# Patient Record
Sex: MALE | Race: WHITE | NOT HISPANIC OR LATINO
[De-identification: names, ages, dates, MRNs, and addresses within clinical notes are randomized per-mention and may not be internally consistent; named-entity substitution may affect disease eponyms.]

---

## 2017-05-03 ENCOUNTER — APPOINTMENT (OUTPATIENT)
Dept: VASCULAR SURGERY | Facility: CLINIC | Age: 67
End: 2017-05-03

## 2017-05-03 VITALS
SYSTOLIC BLOOD PRESSURE: 124 MMHG | HEART RATE: 73 BPM | BODY MASS INDEX: 13.72 KG/M2 | HEIGHT: 71 IN | DIASTOLIC BLOOD PRESSURE: 63 MMHG | WEIGHT: 98 LBS

## 2017-05-26 ENCOUNTER — MEDICATION RENEWAL (OUTPATIENT)
Age: 67
End: 2017-05-26

## 2017-11-01 ENCOUNTER — APPOINTMENT (OUTPATIENT)
Dept: VASCULAR SURGERY | Facility: CLINIC | Age: 67
End: 2017-11-01
Payer: MEDICARE

## 2017-11-01 VITALS
BODY MASS INDEX: 26.18 KG/M2 | SYSTOLIC BLOOD PRESSURE: 157 MMHG | WEIGHT: 187 LBS | DIASTOLIC BLOOD PRESSURE: 67 MMHG | TEMPERATURE: 98.3 F | HEART RATE: 68 BPM | HEIGHT: 71 IN

## 2017-11-01 PROCEDURE — 99213 OFFICE O/P EST LOW 20 MIN: CPT

## 2017-12-07 ENCOUNTER — MEDICATION RENEWAL (OUTPATIENT)
Age: 67
End: 2017-12-07

## 2018-05-02 ENCOUNTER — APPOINTMENT (OUTPATIENT)
Dept: VASCULAR SURGERY | Facility: CLINIC | Age: 68
End: 2018-05-02

## 2018-05-08 ENCOUNTER — APPOINTMENT (OUTPATIENT)
Dept: VASCULAR SURGERY | Facility: CLINIC | Age: 68
End: 2018-05-08
Payer: MEDICARE

## 2018-05-08 VITALS
WEIGHT: 180 LBS | DIASTOLIC BLOOD PRESSURE: 82 MMHG | TEMPERATURE: 98.8 F | SYSTOLIC BLOOD PRESSURE: 132 MMHG | BODY MASS INDEX: 25.2 KG/M2 | HEART RATE: 71 BPM | HEIGHT: 71 IN

## 2018-05-08 PROCEDURE — 99213 OFFICE O/P EST LOW 20 MIN: CPT

## 2018-10-12 ENCOUNTER — OUTPATIENT (OUTPATIENT)
Dept: OUTPATIENT SERVICES | Facility: HOSPITAL | Age: 68
LOS: 1 days | End: 2018-10-12
Payer: MEDICARE

## 2018-10-12 ENCOUNTER — TRANSCRIPTION ENCOUNTER (OUTPATIENT)
Age: 68
End: 2018-10-12

## 2018-10-12 ENCOUNTER — APPOINTMENT (OUTPATIENT)
Dept: CARDIOLOGY | Facility: CLINIC | Age: 68
End: 2018-10-12
Payer: MEDICARE

## 2018-10-12 VITALS
DIASTOLIC BLOOD PRESSURE: 76 MMHG | RESPIRATION RATE: 16 BRPM | HEART RATE: 56 BPM | WEIGHT: 184.97 LBS | HEIGHT: 71 IN | OXYGEN SATURATION: 98 % | TEMPERATURE: 98 F | SYSTOLIC BLOOD PRESSURE: 176 MMHG

## 2018-10-12 VITALS — DIASTOLIC BLOOD PRESSURE: 70 MMHG | SYSTOLIC BLOOD PRESSURE: 120 MMHG | HEART RATE: 75 BPM

## 2018-10-12 DIAGNOSIS — Z98.890 OTHER SPECIFIED POSTPROCEDURAL STATES: Chronic | ICD-10-CM

## 2018-10-12 DIAGNOSIS — I20.0 UNSTABLE ANGINA: ICD-10-CM

## 2018-10-12 LAB
ALBUMIN SERPL ELPH-MCNC: 4.7 G/DL — SIGNIFICANT CHANGE UP (ref 3.3–5)
ALP SERPL-CCNC: 81 U/L — SIGNIFICANT CHANGE UP (ref 40–120)
ALT FLD-CCNC: 28 U/L — SIGNIFICANT CHANGE UP (ref 10–45)
ANION GAP SERPL CALC-SCNC: 11 MMOL/L — SIGNIFICANT CHANGE UP (ref 5–17)
AST SERPL-CCNC: 23 U/L — SIGNIFICANT CHANGE UP (ref 10–40)
BILIRUB SERPL-MCNC: 0.4 MG/DL — SIGNIFICANT CHANGE UP (ref 0.2–1.2)
BUN SERPL-MCNC: 25 MG/DL — HIGH (ref 7–23)
CALCIUM SERPL-MCNC: 10.2 MG/DL — SIGNIFICANT CHANGE UP (ref 8.4–10.5)
CHLORIDE SERPL-SCNC: 105 MMOL/L — SIGNIFICANT CHANGE UP (ref 96–108)
CO2 SERPL-SCNC: 28 MMOL/L — SIGNIFICANT CHANGE UP (ref 22–31)
CREAT SERPL-MCNC: 1.1 MG/DL — SIGNIFICANT CHANGE UP (ref 0.5–1.3)
GLUCOSE BLDC GLUCOMTR-MCNC: 103 MG/DL — HIGH (ref 70–99)
GLUCOSE BLDC GLUCOMTR-MCNC: 231 MG/DL — HIGH (ref 70–99)
GLUCOSE SERPL-MCNC: 110 MG/DL — HIGH (ref 70–99)
HCT VFR BLD CALC: 41.6 % — SIGNIFICANT CHANGE UP (ref 39–50)
HGB BLD-MCNC: 14 G/DL — SIGNIFICANT CHANGE UP (ref 13–17)
MCHC RBC-ENTMCNC: 31.7 PG — SIGNIFICANT CHANGE UP (ref 27–34)
MCHC RBC-ENTMCNC: 33.7 GM/DL — SIGNIFICANT CHANGE UP (ref 32–36)
MCV RBC AUTO: 94.2 FL — SIGNIFICANT CHANGE UP (ref 80–100)
PLATELET # BLD AUTO: 149 K/UL — LOW (ref 150–400)
POTASSIUM SERPL-MCNC: 5 MMOL/L — SIGNIFICANT CHANGE UP (ref 3.5–5.3)
POTASSIUM SERPL-SCNC: 5 MMOL/L — SIGNIFICANT CHANGE UP (ref 3.5–5.3)
PROT SERPL-MCNC: 7.3 G/DL — SIGNIFICANT CHANGE UP (ref 6–8.3)
RBC # BLD: 4.42 M/UL — SIGNIFICANT CHANGE UP (ref 4.2–5.8)
RBC # FLD: 13 % — SIGNIFICANT CHANGE UP (ref 10.3–14.5)
SODIUM SERPL-SCNC: 144 MMOL/L — SIGNIFICANT CHANGE UP (ref 135–145)
WBC # BLD: 7.8 K/UL — SIGNIFICANT CHANGE UP (ref 3.8–10.5)
WBC # FLD AUTO: 7.8 K/UL — SIGNIFICANT CHANGE UP (ref 3.8–10.5)

## 2018-10-12 PROCEDURE — 99205 OFFICE O/P NEW HI 60 MIN: CPT | Mod: PD

## 2018-10-12 RX ORDER — DULOXETINE HYDROCHLORIDE 30 MG/1
20 CAPSULE, DELAYED RELEASE ORAL DAILY
Qty: 0 | Refills: 0 | Status: DISCONTINUED | OUTPATIENT
Start: 2018-10-12 | End: 2018-10-13

## 2018-10-12 RX ORDER — LOSARTAN POTASSIUM 100 MG/1
50 TABLET, FILM COATED ORAL DAILY
Qty: 0 | Refills: 0 | Status: DISCONTINUED | OUTPATIENT
Start: 2018-10-12 | End: 2018-10-13

## 2018-10-12 RX ORDER — INSULIN LISPRO 100/ML
VIAL (ML) SUBCUTANEOUS
Qty: 0 | Refills: 0 | Status: DISCONTINUED | OUTPATIENT
Start: 2018-10-12 | End: 2018-10-13

## 2018-10-12 RX ORDER — SODIUM CHLORIDE 9 MG/ML
1000 INJECTION, SOLUTION INTRAVENOUS
Qty: 0 | Refills: 0 | Status: DISCONTINUED | OUTPATIENT
Start: 2018-10-12 | End: 2018-10-13

## 2018-10-12 RX ORDER — DEXTROSE 50 % IN WATER 50 %
25 SYRINGE (ML) INTRAVENOUS ONCE
Qty: 0 | Refills: 0 | Status: DISCONTINUED | OUTPATIENT
Start: 2018-10-12 | End: 2018-10-13

## 2018-10-12 RX ORDER — DEXTROSE 50 % IN WATER 50 %
15 SYRINGE (ML) INTRAVENOUS ONCE
Qty: 0 | Refills: 0 | Status: DISCONTINUED | OUTPATIENT
Start: 2018-10-12 | End: 2018-10-13

## 2018-10-12 RX ORDER — ASPIRIN/CALCIUM CARB/MAGNESIUM 324 MG
81 TABLET ORAL DAILY
Qty: 0 | Refills: 0 | Status: DISCONTINUED | OUTPATIENT
Start: 2018-10-13 | End: 2018-10-13

## 2018-10-12 RX ORDER — CLOPIDOGREL BISULFATE 75 MG/1
75 TABLET, FILM COATED ORAL DAILY
Qty: 0 | Refills: 0 | Status: DISCONTINUED | OUTPATIENT
Start: 2018-10-13 | End: 2018-10-13

## 2018-10-12 RX ORDER — INSULIN LISPRO 100/ML
VIAL (ML) SUBCUTANEOUS AT BEDTIME
Qty: 0 | Refills: 0 | Status: DISCONTINUED | OUTPATIENT
Start: 2018-10-12 | End: 2018-10-13

## 2018-10-12 RX ORDER — ATORVASTATIN CALCIUM 80 MG/1
80 TABLET, FILM COATED ORAL AT BEDTIME
Qty: 0 | Refills: 0 | Status: DISCONTINUED | OUTPATIENT
Start: 2018-10-12 | End: 2018-10-13

## 2018-10-12 RX ORDER — CLOPIDOGREL BISULFATE 75 MG/1
1 TABLET, FILM COATED ORAL
Qty: 30 | Refills: 11
Start: 2018-10-12 | End: 2019-10-06

## 2018-10-12 RX ORDER — DEXTROSE 50 % IN WATER 50 %
12.5 SYRINGE (ML) INTRAVENOUS ONCE
Qty: 0 | Refills: 0 | Status: DISCONTINUED | OUTPATIENT
Start: 2018-10-12 | End: 2018-10-13

## 2018-10-12 RX ORDER — METOPROLOL TARTRATE 50 MG
25 TABLET ORAL
Qty: 0 | Refills: 0 | Status: DISCONTINUED | OUTPATIENT
Start: 2018-10-12 | End: 2018-10-13

## 2018-10-12 RX ORDER — ASPIRIN/CALCIUM CARB/MAGNESIUM 324 MG
1 TABLET ORAL
Qty: 30 | Refills: 11
Start: 2018-10-12 | End: 2019-10-06

## 2018-10-12 RX ORDER — GLUCAGON INJECTION, SOLUTION 0.5 MG/.1ML
1 INJECTION, SOLUTION SUBCUTANEOUS ONCE
Qty: 0 | Refills: 0 | Status: DISCONTINUED | OUTPATIENT
Start: 2018-10-12 | End: 2018-10-13

## 2018-10-12 RX ADMIN — ATORVASTATIN CALCIUM 80 MILLIGRAM(S): 80 TABLET, FILM COATED ORAL at 21:12

## 2018-10-12 RX ADMIN — Medication 2: at 18:22

## 2018-10-12 RX ADMIN — Medication 25 MILLIGRAM(S): at 18:22

## 2018-10-12 NOTE — DISCHARGE NOTE ADULT - ADDITIONAL INSTRUCTIONS
Follow-up with your Cardiologist in 1-2 weeks   Do not stop you Aspirin or Plavix unless instructed to do so by your cardiologist.

## 2018-10-12 NOTE — H&P CARDIOLOGY - HISTORY OF PRESENT ILLNESS
69 y/o  male PMH HTN, HLD, DM2 (controlled), bladder cancer, anxiety, PAD, c/o intermittent chest heaviness, not exacerbated with exertion, over the past month. Denies associated dyspnea, dizziness, diaphoresis, nausea, or palpitations. Seen and evaluated by PMD today who referred him to cardiologist, Dr. Barney Medina. Referred for cardiac catheterization today.

## 2018-10-12 NOTE — DISCHARGE NOTE ADULT - HOSPITAL COURSE
69 y/o  male PMH HTN, HLD, DM2 (controlled), bladder cancer, anxiety, PAD, c/o intermittent chest heaviness, not exacerbated with exertion, over the past month. Denies associated dyspnea, dizziness, diaphoresis, nausea, or palpitations. Seen and evaluated by PMD today who referred him to cardiologist, Dr. Barney Medina. Referred for cardiac catheterization today. Pt is now s/p cardiac cath GELA x 1 mRCA via right radial artery access. Pt tolerated the procedure well, cardiac cath site benign. Post-procedure discharge instructions discussed and questions addressed.

## 2018-10-12 NOTE — H&P CARDIOLOGY - PSH
arthroscopy  right carol 2008  bladder surgery  2-000  H/O cardiac catheterization    Knee Surgery  1998

## 2018-10-12 NOTE — CHART NOTE - NSCHARTNOTEFT_GEN_A_CORE
Pt started on Tikosyn 500mcg received dose at 1500  repeat ECG at 1700 with HR 70 AF show QTc 545 with calculated QTc 480ms  baseline ECG on admission HR 76 with QTc 440ms  Reviewed with Dr. Sexton    Plan:  decrease Tikosyn from 500mcg to 250mcg  check ECG prior to next dose at 3am  if QTc stable then give lower dose.  Recheck ECG 2 hours post dose  Review with Dr. Hernandez, EP covering tomorrow     Maribel Rausch NP-c  Cardiology

## 2018-10-12 NOTE — DISCHARGE NOTE ADULT - PATIENT PORTAL LINK FT
You can access the [facility] Patient Portal, offered by [facility], by registering with the following website: [email or web address]

## 2018-10-12 NOTE — DISCHARGE NOTE ADULT - PLAN OF CARE
No heavy lifting or pushing/pulling with procedure arm for 2 weeks. No driving for 2 days. You may shower 24 hours following the procedure but avoid baths/swimming for 1 week. Check your wrist site for bleeding and/or swelling daily following procedure and call your doctor immediately if it occurs or if you experience increased pain at the site. Follow up with your cardiologist in 1-2 weeks. You may call [facility] if you have any questions/concerns regarding your procedure [phone]. Your LDL cholesterol will be less than 70mg/dL Continue with your cholesterol medications. Eat a heart healthy diet that is low in saturated fats and salt, and includes whole grains, fruits, vegetables and lean protein; exercise regularly (consult with your physician or cardiologist first); maintain a heart healthy weight. Continue to follow with your primary physician or cardiologist for treatment goals, continue medication, have liver function testing every 3 months as anti lipid medications can cause liver irritation. If you smoke - quit (A resource to help you stop smoking is the [facility] – phone number [phone].). Your blood pressure will be controlled. Continue with your blood pressure medications; eat a heart healthy diet with low salt diet; exercise regularly (consult with your physician or cardiologist first); maintain a heart healthy weight; if you smoke - quit (A resource to help you stop smoking is the [facility] – phone number [phone].); include healthy ways to manage stress. Continue to follow with your primary care physician or cardiologist. Your hemoglobin A1C will be between 7-8 Continue to follow with your primary care MD or your endocrinologist.  Follow a heart healthy diabetic diet. If you check your fingerstick glucose at home, call your MD if it is greater than 250mg/dL on 2 occasions or less than 100mg/dL on 2 occasions. Know signs of low blood sugar, such as: dizziness, shakiness, sweating, confusion, hunger, nervousness-drink 4 ounces apple juice if occurs and call your doctor. Know early signs of high blood sugar, such as: frequent urination, increased thirst, blurry vision, fatigue, headache - call your doctor if this occurs. Follow with other practitioners to care for your diabetes, such as ophthalmologist and podiatrist. Pt remains chest pain free and understands post cath discharge instructions

## 2018-10-12 NOTE — DISCHARGE NOTE ADULT - CARE PLAN
Principal Discharge DX:	CAD (coronary artery disease)  Goal:	Pt remains chest pain free and understands post cath discharge instructions  Assessment and plan of treatment:	No heavy lifting or pushing/pulling with procedure arm for 2 weeks. No driving for 2 days. You may shower 24 hours following the procedure but avoid baths/swimming for 1 week. Check your wrist site for bleeding and/or swelling daily following procedure and call your doctor immediately if it occurs or if you experience increased pain at the site. Follow up with your cardiologist in 1-2 weeks. You may call [facility] if you have any questions/concerns regarding your procedure [phone].  Secondary Diagnosis:	Hyperlipidemia  Goal:	Your LDL cholesterol will be less than 70mg/dL  Assessment and plan of treatment:	Continue with your cholesterol medications. Eat a heart healthy diet that is low in saturated fats and salt, and includes whole grains, fruits, vegetables and lean protein; exercise regularly (consult with your physician or cardiologist first); maintain a heart healthy weight. Continue to follow with your primary physician or cardiologist for treatment goals, continue medication, have liver function testing every 3 months as anti lipid medications can cause liver irritation. If you smoke - quit (A resource to help you stop smoking is the [facility] Tobacco [facility] – phone number [phone].).  Secondary Diagnosis:	HTN (hypertension)  Goal:	Your blood pressure will be controlled.  Assessment and plan of treatment:	Continue with your blood pressure medications; eat a heart healthy diet with low salt diet; exercise regularly (consult with your physician or cardiologist first); maintain a heart healthy weight; if you smoke - quit (A resource to help you stop smoking is the [facility] for Tobacco Control – phone number [phone].); include healthy ways to manage stress. Continue to follow with your primary care physician or cardiologist.  Secondary Diagnosis:	Diabetes  Goal:	Your hemoglobin A1C will be between 7-8  Assessment and plan of treatment:	Continue to follow with your primary care MD or your endocrinologist.  Follow a heart healthy diabetic diet. If you check your fingerstick glucose at home, call your MD if it is greater than 250mg/dL on 2 occasions or less than 100mg/dL on 2 occasions. Know signs of low blood sugar, such as: dizziness, shakiness, sweating, confusion, hunger, nervousness-drink 4 ounces apple juice if occurs and call your doctor. Know early signs of high blood sugar, such as: frequent urination, increased thirst, blurry vision, fatigue, headache - call your doctor if this occurs. Follow with other practitioners to care for your diabetes, such as ophthalmologist and podiatrist.

## 2018-10-12 NOTE — CHART NOTE - NSCHARTNOTEFT_GEN_A_CORE
Patient underwent a PCI procedure and is being admitted as they are at increased risk for major adverse cardiac and vascular events if discharged due to the following high risk characteristics:  admitted for UA

## 2018-10-12 NOTE — H&P CARDIOLOGY - PMH
Anxiety    Cancer of Bladder  2000  Diabetes    HTN (Hypertension)    Hyperlipidemia    Malignant neoplasm of urinary bladder, unspecified site    DOLORES on CPAP    PAD (peripheral artery disease)    Peripheral neuropathy    Rotator Cuff Disorder

## 2018-10-12 NOTE — DISCHARGE NOTE ADULT - MEDICATION SUMMARY - MEDICATIONS TO TAKE
I will START or STAY ON the medications listed below when I get home from the hospital:    Aspirin Enteric Coated 81 mg oral delayed release tablet  -- 1 tab(s) by mouth once a day  -- Indication: For TO KEEP STENT OPEN     losartan 50 mg oral tablet  -- 1 tab(s) by mouth once a day  -- Indication: For High blood pressure     Lyrica 100 mg oral capsule  -- 1 cap(s) by mouth once a day (at bedtime)  -- Indication: For neuropathy     DULoxetine 20 mg oral delayed release capsule  -- 1 cap(s) by mouth once a day  -- Indication: For depression     metFORMIN 500 mg oral tablet  -- 3 tab(s) by mouth once a day PM. RESTART ON 10/15/2018   -- Indication: For diabetes mellitus     metFORMIN 500 mg oral tablet  -- 1 tab(s) by mouth once a day AM. RESTART ON 10/15/2018   -- Indication: For diabetes mellitus     atorvastatin 80 mg oral tablet  -- 1 tab(s) by mouth once a day  -- Indication: For High cholesterol     clopidogrel 75 mg oral tablet  -- 1 tab(s) by mouth once a day  -- Indication: For TO KEEP STENT OPEN     metoprolol tartrate 25 mg oral tablet  -- 1 tab(s) by mouth 2 times a day  -- Indication: For High blood pressure

## 2018-10-13 VITALS
RESPIRATION RATE: 17 BRPM | SYSTOLIC BLOOD PRESSURE: 160 MMHG | OXYGEN SATURATION: 97 % | TEMPERATURE: 97 F | DIASTOLIC BLOOD PRESSURE: 70 MMHG | HEART RATE: 50 BPM

## 2018-10-13 LAB
ANION GAP SERPL CALC-SCNC: 11 MMOL/L — SIGNIFICANT CHANGE UP (ref 5–17)
BUN SERPL-MCNC: 20 MG/DL — SIGNIFICANT CHANGE UP (ref 7–23)
CALCIUM SERPL-MCNC: 9.8 MG/DL — SIGNIFICANT CHANGE UP (ref 8.4–10.5)
CHLORIDE SERPL-SCNC: 107 MMOL/L — SIGNIFICANT CHANGE UP (ref 96–108)
CO2 SERPL-SCNC: 26 MMOL/L — SIGNIFICANT CHANGE UP (ref 22–31)
CREAT SERPL-MCNC: 1.05 MG/DL — SIGNIFICANT CHANGE UP (ref 0.5–1.3)
GLUCOSE SERPL-MCNC: 76 MG/DL — SIGNIFICANT CHANGE UP (ref 70–99)
HBA1C BLD-MCNC: 6.2 % — HIGH (ref 4–5.6)
HCT VFR BLD CALC: 40.1 % — SIGNIFICANT CHANGE UP (ref 39–50)
HGB BLD-MCNC: 13.4 G/DL — SIGNIFICANT CHANGE UP (ref 13–17)
MCHC RBC-ENTMCNC: 31.5 PG — SIGNIFICANT CHANGE UP (ref 27–34)
MCHC RBC-ENTMCNC: 33.5 GM/DL — SIGNIFICANT CHANGE UP (ref 32–36)
MCV RBC AUTO: 93.9 FL — SIGNIFICANT CHANGE UP (ref 80–100)
PLATELET # BLD AUTO: 136 K/UL — LOW (ref 150–400)
POTASSIUM SERPL-MCNC: 4.2 MMOL/L — SIGNIFICANT CHANGE UP (ref 3.5–5.3)
POTASSIUM SERPL-SCNC: 4.2 MMOL/L — SIGNIFICANT CHANGE UP (ref 3.5–5.3)
RBC # BLD: 4.27 M/UL — SIGNIFICANT CHANGE UP (ref 4.2–5.8)
RBC # FLD: 13.1 % — SIGNIFICANT CHANGE UP (ref 10.3–14.5)
SODIUM SERPL-SCNC: 144 MMOL/L — SIGNIFICANT CHANGE UP (ref 135–145)
WBC # BLD: 6.7 K/UL — SIGNIFICANT CHANGE UP (ref 3.8–10.5)
WBC # FLD AUTO: 6.7 K/UL — SIGNIFICANT CHANGE UP (ref 3.8–10.5)

## 2018-10-13 PROCEDURE — 99152 MOD SED SAME PHYS/QHP 5/>YRS: CPT

## 2018-10-13 PROCEDURE — C1725: CPT

## 2018-10-13 PROCEDURE — C9600: CPT | Mod: RC

## 2018-10-13 PROCEDURE — 80053 COMPREHEN METABOLIC PANEL: CPT

## 2018-10-13 PROCEDURE — 85027 COMPLETE CBC AUTOMATED: CPT

## 2018-10-13 PROCEDURE — C1887: CPT

## 2018-10-13 PROCEDURE — 99153 MOD SED SAME PHYS/QHP EA: CPT

## 2018-10-13 PROCEDURE — C1874: CPT

## 2018-10-13 PROCEDURE — 93454 CORONARY ARTERY ANGIO S&I: CPT | Mod: 59

## 2018-10-13 PROCEDURE — C1769: CPT

## 2018-10-13 PROCEDURE — 82962 GLUCOSE BLOOD TEST: CPT

## 2018-10-13 PROCEDURE — 80048 BASIC METABOLIC PNL TOTAL CA: CPT

## 2018-10-13 PROCEDURE — 83036 HEMOGLOBIN GLYCOSYLATED A1C: CPT

## 2018-10-13 PROCEDURE — 93005 ELECTROCARDIOGRAM TRACING: CPT

## 2018-10-13 RX ADMIN — CLOPIDOGREL BISULFATE 75 MILLIGRAM(S): 75 TABLET, FILM COATED ORAL at 05:19

## 2018-10-13 RX ADMIN — LOSARTAN POTASSIUM 50 MILLIGRAM(S): 100 TABLET, FILM COATED ORAL at 05:19

## 2018-10-13 RX ADMIN — Medication 81 MILLIGRAM(S): at 05:19

## 2018-10-13 RX ADMIN — DULOXETINE HYDROCHLORIDE 20 MILLIGRAM(S): 30 CAPSULE, DELAYED RELEASE ORAL at 05:19

## 2018-10-13 NOTE — PROGRESS NOTE ADULT - SUBJECTIVE AND OBJECTIVE BOX
68y old  Male who presents with CAD (12 Oct 2018 20:06) now s/p cardiac cath GELA x 1 mRCA via right radial artery access.       Ceclor (Other)          Medications:  aspirin enteric coated 81 milliGRAM(s) Oral daily  atorvastatin 80 milliGRAM(s) Oral at bedtime  clopidogrel Tablet 75 milliGRAM(s) Oral daily  dextrose 40% Gel 15 Gram(s) Oral once PRN  dextrose 5%. 1000 milliLiter(s) IV Continuous <Continuous>  dextrose 50% Injectable 12.5 Gram(s) IV Push once  dextrose 50% Injectable 25 Gram(s) IV Push once  dextrose 50% Injectable 25 Gram(s) IV Push once  DULoxetine 20 milliGRAM(s) Oral daily  glucagon  Injectable 1 milliGRAM(s) IntraMuscular once PRN  insulin lispro (HumaLOG) corrective regimen sliding scale   SubCutaneous three times a day before meals  insulin lispro (HumaLOG) corrective regimen sliding scale   SubCutaneous at bedtime  losartan 50 milliGRAM(s) Oral daily  metoprolol tartrate 25 milliGRAM(s) Oral two times a day  pregabalin 100 milliGRAM(s) Oral at bedtime      Vitals:  T(C): 36.7 (10-12-18 @ 20:15), Max: 36.9 (10-12-18 @ 12:52)  HR: 51 (10-12-18 @ 20:15) (51 - 66)  BP: 149/67 (10-12-18 @ 20:15) (107/85 - 176/76)  BP(mean): 109 (10-12-18 @ 12:52) (109 - 109)  RR: 13 (10-12-18 @ 20:15) (13 - 17)  SpO2: 95% (10-12-18 @ 20:15) (95% - 100%)  Wt(kg): --  Daily Height in cm: 180.34 (12 Oct 2018 12:52)    Daily Weight in k.9 (12 Oct 2018 12:52)  I&O's Summary        Physical Exam:  Appearance: Normal  HENT: Normal oral muscosa, NC/AT  Cardiovascular: S1S2, RRR, No M/R/G, no JVD, No Lower extremity edema  Procedural Access Site: Right radial artery access. No hematoma, Non-tender to palpation, 2+ pulse, No bruit, No Ecchymosis  Respiratory: Clear to auscultation bilaterally  Gastrointestinal: Soft, Non tender, Normal Bowel Sounds  Musculoskeletal: No clubbing, No joint deformity   Neurologic: Non-focal    Psychiatry: AAOx3, Mood & affect appropriate  Skin: No rashes, No ecchymoses, No cyanosis    10-13    144  |  107  |  20  ----------------------------<  76  4.2   |  26  |  1.05    Ca    9.8      13 Oct 2018 00:30    TPro  7.3  /  Alb  4.7  /  TBili  0.4  /  DBili  x   /  AST  23  /  ALT  28  /  AlkPhos  81  10-12      Interpretation of Telemetry:    ASSESSMENT/PLAN   68y old  Male who presents with CAD (12 Oct 2018 20:06) now s/p cardiac cath GELA x 1 mRCA via right radial artery access. Pt tolerated the procedure well, cardiac cath site benign. Overnight remained uneventful. Denies SOB, CP or dizziness. Post-procedure discharge instructions discussed ans questions addressed 68y old  Male who presents with CAD (12 Oct 2018 20:06) now s/p cardiac cath GELA x 1 mRCA via right radial artery access.             Medications:  aspirin enteric coated 81 milliGRAM(s) Oral daily  atorvastatin 80 milliGRAM(s) Oral at bedtime  clopidogrel Tablet 75 milliGRAM(s) Oral daily  dextrose 40% Gel 15 Gram(s) Oral once PRN  dextrose 5%. 1000 milliLiter(s) IV Continuous <Continuous>  dextrose 50% Injectable 12.5 Gram(s) IV Push once  dextrose 50% Injectable 25 Gram(s) IV Push once  dextrose 50% Injectable 25 Gram(s) IV Push once  DULoxetine 20 milliGRAM(s) Oral daily  glucagon  Injectable 1 milliGRAM(s) IntraMuscular once PRN  insulin lispro (HumaLOG) corrective regimen sliding scale   SubCutaneous three times a day before meals  insulin lispro (HumaLOG) corrective regimen sliding scale   SubCutaneous at bedtime  losartan 50 milliGRAM(s) Oral daily  metoprolol tartrate 25 milliGRAM(s) Oral two times a day  pregabalin 100 milliGRAM(s) Oral at bedtime      Vitals:  T(C): 36.7 (10-12-18 @ 20:15), Max: 36.9 (10-12-18 @ 12:52)  HR: 51 (10-12-18 @ 20:15) (51 - 66)  BP: 149/67 (10-12-18 @ 20:15) (107/85 - 176/76)  BP(mean): 109 (10-12-18 @ 12:52) (109 - 109)  RR: 13 (10-12-18 @ 20:15) (13 - 17)  SpO2: 95% (10-12-18 @ 20:15) (95% - 100%)  Wt(kg): --  Daily Height in cm: 180.34 (12 Oct 2018 12:52)    Daily Weight in k.9 (12 Oct 2018 12:52)  I&O's Summary        Physical Exam:  Appearance: Normal  HENT: Normal oral muscosa, NC/AT  Cardiovascular: S1S2, RRR, No M/R/G, no JVD, No Lower extremity edema  Procedural Access Site: Right radial artery access. No hematoma, Non-tender to palpation, 2+ pulse, No bruit, No Ecchymosis  Respiratory: Clear to auscultation bilaterally  Gastrointestinal: Soft, Non tender, Normal Bowel Sounds  Musculoskeletal: No clubbing, No joint deformity   Neurologic: Non-focal    Psychiatry: AAOx3, Mood & affect appropriate  Skin: No rashes, No ecchymoses, No cyanosis    10-13    144  |  107  |  20  ----------------------------<  76  4.2   |  26  |  1.05    Ca    9.8      13 Oct 2018 00:30    TPro  7.3  /  Alb  4.7  /  TBili  0.4  /  DBili  x   /  AST  23  /  ALT  28  /  AlkPhos  81  10-12      Interpretation of Telemetry: SB/SR 45-70bpm     ASSESSMENT/PLAN   68y old  Male who presents with CAD (12 Oct 2018 20:06) now s/p cardiac cath GELA x 1 mRCA via right radial artery access. Pt tolerated the procedure well, cardiac cath site benign. Overnight remained uneventful. Denies SOB, CP or dizziness. Post-procedure discharge instructions discussed ans questions addressed

## 2018-10-16 RX ORDER — METFORMIN HYDROCHLORIDE 850 MG/1
3 TABLET ORAL
Qty: 0 | Refills: 0 | COMMUNITY

## 2018-10-16 RX ORDER — ASPIRIN/CALCIUM CARB/MAGNESIUM 324 MG
1 TABLET ORAL
Qty: 0 | Refills: 0 | COMMUNITY

## 2018-10-16 RX ORDER — CILOSTAZOL 100 MG/1
1 TABLET ORAL
Qty: 0 | Refills: 0 | COMMUNITY

## 2018-10-16 RX ORDER — METFORMIN HYDROCHLORIDE 850 MG/1
1 TABLET ORAL
Qty: 0 | Refills: 0 | COMMUNITY

## 2018-10-19 PROBLEM — C67.9 MALIGNANT NEOPLASM OF BLADDER, UNSPECIFIED: Chronic | Status: ACTIVE | Noted: 2018-10-12

## 2018-10-19 PROBLEM — I73.9 PERIPHERAL VASCULAR DISEASE, UNSPECIFIED: Chronic | Status: ACTIVE | Noted: 2018-10-12

## 2018-10-19 PROBLEM — G62.9 POLYNEUROPATHY, UNSPECIFIED: Chronic | Status: ACTIVE | Noted: 2018-10-12

## 2018-10-23 ENCOUNTER — APPOINTMENT (OUTPATIENT)
Dept: CARDIOLOGY | Facility: CLINIC | Age: 68
End: 2018-10-23

## 2018-10-24 ENCOUNTER — APPOINTMENT (OUTPATIENT)
Dept: CARDIOLOGY | Facility: CLINIC | Age: 68
End: 2018-10-24

## 2018-10-29 ENCOUNTER — APPOINTMENT (OUTPATIENT)
Dept: CARDIOLOGY | Facility: CLINIC | Age: 68
End: 2018-10-29
Payer: MEDICARE

## 2018-10-29 VITALS — DIASTOLIC BLOOD PRESSURE: 64 MMHG | SYSTOLIC BLOOD PRESSURE: 132 MMHG

## 2018-10-29 PROCEDURE — 99214 OFFICE O/P EST MOD 30 MIN: CPT

## 2018-10-29 RX ORDER — CLOPIDOGREL BISULFATE 75 MG/1
75 TABLET, FILM COATED ORAL DAILY
Qty: 90 | Refills: 2 | Status: ACTIVE | COMMUNITY
Start: 2018-10-29 | End: 1900-01-01

## 2018-10-31 ENCOUNTER — APPOINTMENT (OUTPATIENT)
Dept: CARDIOLOGY | Facility: CLINIC | Age: 68
End: 2018-10-31

## 2018-11-07 ENCOUNTER — APPOINTMENT (OUTPATIENT)
Dept: VASCULAR SURGERY | Facility: CLINIC | Age: 68
End: 2018-11-07
Payer: MEDICARE

## 2018-11-07 VITALS
WEIGHT: 184 LBS | TEMPERATURE: 99 F | HEART RATE: 61 BPM | SYSTOLIC BLOOD PRESSURE: 138 MMHG | HEIGHT: 71 IN | BODY MASS INDEX: 25.76 KG/M2 | DIASTOLIC BLOOD PRESSURE: 68 MMHG

## 2018-11-07 PROCEDURE — 99213 OFFICE O/P EST LOW 20 MIN: CPT

## 2018-11-20 ENCOUNTER — APPOINTMENT (OUTPATIENT)
Dept: INTERNAL MEDICINE | Facility: CLINIC | Age: 68
End: 2018-11-20
Payer: MEDICARE

## 2018-11-20 VITALS — SYSTOLIC BLOOD PRESSURE: 156 MMHG | DIASTOLIC BLOOD PRESSURE: 80 MMHG | HEART RATE: 72 BPM

## 2018-11-20 DIAGNOSIS — M79.604 PAIN IN RIGHT LEG: ICD-10-CM

## 2018-11-20 DIAGNOSIS — L85.3 XEROSIS CUTIS: ICD-10-CM

## 2018-11-20 PROCEDURE — 99214 OFFICE O/P EST MOD 30 MIN: CPT

## 2018-11-20 RX ORDER — CILOSTAZOL 100 MG/1
100 TABLET ORAL TWICE DAILY
Qty: 60 | Refills: 6 | Status: DISCONTINUED | COMMUNITY
Start: 2017-05-03 | End: 2018-11-20

## 2018-11-20 NOTE — HISTORY OF PRESENT ILLNESS
[Coronary Artery Disease] : Coronary Artery Disease [Depression] : Depression [Diabetes Mellitus] : Diabetes Mellitus [Hyperlipidemia] : Hyperlipidemia [Hypertension] : Hypertension [No episodes] : No hypoglycemic episodes since the last visit. [Does not check BP] : The patient is not checking blood pressure [<130/90] : Target blood pressure is  <130/90 [High Intensity therapy] : Patient is currently on high intensity statin therapy [Well Controlled] : Overall status has been well controlled

## 2019-01-03 RX ORDER — PAROXETINE HYDROCHLORIDE 20 MG/1
20 TABLET, FILM COATED ORAL DAILY
Refills: 0 | Status: DISCONTINUED | COMMUNITY
Start: 2018-11-20 | End: 2019-01-03

## 2019-01-14 ENCOUNTER — APPOINTMENT (OUTPATIENT)
Dept: VASCULAR SURGERY | Facility: CLINIC | Age: 69
End: 2019-01-14
Payer: MEDICARE

## 2019-01-14 VITALS
HEART RATE: 51 BPM | WEIGHT: 178 LBS | SYSTOLIC BLOOD PRESSURE: 131 MMHG | HEIGHT: 71 IN | DIASTOLIC BLOOD PRESSURE: 69 MMHG | BODY MASS INDEX: 24.92 KG/M2

## 2019-01-14 PROCEDURE — 99213 OFFICE O/P EST LOW 20 MIN: CPT

## 2019-01-15 ENCOUNTER — RX RENEWAL (OUTPATIENT)
Age: 69
End: 2019-01-15

## 2019-01-28 ENCOUNTER — APPOINTMENT (OUTPATIENT)
Dept: CARDIOLOGY | Facility: CLINIC | Age: 69
End: 2019-01-28

## 2019-04-09 ENCOUNTER — APPOINTMENT (OUTPATIENT)
Dept: VASCULAR SURGERY | Facility: CLINIC | Age: 69
End: 2019-04-09
Payer: MEDICARE

## 2019-04-09 VITALS
BODY MASS INDEX: 25.9 KG/M2 | HEIGHT: 71 IN | HEART RATE: 56 BPM | DIASTOLIC BLOOD PRESSURE: 58 MMHG | OXYGEN SATURATION: 98 % | TEMPERATURE: 98.2 F | SYSTOLIC BLOOD PRESSURE: 131 MMHG | WEIGHT: 185 LBS

## 2019-04-09 PROCEDURE — 99213 OFFICE O/P EST LOW 20 MIN: CPT

## 2019-05-07 ENCOUNTER — APPOINTMENT (OUTPATIENT)
Dept: VASCULAR SURGERY | Facility: CLINIC | Age: 69
End: 2019-05-07
Payer: MEDICARE

## 2019-05-07 VITALS
DIASTOLIC BLOOD PRESSURE: 54 MMHG | OXYGEN SATURATION: 98 % | WEIGHT: 185 LBS | SYSTOLIC BLOOD PRESSURE: 138 MMHG | HEART RATE: 65 BPM | HEIGHT: 71 IN | TEMPERATURE: 98.2 F | BODY MASS INDEX: 25.9 KG/M2

## 2019-05-07 PROCEDURE — 99213 OFFICE O/P EST LOW 20 MIN: CPT

## 2019-06-11 ENCOUNTER — APPOINTMENT (OUTPATIENT)
Dept: VASCULAR SURGERY | Facility: CLINIC | Age: 69
End: 2019-06-11
Payer: MEDICARE

## 2019-06-11 VITALS
HEIGHT: 71 IN | DIASTOLIC BLOOD PRESSURE: 77 MMHG | BODY MASS INDEX: 25.9 KG/M2 | OXYGEN SATURATION: 97 % | TEMPERATURE: 98.1 F | SYSTOLIC BLOOD PRESSURE: 136 MMHG | WEIGHT: 185 LBS | HEART RATE: 64 BPM

## 2019-06-11 PROCEDURE — 99213 OFFICE O/P EST LOW 20 MIN: CPT

## 2019-07-17 ENCOUNTER — APPOINTMENT (OUTPATIENT)
Dept: CARDIOLOGY | Facility: CLINIC | Age: 69
End: 2019-07-17
Payer: MEDICARE

## 2019-07-17 VITALS
SYSTOLIC BLOOD PRESSURE: 126 MMHG | WEIGHT: 180 LBS | DIASTOLIC BLOOD PRESSURE: 62 MMHG | BODY MASS INDEX: 25.11 KG/M2 | HEART RATE: 60 BPM

## 2019-07-17 PROCEDURE — 99214 OFFICE O/P EST MOD 30 MIN: CPT

## 2019-07-17 NOTE — REVIEW OF SYSTEMS
[Leg Claudication] : intermittent leg claudication [Fever] : no fever [Chills] : no chills [Shortness Of Breath] : no shortness of breath [Dyspnea on exertion] : not dyspnea during exertion [Chest  Pressure] : no chest pressure [Chest Pain] : no chest pain [Cough] : no cough [Abdominal Pain] : no abdominal pain [Heartburn] : no heartburn

## 2019-07-17 NOTE — DISCUSSION/SUMMARY
[FreeTextEntry1] : 69M with CAD s/p PCI, PAD, DM, HTN, HLD. Pending MRI \par \par 1. CAD: Cont with asa, plavix, lipitor daily.  Importance of med compliance stressed with patient. Tolerating without bleeding. [facility]par \par 2. PAD: On DAPT and lipitor. Trental for pain.  Decreasing exercise tolerance. Following with vascualr surgery.  He might be a candidate for revascularization, would repeat LE arterial duplex. Continued exercise to build up tolerance. Pending MRI of lumbar spine, continue neuro follow up\par \par 3. HTN: Well controlled, cont current medications\par \par 4. HLD:  On statin, [facility]\par \par 5. DM: Care with PCP\par \par No cardiac contraindications to proceeding with planned MRI of the lumbar spine. ([phone])

## 2019-07-17 NOTE — PHYSICAL EXAM
[General Appearance - Well Developed] : well developed [Normal Appearance] : normal appearance [Well Groomed] : well groomed [General Appearance - Well Nourished] : well nourished [No Deformities] : no deformities [General Appearance - In No Acute Distress] : no acute distress [Normal Conjunctiva] : the conjunctiva exhibited no abnormalities [Eyelids - No Xanthelasma] : the eyelids demonstrated no xanthelasmas [Normal Oral Mucosa] : normal oral mucosa [No Oral Pallor] : no oral pallor [No Oral Cyanosis] : no oral cyanosis [Normal Jugular Venous A Waves Present] : normal jugular venous A waves present [Normal Jugular Venous V Waves Present] : normal jugular venous V waves present [No Jugular Venous Stuart A Waves] : no jugular venous stuart A waves [Respiration, Rhythm And Depth] : normal respiratory rhythm and effort [Exaggerated Use Of Accessory Muscles For Inspiration] : no accessory muscle use [Auscultation Breath Sounds / Voice Sounds] : lungs were clear to auscultation bilaterally [Heart Rate And Rhythm] : heart rate and rhythm were normal [Heart Sounds] : normal S1 and S2 [Murmurs] : no murmurs present [Edema] : no peripheral edema present [Abdomen Soft] : soft [Abdomen Tenderness] : non-tender [Abdomen Mass (___ Cm)] : no abdominal mass palpated [Abnormal Walk] : normal gait [Gait - Sufficient For Exercise Testing] : the gait was sufficient for exercise testing [Nail Clubbing] : no clubbing of the fingernails [Cyanosis, Localized] : no localized cyanosis [Petechial Hemorrhages (___cm)] : no petechial hemorrhages [Skin Color & Pigmentation] : normal skin color and pigmentation [] : no rash [No Venous Stasis] : no venous stasis [Skin Lesions] : no skin lesions [No Skin Ulcers] : no skin ulcer [No Xanthoma] : no  xanthoma was observed [Oriented To Time, Place, And Person] : oriented to person, place, and time [Affect] : the affect was normal [Mood] : the mood was normal [No Anxiety] : not feeling anxious [FreeTextEntry1] : +RRA pulse

## 2019-07-24 ENCOUNTER — APPOINTMENT (OUTPATIENT)
Dept: INTERNAL MEDICINE | Facility: CLINIC | Age: 69
End: 2019-07-24
Payer: MEDICARE

## 2019-07-24 LAB
BASOPHILS # BLD AUTO: 0.02 K/UL
BASOPHILS NFR BLD AUTO: 0.3 %
EOSINOPHIL # BLD AUTO: 0.13 K/UL
EOSINOPHIL NFR BLD AUTO: 2 %
HCT VFR BLD CALC: 40.8 %
HGB BLD-MCNC: 13 G/DL
IMM GRANULOCYTES NFR BLD AUTO: 0.5 %
LYMPHOCYTES # BLD AUTO: 1.39 K/UL
LYMPHOCYTES NFR BLD AUTO: 21.8 %
MAN DIFF?: NORMAL
MCHC RBC-ENTMCNC: 31.2 PG
MCHC RBC-ENTMCNC: 31.9 GM/DL
MCV RBC AUTO: 97.8 FL
MONOCYTES # BLD AUTO: 0.39 K/UL
MONOCYTES NFR BLD AUTO: 6.1 %
NEUTROPHILS # BLD AUTO: 4.42 K/UL
NEUTROPHILS NFR BLD AUTO: 69.3 %
PLATELET # BLD AUTO: 126 K/UL
RBC # BLD: 4.17 M/UL
RBC # FLD: 13.1 %
WBC # FLD AUTO: 6.38 K/UL

## 2019-07-24 PROCEDURE — 36415 COLL VENOUS BLD VENIPUNCTURE: CPT

## 2019-07-25 LAB
ALBUMIN SERPL ELPH-MCNC: 4.6 G/DL
ALP BLD-CCNC: 77 U/L
ALT SERPL-CCNC: 22 U/L
ANION GAP SERPL CALC-SCNC: 10 MMOL/L
AST SERPL-CCNC: 20 U/L
BILIRUB SERPL-MCNC: 0.3 MG/DL
BUN SERPL-MCNC: 24 MG/DL
CALCIUM SERPL-MCNC: 9.9 MG/DL
CHLORIDE SERPL-SCNC: 108 MMOL/L
CHOLEST SERPL-MCNC: 125 MG/DL
CHOLEST/HDLC SERPL: 3.3 RATIO
CK SERPL-CCNC: 92 U/L
CO2 SERPL-SCNC: 27 MMOL/L
CREAT SERPL-MCNC: 0.97 MG/DL
ERYTHROCYTE [SEDIMENTATION RATE] IN BLOOD BY WESTERGREN METHOD: 3 MM/HR
ESTIMATED AVERAGE GLUCOSE: 126 MG/DL
GGT SERPL-CCNC: 14 U/L
GLUCOSE SERPL-MCNC: 124 MG/DL
HBA1C MFR BLD HPLC: 6 %
HDLC SERPL-MCNC: 38 MG/DL
LDLC SERPL CALC-MCNC: 63 MG/DL
POTASSIUM SERPL-SCNC: 5.6 MMOL/L
PROT SERPL-MCNC: 6.5 G/DL
SODIUM SERPL-SCNC: 145 MMOL/L
T4 FREE SERPL-MCNC: 1 NG/DL
TRIGL SERPL-MCNC: 118 MG/DL
TSH SERPL-ACNC: 4.34 UIU/ML

## 2019-07-26 ENCOUNTER — APPOINTMENT (OUTPATIENT)
Dept: CARDIOLOGY | Facility: CLINIC | Age: 69
End: 2019-07-26

## 2019-07-26 LAB
ALBUMIN MFR SERPL ELPH: 64 %
ALBUMIN SERPL-MCNC: 4.2 G/DL
ALBUMIN/GLOB SERPL: 1.8 RATIO
ALPHA1 GLOB MFR SERPL ELPH: 3 %
ALPHA1 GLOB SERPL ELPH-MCNC: 0.2 G/DL
ALPHA2 GLOB MFR SERPL ELPH: 14.1 %
ALPHA2 GLOB SERPL ELPH-MCNC: 0.9 G/DL
B-GLOBULIN MFR SERPL ELPH: 11.5 %
B-GLOBULIN SERPL ELPH-MCNC: 0.7 G/DL
DEPRECATED KAPPA LC FREE/LAMBDA SER: 0.89 RATIO
GAMMA GLOB FLD ELPH-MCNC: 0.5 G/DL
GAMMA GLOB MFR SERPL ELPH: 7.4 %
IGA SER QL IEP: 152 MG/DL
IGG SER QL IEP: 469 MG/DL
IGM SER QL IEP: 44 MG/DL
INTERPRETATION SERPL IEP-IMP: NORMAL
KAPPA LC CSF-MCNC: 2.15 MG/DL
KAPPA LC SERPL-MCNC: 1.91 MG/DL
M PROTEIN SPEC IFE-MCNC: NORMAL
PROT SERPL-MCNC: 6.5 G/DL
PROT SERPL-MCNC: 6.5 G/DL

## 2019-08-01 LAB

## 2019-08-06 ENCOUNTER — APPOINTMENT (OUTPATIENT)
Dept: VASCULAR SURGERY | Facility: CLINIC | Age: 69
End: 2019-08-06
Payer: MEDICARE

## 2019-08-06 VITALS
SYSTOLIC BLOOD PRESSURE: 98 MMHG | WEIGHT: 180 LBS | TEMPERATURE: 98.2 F | BODY MASS INDEX: 25.2 KG/M2 | OXYGEN SATURATION: 97 % | HEART RATE: 76 BPM | HEIGHT: 71 IN | DIASTOLIC BLOOD PRESSURE: 54 MMHG

## 2019-08-06 PROCEDURE — 99214 OFFICE O/P EST MOD 30 MIN: CPT

## 2019-09-03 ENCOUNTER — APPOINTMENT (OUTPATIENT)
Dept: VASCULAR SURGERY | Facility: CLINIC | Age: 69
End: 2019-09-03
Payer: MEDICARE

## 2019-09-03 VITALS
SYSTOLIC BLOOD PRESSURE: 108 MMHG | TEMPERATURE: 98.2 F | DIASTOLIC BLOOD PRESSURE: 64 MMHG | HEIGHT: 71 IN | WEIGHT: 180 LBS | BODY MASS INDEX: 25.2 KG/M2 | OXYGEN SATURATION: 98 % | HEART RATE: 59 BPM

## 2019-09-03 PROCEDURE — 93923 UPR/LXTR ART STDY 3+ LVLS: CPT

## 2019-09-03 PROCEDURE — 99213 OFFICE O/P EST LOW 20 MIN: CPT

## 2019-09-13 ENCOUNTER — APPOINTMENT (OUTPATIENT)
Dept: INTERNAL MEDICINE | Facility: CLINIC | Age: 69
End: 2019-09-13
Payer: MEDICARE

## 2019-09-13 VITALS
WEIGHT: 180 LBS | OXYGEN SATURATION: 98 % | RESPIRATION RATE: 16 BRPM | BODY MASS INDEX: 25.2 KG/M2 | DIASTOLIC BLOOD PRESSURE: 65 MMHG | HEART RATE: 63 BPM | SYSTOLIC BLOOD PRESSURE: 111 MMHG | HEIGHT: 71 IN

## 2019-09-13 PROCEDURE — 99213 OFFICE O/P EST LOW 20 MIN: CPT

## 2019-10-04 ENCOUNTER — APPOINTMENT (OUTPATIENT)
Dept: CARDIOLOGY | Facility: CLINIC | Age: 69
End: 2019-10-04

## 2019-10-18 ENCOUNTER — APPOINTMENT (OUTPATIENT)
Dept: INTERNAL MEDICINE | Facility: CLINIC | Age: 69
End: 2019-10-18

## 2019-10-18 ENCOUNTER — RECORD ABSTRACTING (OUTPATIENT)
Age: 69
End: 2019-10-18

## 2019-10-18 DIAGNOSIS — R10.32 LEFT LOWER QUADRANT PAIN: ICD-10-CM

## 2019-10-18 DIAGNOSIS — G47.33 OBSTRUCTIVE SLEEP APNEA (ADULT) (PEDIATRIC): ICD-10-CM

## 2019-10-18 DIAGNOSIS — R14.3 FLATULENCE: ICD-10-CM

## 2019-10-18 DIAGNOSIS — Z87.09 PERSONAL HISTORY OF OTHER DISEASES OF THE RESPIRATORY SYSTEM: ICD-10-CM

## 2019-10-22 ENCOUNTER — APPOINTMENT (OUTPATIENT)
Dept: VASCULAR SURGERY | Facility: CLINIC | Age: 69
End: 2019-10-22
Payer: MEDICARE

## 2019-10-22 VITALS
HEART RATE: 74 BPM | DIASTOLIC BLOOD PRESSURE: 69 MMHG | HEIGHT: 71 IN | WEIGHT: 179 LBS | SYSTOLIC BLOOD PRESSURE: 123 MMHG | BODY MASS INDEX: 25.06 KG/M2 | TEMPERATURE: 98 F | OXYGEN SATURATION: 97 %

## 2019-10-22 PROCEDURE — 99213 OFFICE O/P EST LOW 20 MIN: CPT

## 2019-11-27 ENCOUNTER — APPOINTMENT (OUTPATIENT)
Dept: INTERNAL MEDICINE | Facility: CLINIC | Age: 69
End: 2019-11-27
Payer: MEDICARE

## 2019-11-27 VITALS
OXYGEN SATURATION: 95 % | SYSTOLIC BLOOD PRESSURE: 113 MMHG | HEART RATE: 62 BPM | RESPIRATION RATE: 16 BRPM | DIASTOLIC BLOOD PRESSURE: 70 MMHG | HEIGHT: 71 IN | WEIGHT: 185 LBS | BODY MASS INDEX: 25.9 KG/M2

## 2019-11-27 DIAGNOSIS — M79.671 PAIN IN RIGHT LEG: ICD-10-CM

## 2019-11-27 DIAGNOSIS — M79.672 PAIN IN RIGHT LEG: ICD-10-CM

## 2019-11-27 DIAGNOSIS — M79.605 PAIN IN RIGHT LEG: ICD-10-CM

## 2019-11-27 DIAGNOSIS — I20.0 UNSTABLE ANGINA: ICD-10-CM

## 2019-11-27 DIAGNOSIS — M79.604 PAIN IN RIGHT LEG: ICD-10-CM

## 2019-11-27 DIAGNOSIS — F32.9 MAJOR DEPRESSIVE DISORDER, SINGLE EPISODE, UNSPECIFIED: ICD-10-CM

## 2019-11-27 PROCEDURE — 99214 OFFICE O/P EST MOD 30 MIN: CPT

## 2019-11-27 NOTE — HISTORY OF PRESENT ILLNESS
[FreeTextEntry1] : Getting evaluation   neuro  [facility]\par ? gabapentin effect on cognition\par Being withdrawn\par \par No angina\par Adherent to meds\par W  helpful\par \par \par Walking disabilities  unchanged\par

## 2019-11-27 NOTE — ASSESSMENT
[FreeTextEntry1] : Mild cognitive dysfunction\par \par To continue evaluation by the [facility]\par \par No changes in meds for now \par Getting PT  to shoulders   had seen ortho\par     may ultimately be facing need for surgical intervention

## 2020-01-28 ENCOUNTER — APPOINTMENT (OUTPATIENT)
Dept: VASCULAR SURGERY | Facility: CLINIC | Age: 70
End: 2020-01-28
Payer: MEDICARE

## 2020-01-28 VITALS
DIASTOLIC BLOOD PRESSURE: 67 MMHG | BODY MASS INDEX: 24.57 KG/M2 | HEIGHT: 71 IN | SYSTOLIC BLOOD PRESSURE: 99 MMHG | WEIGHT: 175.5 LBS | TEMPERATURE: 97.9 F

## 2020-01-28 PROCEDURE — 99214 OFFICE O/P EST MOD 30 MIN: CPT

## 2020-02-11 ENCOUNTER — TRANSCRIPTION ENCOUNTER (OUTPATIENT)
Age: 70
End: 2020-02-11

## 2020-02-17 ENCOUNTER — TRANSCRIPTION ENCOUNTER (OUTPATIENT)
Age: 70
End: 2020-02-17

## 2020-02-24 ENCOUNTER — APPOINTMENT (OUTPATIENT)
Dept: INTERNAL MEDICINE | Facility: CLINIC | Age: 70
End: 2020-02-24
Payer: MEDICARE

## 2020-02-24 VITALS
HEART RATE: 93 BPM | HEIGHT: 71 IN | TEMPERATURE: 98 F | SYSTOLIC BLOOD PRESSURE: 102 MMHG | RESPIRATION RATE: 16 BRPM | BODY MASS INDEX: 24.92 KG/M2 | WEIGHT: 178 LBS | OXYGEN SATURATION: 97 % | DIASTOLIC BLOOD PRESSURE: 62 MMHG

## 2020-02-24 DIAGNOSIS — M10.9 GOUT, UNSPECIFIED: ICD-10-CM

## 2020-02-24 DIAGNOSIS — I73.9 PERIPHERAL VASCULAR DISEASE, UNSPECIFIED: ICD-10-CM

## 2020-02-24 PROCEDURE — 99213 OFFICE O/P EST LOW 20 MIN: CPT

## 2020-02-24 NOTE — PHYSICAL EXAM
[No Acute Distress] : no acute distress [Well Nourished] : well nourished [Well Developed] : well developed [Normal Sclera/Conjunctiva] : normal sclera/conjunctiva [Well-Appearing] : well-appearing [PERRL] : pupils equal round and reactive to light [EOMI] : extraocular movements intact [Normal Outer Ear/Nose] : the outer ears and nose were normal in appearance [Normal Oropharynx] : the oropharynx was normal [No JVD] : no jugular venous distention [No Lymphadenopathy] : no lymphadenopathy [Supple] : supple [Thyroid Normal, No Nodules] : the thyroid was normal and there were no nodules present [No Respiratory Distress] : no respiratory distress  [No Accessory Muscle Use] : no accessory muscle use [Clear to Auscultation] : lungs were clear to auscultation bilaterally [Normal Rate] : normal rate  [Regular Rhythm] : with a regular rhythm [Normal S1, S2] : normal S1 and S2 [No Murmur] : no murmur heard [No Abdominal Bruit] : a ~M bruit was not heard ~T in the abdomen [No Carotid Bruits] : no carotid bruits [No Varicosities] : no varicosities [Pedal Pulses Present] : the pedal pulses are present [No Edema] : there was no peripheral edema [No Palpable Aorta] : no palpable aorta [No Extremity Clubbing/Cyanosis] : no extremity clubbing/cyanosis [Soft] : abdomen soft [Non Tender] : non-tender [No Masses] : no abdominal mass palpated [Non-distended] : non-distended [No HSM] : no HSM [Normal Bowel Sounds] : normal bowel sounds [Normal Posterior Cervical Nodes] : no posterior cervical lymphadenopathy [Normal Anterior Cervical Nodes] : no anterior cervical lymphadenopathy [No CVA Tenderness] : no CVA  tenderness [No Joint Swelling] : no joint swelling [No Spinal Tenderness] : no spinal tenderness [Grossly Normal Strength/Tone] : grossly normal strength/tone [No Rash] : no rash [Normal Gait] : normal gait [Coordination Grossly Intact] : coordination grossly intact [No Focal Deficits] : no focal deficits [Deep Tendon Reflexes (DTR)] : deep tendon reflexes were 2+ and symmetric [Normal Affect] : the affect was normal [Normal Insight/Judgement] : insight and judgment were intact

## 2020-02-24 NOTE — HISTORY OF PRESENT ILLNESS
[FreeTextEntry1] : F/U\par \par Concomitant care   [facility]\par Eval there 2 m ago:  BT all acceptable\par \par Claudication   limiting\par Can go max 100 feet [de-identified] : Adherent to most med   -  Donepezil stopped  "made too sleepy"

## 2020-05-27 ENCOUNTER — APPOINTMENT (OUTPATIENT)
Dept: INTERNAL MEDICINE | Facility: CLINIC | Age: 70
End: 2020-05-27

## 2020-06-18 ENCOUNTER — NON-APPOINTMENT (OUTPATIENT)
Age: 70
End: 2020-06-18

## 2020-06-18 ENCOUNTER — APPOINTMENT (OUTPATIENT)
Dept: CARDIOLOGY | Facility: CLINIC | Age: 70
End: 2020-06-18
Payer: MEDICARE

## 2020-06-18 VITALS — SYSTOLIC BLOOD PRESSURE: 124 MMHG | DIASTOLIC BLOOD PRESSURE: 73 MMHG | HEART RATE: 91 BPM

## 2020-06-18 PROCEDURE — 93000 ELECTROCARDIOGRAM COMPLETE: CPT

## 2020-06-18 PROCEDURE — 99214 OFFICE O/P EST MOD 30 MIN: CPT

## 2020-06-18 RX ORDER — PENTOXIFYLLINE 400 MG/1
400 TABLET, EXTENDED RELEASE ORAL
Qty: 270 | Refills: 3 | Status: DISCONTINUED | COMMUNITY
Start: 2019-04-09 | End: 2020-06-18

## 2020-06-18 NOTE — HISTORY OF PRESENT ILLNESS
[FreeTextEntry1] : 69M with PAD and claudication, DM, HTN, HLD, CAD s/p PCI (RCA), former smoker who presents for follow up.\par \par Had been having bad claudication, went to see Dr. Bailey, had multiple stents placed to the legs bilaterally\par Notes continued claudication, no rest pain. As a result, he has been hesitant to walk due to the pain\par Early onset dementia, has been placed on Namenda. Decreased appetite, decreased desire to go out and walk.\par Denies chest pain, shortness of breath, dizziness, lightheadedness\par \par HISTORY:\par \par In October 2018, pt was seen in the office with worsening exertional chest pain, was sent for cardiac cath and was noted with 99% RCA occlusion s/p GELA.  \par \par ECG: SR, no ST-T wave changes\par Cath 2018 (RRA): 100% RCA, other arteries normal\par TTE 9/2017: Normal , EF 78%\par LE Duplex: Moderate fem-pop disease bilaterally\par NST (2009): Mild inferior ischemia, EF 58%\par \par Asa 81mg, Clopidogrel 75mg, Losartan 50mg, Metoprolol 12.5mg BID, atorvastatin

## 2020-06-18 NOTE — REVIEW OF SYSTEMS
[Fever] : no fever [Shortness Of Breath] : no shortness of breath [Chills] : no chills [Dyspnea on exertion] : not dyspnea during exertion [Chest Pain] : no chest pain [Chest  Pressure] : no chest pressure [Cough] : no cough [Abdominal Pain] : no abdominal pain [Leg Claudication] : intermittent leg claudication [Heartburn] : no heartburn

## 2020-06-18 NOTE — DISCUSSION/SUMMARY
[FreeTextEntry1] : 69M with CAD s/p PCI, PAD, DM, HTN, HLD\par \par 1. CAD: Cont with asa, plavix, lipitor daily.  Importance of med compliance stressed with patient. Tolerating without bleeding.\par \par 2. PAD: On DAPT and lipitor. There is a component of diabetic neuropathy as well. He is s/p stenting of both legs (report unavailable) with Dr. Bailey. Continued claudication. Dr. Leblanc had recommended exercise and deemed that further procedurals would be futile. He is seeing Dr. Bailey next week. Would continue to recommend gradual exercise to manage claudication \par \par 3. HTN: Well controlled, cont current medications\par \par 4. HLD:  On statin, [facility]\par \par 5. DM: Care with PCP\par \par Check ECG\par RV 6 months

## 2020-06-18 NOTE — PHYSICAL EXAM
[General Appearance - Well Developed] : well developed [General Appearance - Well Nourished] : well nourished [Well Groomed] : well groomed [Normal Appearance] : normal appearance [No Deformities] : no deformities [General Appearance - In No Acute Distress] : no acute distress [Eyelids - No Xanthelasma] : the eyelids demonstrated no xanthelasmas [Normal Oral Mucosa] : normal oral mucosa [Normal Conjunctiva] : the conjunctiva exhibited no abnormalities [No Oral Cyanosis] : no oral cyanosis [No Oral Pallor] : no oral pallor [Normal Jugular Venous A Waves Present] : normal jugular venous A waves present [Normal Jugular Venous V Waves Present] : normal jugular venous V waves present [No Jugular Venous Stuart A Waves] : no jugular venous stuart A waves [Exaggerated Use Of Accessory Muscles For Inspiration] : no accessory muscle use [Respiration, Rhythm And Depth] : normal respiratory rhythm and effort [Heart Rate And Rhythm] : heart rate and rhythm were normal [Auscultation Breath Sounds / Voice Sounds] : lungs were clear to auscultation bilaterally [Heart Sounds] : normal S1 and S2 [Murmurs] : no murmurs present [Edema] : no peripheral edema present [FreeTextEntry1] : Boot on L foot, pulses absent bilateral AT/DP  [Abdomen Soft] : soft [Abdomen Mass (___ Cm)] : no abdominal mass palpated [Abdomen Tenderness] : non-tender [Abnormal Walk] : normal gait [Gait - Sufficient For Exercise Testing] : the gait was sufficient for exercise testing [Nail Clubbing] : no clubbing of the fingernails [Petechial Hemorrhages (___cm)] : no petechial hemorrhages [Cyanosis, Localized] : no localized cyanosis [Skin Color & Pigmentation] : normal skin color and pigmentation [] : no rash [No Venous Stasis] : no venous stasis [Skin Lesions] : no skin lesions [No Skin Ulcers] : no skin ulcer [No Xanthoma] : no  xanthoma was observed [Mood] : the mood was normal [Affect] : the affect was normal [Oriented To Time, Place, And Person] : oriented to person, place, and time [No Anxiety] : not feeling anxious

## 2020-07-06 ENCOUNTER — APPOINTMENT (OUTPATIENT)
Dept: INTERNAL MEDICINE | Facility: CLINIC | Age: 70
End: 2020-07-06
Payer: MEDICARE

## 2020-07-06 VITALS
OXYGEN SATURATION: 96 % | TEMPERATURE: 98 F | DIASTOLIC BLOOD PRESSURE: 67 MMHG | HEART RATE: 92 BPM | HEIGHT: 71 IN | WEIGHT: 186 LBS | SYSTOLIC BLOOD PRESSURE: 125 MMHG | BODY MASS INDEX: 26.04 KG/M2

## 2020-07-06 DIAGNOSIS — R41.3 OTHER AMNESIA: ICD-10-CM

## 2020-07-06 PROCEDURE — 99214 OFFICE O/P EST MOD 30 MIN: CPT | Mod: 25

## 2020-07-06 PROCEDURE — 36415 COLL VENOUS BLD VENIPUNCTURE: CPT

## 2020-07-06 RX ORDER — CLOTRIMAZOLE AND BETAMETHASONE DIPROPIONATE 10; .5 MG/G; MG/G
1-0.05 CREAM TOPICAL TWICE DAILY
Qty: 1 | Refills: 3 | Status: DISCONTINUED | COMMUNITY
Start: 2019-01-15 | End: 2020-07-06

## 2020-07-06 RX ORDER — GABAPENTIN 300 MG/1
300 CAPSULE ORAL
Refills: 0 | Status: DISCONTINUED | COMMUNITY
Start: 2019-09-13 | End: 2020-07-06

## 2020-07-06 NOTE — HISTORY OF PRESENT ILLNESS
[FreeTextEntry1] : for f/u of medical problems  former pt of .  he taks all the same meds  and has no kirit effects diet re dm not that good  watches his salt  denies  headache dizziness as well as cp and sob  has foot ulcerations and is due for a vascular procedure  he sees stacey for  his memory wife says he wakes up at night at times

## 2020-07-07 LAB
ALBUMIN SERPL ELPH-MCNC: 4.7 G/DL
ALP BLD-CCNC: 87 U/L
ALT SERPL-CCNC: 22 U/L
ANION GAP SERPL CALC-SCNC: 14 MMOL/L
AST SERPL-CCNC: 19 U/L
BASOPHILS # BLD AUTO: 0.02 K/UL
BASOPHILS NFR BLD AUTO: 0.3 %
BILIRUB SERPL-MCNC: 0.6 MG/DL
BUN SERPL-MCNC: 25 MG/DL
CALCIUM SERPL-MCNC: 9.8 MG/DL
CHLORIDE SERPL-SCNC: 101 MMOL/L
CHOLEST SERPL-MCNC: 142 MG/DL
CHOLEST/HDLC SERPL: 3.6 RATIO
CO2 SERPL-SCNC: 25 MMOL/L
CREAT SERPL-MCNC: 1.25 MG/DL
EOSINOPHIL # BLD AUTO: 0.11 K/UL
EOSINOPHIL NFR BLD AUTO: 1.6 %
ESTIMATED AVERAGE GLUCOSE: 148 MG/DL
GLUCOSE SERPL-MCNC: 310 MG/DL
HBA1C MFR BLD HPLC: 6.8 %
HCT VFR BLD CALC: 41.4 %
HDLC SERPL-MCNC: 39 MG/DL
HGB BLD-MCNC: 12.7 G/DL
IMM GRANULOCYTES NFR BLD AUTO: 0.4 %
LDLC SERPL CALC-MCNC: 70 MG/DL
LYMPHOCYTES # BLD AUTO: 1.08 K/UL
LYMPHOCYTES NFR BLD AUTO: 15.5 %
MAN DIFF?: NORMAL
MCHC RBC-ENTMCNC: 30.7 GM/DL
MCHC RBC-ENTMCNC: 30.9 PG
MCV RBC AUTO: 100.7 FL
MONOCYTES # BLD AUTO: 0.32 K/UL
MONOCYTES NFR BLD AUTO: 4.6 %
NEUTROPHILS # BLD AUTO: 5.4 K/UL
NEUTROPHILS NFR BLD AUTO: 77.6 %
PLATELET # BLD AUTO: 166 K/UL
POTASSIUM SERPL-SCNC: 4.8 MMOL/L
PROT SERPL-MCNC: 6.5 G/DL
PSA SERPL-MCNC: 0.42 NG/ML
RBC # BLD: 4.11 M/UL
RBC # FLD: 13 %
SODIUM SERPL-SCNC: 140 MMOL/L
TRIGL SERPL-MCNC: 162 MG/DL
WBC # FLD AUTO: 6.96 K/UL

## 2020-08-04 ENCOUNTER — APPOINTMENT (OUTPATIENT)
Dept: VASCULAR SURGERY | Facility: CLINIC | Age: 70
End: 2020-08-04

## 2020-10-05 ENCOUNTER — APPOINTMENT (OUTPATIENT)
Dept: INTERNAL MEDICINE | Facility: CLINIC | Age: 70
End: 2020-10-05
Payer: MEDICARE

## 2020-10-05 VITALS
HEIGHT: 71 IN | OXYGEN SATURATION: 97 % | DIASTOLIC BLOOD PRESSURE: 73 MMHG | SYSTOLIC BLOOD PRESSURE: 127 MMHG | WEIGHT: 193 LBS | BODY MASS INDEX: 27.02 KG/M2 | HEART RATE: 75 BPM

## 2020-10-05 VITALS — DIASTOLIC BLOOD PRESSURE: 65 MMHG | SYSTOLIC BLOOD PRESSURE: 115 MMHG

## 2020-10-05 DIAGNOSIS — Z23 ENCOUNTER FOR IMMUNIZATION: ICD-10-CM

## 2020-10-05 PROCEDURE — G0008: CPT

## 2020-10-05 PROCEDURE — 36415 COLL VENOUS BLD VENIPUNCTURE: CPT

## 2020-10-05 PROCEDURE — 90662 IIV NO PRSV INCREASED AG IM: CPT

## 2020-10-05 PROCEDURE — 99214 OFFICE O/P EST MOD 30 MIN: CPT | Mod: 25

## 2020-10-05 RX ORDER — RIVASTIGMINE 13.3 MG/24H
13.3 PATCH, EXTENDED RELEASE TRANSDERMAL
Refills: 0 | Status: ACTIVE | COMMUNITY
Start: 2020-10-05

## 2020-10-05 NOTE — HISTORY OF PRESENT ILLNESS
[FreeTextEntry1] : for f/u  cad  dm  and dementia.  wife says jimmy has good and bad days  saw stacey and put on rivastigmaine.  he is exercising and has no cardiopulm sx    he does some exercise  he has been on  the added glipizide  he still  eats sweets

## 2020-10-06 LAB
ALBUMIN SERPL ELPH-MCNC: 4.4 G/DL
ALP BLD-CCNC: 95 U/L
ALT SERPL-CCNC: 26 U/L
ANION GAP SERPL CALC-SCNC: 9 MMOL/L
AST SERPL-CCNC: 24 U/L
BILIRUB SERPL-MCNC: 0.3 MG/DL
BUN SERPL-MCNC: 27 MG/DL
CALCIUM SERPL-MCNC: 9.9 MG/DL
CHLORIDE SERPL-SCNC: 105 MMOL/L
CHOLEST SERPL-MCNC: 142 MG/DL
CHOLEST/HDLC SERPL: 3.5 RATIO
CO2 SERPL-SCNC: 26 MMOL/L
CREAT SERPL-MCNC: 1.22 MG/DL
ESTIMATED AVERAGE GLUCOSE: 128 MG/DL
GLUCOSE SERPL-MCNC: 240 MG/DL
HBA1C MFR BLD HPLC: 6.1 %
HDLC SERPL-MCNC: 41 MG/DL
LDLC SERPL CALC-MCNC: 75 MG/DL
POTASSIUM SERPL-SCNC: 5.6 MMOL/L
PROT SERPL-MCNC: 6.4 G/DL
SODIUM SERPL-SCNC: 140 MMOL/L
TRIGL SERPL-MCNC: 128 MG/DL

## 2020-12-29 ENCOUNTER — APPOINTMENT (OUTPATIENT)
Dept: CARDIOLOGY | Facility: CLINIC | Age: 70
End: 2020-12-29
Payer: MEDICARE

## 2020-12-29 VITALS — DIASTOLIC BLOOD PRESSURE: 64 MMHG | SYSTOLIC BLOOD PRESSURE: 126 MMHG

## 2020-12-29 VITALS
DIASTOLIC BLOOD PRESSURE: 68 MMHG | WEIGHT: 193 LBS | BODY MASS INDEX: 27.02 KG/M2 | HEART RATE: 72 BPM | SYSTOLIC BLOOD PRESSURE: 141 MMHG | HEIGHT: 71 IN

## 2020-12-29 PROCEDURE — 99214 OFFICE O/P EST MOD 30 MIN: CPT

## 2020-12-29 NOTE — PHYSICAL EXAM
[General Appearance - Well Developed] : well developed [Normal Appearance] : normal appearance [Well Groomed] : well groomed [General Appearance - Well Nourished] : well nourished [No Deformities] : no deformities [General Appearance - In No Acute Distress] : no acute distress [Normal Conjunctiva] : the conjunctiva exhibited no abnormalities [Eyelids - No Xanthelasma] : the eyelids demonstrated no xanthelasmas [Normal Oral Mucosa] : normal oral mucosa [No Oral Pallor] : no oral pallor [No Oral Cyanosis] : no oral cyanosis [Normal Jugular Venous A Waves Present] : normal jugular venous A waves present [Normal Jugular Venous V Waves Present] : normal jugular venous V waves present [No Jugular Venous Stuart A Waves] : no jugular venous stuart A waves [Respiration, Rhythm And Depth] : normal respiratory rhythm and effort [Exaggerated Use Of Accessory Muscles For Inspiration] : no accessory muscle use [Auscultation Breath Sounds / Voice Sounds] : lungs were clear to auscultation bilaterally [Heart Rate And Rhythm] : heart rate and rhythm were normal [Heart Sounds] : normal S1 and S2 [Murmurs] : no murmurs present [Edema] : no peripheral edema present [Abdomen Soft] : soft [Abdomen Tenderness] : non-tender [Abdomen Mass (___ Cm)] : no abdominal mass palpated [Abnormal Walk] : normal gait [Gait - Sufficient For Exercise Testing] : the gait was sufficient for exercise testing [Nail Clubbing] : no clubbing of the fingernails [Cyanosis, Localized] : no localized cyanosis [Petechial Hemorrhages (___cm)] : no petechial hemorrhages [Skin Color & Pigmentation] : normal skin color and pigmentation [] : no rash [No Venous Stasis] : no venous stasis [Skin Lesions] : no skin lesions [No Skin Ulcers] : no skin ulcer [No Xanthoma] : no  xanthoma was observed [Oriented To Time, Place, And Person] : oriented to person, place, and time [Affect] : the affect was normal [Mood] : the mood was normal [No Anxiety] : not feeling anxious

## 2020-12-29 NOTE — REVIEW OF SYSTEMS
[Fever] : no fever [Chills] : no chills [Shortness Of Breath] : no shortness of breath [Dyspnea on exertion] : not dyspnea during exertion [Chest  Pressure] : no chest pressure [Chest Pain] : no chest pain [Leg Claudication] : intermittent leg claudication [Cough] : no cough [Abdominal Pain] : no abdominal pain [Heartburn] : no heartburn

## 2020-12-29 NOTE — DISCUSSION/SUMMARY
[FreeTextEntry1] : 70M with CAD s/p PCI, PAD, DM, HTN, HLD\par \par 1. CAD: Cont with asa, plavix, lipitor daily.  Importance of med compliance stressed with patient. Tolerating without bleeding.\par \par 2. PAD: On DAPT and lipitor. There is a component of diabetic neuropathy as well. He is s/p stenting of both legs (report unavailable) with Dr. Bailey with balloon angioplasty as well. Wife notes symptoms have improved. Would continue to recommend gradual exercise to manage claudication \par \par 3. HTN: Well controlled, cont current medications\par \par 4. HLD:  On statin, [facility]\par \par 5. DM: Care with PCP\par \par RV 6 months \par Echo in 2021

## 2021-01-04 ENCOUNTER — APPOINTMENT (OUTPATIENT)
Dept: INTERNAL MEDICINE | Facility: CLINIC | Age: 71
End: 2021-01-04
Payer: MEDICARE

## 2021-01-04 VITALS
DIASTOLIC BLOOD PRESSURE: 64 MMHG | HEART RATE: 66 BPM | WEIGHT: 187 LBS | SYSTOLIC BLOOD PRESSURE: 140 MMHG | HEIGHT: 71 IN | BODY MASS INDEX: 26.18 KG/M2 | OXYGEN SATURATION: 98 %

## 2021-01-04 DIAGNOSIS — L30.9 DERMATITIS, UNSPECIFIED: ICD-10-CM

## 2021-01-04 PROCEDURE — 99214 OFFICE O/P EST MOD 30 MIN: CPT

## 2021-01-04 RX ORDER — CLOBETASOL PROPIONATE 0.5 MG/G
0.05 OINTMENT TOPICAL DAILY
Qty: 1 | Refills: 0 | Status: ACTIVE | COMMUNITY
Start: 2021-01-04 | End: 1900-01-01

## 2021-04-05 ENCOUNTER — APPOINTMENT (OUTPATIENT)
Dept: INTERNAL MEDICINE | Facility: CLINIC | Age: 71
End: 2021-04-05
Payer: MEDICARE

## 2021-04-05 VITALS
DIASTOLIC BLOOD PRESSURE: 74 MMHG | HEIGHT: 71 IN | SYSTOLIC BLOOD PRESSURE: 132 MMHG | WEIGHT: 192 LBS | HEART RATE: 77 BPM | BODY MASS INDEX: 26.88 KG/M2 | OXYGEN SATURATION: 97 % | TEMPERATURE: 97.8 F

## 2021-04-05 VITALS — DIASTOLIC BLOOD PRESSURE: 65 MMHG | SYSTOLIC BLOOD PRESSURE: 120 MMHG

## 2021-04-05 DIAGNOSIS — Z00.00 ENCOUNTER FOR GENERAL ADULT MEDICAL EXAMINATION W/OUT ABNORMAL FINDINGS: ICD-10-CM

## 2021-04-05 DIAGNOSIS — Z12.5 ENCOUNTER FOR SCREENING FOR MALIGNANT NEOPLASM OF PROSTATE: ICD-10-CM

## 2021-04-05 LAB
BILIRUB UR QL STRIP: NEGATIVE
CLARITY UR: CLEAR
COLLECTION METHOD: NORMAL
GLUCOSE UR-MCNC: NORMAL
HCG UR QL: NORMAL EU/DL
HGB UR QL STRIP.AUTO: NEGATIVE
KETONES UR-MCNC: NEGATIVE
LEUKOCYTE ESTERASE UR QL STRIP: NEGATIVE
NITRITE UR QL STRIP: NEGATIVE
PH UR STRIP: 5.5
PROT UR STRIP-MCNC: NEGATIVE
SP GR UR STRIP: 1.03

## 2021-04-05 PROCEDURE — 36415 COLL VENOUS BLD VENIPUNCTURE: CPT

## 2021-04-05 PROCEDURE — G0439: CPT

## 2021-04-05 PROCEDURE — 81003 URINALYSIS AUTO W/O SCOPE: CPT | Mod: QW

## 2021-04-05 NOTE — HISTORY OF PRESENT ILLNESS
[FreeTextEntry1] : snacks at times  and so may impact his dm he is to have bypass for his claudication  has no retinopathy  meds set out for him totake  memory is poor

## 2021-04-06 DIAGNOSIS — D64.9 ANEMIA, UNSPECIFIED: ICD-10-CM

## 2021-04-07 ENCOUNTER — APPOINTMENT (OUTPATIENT)
Dept: INTERNAL MEDICINE | Facility: CLINIC | Age: 71
End: 2021-04-07

## 2021-04-08 LAB
ALBUMIN SERPL ELPH-MCNC: 4.7 G/DL
ALP BLD-CCNC: 124 U/L
ALT SERPL-CCNC: 24 U/L
ANION GAP SERPL CALC-SCNC: 9 MMOL/L
AST SERPL-CCNC: 18 U/L
BASOPHILS # BLD AUTO: 0.02 K/UL
BASOPHILS NFR BLD AUTO: 0.3 %
BILIRUB SERPL-MCNC: 0.3 MG/DL
BUN SERPL-MCNC: 36 MG/DL
CALCIUM SERPL-MCNC: 10.2 MG/DL
CHLORIDE SERPL-SCNC: 103 MMOL/L
CHOLEST SERPL-MCNC: 131 MG/DL
CO2 SERPL-SCNC: 26 MMOL/L
CREAT SERPL-MCNC: 1.17 MG/DL
EOSINOPHIL # BLD AUTO: 0.11 K/UL
EOSINOPHIL NFR BLD AUTO: 1.6 %
ESTIMATED AVERAGE GLUCOSE: 128 MG/DL
FERRITIN SERPL-MCNC: 54 NG/ML
FOLATE SERPL-MCNC: >20 NG/ML
GLUCOSE SERPL-MCNC: 347 MG/DL
HBA1C MFR BLD HPLC: 6.1 %
HCT VFR BLD CALC: 39.1 %
HDLC SERPL-MCNC: 37 MG/DL
HGB BLD-MCNC: 12.1 G/DL
IMM GRANULOCYTES NFR BLD AUTO: 0.9 %
INR PPP: 0.98 RATIO
IRON SATN MFR SERPL: 30 %
IRON SERPL-MCNC: 103 UG/DL
LDLC SERPL CALC-MCNC: 67 MG/DL
LYMPHOCYTES # BLD AUTO: 0.89 K/UL
LYMPHOCYTES NFR BLD AUTO: 12.7 %
MAN DIFF?: NORMAL
MCHC RBC-ENTMCNC: 30.9 GM/DL
MCHC RBC-ENTMCNC: 31.7 PG
MCV RBC AUTO: 102.4 FL
MONOCYTES # BLD AUTO: 0.37 K/UL
MONOCYTES NFR BLD AUTO: 5.3 %
NEUTROPHILS # BLD AUTO: 5.56 K/UL
NEUTROPHILS NFR BLD AUTO: 79.2 %
NONHDLC SERPL-MCNC: 94 MG/DL
PLATELET # BLD AUTO: 196 K/UL
POTASSIUM SERPL-SCNC: 5.3 MMOL/L
PROT SERPL-MCNC: 6.5 G/DL
PSA SERPL-MCNC: 0.33 NG/ML
PT BLD: 11.7 SEC
RBC # BLD: 3.82 M/UL
RBC # FLD: 13.2 %
SODIUM SERPL-SCNC: 138 MMOL/L
TIBC SERPL-MCNC: 340 UG/DL
TRIGL SERPL-MCNC: 139 MG/DL
TSH SERPL-ACNC: 2.93 UIU/ML
UIBC SERPL-MCNC: 237 UG/DL
VIT B12 SERPL-MCNC: 1141 PG/ML
WBC # FLD AUTO: 7.01 K/UL

## 2021-04-09 LAB
RBC # BLD: 3.8 M/UL
RETICS # AUTO: 1.6 %
RETICS AGGREG/RBC NFR: 60 K/UL

## 2021-04-13 LAB
ALBUMIN MFR SERPL ELPH: 61.3 %
ALBUMIN SERPL-MCNC: 4 G/DL
ALBUMIN/GLOB SERPL: 1.6 RATIO
ALPHA1 GLOB MFR SERPL ELPH: 3.8 %
ALPHA1 GLOB SERPL ELPH-MCNC: 0.2 G/DL
ALPHA2 GLOB MFR SERPL ELPH: 15 %
ALPHA2 GLOB SERPL ELPH-MCNC: 1 G/DL
B-GLOBULIN MFR SERPL ELPH: 13 %
B-GLOBULIN SERPL ELPH-MCNC: 0.8 G/DL
DEPRECATED KAPPA LC FREE/LAMBDA SER: 1.02 RATIO
GAMMA GLOB FLD ELPH-MCNC: 0.4 G/DL
GAMMA GLOB MFR SERPL ELPH: 6.9 %
IGA SER QL IEP: 159 MG/DL
IGG SER QL IEP: 492 MG/DL
IGM SER QL IEP: 44 MG/DL
INTERPRETATION SERPL IEP-IMP: NORMAL
KAPPA LC CSF-MCNC: 2.11 MG/DL
KAPPA LC SERPL-MCNC: 2.16 MG/DL
M PROTEIN SPEC IFE-MCNC: NORMAL
PROT SERPL-MCNC: 6.5 G/DL

## 2021-04-15 ENCOUNTER — APPOINTMENT (OUTPATIENT)
Dept: INTERNAL MEDICINE | Facility: CLINIC | Age: 71
End: 2021-04-15
Payer: MEDICARE

## 2021-04-15 VITALS
BODY MASS INDEX: 26.6 KG/M2 | SYSTOLIC BLOOD PRESSURE: 140 MMHG | DIASTOLIC BLOOD PRESSURE: 77 MMHG | WEIGHT: 190 LBS | OXYGEN SATURATION: 99 % | TEMPERATURE: 97.8 F | HEART RATE: 80 BPM | HEIGHT: 71 IN

## 2021-04-15 DIAGNOSIS — Z01.818 ENCOUNTER FOR OTHER PREPROCEDURAL EXAMINATION: ICD-10-CM

## 2021-04-15 LAB — T4 FREE SERPL DIALY-MCNC: 0.83 NG/DL

## 2021-04-15 PROCEDURE — 99214 OFFICE O/P EST MOD 30 MIN: CPT

## 2021-04-15 NOTE — PHYSICAL EXAM
[No Acute Distress] : no acute distress [Well Nourished] : well nourished [Well Developed] : well developed [Well-Appearing] : well-appearing [Normal Sclera/Conjunctiva] : normal sclera/conjunctiva [PERRL] : pupils equal round and reactive to light [EOMI] : extraocular movements intact [Normal Outer Ear/Nose] : the outer ears and nose were normal in appearance [Normal Oropharynx] : the oropharynx was normal [No JVD] : no jugular venous distention [No Lymphadenopathy] : no lymphadenopathy [Supple] : supple [Thyroid Normal, No Nodules] : the thyroid was normal and there were no nodules present [No Respiratory Distress] : no respiratory distress  [No Accessory Muscle Use] : no accessory muscle use [Clear to Auscultation] : lungs were clear to auscultation bilaterally [Normal Rate] : normal rate  [Regular Rhythm] : with a regular rhythm [Normal S1, S2] : normal S1 and S2 [No Murmur] : no murmur heard [No Carotid Bruits] : no carotid bruits [No Abdominal Bruit] : a ~M bruit was not heard ~T in the abdomen [No Varicosities] : no varicosities [Pedal Pulses Present] : the pedal pulses are present [No Edema] : there was no peripheral edema [No Palpable Aorta] : no palpable aorta [No Extremity Clubbing/Cyanosis] : no extremity clubbing/cyanosis [Soft] : abdomen soft [Non Tender] : non-tender [Non-distended] : non-distended [No HSM] : no HSM [No Masses] : no abdominal mass palpated [Normal Bowel Sounds] : normal bowel sounds [Normal Posterior Cervical Nodes] : no posterior cervical lymphadenopathy [Normal Anterior Cervical Nodes] : no anterior cervical lymphadenopathy [No CVA Tenderness] : no CVA  tenderness [No Spinal Tenderness] : no spinal tenderness [No Joint Swelling] : no joint swelling [Grossly Normal Strength/Tone] : grossly normal strength/tone [No Rash] : no rash [Coordination Grossly Intact] : coordination grossly intact [No Focal Deficits] : no focal deficits [Normal Gait] : normal gait [Normal Affect] : the affect was normal [Deep Tendon Reflexes (DTR)] : deep tendon reflexes were 2+ and symmetric [Normal Insight/Judgement] : insight and judgment were intact

## 2021-04-15 NOTE — HISTORY OF PRESENT ILLNESS
[Coronary Artery Disease] : coronary artery disease [No Pertinent Pulmonary History] : no history of asthma, COPD, sleep apnea, or smoking [No Adverse Anesthesia Reaction] : no adverse anesthesia reaction in self or family member [Chronic Anticoagulation] : chronic anticoagulation [Chronic Kidney Disease] : no chronic kidney disease [Diabetes] : diabetes [FreeTextEntry2] : may 6 [FreeTextEntry1] : bypass in left leg [FreeTextEntry3] : dr pushpa carlin [FreeTextEntry5] : plavix

## 2021-04-23 ENCOUNTER — OUTPATIENT (OUTPATIENT)
Dept: OUTPATIENT SERVICES | Facility: HOSPITAL | Age: 71
LOS: 1 days | End: 2021-04-23
Payer: MEDICARE

## 2021-04-23 VITALS
SYSTOLIC BLOOD PRESSURE: 109 MMHG | OXYGEN SATURATION: 96 % | DIASTOLIC BLOOD PRESSURE: 73 MMHG | TEMPERATURE: 98 F | WEIGHT: 192.02 LBS | HEART RATE: 69 BPM | RESPIRATION RATE: 18 BRPM | HEIGHT: 71 IN

## 2021-04-23 DIAGNOSIS — F03.90 UNSPECIFIED DEMENTIA WITHOUT BEHAVIORAL DISTURBANCE: ICD-10-CM

## 2021-04-23 DIAGNOSIS — Z98.890 OTHER SPECIFIED POSTPROCEDURAL STATES: Chronic | ICD-10-CM

## 2021-04-23 DIAGNOSIS — G47.33 OBSTRUCTIVE SLEEP APNEA (ADULT) (PEDIATRIC): ICD-10-CM

## 2021-04-23 DIAGNOSIS — Z98.49 CATARACT EXTRACTION STATUS, UNSPECIFIED EYE: Chronic | ICD-10-CM

## 2021-04-23 DIAGNOSIS — I70.221 ATHEROSCLEROSIS OF NATIVE ARTERIES OF EXTREMITIES WITH REST PAIN, RIGHT LEG: ICD-10-CM

## 2021-04-23 DIAGNOSIS — Z01.818 ENCOUNTER FOR OTHER PREPROCEDURAL EXAMINATION: ICD-10-CM

## 2021-04-23 DIAGNOSIS — Z29.9 ENCOUNTER FOR PROPHYLACTIC MEASURES, UNSPECIFIED: ICD-10-CM

## 2021-04-23 DIAGNOSIS — I25.10 ATHEROSCLEROTIC HEART DISEASE OF NATIVE CORONARY ARTERY WITHOUT ANGINA PECTORIS: ICD-10-CM

## 2021-04-23 DIAGNOSIS — I10 ESSENTIAL (PRIMARY) HYPERTENSION: ICD-10-CM

## 2021-04-23 DIAGNOSIS — S46.211S STRAIN OF MUSCLE, FASCIA AND TENDON OF OTHER PARTS OF BICEPS, RIGHT ARM, SEQUELA: ICD-10-CM

## 2021-04-23 DIAGNOSIS — E11.9 TYPE 2 DIABETES MELLITUS WITHOUT COMPLICATIONS: ICD-10-CM

## 2021-04-23 DIAGNOSIS — I70.209 UNSPECIFIED ATHEROSCLEROSIS OF NATIVE ARTERIES OF EXTREMITIES, UNSPECIFIED EXTREMITY: ICD-10-CM

## 2021-04-23 LAB
ANION GAP SERPL CALC-SCNC: 11 MMOL/L — SIGNIFICANT CHANGE UP (ref 5–17)
BLD GP AB SCN SERPL QL: NEGATIVE — SIGNIFICANT CHANGE UP
BUN SERPL-MCNC: 31 MG/DL — HIGH (ref 7–23)
CALCIUM SERPL-MCNC: 10 MG/DL — SIGNIFICANT CHANGE UP (ref 8.4–10.5)
CHLORIDE SERPL-SCNC: 105 MMOL/L — SIGNIFICANT CHANGE UP (ref 96–108)
CO2 SERPL-SCNC: 25 MMOL/L — SIGNIFICANT CHANGE UP (ref 22–31)
CREAT SERPL-MCNC: 1.11 MG/DL — SIGNIFICANT CHANGE UP (ref 0.5–1.3)
GLUCOSE SERPL-MCNC: 154 MG/DL — HIGH (ref 70–99)
HCT VFR BLD CALC: 42.8 % — SIGNIFICANT CHANGE UP (ref 39–50)
HGB BLD-MCNC: 13.3 G/DL — SIGNIFICANT CHANGE UP (ref 13–17)
MCHC RBC-ENTMCNC: 31.1 GM/DL — LOW (ref 32–36)
MCHC RBC-ENTMCNC: 31.1 PG — SIGNIFICANT CHANGE UP (ref 27–34)
MCV RBC AUTO: 100.2 FL — HIGH (ref 80–100)
NRBC # BLD: 0 /100 WBCS — SIGNIFICANT CHANGE UP (ref 0–0)
PLATELET # BLD AUTO: 197 K/UL — SIGNIFICANT CHANGE UP (ref 150–400)
POTASSIUM SERPL-MCNC: 4.4 MMOL/L — SIGNIFICANT CHANGE UP (ref 3.5–5.3)
POTASSIUM SERPL-SCNC: 4.4 MMOL/L — SIGNIFICANT CHANGE UP (ref 3.5–5.3)
RBC # BLD: 4.27 M/UL — SIGNIFICANT CHANGE UP (ref 4.2–5.8)
RBC # FLD: 13.2 % — SIGNIFICANT CHANGE UP (ref 10.3–14.5)
RH IG SCN BLD-IMP: POSITIVE — SIGNIFICANT CHANGE UP
SODIUM SERPL-SCNC: 141 MMOL/L — SIGNIFICANT CHANGE UP (ref 135–145)
WBC # BLD: 8.52 K/UL — SIGNIFICANT CHANGE UP (ref 3.8–10.5)
WBC # FLD AUTO: 8.52 K/UL — SIGNIFICANT CHANGE UP (ref 3.8–10.5)

## 2021-04-23 PROCEDURE — 93010 ELECTROCARDIOGRAM REPORT: CPT

## 2021-04-23 PROCEDURE — 80048 BASIC METABOLIC PNL TOTAL CA: CPT

## 2021-04-23 PROCEDURE — 86850 RBC ANTIBODY SCREEN: CPT

## 2021-04-23 PROCEDURE — G0463: CPT

## 2021-04-23 PROCEDURE — 85027 COMPLETE CBC AUTOMATED: CPT

## 2021-04-23 PROCEDURE — 93005 ELECTROCARDIOGRAM TRACING: CPT

## 2021-04-23 PROCEDURE — 86901 BLOOD TYPING SEROLOGIC RH(D): CPT

## 2021-04-23 PROCEDURE — 86900 BLOOD TYPING SEROLOGIC ABO: CPT

## 2021-04-23 RX ORDER — METFORMIN HYDROCHLORIDE 850 MG/1
3 TABLET ORAL
Qty: 0 | Refills: 0 | DISCHARGE

## 2021-04-23 RX ORDER — VANCOMYCIN HCL 1 G
1250 VIAL (EA) INTRAVENOUS ONCE
Refills: 0 | Status: DISCONTINUED | OUTPATIENT
Start: 2021-05-06 | End: 2021-05-07

## 2021-04-23 RX ORDER — METOPROLOL TARTRATE 50 MG
1 TABLET ORAL
Qty: 0 | Refills: 0 | DISCHARGE

## 2021-04-23 RX ORDER — METFORMIN HYDROCHLORIDE 850 MG/1
1 TABLET ORAL
Qty: 0 | Refills: 0 | DISCHARGE

## 2021-04-23 RX ORDER — DULOXETINE HYDROCHLORIDE 30 MG/1
1 CAPSULE, DELAYED RELEASE ORAL
Qty: 0 | Refills: 0 | DISCHARGE

## 2021-04-23 NOTE — H&P PST ADULT - CONSTITUTIONAL DETAILS
Ongoing SW/CM Assessment/Plan of Care Note     See SW/CM flowsheets for goals and other objective data.    Patient/Family discharge goal (s): Transportation   Goal #1: Adjustment/coping issues addressed    Progress note: [facility] () [facility] on will call to [facility].         well-groomed/no distress

## 2021-04-23 NOTE — H&P PST ADULT - HISTORY OF PRESENT ILLNESS
70 YOM presents to PST accompanied by wife (pt poor historian) for Left leg femoral endarterectomy artery bypass on 5/6/21. Pt reports tightness in the calves to bilateral legs, ambulates with cane, difficulty walking far distances. PMH: HTN, HLD, DM, DOLORES (on CPAP), CAD(stent x1 to mid RCA in 2018 on plavix- per surgeon office to discontinue 4/28 & ASA, and dementia.  Pt denies  fever, chills, cough, chest pain, SOB or N/V. Pt to have covid testing at [facility] 5/3/21.

## 2021-04-23 NOTE — H&P PST ADULT - NSICDXPROBLEM_GEN_ALL_CORE_FT
PROBLEM DIAGNOSES  Problem: Atherosclerosis of native artery of extremity  Assessment and Plan: Pt scheduled for surgery on 5/6/21. Surgical and chlorohexidine instructions reveiwed with patient and wife. COVID testing at [facility] on 5/3/21. To see cardiologist for eval on 4/29    Problem: Dementia  Assessment and Plan: continue taking medication as prescribed.    Problem: DOLORES on CPAP  Assessment and Plan: OR booking made aware    Problem: Hypertension  Assessment and Plan: continue taking medication as prescribed    Problem: DM (diabetes mellitus)  Assessment and Plan: Last dose of oral medication to be taken 24hrs prior to sx. FS to be checked on admission.    Problem: CAD (coronary artery disease)  Assessment and Plan: As per surgeons office pt to take last dose of Plavix on 4/28/21 and pt to continue ASA    Problem: Need for prophylactic measure  Assessment and Plan: Caprini Score 3 - 6:  At Risk, pharmacologic VTE prophylaxis is indicated for most patients (in the absence of a contraindication)

## 2021-04-23 NOTE — H&P PST ADULT - NSICDXFAMILYHX_GEN_ALL_CORE_FT
FAMILY HISTORY:  Father  Still living? Unknown  FH: diabetes mellitus, Age at diagnosis: Age Unknown  FH: senile dementia, Age at diagnosis: Age Unknown    Mother  Still living? Unknown  FH: breast cancer, Age at diagnosis: Age Unknown  FH: senile dementia, Age at diagnosis: Age Unknown

## 2021-04-23 NOTE — H&P PST ADULT - ASSESSMENT
CAPRINI SCORE [CLOT]    AGE RELATED RISK FACTORS                                                       MOBILITY RELATED FACTORS  [ ] Age 41-60 years                                            (1 Point)                  [ ] Bed rest                                                        (1 Point)  [ ] Age: 61-74 years                                           (2 Points)                 [ ] Plaster cast                                                   (2 Points)  [ ] Age= 75 years                                              (3 Points)                 [ ] Bed bound for more than 72 hours                 (2 Points)    DISEASE RELATED RISK FACTORS                                               GENDER SPECIFIC FACTORS  [ ] Edema in the lower extremities                       (1 Point)                  [ ] Pregnancy                                                     (1 Point)  [ ] Varicose veins                                               (1 Point)                  [ ] Post-partum < 6 weeks                                   (1 Point)             [ ] BMI > 25 Kg/m2                                            (1 Point)                  [ ] Hormonal therapy  or oral contraception          (1 Point)                 [ ] Sepsis (in the previous month)                        (1 Point)                  [ ] History of pregnancy complications                 (1 point)  [ ] Pneumonia or serious lung disease                                               [ ] Unexplained or recurrent                     (1 Point)           (in the previous month)                               (1 Point)  [ ] Abnormal pulmonary function test                     (1 Point)                 SURGERY RELATED RISK FACTORS  [ ] Acute myocardial infarction                              (1 Point)                 [ ]  Section                                             (1 Point)  [ ] Congestive heart failure (in the previous month)  (1 Point)               [ ] Minor surgery                                                  (1 Point)   [ ] Inflammatory bowel disease                             (1 Point)                 [ ] Arthroscopic surgery                                        (2 Points)  [ ] Central venous access                                      (2 Points)                [ ] General surgery lasting more than 45 minutes   (2 Points)       [ ] Stroke (in the previous month)                          (5 Points)               [ ] Elective arthroplasty                                         (5 Points)                                                                                                                                               HEMATOLOGY RELATED FACTORS                                                 TRAUMA RELATED RISK FACTORS  [ ] Prior episodes of VTE                                     (3 Points)                [ ] Fracture of the hip, pelvis, or leg                       (5 Points)  [ ] Positive family history for VTE                         (3 Points)                 [ ] Acute spinal cord injury (in the previous month)  (5 Points)  [ ] Prothrombin 96468 A                                     (3 Points)                 [ ] Paralysis  (less than 1 month)                             (5 Points)  [ ] Factor V Leiden                                             (3 Points)                  [ ] Multiple Trauma within 1 month                        (5 Points)  [ ] Lupus anticoagulants                                     (3 Points)                                                           [ ] Anticardiolipin antibodies                               (3 Points)                                                       [ ] High homocysteine in the blood                      (3 Points)                                             [ ] Other congenital or acquired thrombophilia      (3 Points)                                                [ ] Heparin induced thrombocytopenia                  (3 Points)                                          Total Score [  5   ]    Caprini Score 0 - 2:  Low Risk, No VTE Prophylaxis required for most patients, encourage ambulation  Caprini Score 3 - 6:  At Risk, pharmacologic VTE prophylaxis is indicated for most patients (in the absence of a contraindication)  Caprini Score Greater than or = 7:  High Risk, pharmacologic VTE prophylaxis is indicated for most patients (in the absence of a contraindication)

## 2021-04-23 NOTE — H&P PST ADULT - ATTENDING COMMENTS
We plan left femoral endarterectomy for worsening rest pain.  All risks and alternatives were reiterated to the [facility] and they agree with this plan.

## 2021-04-23 NOTE — H&P PST ADULT - NSICDXPASTMEDICALHX_GEN_ALL_CORE_FT
PAST MEDICAL HISTORY:  Anxiety     Atherosclerosis of native artery of extremity     Cancer of Bladder 2000    Dementia     Diabetes     HTN (Hypertension)     Hyperlipidemia     Malignant neoplasm of urinary bladder, unspecified site     DOLORES on CPAP     PAD (peripheral artery disease)     Peripheral neuropathy     Rotator Cuff Disorder

## 2021-04-23 NOTE — H&P PST ADULT - NSICDXPASTSURGICALHX_GEN_ALL_CORE_FT
PAST SURGICAL HISTORY:  arthroscopy right shoulderx2 2008    bladder surgery 2000 washout/laser    H/O cardiac catheterization 2018- stent x1 mid RCA    Knee Surgery 1998- meniscal    S/P cataract surgery bilateral

## 2021-04-29 ENCOUNTER — APPOINTMENT (OUTPATIENT)
Dept: CARDIOLOGY | Facility: CLINIC | Age: 71
End: 2021-04-29
Payer: MEDICARE

## 2021-04-29 VITALS — DIASTOLIC BLOOD PRESSURE: 70 MMHG | HEART RATE: 62 BPM | SYSTOLIC BLOOD PRESSURE: 117 MMHG

## 2021-04-29 DIAGNOSIS — I70.213 ATHEROSCLEROSIS OF NATIVE ARTERIES OF EXTREMITIES WITH INTERMITTENT CLAUDICATION, BILATERAL LEGS: ICD-10-CM

## 2021-04-29 PROBLEM — I70.209 UNSPECIFIED ATHEROSCLEROSIS OF NATIVE ARTERIES OF EXTREMITIES, UNSPECIFIED EXTREMITY: Chronic | Status: ACTIVE | Noted: 2021-04-23

## 2021-04-29 PROCEDURE — 99214 OFFICE O/P EST MOD 30 MIN: CPT

## 2021-04-29 NOTE — HISTORY OF PRESENT ILLNESS
[FreeTextEntry1] : 70M with PAD and claudication, DM, HTN, HLD, CAD s/p PCI (RCA), former smoker who presents for clearance prior to LE bypass surgery \par \par Extensive bilateral PAD in the legs, worsening claudication- pending L femoral endarterectomy with ?bypass (wife thinks they might be cutting out a piece of occluded vessel and creating a bypass) \par No further LE ulcers or stigmata from PAD\par \par Recent LE angio showing L sided iliac and external iliac- mod stenosis with prior stents. Bilateral SFA occlusions with profundus femoral artery \par \par Early onset dementia, has been placed on Namenda. \par Denies chest pain, shortness of breath, dizziness, lightheadedness. Limited exercise tolerance due to PAD\par \par HISTORY:\par \par In October 2018, pt was seen in the office with worsening exertional chest pain, was sent for cardiac cath and was noted with 99% RCA occlusion s/p GELA.  \par ---------------------------------------\par ECG: SR, no ST-T wave changes\par Cath 2018 (RRA): 100% RCA, other arteries normal\par TTE 9/2017: Normal , EF 78%\par LE angio 2021: L sided iliac and external iliac- mod stenosis with prior stents. Bilateral SFA occlusions with profundus femoral artery \par NST (2009): Mild inferior ischemia, EF 58%\par \par Asa 81mg, Clopidogrel 75mg, Losartan 50mg, Metoprolol 12.5mg BID, atorvastatin

## 2021-04-29 NOTE — DISCUSSION/SUMMARY
[FreeTextEntry1] : 70M with CAD s/p PCI, PAD, DM, HTN, HLD, preop prior to L femoral endarterectomy with possible bypass\par \par 1. CAD: Cont with asa, plavix, lipitor daily. Tolerating without bleeding. Will hold Plavix x 5 days prior to surgery. Cont ASA\par \par 2. PAD: On DAPT and lipitor. Cont. Pending endarterectomy/bypass\par \par 3. HTN: Well controlled, cont current medications\par \par 4. HLD:  On statin, [facility]\par \par 5. DM: Care with PCP\par \par Will order echo, can be done after surgery\par \par Limited exercise tolerance due to claudication. Cardiac testing in the last few years has been normal. No evidence of ongoing ischemia, arrhythmia, valvular heart disease or decompensated heart failure.  This patient is optimized from a cardiac standpoint for this intermediate to high risk vascular surgery.  No further cardiac testing is necessary prior to surgery. Management of asa and Plavix as above. \par

## 2021-04-29 NOTE — REVIEW OF SYSTEMS
[SOB] : no shortness of breath [Dyspnea on exertion] : not dyspnea during exertion [Chest Discomfort] : no chest discomfort [Leg Claudication] : intermittent leg claudication [Negative] : Heme/Lymph

## 2021-04-29 NOTE — PHYSICAL EXAM
[Well Developed] : well developed [Well Nourished] : well nourished [No Acute Distress] : no acute distress [Normal Conjunctiva] : normal conjunctiva [Normal Venous Pressure] : normal venous pressure [No Carotid Bruit] : no carotid bruit [Normal S1, S2] : normal S1, S2 [No Murmur] : no murmur [No Rub] : no rub [No Gallop] : no gallop [Clear Lung Fields] : clear lung fields [Good Air Entry] : good air entry [No Respiratory Distress] : no respiratory distress  [Soft] : abdomen soft [Non Tender] : non-tender [No Masses/organomegaly] : no masses/organomegaly [Normal Bowel Sounds] : normal bowel sounds [No Edema] : no edema [No Cyanosis] : no cyanosis [No Clubbing] : no clubbing [No Varicosities] : no varicosities [Normal Radial B/L] : normal radial B/L [No Rash] : no rash [No Skin Lesions] : no skin lesions [Moves all extremities] : moves all extremities [No Focal Deficits] : no focal deficits [Normal Speech] : normal speech [Alert and Oriented] : alert and oriented [Normal memory] : normal memory

## 2021-05-03 ENCOUNTER — APPOINTMENT (OUTPATIENT)
Dept: INTERNAL MEDICINE | Facility: CLINIC | Age: 71
End: 2021-05-03
Payer: MEDICARE

## 2021-05-03 ENCOUNTER — OUTPATIENT (OUTPATIENT)
Dept: OUTPATIENT SERVICES | Facility: HOSPITAL | Age: 71
LOS: 1 days | End: 2021-05-03

## 2021-05-03 DIAGNOSIS — Z98.49 CATARACT EXTRACTION STATUS, UNSPECIFIED EYE: Chronic | ICD-10-CM

## 2021-05-03 DIAGNOSIS — Z98.890 OTHER SPECIFIED POSTPROCEDURAL STATES: Chronic | ICD-10-CM

## 2021-05-03 DIAGNOSIS — Z11.52 ENCOUNTER FOR SCREENING FOR COVID-19: ICD-10-CM

## 2021-05-03 LAB — SARS-COV-2 RNA SPEC QL NAA+PROBE: SIGNIFICANT CHANGE UP

## 2021-05-03 PROCEDURE — 93306 TTE W/DOPPLER COMPLETE: CPT

## 2021-05-05 ENCOUNTER — TRANSCRIPTION ENCOUNTER (OUTPATIENT)
Age: 71
End: 2021-05-05

## 2021-05-06 ENCOUNTER — INPATIENT (INPATIENT)
Facility: HOSPITAL | Age: 71
LOS: 0 days | Discharge: HOME | DRG: 254 | End: 2021-05-07
Attending: SURGERY | Admitting: SURGERY
Payer: MEDICARE

## 2021-05-06 ENCOUNTER — RESULT REVIEW (OUTPATIENT)
Age: 71
End: 2021-05-06

## 2021-05-06 VITALS
HEIGHT: 71 IN | SYSTOLIC BLOOD PRESSURE: 144 MMHG | OXYGEN SATURATION: 97 % | TEMPERATURE: 98 F | WEIGHT: 192.02 LBS | HEART RATE: 56 BPM | DIASTOLIC BLOOD PRESSURE: 73 MMHG | RESPIRATION RATE: 18 BRPM

## 2021-05-06 DIAGNOSIS — Z98.890 OTHER SPECIFIED POSTPROCEDURAL STATES: Chronic | ICD-10-CM

## 2021-05-06 DIAGNOSIS — I70.221 ATHEROSCLEROSIS OF NATIVE ARTERIES OF EXTREMITIES WITH REST PAIN, RIGHT LEG: ICD-10-CM

## 2021-05-06 DIAGNOSIS — Z01.818 ENCOUNTER FOR OTHER PREPROCEDURAL EXAMINATION: ICD-10-CM

## 2021-05-06 DIAGNOSIS — Z98.49 CATARACT EXTRACTION STATUS, UNSPECIFIED EYE: Chronic | ICD-10-CM

## 2021-05-06 LAB
GAS PNL BLDA: SIGNIFICANT CHANGE UP
GLUCOSE BLDC GLUCOMTR-MCNC: 130 MG/DL — HIGH (ref 70–99)
GLUCOSE BLDC GLUCOMTR-MCNC: 153 MG/DL — HIGH (ref 70–99)
GLUCOSE BLDC GLUCOMTR-MCNC: 162 MG/DL — HIGH (ref 70–99)
GLUCOSE BLDC GLUCOMTR-MCNC: 166 MG/DL — HIGH (ref 70–99)
RH IG SCN BLD-IMP: POSITIVE — SIGNIFICANT CHANGE UP

## 2021-05-06 PROCEDURE — 88311 DECALCIFY TISSUE: CPT | Mod: 26

## 2021-05-06 PROCEDURE — 88304 TISSUE EXAM BY PATHOLOGIST: CPT | Mod: 26

## 2021-05-06 RX ORDER — ONDANSETRON 8 MG/1
8 TABLET, FILM COATED ORAL ONCE
Refills: 0 | Status: DISCONTINUED | OUTPATIENT
Start: 2021-05-06 | End: 2021-05-07

## 2021-05-06 RX ORDER — MEMANTINE HYDROCHLORIDE 10 MG/1
10 TABLET ORAL
Refills: 0 | Status: DISCONTINUED | OUTPATIENT
Start: 2021-05-06 | End: 2021-05-07

## 2021-05-06 RX ORDER — CHLORHEXIDINE GLUCONATE 213 G/1000ML
1 SOLUTION TOPICAL ONCE
Refills: 0 | Status: COMPLETED | OUTPATIENT
Start: 2021-05-06 | End: 2021-05-06

## 2021-05-06 RX ORDER — DEXTROSE 50 % IN WATER 50 %
25 SYRINGE (ML) INTRAVENOUS ONCE
Refills: 0 | Status: DISCONTINUED | OUTPATIENT
Start: 2021-05-06 | End: 2021-05-07

## 2021-05-06 RX ORDER — LIDOCAINE HCL 20 MG/ML
0.2 VIAL (ML) INJECTION ONCE
Refills: 0 | Status: DISCONTINUED | OUTPATIENT
Start: 2021-05-06 | End: 2021-05-06

## 2021-05-06 RX ORDER — ASPIRIN/CALCIUM CARB/MAGNESIUM 324 MG
81 TABLET ORAL DAILY
Refills: 0 | Status: DISCONTINUED | OUTPATIENT
Start: 2021-05-06 | End: 2021-05-07

## 2021-05-06 RX ORDER — SODIUM CHLORIDE 9 MG/ML
1000 INJECTION, SOLUTION INTRAVENOUS
Refills: 0 | Status: DISCONTINUED | OUTPATIENT
Start: 2021-05-06 | End: 2021-05-07

## 2021-05-06 RX ORDER — RIVASTIGMINE 4.6 MG/24H
1 PATCH, EXTENDED RELEASE TRANSDERMAL EVERY 24 HOURS
Refills: 0 | Status: DISCONTINUED | OUTPATIENT
Start: 2021-05-06 | End: 2021-05-07

## 2021-05-06 RX ORDER — DEXTROSE 50 % IN WATER 50 %
12.5 SYRINGE (ML) INTRAVENOUS ONCE
Refills: 0 | Status: DISCONTINUED | OUTPATIENT
Start: 2021-05-06 | End: 2021-05-07

## 2021-05-06 RX ORDER — FENTANYL CITRATE 50 UG/ML
25 INJECTION INTRAVENOUS
Refills: 0 | Status: DISCONTINUED | OUTPATIENT
Start: 2021-05-06 | End: 2021-05-07

## 2021-05-06 RX ORDER — PHENYLEPHRINE HYDROCHLORIDE 10 MG/ML
0.2 INJECTION INTRAVENOUS
Qty: 40 | Refills: 0 | Status: DISCONTINUED | OUTPATIENT
Start: 2021-05-06 | End: 2021-05-07

## 2021-05-06 RX ORDER — INSULIN LISPRO 100/ML
VIAL (ML) SUBCUTANEOUS AT BEDTIME
Refills: 0 | Status: DISCONTINUED | OUTPATIENT
Start: 2021-05-06 | End: 2021-05-07

## 2021-05-06 RX ORDER — DEXTROSE 50 % IN WATER 50 %
15 SYRINGE (ML) INTRAVENOUS ONCE
Refills: 0 | Status: DISCONTINUED | OUTPATIENT
Start: 2021-05-06 | End: 2021-05-07

## 2021-05-06 RX ORDER — GLUCAGON INJECTION, SOLUTION 0.5 MG/.1ML
1 INJECTION, SOLUTION SUBCUTANEOUS ONCE
Refills: 0 | Status: DISCONTINUED | OUTPATIENT
Start: 2021-05-06 | End: 2021-05-07

## 2021-05-06 RX ORDER — SODIUM CHLORIDE 9 MG/ML
3 INJECTION INTRAMUSCULAR; INTRAVENOUS; SUBCUTANEOUS EVERY 8 HOURS
Refills: 0 | Status: DISCONTINUED | OUTPATIENT
Start: 2021-05-06 | End: 2021-05-07

## 2021-05-06 RX ORDER — METOPROLOL TARTRATE 50 MG
12.5 TABLET ORAL
Refills: 0 | Status: DISCONTINUED | OUTPATIENT
Start: 2021-05-06 | End: 2021-05-07

## 2021-05-06 RX ORDER — SODIUM CHLORIDE 9 MG/ML
3 INJECTION INTRAMUSCULAR; INTRAVENOUS; SUBCUTANEOUS EVERY 8 HOURS
Refills: 0 | Status: DISCONTINUED | OUTPATIENT
Start: 2021-05-06 | End: 2021-05-06

## 2021-05-06 RX ORDER — DULOXETINE HYDROCHLORIDE 30 MG/1
60 CAPSULE, DELAYED RELEASE ORAL
Refills: 0 | Status: DISCONTINUED | OUTPATIENT
Start: 2021-05-06 | End: 2021-05-07

## 2021-05-06 RX ORDER — ATORVASTATIN CALCIUM 80 MG/1
80 TABLET, FILM COATED ORAL AT BEDTIME
Refills: 0 | Status: DISCONTINUED | OUTPATIENT
Start: 2021-05-06 | End: 2021-05-07

## 2021-05-06 RX ORDER — LOSARTAN POTASSIUM 100 MG/1
50 TABLET, FILM COATED ORAL DAILY
Refills: 0 | Status: DISCONTINUED | OUTPATIENT
Start: 2021-05-06 | End: 2021-05-07

## 2021-05-06 RX ORDER — CLOPIDOGREL BISULFATE 75 MG/1
75 TABLET, FILM COATED ORAL DAILY
Refills: 0 | Status: DISCONTINUED | OUTPATIENT
Start: 2021-05-06 | End: 2021-05-07

## 2021-05-06 RX ORDER — INSULIN LISPRO 100/ML
VIAL (ML) SUBCUTANEOUS
Refills: 0 | Status: DISCONTINUED | OUTPATIENT
Start: 2021-05-06 | End: 2021-05-07

## 2021-05-06 RX ADMIN — ATORVASTATIN CALCIUM 80 MILLIGRAM(S): 80 TABLET, FILM COATED ORAL at 22:42

## 2021-05-06 RX ADMIN — SODIUM CHLORIDE 75 MILLILITER(S): 9 INJECTION, SOLUTION INTRAVENOUS at 12:21

## 2021-05-06 RX ADMIN — MEMANTINE HYDROCHLORIDE 10 MILLIGRAM(S): 10 TABLET ORAL at 17:22

## 2021-05-06 RX ADMIN — Medication 12.5 MILLIGRAM(S): at 17:22

## 2021-05-06 RX ADMIN — DULOXETINE HYDROCHLORIDE 60 MILLIGRAM(S): 30 CAPSULE, DELAYED RELEASE ORAL at 17:22

## 2021-05-06 RX ADMIN — RIVASTIGMINE 1 PATCH: 4.6 PATCH, EXTENDED RELEASE TRANSDERMAL at 22:42

## 2021-05-06 RX ADMIN — CHLORHEXIDINE GLUCONATE 1 APPLICATION(S): 213 SOLUTION TOPICAL at 06:55

## 2021-05-06 RX ADMIN — Medication 81 MILLIGRAM(S): at 12:44

## 2021-05-06 RX ADMIN — SODIUM CHLORIDE 3 MILLILITER(S): 9 INJECTION INTRAMUSCULAR; INTRAVENOUS; SUBCUTANEOUS at 06:53

## 2021-05-06 RX ADMIN — SODIUM CHLORIDE 3 MILLILITER(S): 9 INJECTION INTRAMUSCULAR; INTRAVENOUS; SUBCUTANEOUS at 13:13

## 2021-05-06 RX ADMIN — Medication 1: at 16:51

## 2021-05-06 RX ADMIN — SODIUM CHLORIDE 3 MILLILITER(S): 9 INJECTION INTRAMUSCULAR; INTRAVENOUS; SUBCUTANEOUS at 22:09

## 2021-05-06 NOTE — BRIEF OPERATIVE NOTE - NSICDXBRIEFPROCEDURE_GEN_ALL_CORE_FT
PROCEDURES:  Endarterectomy of left common femoral artery 06-May-2021 10:27:21 with profundaplasty and patch angio Bryan Wilkinson

## 2021-05-06 NOTE — BRIEF OPERATIVE NOTE - OPERATION/FINDINGS
External iliac soft an clampable, CFA calcified endartrectomy performed.   profunda orifice stenosis, patch angioplasty performed

## 2021-05-06 NOTE — CHART NOTE - NSCHARTNOTEFT_GEN_A_CORE
Post Operative Note  Patient: SHERICE RAMIREZ 70y (1950) Male   MRN: [id number]  Location: [facility] PACU 11  Visit: 05-06-21 Inpatient  Date: 05-06-21 @ 13:35    Procedure: S/P Left Common Femoral Artery Endarterectomy with profundaplasty and patch angioplasty    Subjective:   Seen and examined at bedside 4 hrs postop. Hypotensive upon arrival to PACU w/ SBP in 80s. ABG sent w/ out any gross abnormalities. Patient started on minimal jake and weaned off successfully. Alert and interactive upon postop check. Denies chest pain, SOB, nausea or dizziness. No pain on LLE.     Objective:  Vitals: T(F): 97.7 (05-06-21 @ 13:00), Max: 97.7 (05-06-21 @ 06:20)  HR: 74 (05-06-21 @ 13:00)  BP: 153/70 (05-06-21 @ 13:00) (84/49 - 153/70)  RR: 16 (05-06-21 @ 13:00)  SpO2: 97% (05-06-21 @ 13:00)  Vent Settings:     In:   05-06-21 @ 07:01  -  05-06-21 @ 13:35  --------------------------------------------------------  IN: 0 mL      IV Fluids: dextrose 5%. 1000 milliLiter(s) (50 mL/Hr) IV Continuous <Continuous>  dextrose 5%. 1000 milliLiter(s) (100 mL/Hr) IV Continuous <Continuous>  lactated ringers. 1000 milliLiter(s) (75 mL/Hr) IV Continuous <Continuous>  sodium chloride 0.9% lock flush 3 milliLiter(s) IV Push every 8 hours      Out:   05-06-21 @ 07:01  -  05-06-21 @ 13:35  --------------------------------------------------------  OUT: 350 mL      EBL: 30cc    Voided Urine:   05-06-21 @ 07:01  -  05-06-21 @ 13:35  --------------------------------------------------------  OUT: 350 mL      Mercaod Catheter: yes         Physical Examination:  General: NAD, resting comfortably in bed  HEENT: Normocephalic atraumatic  Respiratory: Nonlabored respirations, normal CW expansion.  Cardio: regular rate and rhythm.  Abdomen: soft. nontender, nondistended  Vascular: L groin incision soft, no erythema or palpable collections. wound covered with steri strips, 4x4 gauze and tegaderm.  Pulse Exam: L PT Dopplerable at the end of the case and on POC. No DP appreciated.      Imaging:  No post-op imaging studies    Assessment:  70yMale patient S/P Left Common Femoral Artery Endarterectomy with profundaplasty and patch angioplasty for PAD    Plan:  - ASA and Plavix   - Pain control PRN  - Diet: Reg  - ISS   - DAWNA Mercado on POD#1   - Activity: Bedrest for 24hrs, ambulate as tolerated thereafter   - DVT ppx: NO SCD on LLE     Date/Time: 05-06-21 @ 13:35    Vascular Surgery  [id number]

## 2021-05-07 ENCOUNTER — TRANSCRIPTION ENCOUNTER (OUTPATIENT)
Age: 71
End: 2021-05-07

## 2021-05-07 VITALS
DIASTOLIC BLOOD PRESSURE: 65 MMHG | OXYGEN SATURATION: 99 % | SYSTOLIC BLOOD PRESSURE: 142 MMHG | RESPIRATION RATE: 16 BRPM | HEART RATE: 83 BPM

## 2021-05-07 LAB
A1C WITH ESTIMATED AVERAGE GLUCOSE RESULT: 5.9 % — HIGH (ref 4–5.6)
ANION GAP SERPL CALC-SCNC: 11 MMOL/L — SIGNIFICANT CHANGE UP (ref 5–17)
BASOPHILS # BLD AUTO: 0.01 K/UL — SIGNIFICANT CHANGE UP (ref 0–0.2)
BASOPHILS NFR BLD AUTO: 0.1 % — SIGNIFICANT CHANGE UP (ref 0–2)
BUN SERPL-MCNC: 20 MG/DL — SIGNIFICANT CHANGE UP (ref 7–23)
CALCIUM SERPL-MCNC: 9.5 MG/DL — SIGNIFICANT CHANGE UP (ref 8.4–10.5)
CHLORIDE SERPL-SCNC: 108 MMOL/L — SIGNIFICANT CHANGE UP (ref 96–108)
CO2 SERPL-SCNC: 23 MMOL/L — SIGNIFICANT CHANGE UP (ref 22–31)
COVID-19 SPIKE DOMAIN AB INTERP: POSITIVE
COVID-19 SPIKE DOMAIN ANTIBODY RESULT: >250 U/ML — HIGH
CREAT SERPL-MCNC: 0.98 MG/DL — SIGNIFICANT CHANGE UP (ref 0.5–1.3)
EOSINOPHIL # BLD AUTO: 0 K/UL — SIGNIFICANT CHANGE UP (ref 0–0.5)
EOSINOPHIL NFR BLD AUTO: 0 % — SIGNIFICANT CHANGE UP (ref 0–6)
ESTIMATED AVERAGE GLUCOSE: 123 MG/DL — HIGH (ref 68–114)
GLUCOSE BLDC GLUCOMTR-MCNC: 122 MG/DL — HIGH (ref 70–99)
GLUCOSE SERPL-MCNC: 127 MG/DL — HIGH (ref 70–99)
HCT VFR BLD CALC: 36.5 % — LOW (ref 39–50)
HGB BLD-MCNC: 11.7 G/DL — LOW (ref 13–17)
IMM GRANULOCYTES NFR BLD AUTO: 0.5 % — SIGNIFICANT CHANGE UP (ref 0–1.5)
LYMPHOCYTES # BLD AUTO: 0.51 K/UL — LOW (ref 1–3.3)
LYMPHOCYTES # BLD AUTO: 4.1 % — LOW (ref 13–44)
MAGNESIUM SERPL-MCNC: 2 MG/DL — SIGNIFICANT CHANGE UP (ref 1.6–2.6)
MCHC RBC-ENTMCNC: 31.1 PG — SIGNIFICANT CHANGE UP (ref 27–34)
MCHC RBC-ENTMCNC: 32.1 GM/DL — SIGNIFICANT CHANGE UP (ref 32–36)
MCV RBC AUTO: 97.1 FL — SIGNIFICANT CHANGE UP (ref 80–100)
MONOCYTES # BLD AUTO: 0.51 K/UL — SIGNIFICANT CHANGE UP (ref 0–0.9)
MONOCYTES NFR BLD AUTO: 4.1 % — SIGNIFICANT CHANGE UP (ref 2–14)
NEUTROPHILS # BLD AUTO: 11.37 K/UL — HIGH (ref 1.8–7.4)
NEUTROPHILS NFR BLD AUTO: 91.2 % — HIGH (ref 43–77)
NRBC # BLD: 0 /100 WBCS — SIGNIFICANT CHANGE UP (ref 0–0)
PHOSPHATE SERPL-MCNC: 3.3 MG/DL — SIGNIFICANT CHANGE UP (ref 2.5–4.5)
PLATELET # BLD AUTO: 165 K/UL — SIGNIFICANT CHANGE UP (ref 150–400)
POTASSIUM SERPL-MCNC: 4.9 MMOL/L — SIGNIFICANT CHANGE UP (ref 3.5–5.3)
POTASSIUM SERPL-SCNC: 4.9 MMOL/L — SIGNIFICANT CHANGE UP (ref 3.5–5.3)
RBC # BLD: 3.76 M/UL — LOW (ref 4.2–5.8)
RBC # FLD: 13 % — SIGNIFICANT CHANGE UP (ref 10.3–14.5)
SARS-COV-2 IGG+IGM SERPL QL IA: >250 U/ML — HIGH
SARS-COV-2 IGG+IGM SERPL QL IA: POSITIVE
SODIUM SERPL-SCNC: 142 MMOL/L — SIGNIFICANT CHANGE UP (ref 135–145)
WBC # BLD: 12.46 K/UL — HIGH (ref 3.8–10.5)
WBC # FLD AUTO: 12.46 K/UL — HIGH (ref 3.8–10.5)

## 2021-05-07 PROCEDURE — 88304 TISSUE EXAM BY PATHOLOGIST: CPT

## 2021-05-07 PROCEDURE — 82435 ASSAY OF BLOOD CHLORIDE: CPT

## 2021-05-07 PROCEDURE — 85025 COMPLETE CBC W/AUTO DIFF WBC: CPT

## 2021-05-07 PROCEDURE — 83735 ASSAY OF MAGNESIUM: CPT

## 2021-05-07 PROCEDURE — C1768: CPT

## 2021-05-07 PROCEDURE — 85014 HEMATOCRIT: CPT

## 2021-05-07 PROCEDURE — 84100 ASSAY OF PHOSPHORUS: CPT

## 2021-05-07 PROCEDURE — 85018 HEMOGLOBIN: CPT

## 2021-05-07 PROCEDURE — 83036 HEMOGLOBIN GLYCOSYLATED A1C: CPT

## 2021-05-07 PROCEDURE — 88311 DECALCIFY TISSUE: CPT

## 2021-05-07 PROCEDURE — 86769 SARS-COV-2 COVID-19 ANTIBODY: CPT

## 2021-05-07 PROCEDURE — 82803 BLOOD GASES ANY COMBINATION: CPT

## 2021-05-07 PROCEDURE — U0003: CPT

## 2021-05-07 PROCEDURE — C1889: CPT

## 2021-05-07 PROCEDURE — 83605 ASSAY OF LACTIC ACID: CPT

## 2021-05-07 PROCEDURE — 82947 ASSAY GLUCOSE BLOOD QUANT: CPT

## 2021-05-07 PROCEDURE — 82962 GLUCOSE BLOOD TEST: CPT

## 2021-05-07 PROCEDURE — C9803: CPT

## 2021-05-07 PROCEDURE — 82565 ASSAY OF CREATININE: CPT

## 2021-05-07 PROCEDURE — 84132 ASSAY OF SERUM POTASSIUM: CPT

## 2021-05-07 PROCEDURE — U0005: CPT

## 2021-05-07 PROCEDURE — 82330 ASSAY OF CALCIUM: CPT

## 2021-05-07 PROCEDURE — 84295 ASSAY OF SERUM SODIUM: CPT

## 2021-05-07 PROCEDURE — 80048 BASIC METABOLIC PNL TOTAL CA: CPT

## 2021-05-07 PROCEDURE — 93005 ELECTROCARDIOGRAM TRACING: CPT

## 2021-05-07 RX ORDER — MEMANTINE HYDROCHLORIDE 10 MG/1
1 TABLET ORAL
Qty: 0 | Refills: 0 | DISCHARGE
Start: 2021-05-07

## 2021-05-07 RX ORDER — RIVASTIGMINE 4.6 MG/24H
1 PATCH, EXTENDED RELEASE TRANSDERMAL
Qty: 0 | Refills: 0 | DISCHARGE
Start: 2021-05-07

## 2021-05-07 RX ORDER — ATORVASTATIN CALCIUM 80 MG/1
1 TABLET, FILM COATED ORAL
Qty: 0 | Refills: 0 | DISCHARGE

## 2021-05-07 RX ORDER — MEMANTINE HYDROCHLORIDE 10 MG/1
1 TABLET ORAL
Qty: 0 | Refills: 0 | DISCHARGE

## 2021-05-07 RX ORDER — DULOXETINE HYDROCHLORIDE 30 MG/1
1 CAPSULE, DELAYED RELEASE ORAL
Qty: 0 | Refills: 0 | DISCHARGE
Start: 2021-05-07

## 2021-05-07 RX ORDER — ATORVASTATIN CALCIUM 80 MG/1
1 TABLET, FILM COATED ORAL
Qty: 0 | Refills: 0 | DISCHARGE
Start: 2021-05-07

## 2021-05-07 RX ORDER — RIVASTIGMINE 4.6 MG/24H
1 PATCH, EXTENDED RELEASE TRANSDERMAL
Qty: 0 | Refills: 0 | DISCHARGE

## 2021-05-07 RX ORDER — LOSARTAN POTASSIUM 100 MG/1
1 TABLET, FILM COATED ORAL
Qty: 0 | Refills: 0 | DISCHARGE
Start: 2021-05-07

## 2021-05-07 RX ORDER — LOSARTAN POTASSIUM 100 MG/1
0.5 TABLET, FILM COATED ORAL
Qty: 0 | Refills: 0 | DISCHARGE
Start: 2021-05-07

## 2021-05-07 RX ORDER — ASPIRIN/CALCIUM CARB/MAGNESIUM 324 MG
1 TABLET ORAL
Qty: 0 | Refills: 0 | DISCHARGE
Start: 2021-05-07

## 2021-05-07 RX ORDER — CLOPIDOGREL BISULFATE 75 MG/1
1 TABLET, FILM COATED ORAL
Qty: 0 | Refills: 0 | DISCHARGE
Start: 2021-05-07

## 2021-05-07 RX ORDER — OXYCODONE HYDROCHLORIDE 5 MG/1
1 TABLET ORAL
Qty: 16 | Refills: 0
Start: 2021-05-07 | End: 2021-05-10

## 2021-05-07 RX ORDER — LOSARTAN POTASSIUM 100 MG/1
1 TABLET, FILM COATED ORAL
Qty: 0 | Refills: 0 | DISCHARGE

## 2021-05-07 RX ORDER — DULOXETINE HYDROCHLORIDE 30 MG/1
0 CAPSULE, DELAYED RELEASE ORAL
Qty: 0 | Refills: 0 | DISCHARGE

## 2021-05-07 RX ADMIN — Medication 12.5 MILLIGRAM(S): at 06:18

## 2021-05-07 RX ADMIN — DULOXETINE HYDROCHLORIDE 60 MILLIGRAM(S): 30 CAPSULE, DELAYED RELEASE ORAL at 06:19

## 2021-05-07 RX ADMIN — SODIUM CHLORIDE 3 MILLILITER(S): 9 INJECTION INTRAMUSCULAR; INTRAVENOUS; SUBCUTANEOUS at 06:19

## 2021-05-07 RX ADMIN — RIVASTIGMINE 1 PATCH: 4.6 PATCH, EXTENDED RELEASE TRANSDERMAL at 07:14

## 2021-05-07 RX ADMIN — MEMANTINE HYDROCHLORIDE 10 MILLIGRAM(S): 10 TABLET ORAL at 06:19

## 2021-05-07 RX ADMIN — LOSARTAN POTASSIUM 50 MILLIGRAM(S): 100 TABLET, FILM COATED ORAL at 06:19

## 2021-05-07 NOTE — PROGRESS NOTE ADULT - ASSESSMENT
Assessment:  70yMale patient S/P Left Common Femoral Artery Endarterectomy with profundaplasty and patch angioplasty for PAD    Plan:  - ASA and Plavix   - Pain control PRN  - Diet: Reg  - ISS   - Mercado d/c, f/u TOV  - Activity: Bedrest for 24hrs, ambulate as tolerated thereafter   - DVT ppx: NO SCD on LLE   - d/c today    Vascular Surgery  [id number].

## 2021-05-07 NOTE — DISCHARGE NOTE PROVIDER - NSDCCPCAREPLAN_GEN_ALL_CORE_FT
PRINCIPAL DISCHARGE DIAGNOSIS  Diagnosis: Atherosclerosis of native artery of extremity  Assessment and Plan of Treatment: WOUND CARE:  Dry gauze and paper tape over incision.  BATHING: You may shower and/or sponge bathe.  ACTIVITY: If you are taking narcotic pain medication (such as Percocet), do NOT make important decisions.  NOTIFY YOUR SURGEON IF: You have any bleeding that does not stop, any fever (over 100.4 F) or chills, persistent numbness/tingling, leg color changes, or if your pain is not controlled on your discharge pain medications.  FOLLOW-UP:  Please follow up with  in 1-2 weeks regarding your hospitalization.

## 2021-05-07 NOTE — DISCHARGE NOTE PROVIDER - NSDCCPTREATMENT_GEN_ALL_CORE_FT
PRINCIPAL PROCEDURE  Procedure: Endarterectomy of left common femoral artery  Findings and Treatment: with profundaplasty and patch angio

## 2021-05-07 NOTE — DISCHARGE NOTE NURSING/CASE MANAGEMENT/SOCIAL WORK - PATIENT PORTAL LINK FT
You can access the FollowMyHealth Patient Portal offered by [facility] by registering at the following website: [email or web address]. By joining [facility]’s FollowMyHealth portal, you will also be able to view your health information using other applications (apps) compatible with our system.

## 2021-05-07 NOTE — DISCHARGE NOTE PROVIDER - CARE PROVIDER_API CALL
Zuhair Sapp)  Vascular Surgery  [address], [address]  [place] [address]  Phone: [phone]  Fax: [phone]  Follow Up Time: 1 week

## 2021-05-07 NOTE — PROGRESS NOTE ADULT - SUBJECTIVE AND OBJECTIVE BOX
VASCULAR SURGERY DAILY PROGRESS NOTE:     SUBJECTIVE/ROS: Patient feels well.   Denies nausea, vomiting, chest pain, shortness of breath       MEDICATIONS  (STANDING):  aspirin enteric coated 81 milliGRAM(s) Oral daily  atorvastatin 80 milliGRAM(s) Oral at bedtime  clopidogrel Tablet 75 milliGRAM(s) Oral daily  dextrose 40% Gel 15 Gram(s) Oral once  dextrose 5%. 1000 milliLiter(s) (50 mL/Hr) IV Continuous <Continuous>  dextrose 5%. 1000 milliLiter(s) (100 mL/Hr) IV Continuous <Continuous>  dextrose 50% Injectable 25 Gram(s) IV Push once  dextrose 50% Injectable 12.5 Gram(s) IV Push once  dextrose 50% Injectable 25 Gram(s) IV Push once  DULoxetine 60 milliGRAM(s) Oral two times a day  glucagon  Injectable 1 milliGRAM(s) IntraMuscular once  insulin lispro (ADMELOG) corrective regimen sliding scale   SubCutaneous three times a day before meals  insulin lispro (ADMELOG) corrective regimen sliding scale   SubCutaneous at bedtime  lactated ringers. 1000 milliLiter(s) (75 mL/Hr) IV Continuous <Continuous>  losartan 50 milliGRAM(s) Oral daily  memantine 10 milliGRAM(s) Oral two times a day  metoprolol tartrate 12.5 milliGRAM(s) Oral two times a day  phenylephrine    Infusion 0.2 MICROgram(s)/kG/Min (6.53 mL/Hr) IV Continuous <Continuous>  rivastigmine patch 13.3 mG/24 Hr(s) 1 Patch Transdermal every 24 hours  sodium chloride 0.9% lock flush 3 milliLiter(s) IV Push every 8 hours  vancomycin  IVPB 1250 milliGRAM(s) IV Intermittent once    MEDICATIONS  (PRN):  fentaNYL    Injectable 25 MICROGram(s) IV Push every 5 minutes PRN Moderate Pain (4 - 6)  ondansetron Injectable 8 milliGRAM(s) IV Push once PRN Nausea and/or Vomiting      OBJECTIVE:    Vital Signs Last 24 Hrs  T(C): 36.4 (07 May 2021 07:50), Max: 36.6 (06 May 2021 20:00)  T(F): 97.5 (07 May 2021 07:50), Max: 97.9 (06 May 2021 20:00)  HR: 93 (07 May 2021 08:00) (65 - 95)  BP: 150/65 (07 May 2021 08:00) (84/49 - 157/67)  BP(mean): 94 (07 May 2021 08:00) (64 - 103)  RR: 16 (07 May 2021 08:00) (14 - 16)  SpO2: 100% (07 May 2021 08:00) (96% - 100%)        I&O's Detail    06 May 2021 07:01  -  07 May 2021 07:00  --------------------------------------------------------  IN:    Lactated Ringers: 1275 mL  Total IN: 1275 mL    OUT:    Indwelling Catheter - Urethral (mL): 2750 mL  Total OUT: 2750 mL    Total NET: -1475 mL      07 May 2021 07:01  -  07 May 2021 09:32  --------------------------------------------------------  IN:  Total IN: 0 mL    OUT:    Indwelling Catheter - Urethral (mL): 150 mL  Total OUT: 150 mL    Total NET: -150 mL          Daily     Daily     LABS:                        11.7   12.46 )-----------( 165      ( 07 May 2021 03:02 )             36.5     05-07    142  |  108  |  20  ----------------------------<  127<H>  4.9   |  23  |  0.98    Ca    9.5      07 May 2021 03:02  Phos  3.3     05-07  Mg     2.0     05-07    Physical Examination:  General: NAD, resting comfortably in bed  HEENT: Normocephalic atraumatic  Respiratory: Nonlabored respirations, normal CW expansion.  Cardio: regular rate and rhythm.  Abdomen: soft. nontender, nondistended  Vascular: L groin incision soft, no erythema or palpable collections. wound covered with steri strips, 4x4 gauze and tegaderm.  Pulse Exam: L PT Dopplerable at the end of the case, during poc and AM rounds. No DP appreciated.

## 2021-05-07 NOTE — DISCHARGE NOTE PROVIDER - NSDCFUSCHEDAPPT_GEN_ALL_CORE_FT
SHERICE RAMIREZ ; 06/28/2021 ; NPP Cardio [address] SHERICE Goldstein ; 07/14/2021 ; NPP Intmed [address]

## 2021-05-07 NOTE — DISCHARGE NOTE PROVIDER - HOSPITAL COURSE
71 y/o M presented to [facility] on 5/6 on Left Leg Femoral endarterectomy artery bypass on 5/6/21. PMH: HTN, HLD, DM, DOLORES (on CPAP), CAD(stent x1 to mid RCA in 2018).   In the PACU, the patients' pain was controlled, tolerating PO, and voiding appropriately. Hypotensive upon arrival to PACU w/ SBP in 80s. ABG sent w/ out any gross abnormalities.  The patient was seen POD1 in PACU and remained hemodynamically stable.   On day of discharge, the patient was tolerating diet, and pain controlled. Incision sites remained clear dry and intact.  All questions were answered and patient safely discharged.

## 2021-05-10 ENCOUNTER — NON-APPOINTMENT (OUTPATIENT)
Age: 71
End: 2021-05-10

## 2021-05-10 LAB — SURGICAL PATHOLOGY STUDY: SIGNIFICANT CHANGE UP

## 2021-05-20 NOTE — H&P PST ADULT - DENTITION

## 2021-06-28 ENCOUNTER — APPOINTMENT (OUTPATIENT)
Dept: CARDIOLOGY | Facility: CLINIC | Age: 71
End: 2021-06-28
Payer: MEDICARE

## 2021-06-28 VITALS
BODY MASS INDEX: 26.6 KG/M2 | WEIGHT: 190 LBS | SYSTOLIC BLOOD PRESSURE: 124 MMHG | HEART RATE: 64 BPM | DIASTOLIC BLOOD PRESSURE: 58 MMHG | HEIGHT: 71 IN

## 2021-06-28 PROCEDURE — 99214 OFFICE O/P EST MOD 30 MIN: CPT

## 2021-06-28 NOTE — DISCUSSION/SUMMARY
[FreeTextEntry1] : 71M with CAD s/p PCI, PAD, DM, HTN, HLD\par \par 1. CAD: Cont with asa, plavix, lipitor daily. Tolerating without bleeding.\par \par 2. PAD: On DAPT and lipitor. Cont. S/p L endarterectomy, vascualr follow up pending \par \par 3. HTN: Well controlled, cont current medications\par \par 4. HLD:  On statin, [facility]\par \par 5. DM: Care with PCP\par \par Last echo within normal limits\par \par RV 4M \par

## 2021-06-28 NOTE — HISTORY OF PRESENT ILLNESS
[FreeTextEntry1] : 71M with PAD and claudication, DM, HTN, HLD, CAD s/p PCI (RCA), former smoker s/p L femoral endarterectomy \par \par S/p L femoral endarterectomy with improved symptoms\par Needs procedure on R side- pending follow up with Dr. Can \par \par Early onset dementia, has been placed on Namenda. \par Denies chest pain, shortness of breath, dizziness, lightheadedness. Limited exercise tolerance due to PAD\par \par HISTORY:\par \par In October 2018, pt was seen in the office with worsening exertional chest pain, was sent for cardiac cath and was noted with 99% RCA occlusion s/p GELA.  \par ---------------------------------------\par ECG: SR, no ST-T wave changes\par Cath 2018 (RRA): 100% RCA, other arteries normal\par TTE 5/2021: 60-65%, mild aortic root calcification\par LE angio 2021: L sided iliac and external iliac- mod stenosis with prior stents. Bilateral SFA occlusions with profundus femoral artery \par NST (2009): Mild inferior ischemia, EF 58%\par \par Asa 81mg, Clopidogrel 75mg, Losartan 50mg, Metoprolol 12.5mg BID, atorvastatin

## 2021-06-28 NOTE — REVIEW OF SYSTEMS
[Negative] : Heme/Lymph [SOB] : no shortness of breath [Dyspnea on exertion] : not dyspnea during exertion [Chest Discomfort] : no chest discomfort [Leg Claudication] : intermittent leg claudication

## 2021-07-14 ENCOUNTER — APPOINTMENT (OUTPATIENT)
Dept: INTERNAL MEDICINE | Facility: CLINIC | Age: 71
End: 2021-07-14
Payer: MEDICARE

## 2021-07-14 VITALS
OXYGEN SATURATION: 97 % | HEIGHT: 71 IN | HEART RATE: 60 BPM | DIASTOLIC BLOOD PRESSURE: 67 MMHG | TEMPERATURE: 97.8 F | BODY MASS INDEX: 27.02 KG/M2 | WEIGHT: 193 LBS | SYSTOLIC BLOOD PRESSURE: 119 MMHG

## 2021-07-14 PROCEDURE — 99214 OFFICE O/P EST MOD 30 MIN: CPT

## 2021-07-14 NOTE — HISTORY OF PRESENT ILLNESS
[FreeTextEntry1] : f/u  of  med problems  dementia  is about same.  wife says not eating or drinking well though with no sig wt loss  he is more depressed and the psych added wellbutrin  he has no   c/o  cp or sob  he refuses to do any walking   he eats more sugar than his wife tries to allow him  he sees the eye doctor  at [facility] and with dr soria sees dr abrams no changes in rx made.

## 2021-09-03 DIAGNOSIS — G47.00 INSOMNIA, UNSPECIFIED: ICD-10-CM

## 2021-09-09 ENCOUNTER — APPOINTMENT (OUTPATIENT)
Dept: INTERNAL MEDICINE | Facility: CLINIC | Age: 71
End: 2021-09-09
Payer: MEDICARE

## 2021-09-09 VITALS
OXYGEN SATURATION: 96 % | BODY MASS INDEX: 26.32 KG/M2 | SYSTOLIC BLOOD PRESSURE: 128 MMHG | DIASTOLIC BLOOD PRESSURE: 62 MMHG | TEMPERATURE: 97.8 F | WEIGHT: 188 LBS | HEART RATE: 70 BPM | HEIGHT: 71 IN

## 2021-09-09 DIAGNOSIS — Z01.818 ENCOUNTER FOR OTHER PREPROCEDURAL EXAMINATION: ICD-10-CM

## 2021-09-09 DIAGNOSIS — F51.04 PSYCHOPHYSIOLOGIC INSOMNIA: ICD-10-CM

## 2021-09-09 PROCEDURE — 99214 OFFICE O/P EST MOD 30 MIN: CPT

## 2021-09-09 NOTE — PHYSICAL EXAM
[Normal Sclera/Conjunctiva] : normal sclera/conjunctiva [Normal Outer Ear/Nose] : the outer ears and nose were normal in appearance [No JVD] : no jugular venous distention [No Respiratory Distress] : no respiratory distress  [Supple] : supple [No Accessory Muscle Use] : no accessory muscle use [Clear to Auscultation] : lungs were clear to auscultation bilaterally [No Edema] : there was no peripheral edema [Soft] : abdomen soft [Non Tender] : non-tender [No HSM] : no HSM [Normal Bowel Sounds] : normal bowel sounds [Coordination Grossly Intact] : coordination grossly intact [Normal Gait] : normal gait [Normal] : affect was normal and insight and judgment were intact [No Palpable Aorta] : no palpable aorta [Normal Affect] : the affect was normal [Normal Mood] : the mood was normal

## 2021-09-10 ENCOUNTER — NON-APPOINTMENT (OUTPATIENT)
Age: 71
End: 2021-09-10

## 2021-09-10 ENCOUNTER — APPOINTMENT (OUTPATIENT)
Dept: CARDIOLOGY | Facility: CLINIC | Age: 71
End: 2021-09-10
Payer: MEDICARE

## 2021-09-10 VITALS — SYSTOLIC BLOOD PRESSURE: 101 MMHG | HEART RATE: 89 BPM | DIASTOLIC BLOOD PRESSURE: 66 MMHG

## 2021-09-10 PROBLEM — F51.04 CHRONIC INSOMNIA: Status: ACTIVE | Noted: 2021-09-10

## 2021-09-10 PROCEDURE — 99214 OFFICE O/P EST MOD 30 MIN: CPT

## 2021-09-10 PROCEDURE — 93000 ELECTROCARDIOGRAM COMPLETE: CPT

## 2021-09-10 NOTE — DISCUSSION/SUMMARY
[FreeTextEntry1] : 71M with CAD s/p PCI, PAD, DM, HTN, HLD. Pending R femoral endarterectomy \par \par 1. CAD: Cont with asa, lipitor daily. Plavix on hold \par \par 2. PAD: On asa and lipitor. Pending R fem endarterectomy. Clarify need for plavix with vascular. OK to stop from cardiac standpoint given stent placement 3 years ago\par \par 3. HTN: Well controlled, cont current medications\par \par 4. HLD:  On statin, [facility]\par \par 5. DM: Care with PCP\par \par Recent echo normal. ECG normal. No active cardiac symptoms. He can climb a flight of stairs, albeit slowly\par \par This patient is able to perform >4 METS of activity without limitation. No evidence of ongoing ischemia, arrhythmia, valvular heart disease or decompensated heart failure.  This patient is optimized from a cardiac standpoint for this intermediate to high risk surgery.  No further cardiac testing is necessary prior to surgery.\par \par

## 2021-09-10 NOTE — HISTORY OF PRESENT ILLNESS
[FreeTextEntry1] : 71M with PAD and claudication, DM, HTN, HLD, CAD s/p PCI (RCA), former smoker s/p L femoral endarterectomy, pending R femoral endarterectomy, following with Dr. Johnathon Bailey  \par \par Feeling well overall\par Patient denies chest pain, shortness of breath, palpitations, dizziness, lightheadedness, syncope.\par Limited exercise tolerance due to PAD\par Early onset dementia, has been placed on Namenda. \par Stopped Plavix recently as he had a cut on his nose that did not stop bleeding for 5 days, has since stopped \par \par HISTORY:\par \par In October 2018, pt was seen in the office with worsening exertional chest pain, was sent for cardiac cath and was noted with 99% RCA occlusion s/p GELA.  \par ---------------------------------------\par ECG: SR, no ST-T wave changes\par Cath 2018 (RRA): 100% RCA, other arteries normal\par TTE 5/2021: 60-65%, mild aortic root calcification\par LE angio 2021: L sided iliac and external iliac- mod stenosis with prior stents. Bilateral SFA occlusions with profundus femoral artery \par NST (2009): Mild inferior ischemia, EF 58%\par \par Asa 81mg, [Clopidogrel 75mg], Losartan 50mg, Metoprolol 12.5mg BID, atorvastatin

## 2021-09-24 ENCOUNTER — APPOINTMENT (OUTPATIENT)
Dept: INTERNAL MEDICINE | Facility: CLINIC | Age: 71
End: 2021-09-24

## 2021-09-24 ENCOUNTER — OUTPATIENT (OUTPATIENT)
Dept: OUTPATIENT SERVICES | Facility: HOSPITAL | Age: 71
LOS: 1 days | End: 2021-09-24
Payer: MEDICARE

## 2021-09-24 VITALS
HEART RATE: 60 BPM | OXYGEN SATURATION: 96 % | HEIGHT: 71 IN | SYSTOLIC BLOOD PRESSURE: 103 MMHG | DIASTOLIC BLOOD PRESSURE: 67 MMHG | TEMPERATURE: 98 F | RESPIRATION RATE: 17 BRPM | WEIGHT: 190.04 LBS

## 2021-09-24 DIAGNOSIS — Z98.49 CATARACT EXTRACTION STATUS, UNSPECIFIED EYE: Chronic | ICD-10-CM

## 2021-09-24 DIAGNOSIS — E11.9 TYPE 2 DIABETES MELLITUS WITHOUT COMPLICATIONS: ICD-10-CM

## 2021-09-24 DIAGNOSIS — I70.211 ATHEROSCLEROSIS OF NATIVE ARTERIES OF EXTREMITIES WITH INTERMITTENT CLAUDICATION, RIGHT LEG: ICD-10-CM

## 2021-09-24 DIAGNOSIS — Z98.890 OTHER SPECIFIED POSTPROCEDURAL STATES: Chronic | ICD-10-CM

## 2021-09-24 DIAGNOSIS — G30.9 ALZHEIMER'S DISEASE, UNSPECIFIED: ICD-10-CM

## 2021-09-24 DIAGNOSIS — Z01.818 ENCOUNTER FOR OTHER PREPROCEDURAL EXAMINATION: ICD-10-CM

## 2021-09-24 PROCEDURE — 83036 HEMOGLOBIN GLYCOSYLATED A1C: CPT

## 2021-09-24 PROCEDURE — 86850 RBC ANTIBODY SCREEN: CPT

## 2021-09-24 PROCEDURE — 85027 COMPLETE CBC AUTOMATED: CPT

## 2021-09-24 PROCEDURE — G0463: CPT

## 2021-09-24 PROCEDURE — 86900 BLOOD TYPING SEROLOGIC ABO: CPT

## 2021-09-24 PROCEDURE — 80048 BASIC METABOLIC PNL TOTAL CA: CPT

## 2021-09-24 PROCEDURE — 86901 BLOOD TYPING SEROLOGIC RH(D): CPT

## 2021-09-24 RX ORDER — VANCOMYCIN HCL 1 G
1250 VIAL (EA) INTRAVENOUS ONCE
Refills: 0 | Status: DISCONTINUED | OUTPATIENT
Start: 2021-10-08 | End: 2021-10-10

## 2021-09-24 RX ORDER — CHLORHEXIDINE GLUCONATE 213 G/1000ML
1 SOLUTION TOPICAL ONCE
Refills: 0 | Status: DISCONTINUED | OUTPATIENT
Start: 2021-10-08 | End: 2021-10-10

## 2021-09-24 NOTE — H&P PST ADULT - NSICDXPASTSURGICALHX_GEN_ALL_CORE_FT
PAST SURGICAL HISTORY:  arthroscopy right shoulderx2 2008    bladder surgery 2000 washout/laser    H/O cardiac catheterization 2018- stent x1 mid RCA    H/O peripheral artery bypass left femoral May 2021    Knee Surgery 1998- meniscal    S/P cataract surgery bilateral

## 2021-09-24 NOTE — H&P PST ADULT - NSICDXPASTMEDICALHX_GEN_ALL_CORE_FT
PAST MEDICAL HISTORY:  Alzheimer disease     Anxiety     Atherosclerosis of native artery of extremity     Cancer of Bladder 2000    HTN (Hypertension)     Hyperlipidemia     Malignant neoplasm of urinary bladder, unspecified site     DOLORES on CPAP     PAD (peripheral artery disease)     Peripheral neuropathy     Rotator Cuff Disorder     Type 2 diabetes mellitus      PAST MEDICAL HISTORY:  Alzheimer disease     Anxiety     Atherosclerosis of native artery of extremity     Cancer of Bladder 2000    HTN (Hypertension)     Hyperlipidemia     Malignant neoplasm of urinary bladder, unspecified site      [facility]    DOLORES on CPAP     PAD (peripheral artery disease)     Peripheral neuropathy     Rotator Cuff Disorder     Type 2 diabetes mellitus

## 2021-09-24 NOTE — H&P PST ADULT - BP NONINVASIVE SYSTOLIC (MM HG)
103 Purse String (Intermediate) Text: Given the location of the defect and the characteristics of the surrounding skin a purse string intermediate closure was deemed most appropriate.  Undermining was performed circumfirentially around the surgical defect.  A purse string suture was then placed and tightened.

## 2021-09-24 NOTE — H&P PST ADULT - ATTENDING COMMENTS
We plan right femoral endarterectomy for worsening rest pain.  All risks and alternatives were reiterated to the Millers who agree with this plan.

## 2021-09-24 NOTE — H&P PST ADULT - HEALTH CARE MAINTENANCE
flu no  COVID Moderna x2 last 2/2021   pneumonia up to date  shingles immunized  tetanus 7or 8 years ago

## 2021-09-24 NOTE — H&P PST ADULT - HISTORY OF PRESENT ILLNESS
71 yr old male with hx of CAD, hypertension, diabetes mellitus, Alzheimer Disease,  atherosclerosis femoral  artery. For right femoral endarterectomy artery bypass. Hx via wife.     **Diabetes  Metformin last 10/6  Glipizide last 10/6  fs on arrival    **Atherosclerosis leg  on Plavix  and Aspirin  per wife pt surgeon instructed them  to stay on both      ***COVID***  denies s/s  denies known exposure  denies travel  vaccinated Moderna 2 doses last Feb 2021   swab 10/5 [facility]

## 2021-10-04 PROBLEM — E11.9 TYPE 2 DIABETES MELLITUS WITHOUT COMPLICATIONS: Chronic | Status: ACTIVE | Noted: 2021-09-24
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
## 2021-10-04 PROBLEM — G30.9 ALZHEIMER'S DISEASE, UNSPECIFIED: Chronic | Status: ACTIVE | Noted: 2021-09-24

## 2021-10-05 ENCOUNTER — OUTPATIENT (OUTPATIENT)
Dept: OUTPATIENT SERVICES | Facility: HOSPITAL | Age: 71
LOS: 1 days | End: 2021-10-05

## 2021-10-05 DIAGNOSIS — Z98.890 OTHER SPECIFIED POSTPROCEDURAL STATES: Chronic | ICD-10-CM

## 2021-10-05 DIAGNOSIS — Z98.49 CATARACT EXTRACTION STATUS, UNSPECIFIED EYE: Chronic | ICD-10-CM

## 2021-10-05 DIAGNOSIS — Z11.52 ENCOUNTER FOR SCREENING FOR COVID-19: ICD-10-CM

## 2021-10-05 LAB — SARS-COV-2 RNA SPEC QL NAA+PROBE: SIGNIFICANT CHANGE UP

## 2021-10-07 ENCOUNTER — TRANSCRIPTION ENCOUNTER (OUTPATIENT)
Age: 71
End: 2021-10-07

## 2021-10-08 ENCOUNTER — INPATIENT (INPATIENT)
Facility: HOSPITAL | Age: 71
LOS: 1 days | Discharge: HOME | DRG: 254 | End: 2021-10-10
Attending: SURGERY | Admitting: SURGERY
Payer: MEDICARE

## 2021-10-08 ENCOUNTER — RESULT REVIEW (OUTPATIENT)
Age: 71
End: 2021-10-08

## 2021-10-08 ENCOUNTER — TRANSCRIPTION ENCOUNTER (OUTPATIENT)
Age: 71
End: 2021-10-08

## 2021-10-08 VITALS
WEIGHT: 190.04 LBS | DIASTOLIC BLOOD PRESSURE: 61 MMHG | SYSTOLIC BLOOD PRESSURE: 148 MMHG | HEIGHT: 71 IN | HEART RATE: 52 BPM | TEMPERATURE: 98 F | OXYGEN SATURATION: 98 % | RESPIRATION RATE: 16 BRPM

## 2021-10-08 DIAGNOSIS — I70.211 ATHEROSCLEROSIS OF NATIVE ARTERIES OF EXTREMITIES WITH INTERMITTENT CLAUDICATION, RIGHT LEG: ICD-10-CM

## 2021-10-08 DIAGNOSIS — Z98.49 CATARACT EXTRACTION STATUS, UNSPECIFIED EYE: Chronic | ICD-10-CM

## 2021-10-08 DIAGNOSIS — Z98.890 OTHER SPECIFIED POSTPROCEDURAL STATES: Chronic | ICD-10-CM

## 2021-10-08 LAB
GAS PNL BLDA: SIGNIFICANT CHANGE UP
GLUCOSE BLDC GLUCOMTR-MCNC: 125 MG/DL — HIGH (ref 70–99)
GLUCOSE BLDC GLUCOMTR-MCNC: 149 MG/DL — HIGH (ref 70–99)
GLUCOSE BLDC GLUCOMTR-MCNC: 149 MG/DL — HIGH (ref 70–99)
GLUCOSE BLDC GLUCOMTR-MCNC: 201 MG/DL — HIGH (ref 70–99)

## 2021-10-08 PROCEDURE — 88311 DECALCIFY TISSUE: CPT | Mod: 26

## 2021-10-08 PROCEDURE — 88305 TISSUE EXAM BY PATHOLOGIST: CPT | Mod: 26

## 2021-10-08 RX ORDER — LABETALOL HCL 100 MG
10 TABLET ORAL ONCE
Refills: 0 | Status: COMPLETED | OUTPATIENT
Start: 2021-10-08 | End: 2021-10-08

## 2021-10-08 RX ORDER — HALOPERIDOL DECANOATE 100 MG/ML
1 INJECTION INTRAMUSCULAR ONCE
Refills: 0 | Status: COMPLETED | OUTPATIENT
Start: 2021-10-08 | End: 2021-10-09

## 2021-10-08 RX ORDER — DEXTROSE MONOHYDRATE, SODIUM CHLORIDE, AND POTASSIUM CHLORIDE 50; .745; 4.5 G/1000ML; G/1000ML; G/1000ML
1000 INJECTION, SOLUTION INTRAVENOUS
Refills: 0 | Status: DISCONTINUED | OUTPATIENT
Start: 2021-10-08 | End: 2021-10-10

## 2021-10-08 RX ORDER — ZALEPLON 10 MG
5 CAPSULE ORAL AT BEDTIME
Refills: 0 | Status: DISCONTINUED | OUTPATIENT
Start: 2021-10-08 | End: 2021-10-10

## 2021-10-08 RX ORDER — DEXTROSE 50 % IN WATER 50 %
25 SYRINGE (ML) INTRAVENOUS ONCE
Refills: 0 | Status: DISCONTINUED | OUTPATIENT
Start: 2021-10-08 | End: 2021-10-10

## 2021-10-08 RX ORDER — METOPROLOL TARTRATE 50 MG
12.5 TABLET ORAL
Refills: 0 | Status: DISCONTINUED | OUTPATIENT
Start: 2021-10-08 | End: 2021-10-08

## 2021-10-08 RX ORDER — LABETALOL HCL 100 MG
5 TABLET ORAL ONCE
Refills: 0 | Status: COMPLETED | OUTPATIENT
Start: 2021-10-08 | End: 2021-10-08

## 2021-10-08 RX ORDER — HEPARIN SODIUM 5000 [USP'U]/ML
5000 INJECTION INTRAVENOUS; SUBCUTANEOUS EVERY 8 HOURS
Refills: 0 | Status: DISCONTINUED | OUTPATIENT
Start: 2021-10-08 | End: 2021-10-10

## 2021-10-08 RX ORDER — TRAZODONE HCL 50 MG
50 TABLET ORAL ONCE
Refills: 0 | Status: COMPLETED | OUTPATIENT
Start: 2021-10-08 | End: 2021-10-08

## 2021-10-08 RX ORDER — DEXTROSE 50 % IN WATER 50 %
12.5 SYRINGE (ML) INTRAVENOUS ONCE
Refills: 0 | Status: DISCONTINUED | OUTPATIENT
Start: 2021-10-08 | End: 2021-10-10

## 2021-10-08 RX ORDER — DULOXETINE HYDROCHLORIDE 30 MG/1
60 CAPSULE, DELAYED RELEASE ORAL
Refills: 0 | Status: DISCONTINUED | OUTPATIENT
Start: 2021-10-08 | End: 2021-10-10

## 2021-10-08 RX ORDER — HYDROMORPHONE HYDROCHLORIDE 2 MG/ML
0.5 INJECTION INTRAMUSCULAR; INTRAVENOUS; SUBCUTANEOUS
Refills: 0 | Status: DISCONTINUED | OUTPATIENT
Start: 2021-10-08 | End: 2021-10-08

## 2021-10-08 RX ORDER — HYDROXYZINE HCL 10 MG
25 TABLET ORAL ONCE
Refills: 0 | Status: DISCONTINUED | OUTPATIENT
Start: 2021-10-08 | End: 2021-10-08

## 2021-10-08 RX ORDER — LOSARTAN POTASSIUM 100 MG/1
50 TABLET, FILM COATED ORAL DAILY
Refills: 0 | Status: DISCONTINUED | OUTPATIENT
Start: 2021-10-08 | End: 2021-10-10

## 2021-10-08 RX ORDER — INSULIN LISPRO 100/ML
VIAL (ML) SUBCUTANEOUS
Refills: 0 | Status: DISCONTINUED | OUTPATIENT
Start: 2021-10-08 | End: 2021-10-10

## 2021-10-08 RX ORDER — SODIUM CHLORIDE 9 MG/ML
1000 INJECTION, SOLUTION INTRAVENOUS
Refills: 0 | Status: DISCONTINUED | OUTPATIENT
Start: 2021-10-08 | End: 2021-10-10

## 2021-10-08 RX ORDER — CLOPIDOGREL BISULFATE 75 MG/1
75 TABLET, FILM COATED ORAL DAILY
Refills: 0 | Status: DISCONTINUED | OUTPATIENT
Start: 2021-10-08 | End: 2021-10-10

## 2021-10-08 RX ORDER — LABETALOL HCL 100 MG
20 TABLET ORAL ONCE
Refills: 0 | Status: COMPLETED | OUTPATIENT
Start: 2021-10-08 | End: 2021-10-08

## 2021-10-08 RX ORDER — DEXTROSE 50 % IN WATER 50 %
15 SYRINGE (ML) INTRAVENOUS ONCE
Refills: 0 | Status: DISCONTINUED | OUTPATIENT
Start: 2021-10-08 | End: 2021-10-10

## 2021-10-08 RX ORDER — DONEPEZIL HYDROCHLORIDE 10 MG/1
10 TABLET, FILM COATED ORAL AT BEDTIME
Refills: 0 | Status: DISCONTINUED | OUTPATIENT
Start: 2021-10-08 | End: 2021-10-10

## 2021-10-08 RX ORDER — SODIUM CHLORIDE 9 MG/ML
3 INJECTION INTRAMUSCULAR; INTRAVENOUS; SUBCUTANEOUS EVERY 8 HOURS
Refills: 0 | Status: DISCONTINUED | OUTPATIENT
Start: 2021-10-08 | End: 2021-10-10

## 2021-10-08 RX ORDER — ASPIRIN/CALCIUM CARB/MAGNESIUM 324 MG
81 TABLET ORAL DAILY
Refills: 0 | Status: DISCONTINUED | OUTPATIENT
Start: 2021-10-08 | End: 2021-10-10

## 2021-10-08 RX ORDER — RISPERIDONE 4 MG/1
1 TABLET ORAL ONCE
Refills: 0 | Status: DISCONTINUED | OUTPATIENT
Start: 2021-10-08 | End: 2021-10-08

## 2021-10-08 RX ORDER — DEXTROSE MONOHYDRATE, SODIUM CHLORIDE, AND POTASSIUM CHLORIDE 50; .745; 4.5 G/1000ML; G/1000ML; G/1000ML
1000 INJECTION, SOLUTION INTRAVENOUS
Refills: 0 | Status: DISCONTINUED | OUTPATIENT
Start: 2021-10-08 | End: 2021-10-08

## 2021-10-08 RX ORDER — LANOLIN ALCOHOL/MO/W.PET/CERES
3 CREAM (GRAM) TOPICAL AT BEDTIME
Refills: 0 | Status: DISCONTINUED | OUTPATIENT
Start: 2021-10-08 | End: 2021-10-10

## 2021-10-08 RX ORDER — ATORVASTATIN CALCIUM 80 MG/1
80 TABLET, FILM COATED ORAL AT BEDTIME
Refills: 0 | Status: DISCONTINUED | OUTPATIENT
Start: 2021-10-08 | End: 2021-10-10

## 2021-10-08 RX ORDER — SODIUM CHLORIDE 9 MG/ML
3 INJECTION INTRAMUSCULAR; INTRAVENOUS; SUBCUTANEOUS EVERY 8 HOURS
Refills: 0 | Status: DISCONTINUED | OUTPATIENT
Start: 2021-10-08 | End: 2021-10-08

## 2021-10-08 RX ORDER — METOPROLOL TARTRATE 50 MG
12.5 TABLET ORAL THREE TIMES A DAY
Refills: 0 | Status: DISCONTINUED | OUTPATIENT
Start: 2021-10-08 | End: 2021-10-10

## 2021-10-08 RX ORDER — MEMANTINE HYDROCHLORIDE 10 MG/1
10 TABLET ORAL
Refills: 0 | Status: DISCONTINUED | OUTPATIENT
Start: 2021-10-08 | End: 2021-10-10

## 2021-10-08 RX ORDER — ONDANSETRON 8 MG/1
4 TABLET, FILM COATED ORAL ONCE
Refills: 0 | Status: DISCONTINUED | OUTPATIENT
Start: 2021-10-08 | End: 2021-10-08

## 2021-10-08 RX ORDER — LIDOCAINE HCL 20 MG/ML
0.2 VIAL (ML) INJECTION ONCE
Refills: 0 | Status: DISCONTINUED | OUTPATIENT
Start: 2021-10-08 | End: 2021-10-08

## 2021-10-08 RX ORDER — ACETAMINOPHEN 500 MG
650 TABLET ORAL EVERY 6 HOURS
Refills: 0 | Status: DISCONTINUED | OUTPATIENT
Start: 2021-10-08 | End: 2021-10-10

## 2021-10-08 RX ORDER — GLUCAGON INJECTION, SOLUTION 0.5 MG/.1ML
1 INJECTION, SOLUTION SUBCUTANEOUS ONCE
Refills: 0 | Status: DISCONTINUED | OUTPATIENT
Start: 2021-10-08 | End: 2021-10-10

## 2021-10-08 RX ORDER — OXYCODONE AND ACETAMINOPHEN 5; 325 MG/1; MG/1
1 TABLET ORAL EVERY 6 HOURS
Refills: 0 | Status: DISCONTINUED | OUTPATIENT
Start: 2021-10-08 | End: 2021-10-10

## 2021-10-08 RX ADMIN — DONEPEZIL HYDROCHLORIDE 10 MILLIGRAM(S): 10 TABLET, FILM COATED ORAL at 21:21

## 2021-10-08 RX ADMIN — Medication 5 MILLIGRAM(S): at 15:45

## 2021-10-08 RX ADMIN — ATORVASTATIN CALCIUM 80 MILLIGRAM(S): 80 TABLET, FILM COATED ORAL at 21:21

## 2021-10-08 RX ADMIN — Medication 20 MILLIGRAM(S): at 17:11

## 2021-10-08 RX ADMIN — HEPARIN SODIUM 5000 UNIT(S): 5000 INJECTION INTRAVENOUS; SUBCUTANEOUS at 17:14

## 2021-10-08 RX ADMIN — Medication 81 MILLIGRAM(S): at 13:32

## 2021-10-08 RX ADMIN — Medication 5 MILLIGRAM(S): at 23:32

## 2021-10-08 RX ADMIN — Medication 10 MILLIGRAM(S): at 20:23

## 2021-10-08 RX ADMIN — CLOPIDOGREL BISULFATE 75 MILLIGRAM(S): 75 TABLET, FILM COATED ORAL at 13:32

## 2021-10-08 RX ADMIN — Medication 0.5 MILLIGRAM(S): at 23:01

## 2021-10-08 RX ADMIN — Medication 12.5 MILLIGRAM(S): at 17:57

## 2021-10-08 RX ADMIN — Medication 10 MILLIGRAM(S): at 16:40

## 2021-10-08 RX ADMIN — DEXTROSE MONOHYDRATE, SODIUM CHLORIDE, AND POTASSIUM CHLORIDE 75 MILLILITER(S): 50; .745; 4.5 INJECTION, SOLUTION INTRAVENOUS at 13:32

## 2021-10-08 RX ADMIN — SODIUM CHLORIDE 3 MILLILITER(S): 9 INJECTION INTRAMUSCULAR; INTRAVENOUS; SUBCUTANEOUS at 21:52

## 2021-10-08 RX ADMIN — Medication 4: at 17:13

## 2021-10-08 RX ADMIN — MEMANTINE HYDROCHLORIDE 10 MILLIGRAM(S): 10 TABLET ORAL at 17:57

## 2021-10-08 RX ADMIN — Medication 50 MILLIGRAM(S): at 23:54

## 2021-10-08 RX ADMIN — Medication 50 MILLIGRAM(S): at 20:28

## 2021-10-08 RX ADMIN — Medication 3 MILLIGRAM(S): at 21:21

## 2021-10-08 RX ADMIN — DULOXETINE HYDROCHLORIDE 60 MILLIGRAM(S): 30 CAPSULE, DELAYED RELEASE ORAL at 17:57

## 2021-10-08 RX ADMIN — SODIUM CHLORIDE 3 MILLILITER(S): 9 INJECTION INTRAMUSCULAR; INTRAVENOUS; SUBCUTANEOUS at 14:54

## 2021-10-08 NOTE — PRE-ANESTHESIA EVALUATION ADULT - NSANTHPMHFT_GEN_ALL_CORE
71 yr old male with hx of CAD, hypertension, diabetes mellitus, Alzheimer Disease,  atherosclerosis femoral  artery. For right femoral endarterectomy artery bypass. Hx via wife.

## 2021-10-08 NOTE — DISCHARGE NOTE PROVIDER - NSDCCPCAREPLAN_GEN_ALL_CORE_FT
PRINCIPAL DISCHARGE DIAGNOSIS  Diagnosis: Atherosclerosis of native arteries of extremities with intermittent claudication, right leg  Assessment and Plan of Treatment: Return to ER for temperatures greater than 101, chills sweats, pain not controlled with pain medications, persistent headaches, nausea and/or vomiting, asymmetrical weakness, neurological or mental status changes.  Please keep incision sites clean and dry, shower only.  Do not bathe or immerse incision sites in water for a prolonged amount of time.  May remove gauze dressing in 24hrs.  Steri-strips may fall of on their own in the shower.   Follow up with Dr. Bailey within 2 weeks upon discharge.  Follow up with your PMD within 1 week regarding your hospitalization.

## 2021-10-08 NOTE — DISCHARGE NOTE PROVIDER - NSDCFUSCHEDAPPT_GEN_ALL_CORE_FT
SHERICE RAMIREZ ; 10/25/2021 ; NPP Cardio [address] ; 11/09/2021 ; NPP Intmed [address] ; 12/10/2021 ; NPP Cardio [address]

## 2021-10-08 NOTE — DISCHARGE NOTE PROVIDER - CARE PROVIDER_API CALL
Lalo Bailey)  Vascular Surgery  [address], [address]  [place] [address]  Phone: [phone]  Fax: [phone]  Follow Up Time: 2 weeks   Lalo Bailey)  Vascular Surgery  [address], [address]  [place] [address]  Phone: [phone]  Fax: [phone]  Follow Up Time: 1 week   Lalo Bailey)  Vascular Surgery  [address], [address]  [place] [address]  Phone: [phone]  Fax: [phone]  Follow Up Time: 2 weeks

## 2021-10-08 NOTE — CHART NOTE - NSCHARTNOTEFT_GEN_A_CORE
Vascular Surgery Post-Op Note    Pre-Op Dx: Atherosclerosis of native artery of right lower extremity with intermittent claudication      Procedure: Endarterectomy of Right common femoral artery      Subjective:  Pain controlled with medications.  Tolerating clears, denies N/V.  Denies SOB/CP/dizziness/palpitations.  Hypertensive in PACU, requiring IV labetolol.      Vital Signs Last 24 Hrs  T(C): 36 (08 Oct 2021 10:50), Max: 36.4 (08 Oct 2021 05:57)  T(F): 96.8 (08 Oct 2021 10:50), Max: 97.5 (08 Oct 2021 05:57)  HR: 79 (08 Oct 2021 15:30) (52 - 80)  BP: 168/63 (08 Oct 2021 15:30) (117/56 - 168/63)  BP(mean): 107 (08 Oct 2021 15:30) (81 - 110)  RR: 18 (08 Oct 2021 15:00) (12 - 18)  SpO2: 100% (08 Oct 2021 15:30) (97% - 100%)    Physical Exam:  General: NAD, resting comfortably in bed  Pulmonary: Nonlabored breathing, no respiratory distress  Cardiovascular: NSR  Abdominal: soft, NT/ND  Extremities: WWP, normal strength  Neuro: A/O x 3, CNs II-XII grossly intact, normal motor/sensation, no focal deficits  Pulses:   Right:    DP [ ]2+ [ ]1+ [ ]doppler  PT[ ]2+ [ ]1+ [X]doppler          CAPILLARY BLOOD GLUCOSE      POCT Blood Glucose.: 149 mg/dL (08 Oct 2021 11:26)  POCT Blood Glucose.: 149 mg/dL (08 Oct 2021 05:52)        Assessment:71y Male POD #0 s/p Right common femoral endarterectomy    Plan:  - BP control  - continue clears, advance in AM  - ASA/Plaxiv/SQH  - Pain control PRN  - Home meds  - Incentive spirometry  - bedrest, ambulate in AM  - continue Jess d/c in AM  - AM labs    Vascular surgery [id number] Vascular Surgery Post-Op Note    Pre-Op Dx: Atherosclerosis of native artery of right lower extremity with intermittent claudication      Procedure: Endarterectomy of Right common femoral artery      Subjective:  Pain controlled with medications.  Tolerating clears, denies N/V.  Denies SOB/CP/dizziness/palpitations.  Hypertensive in PACU, requiring IV labetolol.      Vital Signs Last 24 Hrs  T(C): 36 (08 Oct 2021 10:50), Max: 36.4 (08 Oct 2021 05:57)  T(F): 96.8 (08 Oct 2021 10:50), Max: 97.5 (08 Oct 2021 05:57)  HR: 79 (08 Oct 2021 15:30) (52 - 80)  BP: 168/63 (08 Oct 2021 15:30) (117/56 - 168/63)  BP(mean): 107 (08 Oct 2021 15:30) (81 - 110)  RR: 18 (08 Oct 2021 15:00) (12 - 18)  SpO2: 100% (08 Oct 2021 15:30) (97% - 100%)    Physical Exam:  General: NAD, resting comfortably in bed  Pulmonary: Nonlabored breathing, no respiratory distress  Cardiovascular: NSR  Abdominal: soft, NT/ND  Extremities: WWP, normal strength R groin w dressing, C/D/I, soft, no hematoma  Neuro: A/O x 3, CNs II-XII grossly intact, normal motor/sensation, no focal deficits  Pulses:   Right:    DP [ ]2+ [ ]1+ [ ]doppler  PT[ ]2+ [ ]1+ [X]doppler          CAPILLARY BLOOD GLUCOSE      POCT Blood Glucose.: 149 mg/dL (08 Oct 2021 11:26)  POCT Blood Glucose.: 149 mg/dL (08 Oct 2021 05:52)        Assessment:71y Male POD #0 s/p Right common femoral endarterectomy    Plan:  - BP control  - continue clears, advance in AM  - ASA/Plaxiv/SQH  - Pain control PRN  - Home meds  - Incentive spirometry  - bedrest, ambulate in AM  - continue Jess d/c in AM  - AM labs    Vascular surgery [id number] Vascular Surgery Post-Op Note    Pre-Op Dx: Atherosclerosis of native artery of right lower extremity with intermittent claudication      Procedure: Endarterectomy of Right common femoral artery      Subjective:  Pain controlled with medications.  Tolerating clears, denies N/V.  Denies SOB/CP/dizziness/palpitations.  Hypertensive in PACU, requiring IV labetalol.      Vital Signs Last 24 Hrs  T(C): 36 (08 Oct 2021 10:50), Max: 36.4 (08 Oct 2021 05:57)  T(F): 96.8 (08 Oct 2021 10:50), Max: 97.5 (08 Oct 2021 05:57)  HR: 79 (08 Oct 2021 15:30) (52 - 80)  BP: 168/63 (08 Oct 2021 15:30) (117/56 - 168/63)  BP(mean): 107 (08 Oct 2021 15:30) (81 - 110)  RR: 18 (08 Oct 2021 15:00) (12 - 18)  SpO2: 100% (08 Oct 2021 15:30) (97% - 100%)    Physical Exam:  General: NAD, resting comfortably in bed  Pulmonary: Nonlabored breathing, no respiratory distress  Cardiovascular: NSR  Abdominal: soft, NT/ND  Extremities: WWP, normal strength R groin w dressing, C/D/I, soft, no hematoma  Neuro: A/O x 3, CNs II-XII grossly intact, normal motor/sensation, no focal deficits  Pulses:   Right:    DP [ ]2+ [ ]1+ [ ]doppler  PT[ ]2+ [ ]1+ [X]doppler          CAPILLARY BLOOD GLUCOSE      POCT Blood Glucose.: 149 mg/dL (08 Oct 2021 11:26)  POCT Blood Glucose.: 149 mg/dL (08 Oct 2021 05:52)        Assessment:71y Male POD #0 s/p Right common femoral endarterectomy    Plan:  - BP control  - continue clears, advance in AM  - ASA/Plaxiv/SQH  - Pain control PRN  - Home meds  - Incentive spirometry  - bedrest, ambulate in AM  - continue Jess d/pauline in AM  - AM labs    Vascular surgery [id number]

## 2021-10-08 NOTE — DISCHARGE NOTE PROVIDER - NSDCACTIVITY_GEN_ALL_CORE
Stairs allowed/Walking - Indoors allowed/No heavy lifting/straining/Walking - Outdoors allowed/Follow Instructions Provided by your Surgical Team

## 2021-10-08 NOTE — DISCHARGE NOTE PROVIDER - NSDCCPTREATMENT_GEN_ALL_CORE_FT
PRINCIPAL PROCEDURE  Procedure: Endarterectomy of common femoral artery  Findings and Treatment: · Operative Findings	stent occlusion in CFA, removed with endarterectomy

## 2021-10-08 NOTE — DISCHARGE NOTE PROVIDER - PROVIDER TOKENS
PROVIDER:[TOKEN:[[id number]],FOLLOWUP:[2 weeks]] PROVIDER:[TOKEN:[[id number]],FOLLOWUP:[1 week]]

## 2021-10-08 NOTE — DISCHARGE NOTE PROVIDER - NSDCMRMEDTOKEN_GEN_ALL_CORE_FT
aspirin 81 mg oral delayed release tablet: 1 tab(s) orally once a day  atorvastatin 80 mg oral tablet: 1 tab(s) orally once a day (at bedtime)  clopidogrel 75 mg oral tablet: 1 tab(s) orally once a day  DULoxetine 60 mg oral delayed release capsule: 1 cap(s) orally 2 times a day  glipiZIDE 5 mg oral tablet: 1 tab(s) orally once a day; last dose 24 hrs prior to sx  losartan 50 mg oral tablet: 1 tab(s) orally once a day  memantine 10 mg oral tablet: 1 tab(s) orally 2 times a day  metFORMIN 500 mg oral tablet: 1 tab(s) orally 2 times a day; last dose 24 hrs prior to sx  metoprolol: 12.5 milligram(s) orally 2 times a day  rivastigmine 13.3 mg/24 hr transdermal film, extended release: 1 patch transdermal every 24 hours   acetaminophen 325 mg oral tablet: 1 tab(s) orally every 6 hours, As Needed for pain  aspirin 81 mg oral delayed release tablet: 1 tab(s) orally once a day  atorvastatin 80 mg oral tablet: 1 tab(s) orally once a day (at bedtime)  clopidogrel 75 mg oral tablet: 1 tab(s) orally once a day  DULoxetine 60 mg oral delayed release capsule: 1 cap(s) orally 2 times a day  glipiZIDE 5 mg oral tablet: 1 tab(s) orally once a day; last dose 24 hrs prior to sx  losartan 50 mg oral tablet: 1 tab(s) orally once a day  memantine 10 mg oral tablet: 1 tab(s) orally 2 times a day  metFORMIN 500 mg oral tablet: 1 tab(s) orally 2 times a day; last dose 24 hrs prior to sx  metoprolol: 12.5 milligram(s) orally 2 times a day  oxyCODONE 5 mg oral tablet: 1 tab(s) orally every 8 hours MDD:3 tablets per day  rivastigmine 13.3 mg/24 hr transdermal film, extended release: 1 patch transdermal every 24 hours

## 2021-10-08 NOTE — PRE-ANESTHESIA EVALUATION ADULT - NSATTENDATTESTRD_GEN_ALL_CORE
3
The patient has been re-examined and I agree with the above assessment or I updated with my findings.

## 2021-10-08 NOTE — DISCHARGE NOTE PROVIDER - HOSPITAL COURSE
71 yr old male with hx of CAD, hypertension, diabetes mellitus, Alzheimer Disease,  atherosclerosis femoral  artery. For right femoral endarterectomy artery bypass. Hx via wife. On 10/8 pt underwent R CFA endarterectomy.  Pt. tolerated procedure well and was transferred to the recovery room where pt was closely managed for postoperative pain and blood pressure control, and then transferred to the floor without incidence.  Pt. was mainly managed for postoperative pain, wound care, as well as close monitoring of fluid resuscitation, neurological status and electrolyte repletion.  Pt has been tolerating a diet, voiding, ambulating, and the pain is now well controlled.   Pt. is ready for discharge home in stable condition, and will follow up in two weeks as an outpatient with Dr. Bailey.  71 yr old male with hx of CAD, hypertension, diabetes mellitus, Alzheimer Disease,  atherosclerosis femoral  artery. For right femoral endarterectomy artery bypass. Hx via wife. On 10/8 pt underwent R CFA endarterectomy.  Pt. tolerated procedure well and was transferred to the recovery room where pt was closely managed for postoperative pain and blood pressure control, and then transferred to the floor without incidence.  Pt. was mainly managed for postoperative pain, wound care, as well as close monitoring of fluid resuscitation, neurological status and electrolyte repletion.  On post-operative day 1, 10/9, the patient recovered well. On post-operative day 2, the patient advanced his diet, and was deemed stable for discharge. Pt has been tolerating a diet, ambulating, and the pain is now well controlled.   Pt. is ready for discharge home in stable condition, and will follow up in two weeks as an outpatient with Dr. Bailey.

## 2021-10-09 LAB
ANION GAP SERPL CALC-SCNC: 11 MMOL/L — SIGNIFICANT CHANGE UP (ref 5–17)
BUN SERPL-MCNC: 22 MG/DL — SIGNIFICANT CHANGE UP (ref 7–23)
CALCIUM SERPL-MCNC: 9.8 MG/DL — SIGNIFICANT CHANGE UP (ref 8.4–10.5)
CHLORIDE SERPL-SCNC: 104 MMOL/L — SIGNIFICANT CHANGE UP (ref 96–108)
CO2 SERPL-SCNC: 23 MMOL/L — SIGNIFICANT CHANGE UP (ref 22–31)
COVID-19 SPIKE DOMAIN AB INTERP: POSITIVE
COVID-19 SPIKE DOMAIN ANTIBODY RESULT: >250 U/ML — HIGH
CREAT SERPL-MCNC: 1.15 MG/DL — SIGNIFICANT CHANGE UP (ref 0.5–1.3)
GLUCOSE BLDC GLUCOMTR-MCNC: 132 MG/DL — HIGH (ref 70–99)
GLUCOSE BLDC GLUCOMTR-MCNC: 140 MG/DL — HIGH (ref 70–99)
GLUCOSE BLDC GLUCOMTR-MCNC: 142 MG/DL — HIGH (ref 70–99)
GLUCOSE BLDC GLUCOMTR-MCNC: 229 MG/DL — HIGH (ref 70–99)
GLUCOSE SERPL-MCNC: 136 MG/DL — HIGH (ref 70–99)
HCT VFR BLD CALC: 35.7 % — LOW (ref 39–50)
HGB BLD-MCNC: 11.9 G/DL — LOW (ref 13–17)
MAGNESIUM SERPL-MCNC: 2.2 MG/DL — SIGNIFICANT CHANGE UP (ref 1.6–2.6)
MCHC RBC-ENTMCNC: 32 PG — SIGNIFICANT CHANGE UP (ref 27–34)
MCHC RBC-ENTMCNC: 33.3 GM/DL — SIGNIFICANT CHANGE UP (ref 32–36)
MCV RBC AUTO: 96 FL — SIGNIFICANT CHANGE UP (ref 80–100)
NRBC # BLD: 0 /100 WBCS — SIGNIFICANT CHANGE UP (ref 0–0)
PHOSPHATE SERPL-MCNC: 3.1 MG/DL — SIGNIFICANT CHANGE UP (ref 2.5–4.5)
PLATELET # BLD AUTO: 142 K/UL — LOW (ref 150–400)
POTASSIUM SERPL-MCNC: 4.8 MMOL/L — SIGNIFICANT CHANGE UP (ref 3.5–5.3)
POTASSIUM SERPL-SCNC: 4.8 MMOL/L — SIGNIFICANT CHANGE UP (ref 3.5–5.3)
RBC # BLD: 3.72 M/UL — LOW (ref 4.2–5.8)
RBC # FLD: 14.2 % — SIGNIFICANT CHANGE UP (ref 10.3–14.5)
SARS-COV-2 IGG+IGM SERPL QL IA: >250 U/ML — HIGH
SARS-COV-2 IGG+IGM SERPL QL IA: POSITIVE
SODIUM SERPL-SCNC: 138 MMOL/L — SIGNIFICANT CHANGE UP (ref 135–145)
WBC # BLD: 7.69 K/UL — SIGNIFICANT CHANGE UP (ref 3.8–10.5)
WBC # FLD AUTO: 7.69 K/UL — SIGNIFICANT CHANGE UP (ref 3.8–10.5)

## 2021-10-09 RX ORDER — LANOLIN ALCOHOL/MO/W.PET/CERES
3 CREAM (GRAM) TOPICAL ONCE
Refills: 0 | Status: COMPLETED | OUTPATIENT
Start: 2021-10-09 | End: 2021-10-09

## 2021-10-09 RX ORDER — LABETALOL HCL 100 MG
10 TABLET ORAL ONCE
Refills: 0 | Status: COMPLETED | OUTPATIENT
Start: 2021-10-09 | End: 2021-10-09

## 2021-10-09 RX ORDER — RISPERIDONE 4 MG/1
1 TABLET ORAL ONCE
Refills: 0 | Status: COMPLETED | OUTPATIENT
Start: 2021-10-09 | End: 2021-10-09

## 2021-10-09 RX ORDER — HALOPERIDOL DECANOATE 100 MG/ML
1 INJECTION INTRAMUSCULAR ONCE
Refills: 0 | Status: COMPLETED | OUTPATIENT
Start: 2021-10-09 | End: 2021-10-09

## 2021-10-09 RX ADMIN — ATORVASTATIN CALCIUM 80 MILLIGRAM(S): 80 TABLET, FILM COATED ORAL at 21:53

## 2021-10-09 RX ADMIN — Medication 4: at 17:34

## 2021-10-09 RX ADMIN — Medication 650 MILLIGRAM(S): at 21:53

## 2021-10-09 RX ADMIN — Medication 650 MILLIGRAM(S): at 22:23

## 2021-10-09 RX ADMIN — RISPERIDONE 1 MILLIGRAM(S): 4 TABLET ORAL at 03:43

## 2021-10-09 RX ADMIN — DONEPEZIL HYDROCHLORIDE 10 MILLIGRAM(S): 10 TABLET, FILM COATED ORAL at 21:53

## 2021-10-09 RX ADMIN — Medication 12.5 MILLIGRAM(S): at 21:53

## 2021-10-09 RX ADMIN — HALOPERIDOL DECANOATE 1 MILLIGRAM(S): 100 INJECTION INTRAMUSCULAR at 00:03

## 2021-10-09 RX ADMIN — MEMANTINE HYDROCHLORIDE 10 MILLIGRAM(S): 10 TABLET ORAL at 17:36

## 2021-10-09 RX ADMIN — DULOXETINE HYDROCHLORIDE 60 MILLIGRAM(S): 30 CAPSULE, DELAYED RELEASE ORAL at 06:09

## 2021-10-09 RX ADMIN — HEPARIN SODIUM 5000 UNIT(S): 5000 INJECTION INTRAVENOUS; SUBCUTANEOUS at 01:30

## 2021-10-09 RX ADMIN — HEPARIN SODIUM 5000 UNIT(S): 5000 INJECTION INTRAVENOUS; SUBCUTANEOUS at 17:35

## 2021-10-09 RX ADMIN — Medication 81 MILLIGRAM(S): at 13:49

## 2021-10-09 RX ADMIN — LOSARTAN POTASSIUM 50 MILLIGRAM(S): 100 TABLET, FILM COATED ORAL at 06:09

## 2021-10-09 RX ADMIN — DULOXETINE HYDROCHLORIDE 60 MILLIGRAM(S): 30 CAPSULE, DELAYED RELEASE ORAL at 17:36

## 2021-10-09 RX ADMIN — Medication 12.5 MILLIGRAM(S): at 06:09

## 2021-10-09 RX ADMIN — Medication 3 MILLIGRAM(S): at 03:49

## 2021-10-09 RX ADMIN — Medication 1 MILLIGRAM(S): at 01:08

## 2021-10-09 RX ADMIN — Medication 10 MILLIGRAM(S): at 06:08

## 2021-10-09 RX ADMIN — Medication 12.5 MILLIGRAM(S): at 13:49

## 2021-10-09 RX ADMIN — HEPARIN SODIUM 5000 UNIT(S): 5000 INJECTION INTRAVENOUS; SUBCUTANEOUS at 10:52

## 2021-10-09 RX ADMIN — SODIUM CHLORIDE 3 MILLILITER(S): 9 INJECTION INTRAMUSCULAR; INTRAVENOUS; SUBCUTANEOUS at 07:00

## 2021-10-09 RX ADMIN — MEMANTINE HYDROCHLORIDE 10 MILLIGRAM(S): 10 TABLET ORAL at 06:09

## 2021-10-09 RX ADMIN — HALOPERIDOL DECANOATE 1 MILLIGRAM(S): 100 INJECTION INTRAMUSCULAR at 01:41

## 2021-10-09 RX ADMIN — SODIUM CHLORIDE 3 MILLILITER(S): 9 INJECTION INTRAMUSCULAR; INTRAVENOUS; SUBCUTANEOUS at 21:58

## 2021-10-09 RX ADMIN — SODIUM CHLORIDE 3 MILLILITER(S): 9 INJECTION INTRAMUSCULAR; INTRAVENOUS; SUBCUTANEOUS at 13:18

## 2021-10-09 RX ADMIN — Medication 3 MILLIGRAM(S): at 21:53

## 2021-10-09 RX ADMIN — CLOPIDOGREL BISULFATE 75 MILLIGRAM(S): 75 TABLET, FILM COATED ORAL at 13:50

## 2021-10-09 NOTE — PROGRESS NOTE ADULT - ASSESSMENT
71y M with PMHx of CAD, HTN, DM2, alzheimer's, with atherosclerosis of R femoral artery, now s/p CFA endarterectomy, recovering appropriately on floor. Dispo planning, likely today.      PLAN:  - pain control PRN  - OOBAT  - CLD, will advance td as tolerated  - DVT ppx w/SQH  - c/w home medications  - D/C miller      VASCULAR  [id number]    71y M with PMHx of CAD, HTN, DM2, alzheimer's, with atherosclerosis of R femoral artery, now s/p CFA endarterectomy, recovering appropriately on floor. Dispo planning, likely today.      PLAN:  - pain control PRN  - OOBAT  - CLD, will advance td as tolerated  - DVT ppx w/SQH  - c/w home medications        VASCULAR  [id number]

## 2021-10-09 NOTE — PROGRESS NOTE ADULT - SUBJECTIVE AND OBJECTIVE BOX
VASCULAR SURGERY PROGRESS NOTE  Hospital Day #1d  Post-Op Day #  Procedure/Dx: Endarterectomy of common femoral artery          SUBJECTIVE  Pt seen and examined at bedside. No complaints.  Pain controlled. Denies N/V. Tolerating diet. Passing flatus and BM.   No acute events overnight.     PMHx  ·	HTN (Hypertension)  ·	Hyperlipidemia  ·	Rotator Cuff Disorder  ·	Anxiety  ·	Cancer of Bladder  ·	PAD (peripheral artery disease)  ·	Peripheral neuropathy  ·	DOLORES on CPAP  ·	Atherosclerosis of native artery of extremity  ·	Type 2 diabetes mellitus  ·	Alzheimer disease          OBJECTIVE:    PHYSICAL EXAM   General: NAD, resting comfortably in bed  Pulmonary: Nonlabored breathing, no respiratory distress  Cardiovascular: NSR  Abdominal: soft, NT/ND  Extremities: WWP, normal strength R groin w dressing, C/D/I, soft, no hematoma  Neuro: A/O x 3, CNs II-XII grossly intact, normal motor/sensation, no focal deficits  Right: +PT signals      Vital Signs Last 24 Hrs  T(C): 36.9 (08 Oct 2021 22:39), Max: 36.9 (08 Oct 2021 22:39)  T(F): 98.4 (08 Oct 2021 22:39), Max: 98.4 (08 Oct 2021 22:39)  HR: 74 (08 Oct 2021 22:39) (52 - 98)  BP: 139/70 (08 Oct 2021 22:39) (117/56 - 183/87)  BP(mean): 112 (08 Oct 2021 18:30) (81 - 112)  RR: 18 (08 Oct 2021 22:39) (12 - 18)  SpO2: 98% (08 Oct 2021 22:39) (93% - 100%)    I's & O's    10-08-21 @ 07:01  -  10-09-21 @ 00:13  --------------------------------------------------------  IN:    Oral Fluid: 850 mL    sodium chloride 0.45% w/ Additives: 750 mL  Total IN: 1600 mL    OUT:    Indwelling Catheter - Urethral (mL): 1175 mL  Total OUT: 1175 mL    Total NET: 425 mL        MEDICATIONS:  ·	DVT PROPHYLAXIS: heparin   Injectable 5000 Unit(s)  ·	ANTIBIOTICS: vancomycin  IVPB 1250 milliGRAM(s)        LABS    ABG - ( 08 Oct 2021 08:30 )  pH, Arterial: 7.39  pH, Blood: x     /  pCO2: 39    /  pO2: 274   / HCO3: 24    / Base Excess: -1.2  /  SaO2: 100.0          VASCULAR SURGERY PROGRESS NOTE  POD #1 // CFA endarterectomy    SUBJECTIVE  Pt seen and examined at bedside. No complaints.  Pain controlled. Denies N/V. Tolerating CLD.     OVERNIGHT  Patient very restless and agitated. Multiple attempts to reorient by different staff members. Soft vest and B/L mittens placed, but patient able to bite through mittens.   Pharmacologic intervention necessary.    PMHx  ·	HTN (Hypertension)  ·	Hyperlipidemia  ·	Rotator Cuff Disorder  ·	Anxiety  ·	Cancer of Bladder  ·	PAD (peripheral artery disease)  ·	Peripheral neuropathy  ·	DOLORES on CPAP  ·	Atherosclerosis of native artery of extremity  ·	Type 2 diabetes mellitus  ·	Alzheimer disease      OBJECTIVE:    PHYSICAL EXAM   General: NAD, resting comfortably in bed  Pulmonary: Nonlabored breathing, no respiratory distress  Cardiovascular: NSR  Abdominal: soft, NT/ND  Extremities: WWP, normal strength R groin w dressing, C/D/I, soft, no hematoma  Neuro: A/O x 3, CNs II-XII grossly intact, normal motor/sensation, no focal deficits  Right: +PT signals      Vital Signs Last 24 Hrs  T(C): 36.9 (08 Oct 2021 22:39), Max: 36.9 (08 Oct 2021 22:39)  T(F): 98.4 (08 Oct 2021 22:39), Max: 98.4 (08 Oct 2021 22:39)  HR: 74 (08 Oct 2021 22:39) (52 - 98)  BP: 139/70 (08 Oct 2021 22:39) (117/56 - 183/87)  BP(mean): 112 (08 Oct 2021 18:30) (81 - 112)  RR: 18 (08 Oct 2021 22:39) (12 - 18)  SpO2: 98% (08 Oct 2021 22:39) (93% - 100%)      I's & O's    08 Oct 2021 07:01  -  09 Oct 2021 00:19  --------------------------------------------------------  IN: 1600 mL / OUT: 1175 mL / NET: 425 mL        MEDICATIONS:  ·	DVT PROPHYLAXIS: heparin   Injectable 5000 Unit(s)  ·	ANTIBIOTICS: vancomycin  IVPB 1250 milliGRAM(s)        LABS    ABG - ( 08 Oct 2021 08:30 )  pH, Arterial: 7.39  pH, Blood: x     /  pCO2: 39    /  pO2: 274   / HCO3: 24    / Base Excess: -1.2  /  SaO2: 100.0          VASCULAR SURGERY PROGRESS NOTE  POD #1 // CFA endarterectomy    SUBJECTIVE  Pt seen and examined at bedside. Lethargic, minimally responsive    OVERNIGHT  Patient very restless and agitated. Multiple attempts to reorient by different staff members. Soft vest and B/L mittens placed, but patient able to bite through mittens.   Pharmacologic intervention necessary.    PMHx  ·	HTN (Hypertension)  ·	Hyperlipidemia  ·	Rotator Cuff Disorder  ·	Anxiety  ·	Cancer of Bladder  ·	PAD (peripheral artery disease)  ·	Peripheral neuropathy  ·	DOLORES on CPAP  ·	Atherosclerosis of native artery of extremity  ·	Type 2 diabetes mellitus  ·	Alzheimer disease      OBJECTIVE:    PHYSICAL EXAM   General: NAD, resting comfortably in bed  Pulmonary: Nonlabored breathing, no respiratory distress  Cardiovascular: NSR  Abdominal: soft, NT/ND  Extremities: WWP, normal strength R groin w dressing, C/D/I, soft, no hematoma  Neuro: A/O x 0  Right: +PT signals      Vital Signs Last 24 Hrs  T(C): 36.3 (09 Oct 2021 05:54), Max: 36.9 (08 Oct 2021 22:39)  T(F): 97.4 (09 Oct 2021 05:54), Max: 98.4 (08 Oct 2021 22:39)  HR: 76 (09 Oct 2021 05:54) (66 - 98)  BP: 196/79 (09 Oct 2021 05:54) (117/56 - 196/79)  BP(mean): 112 (08 Oct 2021 18:30) (81 - 112)  RR: 18 (09 Oct 2021 05:54) (12 - 18)  SpO2: 98% (09 Oct 2021 05:54) (93% - 100%)    I&O's Summary    08 Oct 2021 07:01  -  09 Oct 2021 07:00  --------------------------------------------------------  IN: 2440 mL / OUT: 2225 mL / NET: 215 mL          MEDICATIONS:  ·	DVT PROPHYLAXIS: heparin   Injectable 5000 Unit(s)  ·	ANTIBIOTICS: vancomycin  IVPB 1250 milliGRAM(s)        LABS    ABG - ( 08 Oct 2021 08:30 )  pH, Arterial: 7.39  pH, Blood: x     /  pCO2: 39    /  pO2: 274   / HCO3: 24    / Base Excess: -1.2  /  SaO2: 100.0

## 2021-10-10 ENCOUNTER — TRANSCRIPTION ENCOUNTER (OUTPATIENT)
Age: 71
End: 2021-10-10

## 2021-10-10 VITALS
HEART RATE: 79 BPM | RESPIRATION RATE: 18 BRPM | SYSTOLIC BLOOD PRESSURE: 137 MMHG | DIASTOLIC BLOOD PRESSURE: 75 MMHG | TEMPERATURE: 98 F | OXYGEN SATURATION: 99 %

## 2021-10-10 LAB
ANION GAP SERPL CALC-SCNC: 14 MMOL/L — SIGNIFICANT CHANGE UP (ref 5–17)
BUN SERPL-MCNC: 17 MG/DL — SIGNIFICANT CHANGE UP (ref 7–23)
CALCIUM SERPL-MCNC: 10.1 MG/DL — SIGNIFICANT CHANGE UP (ref 8.4–10.5)
CHLORIDE SERPL-SCNC: 102 MMOL/L — SIGNIFICANT CHANGE UP (ref 96–108)
CO2 SERPL-SCNC: 23 MMOL/L — SIGNIFICANT CHANGE UP (ref 22–31)
CREAT SERPL-MCNC: 0.97 MG/DL — SIGNIFICANT CHANGE UP (ref 0.5–1.3)
GLUCOSE BLDC GLUCOMTR-MCNC: 109 MG/DL — HIGH (ref 70–99)
GLUCOSE BLDC GLUCOMTR-MCNC: 129 MG/DL — HIGH (ref 70–99)
GLUCOSE SERPL-MCNC: 149 MG/DL — HIGH (ref 70–99)
HCT VFR BLD CALC: 41.6 % — SIGNIFICANT CHANGE UP (ref 39–50)
HGB BLD-MCNC: 13.5 G/DL — SIGNIFICANT CHANGE UP (ref 13–17)
MAGNESIUM SERPL-MCNC: 2.1 MG/DL — SIGNIFICANT CHANGE UP (ref 1.6–2.6)
MCHC RBC-ENTMCNC: 31.5 PG — SIGNIFICANT CHANGE UP (ref 27–34)
MCHC RBC-ENTMCNC: 32.5 GM/DL — SIGNIFICANT CHANGE UP (ref 32–36)
MCV RBC AUTO: 97.2 FL — SIGNIFICANT CHANGE UP (ref 80–100)
NRBC # BLD: 0 /100 WBCS — SIGNIFICANT CHANGE UP (ref 0–0)
PHOSPHATE SERPL-MCNC: 3 MG/DL — SIGNIFICANT CHANGE UP (ref 2.5–4.5)
PLATELET # BLD AUTO: 162 K/UL — SIGNIFICANT CHANGE UP (ref 150–400)
POTASSIUM SERPL-MCNC: 4.5 MMOL/L — SIGNIFICANT CHANGE UP (ref 3.5–5.3)
POTASSIUM SERPL-SCNC: 4.5 MMOL/L — SIGNIFICANT CHANGE UP (ref 3.5–5.3)
RBC # BLD: 4.28 M/UL — SIGNIFICANT CHANGE UP (ref 4.2–5.8)
RBC # FLD: 14.2 % — SIGNIFICANT CHANGE UP (ref 10.3–14.5)
SODIUM SERPL-SCNC: 139 MMOL/L — SIGNIFICANT CHANGE UP (ref 135–145)
WBC # BLD: 9.62 K/UL — SIGNIFICANT CHANGE UP (ref 3.8–10.5)
WBC # FLD AUTO: 9.62 K/UL — SIGNIFICANT CHANGE UP (ref 3.8–10.5)

## 2021-10-10 PROCEDURE — 88311 DECALCIFY TISSUE: CPT

## 2021-10-10 PROCEDURE — C1889: CPT

## 2021-10-10 PROCEDURE — 85018 HEMOGLOBIN: CPT

## 2021-10-10 PROCEDURE — 84132 ASSAY OF SERUM POTASSIUM: CPT

## 2021-10-10 PROCEDURE — U0003: CPT

## 2021-10-10 PROCEDURE — C9803: CPT

## 2021-10-10 PROCEDURE — 82803 BLOOD GASES ANY COMBINATION: CPT

## 2021-10-10 PROCEDURE — 84295 ASSAY OF SERUM SODIUM: CPT

## 2021-10-10 PROCEDURE — 85014 HEMATOCRIT: CPT

## 2021-10-10 PROCEDURE — C1768: CPT

## 2021-10-10 PROCEDURE — 82565 ASSAY OF CREATININE: CPT

## 2021-10-10 PROCEDURE — 83735 ASSAY OF MAGNESIUM: CPT

## 2021-10-10 PROCEDURE — 82330 ASSAY OF CALCIUM: CPT

## 2021-10-10 PROCEDURE — 88305 TISSUE EXAM BY PATHOLOGIST: CPT

## 2021-10-10 PROCEDURE — 84100 ASSAY OF PHOSPHORUS: CPT

## 2021-10-10 PROCEDURE — 85027 COMPLETE CBC AUTOMATED: CPT

## 2021-10-10 PROCEDURE — 83605 ASSAY OF LACTIC ACID: CPT

## 2021-10-10 PROCEDURE — 82962 GLUCOSE BLOOD TEST: CPT

## 2021-10-10 PROCEDURE — U0005: CPT

## 2021-10-10 PROCEDURE — 82435 ASSAY OF BLOOD CHLORIDE: CPT

## 2021-10-10 PROCEDURE — 86769 SARS-COV-2 COVID-19 ANTIBODY: CPT

## 2021-10-10 PROCEDURE — C1769: CPT

## 2021-10-10 PROCEDURE — 82947 ASSAY GLUCOSE BLOOD QUANT: CPT

## 2021-10-10 PROCEDURE — 80048 BASIC METABOLIC PNL TOTAL CA: CPT

## 2021-10-10 PROCEDURE — 85025 COMPLETE CBC W/AUTO DIFF WBC: CPT

## 2021-10-10 RX ORDER — LABETALOL HCL 100 MG
10 TABLET ORAL ONCE
Refills: 0 | Status: COMPLETED | OUTPATIENT
Start: 2021-10-10 | End: 2021-10-10

## 2021-10-10 RX ORDER — ACETAMINOPHEN 500 MG
1 TABLET ORAL
Qty: 20 | Refills: 0
Start: 2021-10-10 | End: 2021-10-14

## 2021-10-10 RX ORDER — OXYCODONE HYDROCHLORIDE 5 MG/1
1 TABLET ORAL
Qty: 9 | Refills: 0
Start: 2021-10-10 | End: 2021-10-12

## 2021-10-10 RX ORDER — LOSARTAN POTASSIUM 100 MG/1
50 TABLET, FILM COATED ORAL ONCE
Refills: 0 | Status: DISCONTINUED | OUTPATIENT
Start: 2021-10-10 | End: 2021-10-10

## 2021-10-10 RX ADMIN — DULOXETINE HYDROCHLORIDE 60 MILLIGRAM(S): 30 CAPSULE, DELAYED RELEASE ORAL at 05:29

## 2021-10-10 RX ADMIN — Medication 10 MILLIGRAM(S): at 02:30

## 2021-10-10 RX ADMIN — LOSARTAN POTASSIUM 50 MILLIGRAM(S): 100 TABLET, FILM COATED ORAL at 05:29

## 2021-10-10 RX ADMIN — MEMANTINE HYDROCHLORIDE 10 MILLIGRAM(S): 10 TABLET ORAL at 05:29

## 2021-10-10 RX ADMIN — Medication 12.5 MILLIGRAM(S): at 05:29

## 2021-10-10 RX ADMIN — HEPARIN SODIUM 5000 UNIT(S): 5000 INJECTION INTRAVENOUS; SUBCUTANEOUS at 00:04

## 2021-10-10 RX ADMIN — Medication 5 MILLIGRAM(S): at 01:16

## 2021-10-10 RX ADMIN — HEPARIN SODIUM 5000 UNIT(S): 5000 INJECTION INTRAVENOUS; SUBCUTANEOUS at 14:36

## 2021-10-10 RX ADMIN — SODIUM CHLORIDE 3 MILLILITER(S): 9 INJECTION INTRAMUSCULAR; INTRAVENOUS; SUBCUTANEOUS at 12:00

## 2021-10-10 RX ADMIN — SODIUM CHLORIDE 3 MILLILITER(S): 9 INJECTION INTRAMUSCULAR; INTRAVENOUS; SUBCUTANEOUS at 05:35

## 2021-10-10 RX ADMIN — Medication 12.5 MILLIGRAM(S): at 14:37

## 2021-10-10 RX ADMIN — CLOPIDOGREL BISULFATE 75 MILLIGRAM(S): 75 TABLET, FILM COATED ORAL at 14:36

## 2021-10-10 RX ADMIN — Medication 81 MILLIGRAM(S): at 14:36

## 2021-10-10 NOTE — PROGRESS NOTE ADULT - SUBJECTIVE AND OBJECTIVE BOX
SUBJECTIVE: Pt seen and examined on rounds with team. No acute events overnight.        OBJECTIVE  PHYSICAL EXAM   General: NAD, resting comfortably in bed  Pulmonary: Nonlabored breathing, no respiratory distress  Cardiovascular: NSR  Abdominal: soft, NT/ND  Extremities: WWP, normal strength R groin w dressing, C/D/I, soft, no hematoma  Neuro: A/O x 0  Right: +PT signals    VITALS  T(C): 36.4 (10-10-21 @ 08:47), Max: 36.6 (10-10-21 @ 01:03)  HR: 80 (10-10-21 @ 08:47) (58 - 80)  BP: 164/71 (10-10-21 @ 08:47) (155/81 - 180/84)  RR: 18 (10-10-21 @ 08:47) (18 - 19)  SpO2: 98% (10-10-21 @ 08:47) (96% - 99%)  CAPILLARY BLOOD GLUCOSE      POCT Blood Glucose.: 129 mg/dL (10 Oct 2021 08:09)  POCT Blood Glucose.: 142 mg/dL (09 Oct 2021 21:45)  POCT Blood Glucose.: 229 mg/dL (09 Oct 2021 17:30)  POCT Blood Glucose.: 140 mg/dL (09 Oct 2021 13:48)  POCT Blood Glucose.: 132 mg/dL (09 Oct 2021 10:50)      Is/Os    10-09 @ 07:01  -  10-10 @ 07:00  --------------------------------------------------------  IN:    Oral Fluid: 1440 mL  Total IN: 1440 mL    OUT:    Indwelling Catheter - Urethral (mL): 4150 mL    Voided (mL): 0 mL  Total OUT: 4150 mL    Total NET: -2710 mL      10-10 @ 07:01  -  10-10 @ 10:01  --------------------------------------------------------  IN:    Oral Fluid: 580 mL  Total IN: 580 mL    OUT:    Indwelling Catheter - Urethral (mL): 550 mL  Total OUT: 550 mL    Total NET: 30 mL          MEDICATIONS (STANDING): aspirin enteric coated 81 milliGRAM(s) Oral daily  atorvastatin 80 milliGRAM(s) Oral at bedtime  clopidogrel Tablet 75 milliGRAM(s) Oral daily  dextrose 40% Gel 15 Gram(s) Oral once  dextrose 5%. 1000 milliLiter(s) IV Continuous <Continuous>  dextrose 5%. 1000 milliLiter(s) IV Continuous <Continuous>  dextrose 50% Injectable 25 Gram(s) IV Push once  dextrose 50% Injectable 12.5 Gram(s) IV Push once  dextrose 50% Injectable 25 Gram(s) IV Push once  donepezil 10 milliGRAM(s) Oral at bedtime  DULoxetine 60 milliGRAM(s) Oral two times a day  glucagon  Injectable 1 milliGRAM(s) IntraMuscular once  heparin   Injectable 5000 Unit(s) SubCutaneous every 8 hours  insulin lispro (ADMELOG) corrective regimen sliding scale   SubCutaneous three times a day before meals  losartan 50 milliGRAM(s) Oral daily  melatonin 3 milliGRAM(s) Oral at bedtime  memantine 10 milliGRAM(s) Oral two times a day  metoprolol tartrate 12.5 milliGRAM(s) Oral three times a day  sodium chloride 0.45% with potassium chloride 20 mEq/L 1000 milliLiter(s) IV Continuous <Continuous>  sodium chloride 0.9% lock flush 3 milliLiter(s) IV Push every 8 hours    MEDICATIONS (PRN):acetaminophen   Tablet .. 650 milliGRAM(s) Oral every 6 hours PRN Mild Pain (1 - 3), Moderate Pain (4 - 6)  oxycodone    5 mG/acetaminophen 325 mG 1 Tablet(s) Oral every 6 hours PRN Moderate Pain (4 - 6)  zaleplon 5 milliGRAM(s) Oral at bedtime PRN Insomnia      LABS  CBC (10-10 @ 07:26)                              13.5                           9.62    )----------------(  162        --    % Neutrophils, --    % Lymphocytes, ANC: --                                  41.6    CBC (10-09 @ 07:09)                              11.9<L>                         7.69    )----------------(  142<L>     --    % Neutrophils, --    % Lymphocytes, ANC: --                                  35.7<L>    BMP (10-10 @ 07:22)             139     |  102     |  17    		Ca++ --      Ca 10.1               ---------------------------------( 149<H>		Mg 2.1                4.5     |  23      |  0.97  			Ph 3.0     BMP (10-09 @ 07:06)             138     |  104     |  22    		Ca++ --      Ca 9.8                ---------------------------------( 136<H>		Mg 2.2                4.8     |  23      |  1.15  			Ph 3.1                   IMAGING STUDIES

## 2021-10-10 NOTE — DISCHARGE NOTE NURSING/CASE MANAGEMENT/SOCIAL WORK - PATIENT PORTAL LINK FT
You can access the FollowMyHealth Patient Portal offered by [facility] by registering at the following website: [email or web address]. By joining [facility]’s FollowMyHealth portal, you will also be able to view your health information using other applications (apps) compatible with our system.

## 2021-10-10 NOTE — PROGRESS NOTE ADULT - ASSESSMENT
71y M with PMHx of CAD, HTN, DM2, alzheimer's, with atherosclerosis of R femoral artery, now s/p CFA endarterectomy, recovering appropriately on floor. Dispo planning, likely today.      PLAN:  - pain control PRN  - OOBAT  - Regular diet  - D/c miller  - DVT ppx w/SQH  - c/w home medications  - Possible d/c today      VASCULAR  [id number]

## 2021-10-10 NOTE — DISCHARGE NOTE NURSING/CASE MANAGEMENT/SOCIAL WORK - NSDCPNINST_GEN_ALL_CORE
instructed pt on s/s of infection, medication management, safety precaution and to call md for f/u appt.

## 2021-10-11 ENCOUNTER — NON-APPOINTMENT (OUTPATIENT)
Age: 71
End: 2021-10-11

## 2021-10-18 LAB — SURGICAL PATHOLOGY STUDY: SIGNIFICANT CHANGE UP

## 2021-10-25 ENCOUNTER — APPOINTMENT (OUTPATIENT)
Dept: CARDIOLOGY | Facility: CLINIC | Age: 71
End: 2021-10-25

## 2021-11-09 ENCOUNTER — APPOINTMENT (OUTPATIENT)
Dept: INTERNAL MEDICINE | Facility: CLINIC | Age: 71
End: 2021-11-09
Payer: MEDICARE

## 2021-11-09 VITALS
DIASTOLIC BLOOD PRESSURE: 63 MMHG | OXYGEN SATURATION: 95 % | HEART RATE: 71 BPM | WEIGHT: 197 LBS | TEMPERATURE: 97.7 F | SYSTOLIC BLOOD PRESSURE: 113 MMHG | BODY MASS INDEX: 27.58 KG/M2 | HEIGHT: 71 IN

## 2021-11-09 PROCEDURE — 99214 OFFICE O/P EST MOD 30 MIN: CPT

## 2021-11-09 RX ORDER — BUPROPION HYDROCHLORIDE 150 MG/1
150 TABLET, EXTENDED RELEASE ORAL
Refills: 0 | Status: DISCONTINUED | COMMUNITY
Start: 2021-06-28 | End: 2021-11-09

## 2021-11-09 RX ORDER — DULOXETINE HYDROCHLORIDE 60 MG/1
60 CAPSULE, DELAYED RELEASE ORAL
Refills: 0 | Status: ACTIVE | COMMUNITY
Start: 2021-11-09

## 2021-11-09 RX ORDER — ALOGLIPTIN 25 MG/1
25 TABLET, FILM COATED ORAL DAILY
Refills: 0 | Status: ACTIVE | COMMUNITY
Start: 2021-11-09

## 2021-11-09 RX ORDER — TEMAZEPAM 15 MG/1
15 CAPSULE ORAL
Qty: 60 | Refills: 0 | Status: DISCONTINUED | COMMUNITY
Start: 2021-09-30 | End: 2021-11-09

## 2021-11-09 RX ORDER — TRAZODONE HYDROCHLORIDE 50 MG/1
50 TABLET ORAL
Qty: 90 | Refills: 1 | Status: DISCONTINUED | COMMUNITY
Start: 2021-09-10 | End: 2021-11-09

## 2021-11-09 RX ORDER — GLIPIZIDE 2.5 MG/1
2.5 TABLET, FILM COATED, EXTENDED RELEASE ORAL DAILY
Qty: 90 | Refills: 3 | Status: DISCONTINUED | COMMUNITY
Start: 2020-07-07 | End: 2021-11-09

## 2021-11-09 RX ORDER — ZOLPIDEM TARTRATE 5 MG/1
5 TABLET ORAL
Qty: 30 | Refills: 5 | Status: DISCONTINUED | COMMUNITY
Start: 2021-09-03 | End: 2021-11-09

## 2021-11-09 RX ORDER — DIVALPROEX SODIUM 250 MG/1
250 TABLET, DELAYED RELEASE ORAL
Refills: 0 | Status: ACTIVE | COMMUNITY
Start: 2021-11-09

## 2021-11-09 NOTE — HISTORY OF PRESENT ILLNESS
[FreeTextEntry1] : f/u  med problems  dementia related  issues  are stable  his meds for sleep and anxiety have been adjusted.  changes in chart now  no  cv complains  no sign of bleeding. on new  rx for his dm  to see it decreases his appetite  had  bypass procedure on left leg  had prior one done on the other side and he will need more procedures.  [place]  [place]  are ok on the statin

## 2021-11-09 NOTE — PHYSICAL EXAM
[No Acute Distress] : no acute distress [Normal Sclera/Conjunctiva] : normal sclera/conjunctiva [Normal Outer Ear/Nose] : the outer ears and nose were normal in appearance [No JVD] : no jugular venous distention [Supple] : supple [No Respiratory Distress] : no respiratory distress  [No Accessory Muscle Use] : no accessory muscle use [Clear to Auscultation] : lungs were clear to auscultation bilaterally [Normal Rate] : normal rate  [Regular Rhythm] : with a regular rhythm [Normal S1, S2] : normal S1 and S2 [No Edema] : there was no peripheral edema [Soft] : abdomen soft [Non Tender] : non-tender [Non-distended] : non-distended [Normal Bowel Sounds] : normal bowel sounds [Coordination Grossly Intact] : coordination grossly intact [Normal Gait] : normal gait [Normal] : affect was normal and insight and judgment were intact

## 2021-12-10 ENCOUNTER — APPOINTMENT (OUTPATIENT)
Dept: CARDIOLOGY | Facility: CLINIC | Age: 71
End: 2021-12-10

## 2021-12-13 ENCOUNTER — NON-APPOINTMENT (OUTPATIENT)
Age: 71
End: 2021-12-13

## 2022-01-04 ENCOUNTER — APPOINTMENT (OUTPATIENT)
Dept: INTERNAL MEDICINE | Facility: CLINIC | Age: 72
End: 2022-01-04

## 2022-01-11 ENCOUNTER — NON-APPOINTMENT (OUTPATIENT)
Age: 72
End: 2022-01-11

## 2022-01-13 ENCOUNTER — APPOINTMENT (OUTPATIENT)
Dept: INTERNAL MEDICINE | Facility: CLINIC | Age: 72
End: 2022-01-13
Payer: MEDICARE

## 2022-01-13 VITALS
OXYGEN SATURATION: 95 % | HEIGHT: 71 IN | WEIGHT: 189 LBS | DIASTOLIC BLOOD PRESSURE: 76 MMHG | SYSTOLIC BLOOD PRESSURE: 140 MMHG | HEART RATE: 98 BPM | TEMPERATURE: 97.8 F | BODY MASS INDEX: 26.46 KG/M2

## 2022-01-13 DIAGNOSIS — I73.9 PERIPHERAL VASCULAR DISEASE, UNSPECIFIED: ICD-10-CM

## 2022-01-13 DIAGNOSIS — I25.10 ATHEROSCLEROTIC HEART DISEASE OF NATIVE CORONARY ARTERY W/OUT ANGINA PECTORIS: ICD-10-CM

## 2022-01-13 PROCEDURE — 99496 TRANSJ CARE MGMT HIGH F2F 7D: CPT

## 2022-01-13 RX ORDER — LOSARTAN POTASSIUM 50 MG/1
50 TABLET, FILM COATED ORAL DAILY
Qty: 90 | Refills: 3 | Status: DISCONTINUED | COMMUNITY
Start: 2019-09-13 | End: 2022-01-13

## 2022-01-13 NOTE — PHYSICAL EXAM
[Normal] : no acute distress, well nourished, well developed and well-appearing [Normal Sclera/Conjunctiva] : normal sclera/conjunctiva [Normal Outer Ear/Nose] : the outer ears and nose were normal in appearance [Normal TMs] : both tympanic membranes were normal [No JVD] : no jugular venous distention [No Lymphadenopathy] : no lymphadenopathy [Supple] : supple [No Respiratory Distress] : no respiratory distress  [No Accessory Muscle Use] : no accessory muscle use [Clear to Auscultation] : lungs were clear to auscultation bilaterally [Normal Rate] : normal rate  [Regular Rhythm] : with a regular rhythm [Normal S1, S2] : normal S1 and S2 [No Edema] : there was no peripheral edema

## 2022-01-13 NOTE — HISTORY OF PRESENT ILLNESS
[FreeTextEntry1] : out of  [facility] on 1/9/20222  after 9 day  hospitalization  after falling to the ground and being disoriented.  he was hydrated  and mild renal dysfunction improved.  he has no sign of an acute cardiac event. he was begun on midodrine   5mg tid   wife thinks his   dementia is getting worse  sugars  were mildy high in hospital  wife says he is still not drinking enough.  remains on high dose  statin  for PAD  as well  cardiac stents

## 2022-01-25 ENCOUNTER — INPATIENT (INPATIENT)
Facility: HOSPITAL | Age: 72
LOS: 1 days | Discharge: HOME | DRG: 312 | End: 2022-01-27
Attending: HOSPITALIST | Admitting: INTERNAL MEDICINE
Payer: MEDICARE

## 2022-01-25 ENCOUNTER — NON-APPOINTMENT (OUTPATIENT)
Age: 72
End: 2022-01-25

## 2022-01-25 VITALS
WEIGHT: 195.11 LBS | TEMPERATURE: 98 F | DIASTOLIC BLOOD PRESSURE: 69 MMHG | HEIGHT: 71 IN | SYSTOLIC BLOOD PRESSURE: 110 MMHG | RESPIRATION RATE: 18 BRPM | HEART RATE: 76 BPM

## 2022-01-25 DIAGNOSIS — I95.1 ORTHOSTATIC HYPOTENSION: ICD-10-CM

## 2022-01-25 DIAGNOSIS — Z98.890 OTHER SPECIFIED POSTPROCEDURAL STATES: Chronic | ICD-10-CM

## 2022-01-25 DIAGNOSIS — E11.9 TYPE 2 DIABETES MELLITUS WITHOUT COMPLICATIONS: ICD-10-CM

## 2022-01-25 DIAGNOSIS — F03.90 UNSPECIFIED DEMENTIA, UNSPECIFIED SEVERITY, WITHOUT BEHAVIORAL DISTURBANCE, PSYCHOTIC DISTURBANCE, MOOD DISTURBANCE, AND ANXIETY: ICD-10-CM

## 2022-01-25 DIAGNOSIS — Z29.9 ENCOUNTER FOR PROPHYLACTIC MEASURES, UNSPECIFIED: ICD-10-CM

## 2022-01-25 DIAGNOSIS — I73.9 PERIPHERAL VASCULAR DISEASE, UNSPECIFIED: ICD-10-CM

## 2022-01-25 DIAGNOSIS — I25.10 ATHEROSCLEROTIC HEART DISEASE OF NATIVE CORONARY ARTERY WITHOUT ANGINA PECTORIS: ICD-10-CM

## 2022-01-25 DIAGNOSIS — R55 SYNCOPE AND COLLAPSE: ICD-10-CM

## 2022-01-25 DIAGNOSIS — I10 ESSENTIAL (PRIMARY) HYPERTENSION: ICD-10-CM

## 2022-01-25 DIAGNOSIS — Z98.49 CATARACT EXTRACTION STATUS, UNSPECIFIED EYE: Chronic | ICD-10-CM

## 2022-01-25 LAB
ALBUMIN SERPL ELPH-MCNC: 4.3 G/DL — SIGNIFICANT CHANGE UP (ref 3.3–5)
ALP SERPL-CCNC: 109 U/L — SIGNIFICANT CHANGE UP (ref 40–120)
ALT FLD-CCNC: 29 U/L — SIGNIFICANT CHANGE UP (ref 10–45)
ANION GAP SERPL CALC-SCNC: 12 MMOL/L — SIGNIFICANT CHANGE UP (ref 5–17)
ANION GAP SERPL CALC-SCNC: 13 MMOL/L — SIGNIFICANT CHANGE UP (ref 5–17)
APPEARANCE UR: CLEAR — SIGNIFICANT CHANGE UP
AST SERPL-CCNC: 29 U/L — SIGNIFICANT CHANGE UP (ref 10–40)
BACTERIA # UR AUTO: NEGATIVE — SIGNIFICANT CHANGE UP
BASOPHILS # BLD AUTO: 0.04 K/UL — SIGNIFICANT CHANGE UP (ref 0–0.2)
BASOPHILS NFR BLD AUTO: 0.4 % — SIGNIFICANT CHANGE UP (ref 0–2)
BILIRUB SERPL-MCNC: 0.2 MG/DL — SIGNIFICANT CHANGE UP (ref 0.2–1.2)
BILIRUB UR-MCNC: NEGATIVE — SIGNIFICANT CHANGE UP
BUN SERPL-MCNC: 35 MG/DL — HIGH (ref 7–23)
BUN SERPL-MCNC: 36 MG/DL — HIGH (ref 7–23)
CALCIUM SERPL-MCNC: 10 MG/DL — SIGNIFICANT CHANGE UP (ref 8.4–10.5)
CALCIUM SERPL-MCNC: 9.9 MG/DL — SIGNIFICANT CHANGE UP (ref 8.4–10.5)
CHLORIDE SERPL-SCNC: 100 MMOL/L — SIGNIFICANT CHANGE UP (ref 96–108)
CHLORIDE SERPL-SCNC: 99 MMOL/L — SIGNIFICANT CHANGE UP (ref 96–108)
CO2 SERPL-SCNC: 24 MMOL/L — SIGNIFICANT CHANGE UP (ref 22–31)
CO2 SERPL-SCNC: 25 MMOL/L — SIGNIFICANT CHANGE UP (ref 22–31)
COLOR SPEC: SIGNIFICANT CHANGE UP
CREAT SERPL-MCNC: 1.27 MG/DL — SIGNIFICANT CHANGE UP (ref 0.5–1.3)
CREAT SERPL-MCNC: 1.28 MG/DL — SIGNIFICANT CHANGE UP (ref 0.5–1.3)
DIFF PNL FLD: NEGATIVE — SIGNIFICANT CHANGE UP
EOSINOPHIL # BLD AUTO: 0.11 K/UL — SIGNIFICANT CHANGE UP (ref 0–0.5)
EOSINOPHIL NFR BLD AUTO: 1.2 % — SIGNIFICANT CHANGE UP (ref 0–6)
EPI CELLS # UR: 1 /HPF — SIGNIFICANT CHANGE UP
GLUCOSE SERPL-MCNC: 129 MG/DL — HIGH (ref 70–99)
GLUCOSE SERPL-MCNC: 230 MG/DL — HIGH (ref 70–99)
GLUCOSE UR QL: ABNORMAL
HCT VFR BLD CALC: 39.9 % — SIGNIFICANT CHANGE UP (ref 39–50)
HGB BLD-MCNC: 12.9 G/DL — LOW (ref 13–17)
HYALINE CASTS # UR AUTO: 0 /LPF — SIGNIFICANT CHANGE UP (ref 0–2)
IMM GRANULOCYTES NFR BLD AUTO: 1.6 % — HIGH (ref 0–1.5)
KETONES UR-MCNC: SIGNIFICANT CHANGE UP
LEUKOCYTE ESTERASE UR-ACNC: NEGATIVE — SIGNIFICANT CHANGE UP
LYMPHOCYTES # BLD AUTO: 0.95 K/UL — LOW (ref 1–3.3)
LYMPHOCYTES # BLD AUTO: 10.3 % — LOW (ref 13–44)
MCHC RBC-ENTMCNC: 31.2 PG — SIGNIFICANT CHANGE UP (ref 27–34)
MCHC RBC-ENTMCNC: 32.3 GM/DL — SIGNIFICANT CHANGE UP (ref 32–36)
MCV RBC AUTO: 96.6 FL — SIGNIFICANT CHANGE UP (ref 80–100)
MONOCYTES # BLD AUTO: 0.43 K/UL — SIGNIFICANT CHANGE UP (ref 0–0.9)
MONOCYTES NFR BLD AUTO: 4.7 % — SIGNIFICANT CHANGE UP (ref 2–14)
NEUTROPHILS # BLD AUTO: 7.55 K/UL — HIGH (ref 1.8–7.4)
NEUTROPHILS NFR BLD AUTO: 81.8 % — HIGH (ref 43–77)
NITRITE UR-MCNC: NEGATIVE — SIGNIFICANT CHANGE UP
NRBC # BLD: 0 /100 WBCS — SIGNIFICANT CHANGE UP (ref 0–0)
PH UR: 6 — SIGNIFICANT CHANGE UP (ref 5–8)
PLATELET # BLD AUTO: 159 K/UL — SIGNIFICANT CHANGE UP (ref 150–400)
POTASSIUM SERPL-MCNC: 4.8 MMOL/L — SIGNIFICANT CHANGE UP (ref 3.5–5.3)
POTASSIUM SERPL-MCNC: 5.5 MMOL/L — HIGH (ref 3.5–5.3)
POTASSIUM SERPL-SCNC: 4.8 MMOL/L — SIGNIFICANT CHANGE UP (ref 3.5–5.3)
POTASSIUM SERPL-SCNC: 5.5 MMOL/L — HIGH (ref 3.5–5.3)
PROT SERPL-MCNC: 6.9 G/DL — SIGNIFICANT CHANGE UP (ref 6–8.3)
PROT UR-MCNC: NEGATIVE — SIGNIFICANT CHANGE UP
RBC # BLD: 4.13 M/UL — LOW (ref 4.2–5.8)
RBC # FLD: 13.5 % — SIGNIFICANT CHANGE UP (ref 10.3–14.5)
RBC CASTS # UR COMP ASSIST: 1 /HPF — SIGNIFICANT CHANGE UP (ref 0–4)
SARS-COV-2 RNA SPEC QL NAA+PROBE: DETECTED
SODIUM SERPL-SCNC: 135 MMOL/L — SIGNIFICANT CHANGE UP (ref 135–145)
SODIUM SERPL-SCNC: 138 MMOL/L — SIGNIFICANT CHANGE UP (ref 135–145)
SP GR SPEC: 1.02 — SIGNIFICANT CHANGE UP (ref 1.01–1.02)
TROPONIN T, HIGH SENSITIVITY RESULT: 19 NG/L — SIGNIFICANT CHANGE UP (ref 0–51)
TROPONIN T, HIGH SENSITIVITY RESULT: 19 NG/L — SIGNIFICANT CHANGE UP (ref 0–51)
UROBILINOGEN FLD QL: NEGATIVE — SIGNIFICANT CHANGE UP
VALPROATE SERPL-MCNC: 41 UG/ML — LOW (ref 50–100)
WBC # BLD: 9.23 K/UL — SIGNIFICANT CHANGE UP (ref 3.8–10.5)
WBC # FLD AUTO: 9.23 K/UL — SIGNIFICANT CHANGE UP (ref 3.8–10.5)
WBC UR QL: 1 /HPF — SIGNIFICANT CHANGE UP (ref 0–5)

## 2022-01-25 PROCEDURE — 99222 1ST HOSP IP/OBS MODERATE 55: CPT

## 2022-01-25 PROCEDURE — 99223 1ST HOSP IP/OBS HIGH 75: CPT

## 2022-01-25 PROCEDURE — 71045 X-RAY EXAM CHEST 1 VIEW: CPT | Mod: 26

## 2022-01-25 PROCEDURE — 99285 EMERGENCY DEPT VISIT HI MDM: CPT | Mod: CS,GC

## 2022-01-25 PROCEDURE — 93010 ELECTROCARDIOGRAM REPORT: CPT

## 2022-01-25 PROCEDURE — 70450 CT HEAD/BRAIN W/O DYE: CPT | Mod: 26,MA

## 2022-01-25 RX ORDER — MEMANTINE HYDROCHLORIDE 10 MG/1
10 TABLET ORAL
Refills: 0 | Status: DISCONTINUED | OUTPATIENT
Start: 2022-01-25 | End: 2022-01-27

## 2022-01-25 RX ORDER — RIVASTIGMINE 4.6 MG/24H
1 PATCH, EXTENDED RELEASE TRANSDERMAL EVERY 24 HOURS
Refills: 0 | Status: DISCONTINUED | OUTPATIENT
Start: 2022-01-25 | End: 2022-01-27

## 2022-01-25 RX ORDER — DULOXETINE HYDROCHLORIDE 30 MG/1
60 CAPSULE, DELAYED RELEASE ORAL DAILY
Refills: 0 | Status: DISCONTINUED | OUTPATIENT
Start: 2022-01-25 | End: 2022-01-27

## 2022-01-25 RX ORDER — ENOXAPARIN SODIUM 100 MG/ML
40 INJECTION SUBCUTANEOUS DAILY
Refills: 0 | Status: DISCONTINUED | OUTPATIENT
Start: 2022-01-25 | End: 2022-01-27

## 2022-01-25 RX ORDER — MIDODRINE HYDROCHLORIDE 2.5 MG/1
5 TABLET ORAL THREE TIMES A DAY
Refills: 0 | Status: DISCONTINUED | OUTPATIENT
Start: 2022-01-25 | End: 2022-01-27

## 2022-01-25 RX ORDER — QUETIAPINE FUMARATE 200 MG/1
50 TABLET, FILM COATED ORAL AT BEDTIME
Refills: 0 | Status: DISCONTINUED | OUTPATIENT
Start: 2022-01-25 | End: 2022-01-27

## 2022-01-25 RX ORDER — SODIUM CHLORIDE 9 MG/ML
1000 INJECTION INTRAMUSCULAR; INTRAVENOUS; SUBCUTANEOUS ONCE
Refills: 0 | Status: COMPLETED | OUTPATIENT
Start: 2022-01-25 | End: 2022-01-25

## 2022-01-25 RX ORDER — ATORVASTATIN CALCIUM 80 MG/1
80 TABLET, FILM COATED ORAL AT BEDTIME
Refills: 0 | Status: DISCONTINUED | OUTPATIENT
Start: 2022-01-25 | End: 2022-01-27

## 2022-01-25 RX ORDER — ASPIRIN/CALCIUM CARB/MAGNESIUM 324 MG
81 TABLET ORAL DAILY
Refills: 0 | Status: DISCONTINUED | OUTPATIENT
Start: 2022-01-25 | End: 2022-01-27

## 2022-01-25 RX ORDER — CLOPIDOGREL BISULFATE 75 MG/1
75 TABLET, FILM COATED ORAL DAILY
Refills: 0 | Status: DISCONTINUED | OUTPATIENT
Start: 2022-01-25 | End: 2022-01-27

## 2022-01-25 RX ADMIN — SODIUM CHLORIDE 1000 MILLILITER(S): 9 INJECTION INTRAMUSCULAR; INTRAVENOUS; SUBCUTANEOUS at 15:25

## 2022-01-25 RX ADMIN — MIDODRINE HYDROCHLORIDE 5 MILLIGRAM(S): 2.5 TABLET ORAL at 21:11

## 2022-01-25 RX ADMIN — ATORVASTATIN CALCIUM 80 MILLIGRAM(S): 80 TABLET, FILM COATED ORAL at 21:11

## 2022-01-25 RX ADMIN — QUETIAPINE FUMARATE 50 MILLIGRAM(S): 200 TABLET, FILM COATED ORAL at 21:12

## 2022-01-25 RX ADMIN — MEMANTINE HYDROCHLORIDE 10 MILLIGRAM(S): 10 TABLET ORAL at 21:13

## 2022-01-25 NOTE — H&P ADULT - PROBLEM SELECTOR PLAN 1
Multiple presentations for orthostasis, orthostatics positive. Was unable to get on compression stockings/binder this am. Minimally taking midodrine.  -neuro following appreciate recs  -restart midodrine 5mg TID. Per outpatient pcp, had been instructed not to receive if bp >130 systolic  -ordered for 1L bolus IVF  -holding losartan  -holding metop to allow for chronotropic response  -Abdominal binder + Compression stockings  -instructed on slowly changing position  -PT post bolus to assess ability to ambulate with orthostasis  -f/u with Tim Paris (neurologist) when feasible  -f/u with PCP upon d/c

## 2022-01-25 NOTE — ED PROVIDER NOTE - CLINICAL SUMMARY MEDICAL DECISION MAKING FREE TEXT BOX
nayeli 71 m with advanced dementia adls - feeds self dresses self ususally aox1 - with 4 epsidoes over 2 months of le trembling then weakness and has to go to floor -- unsure of near syncope vs seiazure no postictal period no tongue biting - no stool incontinence or change from baseline urine incont, pt wife observed all epsiodes reports no loc- pt awake and conversant during epsiode - no cp or sob - spreaks against sz - possible complex partial sz - vs near syncope -- on exam no lateral deficits no drift, no droop - aox2 at this time [facility] on [facility]akote will check level- ct head as pt ho bladder ca- ekg screen and neuro to eval

## 2022-01-25 NOTE — CONSULT NOTE ADULT - SUBJECTIVE AND OBJECTIVE BOX
HPI: A 71 year old male with PMh of CAD w/ cardiac 1 stent, atherosclerotic femoral artery (PAD) s/p R femoral endarterectomy artery bipass in bl groin,  HTN, HLD, DM II, Alzheimer's dx, Bladder CA s/p bladder wash (no chemo or radiation), has presented to the ED due to an episode of whole body shaking while standing after which his legs "gave out" and he went down vertically this AM, per wife. There was no LOC, head trauma, tongue biting, urinary incontinence or postictal confusion. Pt denies any lightheadedness/dizziness prior to episode and wife says BP was normal, pt had cheerios and orange juice in the AM. Pt has had 3 other similar episodes with first episode in 12/2021. He was admitted on January 2022 for similar complaint at another hospital and diagnosed with orthostatic hypotension and put on midodrine. Pt currently denies any headache, n/v, changes in vision/hearing, numbness, tingling, weakness, hx of strokes. Is on baby ASA, plavix, memantine.     PAST MEDICAL & SURGICAL HISTORY:  HTN (Hypertension)    Hyperlipidemia    Rotator Cuff Disorder    Anxiety    Cancer of Bladder  2000    PAD (peripheral artery disease)    Peripheral neuropathy    DOLORES on CPAP    Malignant neoplasm of urinary bladder, unspecified site    Atherosclerosis of native artery of extremity    Type 2 diabetes mellitus    Alzheimer disease     activity  [facility]    arthroscopy  right shoulderx2 2008    Knee Surgery  1998- meniscal    bladder surgery  2000 washout/laser    H/O cardiac catheterization  2018- stent x1 mid RCA    S/P cataract surgery  bilateral    H/O peripheral artery bypass  left femoral May 2021      FAMILY HISTORY:  FH: senile dementia (Father, Mother)    FH: diabetes mellitus (Father)    FH: breast cancer (Mother)        SOCIAL HISTORY: SOCIAL HISTORY:     Marital Status: ( x )   (  ) Single  (  )   (  )      Occupation:      Lives: (  ) alone  (  ) with children   ( x ) with spouse  (  ) with parents  (  ) other        MEDICATIONS  Home Medications:  aspirin 81 mg oral delayed release tablet: 1 tab(s) orally once a day (08 Oct 2021 05:49)  atorvastatin 80 mg oral tablet: 1 tab(s) orally once a day (at bedtime) (08 Oct 2021 05:49)  clopidogrel 75 mg oral tablet: 1 tab(s) orally once a day (08 Oct 2021 05:49)  DULoxetine 60 mg oral delayed release capsule: 1 cap(s) orally 2 times a day (08 Oct 2021 05:49)  glipiZIDE 5 mg oral tablet: 1 tab(s) orally once a day; last dose 24 hrs prior to sx (08 Oct 2021 05:49)  losartan 50 mg oral tablet: 1 tab(s) orally once a day (08 Oct 2021 05:49)  memantine 10 mg oral tablet: 1 tab(s) orally 2 times a day (08 Oct 2021 05:49)  metFORMIN 500 mg oral tablet: 1 tab(s) orally 2 times a day; last dose 24 hrs prior to sx (08 Oct 2021 05:49)  metoprolol: 12.5 milligram(s) orally 2 times a day (08 Oct 2021 05:49)  rivastigmine 13.3 mg/24 hr transdermal film, extended release: 1 patch transdermal every 24 hours (08 Oct 2021 05:49)      MEDICATIONS  (STANDING):    MEDICATIONS  (PRN):      ALLERGIES/INTOLERANCES:  Allergies  Ceclor (Rash; Urticaria)    Intolerances      OBJECTIVE:  VITALS   Vital Signs Last 24 Hrs  T(C): 36.4 (25 Jan 2022 11:09), Max: 36.4 (25 Jan 2022 10:37)  T(F): 97.5 (25 Jan 2022 11:09), Max: 97.5 (25 Jan 2022 10:37)  HR: 66 (25 Jan 2022 11:09) (66 - 76)  BP: 163/71 (25 Jan 2022 11:09) (110/69 - 163/71)  BP(mean): --  RR: 18 (25 Jan 2022 11:09) (18 - 18)  SpO2: 100% (25 Jan 2022 11:09) (100% - 100%)    PHYSICAL EXAM:  Neurological Exam:  MS: Awake, alert, oriented to self only. Normal affect. Follows all commands.    Language: Speech is clear, non fluent with good repetition & comprehension (able to name objects_watch__)    CNs: PERRLA (R = 3mm, L = 3mm). VFF. EOMI no nystagmus, no diplopia. V1-3 intact to LT/pinprick, well developed masseter muscles b/l. No facial asymmetry b/l, Hearing grossly normal (rubbing fingers) b/l. Symmetric palate elevation in midline. Gag reflex deferred. Head turning & shoulder shrug intact b/l. Tongue midline, normal movements, no atrophy.     Motor: Normal muscle bulk & tone. No noticeable resting tremor or seizure. Postural tremor (LUE > RUE) No pronator drift.              Deltoid	Biceps	Triceps 	   R	5	5	5	5	 	  L	5	5	5	5			    	H-Flex	H-Ext	K-Flex	K-Ext	D-Flex	P-Flex  R	5	5	5	5	5	5		   L	5	5	5	5	5	5		     Sensation: Intact to LT/PP/Temp in all extremities. Decreased vibration and proprioception in lower extremities.     Cortical: Extinction on DSS (neglect): none    Reflexes:              Biceps(C5)       BR(C6)     Triceps(C7)               Patellar(L4)    Achilles(S1)    Plantar Resp  R	3	          3	             3		        3		    3		Mute   L	3	          3	             3		        3		    3		Mute     Coordination: intact rapid-alt movements. No dysmetria to FTN/HTS    Gait:  Deferred    LABORATORY:  CBC                       12.9   9.23  )-----------( 159      ( 25 Jan 2022 11:05 )             39.9     Chem 01-25    138  |  100  |  35<H>  ----------------------------<  129<H>  4.8   |  25  |  1.27    Ca    10.0      25 Jan 2022 12:40    TPro  6.9  /  Alb  4.3  /  TBili  0.2  /  DBili  x   /  AST  29  /  ALT  29  /  AlkPhos  109  01-25    LFTs LIVER FUNCTIONS - ( 25 Jan 2022 11:05 )  Alb: 4.3 g/dL / Pro: 6.9 g/dL / ALK PHOS: 109 U/L / ALT: 29 U/L / AST: 29 U/L / GGT: x           Coagulopathy   Lipid Panel   A1c   Cardiac enzymes     U/A   CSF  Immunological  Other    STUDIES & IMAGING:  Studies (EKG, EEG, EMG, etc):     Radiology (XR, CT, MR, U/S, TTE/DIPESH):  CT head: Age-appropriate involutional change and microvascular   ischemic disease. No evidence of mass lesion. No intracranial hemorrhage     HPI: A 71 year old male with PMh of CAD w/ cardiac 1 stent, atherosclerotic femoral artery (PAD) s/p R femoral endarterectomy artery bipass in bl groin,  HTN, HLD, DM II, Alzheimer's dx, Bladder CA s/p bladder wash (no chemo or radiation), has presented to the ED due to an episode of whole body shaking while standing after which his legs "gave out" and he went down vertically this AM, per wife. There was no LOC, head trauma, tongue biting, urinary incontinence or postictal confusion but pt is unresponsive during episode. Pt denies any lightheadedness/dizziness prior to episode and wife says BP was normal, pt had cheerios and orange juice in the AM. Pt has had 3 other similar episodes with first episode in 12/2021. He was admitted on January 2022 for similar complaint at another hospital and diagnosed with orthostatic hypotension and put on midodrine. Pt currently denies any headache, n/v, changes in vision/hearing, numbness, tingling, weakness, hx of strokes. Is on baby ASA, plavix, memantine.     PAST MEDICAL & SURGICAL HISTORY:  HTN (Hypertension)    Hyperlipidemia    Rotator Cuff Disorder    Anxiety    Cancer of Bladder  2000    PAD (peripheral artery disease)    Peripheral neuropathy    DOLORES on CPAP    Malignant neoplasm of urinary bladder, unspecified site    Atherosclerosis of native artery of extremity    Type 2 diabetes mellitus    Alzheimer disease     activity  [facility]    arthroscopy  right shoulderx2 2008    Knee Surgery  1998- meniscal    bladder surgery  2000 washout/laser    H/O cardiac catheterization  2018- stent x1 mid RCA    S/P cataract surgery  bilateral    H/O peripheral artery bypass  left femoral May 2021      FAMILY HISTORY:  FH: senile dementia (Father, Mother)    FH: diabetes mellitus (Father)    FH: breast cancer (Mother)        SOCIAL HISTORY: SOCIAL HISTORY:     Marital Status: ( x )   (  ) Single  (  )   (  )      Occupation:      Lives: (  ) alone  (  ) with children   ( x ) with spouse  (  ) with parents  (  ) other        MEDICATIONS  Home Medications:  aspirin 81 mg oral delayed release tablet: 1 tab(s) orally once a day (08 Oct 2021 05:49)  atorvastatin 80 mg oral tablet: 1 tab(s) orally once a day (at bedtime) (08 Oct 2021 05:49)  clopidogrel 75 mg oral tablet: 1 tab(s) orally once a day (08 Oct 2021 05:49)  DULoxetine 60 mg oral delayed release capsule: 1 cap(s) orally 2 times a day (08 Oct 2021 05:49)  glipiZIDE 5 mg oral tablet: 1 tab(s) orally once a day; last dose 24 hrs prior to sx (08 Oct 2021 05:49)  losartan 50 mg oral tablet: 1 tab(s) orally once a day (08 Oct 2021 05:49)  memantine 10 mg oral tablet: 1 tab(s) orally 2 times a day (08 Oct 2021 05:49)  metFORMIN 500 mg oral tablet: 1 tab(s) orally 2 times a day; last dose 24 hrs prior to sx (08 Oct 2021 05:49)  metoprolol: 12.5 milligram(s) orally 2 times a day (08 Oct 2021 05:49)  rivastigmine 13.3 mg/24 hr transdermal film, extended release: 1 patch transdermal every 24 hours (08 Oct 2021 05:49)      MEDICATIONS  (STANDING):    MEDICATIONS  (PRN):      ALLERGIES/INTOLERANCES:  Allergies  Ceclor (Rash; Urticaria)    Intolerances      OBJECTIVE:  VITALS   Vital Signs Last 24 Hrs  T(C): 36.4 (25 Jan 2022 11:09), Max: 36.4 (25 Jan 2022 10:37)  T(F): 97.5 (25 Jan 2022 11:09), Max: 97.5 (25 Jan 2022 10:37)  HR: 66 (25 Jan 2022 11:09) (66 - 76)  BP: 163/71 (25 Jan 2022 11:09) (110/69 - 163/71)  BP(mean): --  RR: 18 (25 Jan 2022 11:09) (18 - 18)  SpO2: 100% (25 Jan 2022 11:09) (100% - 100%)    PHYSICAL EXAM:  Neurological Exam:  MS: Awake, alert, oriented to self only. Normal affect. Follows all commands.    Language: Speech is clear, non fluent with good repetition & comprehension (able to name objects_watch__)    CNs: PERRLA (R = 3mm, L = 3mm). VFF. EOMI no nystagmus, no diplopia. V1-3 intact to LT/pinprick, well developed masseter muscles b/l. No facial asymmetry b/l, Hearing grossly normal (rubbing fingers) b/l. Symmetric palate elevation in midline. Gag reflex deferred. Head turning & shoulder shrug intact b/l. Tongue midline, normal movements, no atrophy.     Motor: Normal muscle bulk & tone. No noticeable resting tremor or seizure. Postural tremor (LUE > RUE) No pronator drift.              Deltoid	Biceps	Triceps 	   R	5	5	5	5	 	  L	5	5	5	5			    	H-Flex	H-Ext	K-Flex	K-Ext	D-Flex	P-Flex  R	5	5	5	5	5	5		   L	5	5	5	5	5	5		     Sensation: Intact to LT/PP/Temp in all extremities. Decreased vibration and proprioception in lower extremities.     Cortical: Extinction on DSS (neglect): none    Reflexes:              Biceps(C5)       BR(C6)     Triceps(C7)               Patellar(L4)    Achilles(S1)    Plantar Resp  R	3	          3	             3		        3		    3		Mute   L	3	          3	             3		        3		    3		Mute     Coordination: intact rapid-alt movements. No dysmetria to FTN/HTS    Gait:  Deferred    LABORATORY:  CBC                       12.9   9.23  )-----------( 159      ( 25 Jan 2022 11:05 )             39.9     Chem 01-25    138  |  100  |  35<H>  ----------------------------<  129<H>  4.8   |  25  |  1.27    Ca    10.0      25 Jan 2022 12:40    TPro  6.9  /  Alb  4.3  /  TBili  0.2  /  DBili  x   /  AST  29  /  ALT  29  /  AlkPhos  109  01-25    LFTs LIVER FUNCTIONS - ( 25 Jan 2022 11:05 )  Alb: 4.3 g/dL / Pro: 6.9 g/dL / ALK PHOS: 109 U/L / ALT: 29 U/L / AST: 29 U/L / GGT: x           Coagulopathy   Lipid Panel   A1c   Cardiac enzymes     U/A   CSF  Immunological  Other    STUDIES & IMAGING:  Studies (EKG, EEG, EMG, etc):     Radiology (XR, CT, MR, U/S, TTE/DIPESH):  CT head: Age-appropriate involutional change and microvascular   ischemic disease. No evidence of mass lesion. No intracranial hemorrhage     HPI: A 71 year old male with PMh of CAD w/ cardiac 1 stent, atherosclerotic femoral artery (PAD) s/p R femoral endarterectomy artery bipass in bl groin,  HTN, HLD, DM II, Alzheimer's dx, Bladder CA s/p bladder wash (no chemo or radiation), has presented to the ED due to an episode of whole body shaking while standing after which his legs "gave out" and he went down vertically this AM, per wife. There was no LOC, head trauma, tongue biting, urinary incontinence or postictal confusion but pt is unresponsive during episode. Pt denies any lightheadedness/dizziness prior to episode and wife says BP was normal, pt had cheerios and orange juice in the AM. Pt has had 3 other similar episodes with first episode in 12/2021. He was admitted on January 2022 for similar complaint at another hospital and diagnosed with orthostatic hypotension and put on midodrine. Pt currently denies any headache, n/v, changes in vision/hearing, numbness, tingling, weakness, hx of strokes. Is on baby ASA, plavix, memantine. Pt's neurologist is Dr. Tim Paris.     PAST MEDICAL & SURGICAL HISTORY:  HTN (Hypertension)    Hyperlipidemia    Rotator Cuff Disorder    Anxiety    Cancer of Bladder  2000    PAD (peripheral artery disease)    Peripheral neuropathy    DOLORES on CPAP    Malignant neoplasm of urinary bladder, unspecified site    Atherosclerosis of native artery of extremity    Type 2 diabetes mellitus    Alzheimer disease     activity  [facility]    arthroscopy  right shoulderx2 2008    Knee Surgery  1998- meniscal    bladder surgery  2000 washout/laser    H/O cardiac catheterization  2018- stent x1 mid RCA    S/P cataract surgery  bilateral    H/O peripheral artery bypass  left femoral May 2021      FAMILY HISTORY:  FH: senile dementia (Father, Mother)    FH: diabetes mellitus (Father)    FH: breast cancer (Mother)        SOCIAL HISTORY: SOCIAL HISTORY:     Marital Status: ( x )   (  ) Single  (  )   (  )      Occupation:      Lives: (  ) alone  (  ) with children   ( x ) with spouse  (  ) with parents  (  ) other        MEDICATIONS  Home Medications:  aspirin 81 mg oral delayed release tablet: 1 tab(s) orally once a day (08 Oct 2021 05:49)  atorvastatin 80 mg oral tablet: 1 tab(s) orally once a day (at bedtime) (08 Oct 2021 05:49)  clopidogrel 75 mg oral tablet: 1 tab(s) orally once a day (08 Oct 2021 05:49)  DULoxetine 60 mg oral delayed release capsule: 1 cap(s) orally 2 times a day (08 Oct 2021 05:49)  glipiZIDE 5 mg oral tablet: 1 tab(s) orally once a day; last dose 24 hrs prior to sx (08 Oct 2021 05:49)  losartan 50 mg oral tablet: 1 tab(s) orally once a day (08 Oct 2021 05:49)  memantine 10 mg oral tablet: 1 tab(s) orally 2 times a day (08 Oct 2021 05:49)  metFORMIN 500 mg oral tablet: 1 tab(s) orally 2 times a day; last dose 24 hrs prior to sx (08 Oct 2021 05:49)  metoprolol: 12.5 milligram(s) orally 2 times a day (08 Oct 2021 05:49)  rivastigmine 13.3 mg/24 hr transdermal film, extended release: 1 patch transdermal every 24 hours (08 Oct 2021 05:49)      MEDICATIONS  (STANDING):    MEDICATIONS  (PRN):      ALLERGIES/INTOLERANCES:  Allergies  Ceclor (Rash; Urticaria)    Intolerances      OBJECTIVE:  VITALS   Vital Signs Last 24 Hrs  T(C): 36.4 (25 Jan 2022 11:09), Max: 36.4 (25 Jan 2022 10:37)  T(F): 97.5 (25 Jan 2022 11:09), Max: 97.5 (25 Jan 2022 10:37)  HR: 66 (25 Jan 2022 11:09) (66 - 76)  BP: 163/71 (25 Jan 2022 11:09) (110/69 - 163/71)  BP(mean): --  RR: 18 (25 Jan 2022 11:09) (18 - 18)  SpO2: 100% (25 Jan 2022 11:09) (100% - 100%)    PHYSICAL EXAM:  Neurological Exam:  MS: Awake, alert, oriented to self only. Normal affect. Follows all commands.    Language: Speech is clear, non fluent with good repetition & comprehension (able to name objects_watch__)    CNs: PERRLA (R = 3mm, L = 3mm). VFF. EOMI no nystagmus, no diplopia. V1-3 intact to LT/pinprick, well developed masseter muscles b/l. No facial asymmetry b/l, Hearing grossly normal (rubbing fingers) b/l. Symmetric palate elevation in midline. Gag reflex deferred. Head turning & shoulder shrug intact b/l. Tongue midline, normal movements, no atrophy.     Motor: Normal muscle bulk & tone. No noticeable resting tremor or seizure. Postural tremor (LUE > RUE) No pronator drift.              Deltoid	Biceps	Triceps 	   R	5	5	5	5	 	  L	5	5	5	5			    	H-Flex	H-Ext	K-Flex	K-Ext	D-Flex	P-Flex  R	5	5	5	5	5	5		   L	5	5	5	5	5	5		     Sensation: Intact to LT/PP/Temp in all extremities. Decreased vibration and proprioception in lower extremities.     Cortical: Extinction on DSS (neglect): none    Reflexes:              Biceps(C5)       BR(C6)     Triceps(C7)               Patellar(L4)    Achilles(S1)    Plantar Resp  R	3	          3	             3		        3		    3		Mute   L	3	          3	             3		        3		    3		Mute     Coordination: intact rapid-alt movements. No dysmetria to FTN/HTS    Gait:  Deferred    LABORATORY:  CBC                       12.9   9.23  )-----------( 159      ( 25 Jan 2022 11:05 )             39.9     Chem 01-25    138  |  100  |  35<H>  ----------------------------<  129<H>  4.8   |  25  |  1.27    Ca    10.0      25 Jan 2022 12:40    TPro  6.9  /  Alb  4.3  /  TBili  0.2  /  DBili  x   /  AST  29  /  ALT  29  /  AlkPhos  109  01-25    LFTs LIVER FUNCTIONS - ( 25 Jan 2022 11:05 )  Alb: 4.3 g/dL / Pro: 6.9 g/dL / ALK PHOS: 109 U/L / ALT: 29 U/L / AST: 29 U/L / GGT: x           Coagulopathy   Lipid Panel   A1c   Cardiac enzymes     U/A   CSF  Immunological  Other    STUDIES & IMAGING:  Studies (EKG, EEG, EMG, etc):     Radiology (XR, CT, MR, U/S, TTE/DIPESH):  CT head: Age-appropriate involutional change and microvascular   ischemic disease. No evidence of mass lesion. No intracranial hemorrhage     HPI: A 71 year old male with PMh of CAD w/ cardiac 1 stent, atherosclerotic femoral artery (PAD) s/p R femoral endarterectomy artery bipass in bl groin,  HTN, HLD, DM II, Alzheimer's dx, Bladder CA s/p bladder wash (no chemo or radiation), has presented to the ED due to an episode of whole body shaking while standing after which his legs "gave out" and he went down vertically this AM, per wife. There was no LOC, head trauma, tongue biting, urinary incontinence or postictal confusion but pt is unresponsive during episode. Pt denies any lightheadedness/dizziness prior to episode and wife says BP was normal, pt had cheerios and orange juice in the AM. Pt has had 3 other similar episodes with first episode in 12/2021. He was admitted on January 2022 for similar complaint at another hospital and diagnosed with orthostatic hypotension and put on midodrine. Pt currently denies any headache, n/v, changes in vision/hearing, numbness, tingling, weakness, hx of strokes. Is on baby ASA, plavix, memantine. Pt's neurologist is Dr. Tim Paris.     PAST MEDICAL & SURGICAL HISTORY:  HTN (Hypertension)    Hyperlipidemia    Rotator Cuff Disorder    Anxiety    Cancer of Bladder  2000    PAD (peripheral artery disease)    Peripheral neuropathy    DOLORES on CPAP    Malignant neoplasm of urinary bladder, unspecified site    Atherosclerosis of native artery of extremity    Type 2 diabetes mellitus    Alzheimer disease     activity  [facility]    arthroscopy  right shoulderx2 2008    Knee Surgery  1998- meniscal    bladder surgery  2000 washout/laser    H/O cardiac catheterization  2018- stent x1 mid RCA    S/P cataract surgery  bilateral    H/O peripheral artery bypass  left femoral May 2021      FAMILY HISTORY:  FH: senile dementia (Father, Mother)    FH: diabetes mellitus (Father)    FH: breast cancer (Mother)        SOCIAL HISTORY: SOCIAL HISTORY:     Marital Status: ( x )   (  ) Single  (  )   (  )      Occupation:      Lives: (  ) alone  (  ) with children   ( x ) with spouse  (  ) with parents  (  ) other        MEDICATIONS  Home Medications:  aspirin 81 mg oral delayed release tablet: 1 tab(s) orally once a day (08 Oct 2021 05:49)  atorvastatin 80 mg oral tablet: 1 tab(s) orally once a day (at bedtime) (08 Oct 2021 05:49)  clopidogrel 75 mg oral tablet: 1 tab(s) orally once a day (08 Oct 2021 05:49)  DULoxetine 60 mg oral delayed release capsule: 1 cap(s) orally 2 times a day (08 Oct 2021 05:49)  glipiZIDE 5 mg oral tablet: 1 tab(s) orally once a day; last dose 24 hrs prior to sx (08 Oct 2021 05:49)  losartan 50 mg oral tablet: 1 tab(s) orally once a day (08 Oct 2021 05:49)  memantine 10 mg oral tablet: 1 tab(s) orally 2 times a day (08 Oct 2021 05:49)  metFORMIN 500 mg oral tablet: 1 tab(s) orally 2 times a day; last dose 24 hrs prior to sx (08 Oct 2021 05:49)  metoprolol: 12.5 milligram(s) orally 2 times a day (08 Oct 2021 05:49)  rivastigmine 13.3 mg/24 hr transdermal film, extended release: 1 patch transdermal every 24 hours (08 Oct 2021 05:49)      MEDICATIONS  (STANDING):    MEDICATIONS  (PRN):      ALLERGIES/INTOLERANCES:  Allergies  Ceclor (Rash; Urticaria)    Intolerances      OBJECTIVE:  VITALS   Vital Signs Last 24 Hrs  T(C): 36.4 (25 Jan 2022 11:09), Max: 36.4 (25 Jan 2022 10:37)  T(F): 97.5 (25 Jan 2022 11:09), Max: 97.5 (25 Jan 2022 10:37)  HR: 66 (25 Jan 2022 11:09) (66 - 76)  BP: 163/71 (25 Jan 2022 11:09) (110/69 - 163/71)  BP(mean): --  RR: 18 (25 Jan 2022 11:09) (18 - 18)  SpO2: 100% (25 Jan 2022 11:09) (100% - 100%)    PHYSICAL EXAM:  Neurological Exam:  MS: Awake, alert, oriented to self only. Normal affect. Follows all commands.    Language: Speech is clear, non fluent with good repetition & comprehension (able to name objects_watch__)    CNs: PERRLA (R = 3mm, L = 3mm). VFF. EOMI no nystagmus, no diplopia. V1-3 intact to LT/pinprick, well developed masseter muscles b/l. No facial asymmetry b/l, Hearing grossly normal (rubbing fingers) b/l. Symmetric palate elevation in midline. Gag reflex deferred. Head turning & shoulder shrug intact b/l. Tongue midline, normal movements, no atrophy.     Motor: Normal muscle bulk & tone. No noticeable resting tremor or seizure. Postural tremor (LUE > RUE) No pronator drift.              Deltoid	Biceps	Triceps 	   R	5	5	5	5	 	  L	5	5	5	5			    	H-Flex	H-Ext	K-Flex	K-Ext	D-Flex	P-Flex  R	5	5	5	5	5	5		   L	5	5	5	5	5	5		     Sensation: Intact to LT/PP/Temp in all extremities. Decreased vibration and proprioception in lower extremities.     Cortical: Extinction on DSS (neglect): none    Reflexes:              Biceps(C5)       BR(C6)     Triceps(C7)               Patellar(L4)    Achilles(S1)    Plantar Resp  R	2	          2	             2		        3		    1		Mute   L	2	          2	             2		        3		    1		Mute     Coordination: intact rapid-alt movements. No dysmetria to FTN/HTS    Gait:  Small steps, steady but cautious gait. Romberg negative.     LABORATORY:  CBC                       12.9   9.23  )-----------( 159      ( 25 Jan 2022 11:05 )             39.9     Chem 01-25    138  |  100  |  35<H>  ----------------------------<  129<H>  4.8   |  25  |  1.27    Ca    10.0      25 Jan 2022 12:40    TPro  6.9  /  Alb  4.3  /  TBili  0.2  /  DBili  x   /  AST  29  /  ALT  29  /  AlkPhos  109  01-25    LFTs LIVER FUNCTIONS - ( 25 Jan 2022 11:05 )  Alb: 4.3 g/dL / Pro: 6.9 g/dL / ALK PHOS: 109 U/L / ALT: 29 U/L / AST: 29 U/L / GGT: x           Coagulopathy   Lipid Panel   A1c   Cardiac enzymes     U/A   CSF  Immunological  Other    STUDIES & IMAGING:  Studies (EKG, EEG, EMG, etc):     Radiology (XR, CT, MR, U/S, TTE/DIPESH):  CT head: Age-appropriate involutional change and microvascular   ischemic disease. No evidence of mass lesion. No intracranial hemorrhage

## 2022-01-25 NOTE — ED ADULT NURSE NOTE - NSICDXPASTMEDICALHX_GEN_ALL_CORE_FT
PAST MEDICAL HISTORY:  Alzheimer disease     Anxiety     Atherosclerosis of native artery of extremity     Cancer of Bladder 2000    HTN (Hypertension)     Hyperlipidemia     Malignant neoplasm of urinary bladder, unspecified site      [facility]    DOLORES on CPAP     PAD (peripheral artery disease)     Peripheral neuropathy     Rotator Cuff Disorder     Type 2 diabetes mellitus

## 2022-01-25 NOTE — ED PROVIDER NOTE - ATTENDING CONTRIBUTION TO CARE
Per chart review the patient remains at [facility].  Plan: will monitor for discharge from the hospital.  
I performed a history and physical exam of the patient and discussed their management with the resident. I reviewed the resident's note and agree with the documented findings and plan of care, lexcept as noted.. My medical decision making and observations are found above.

## 2022-01-25 NOTE — CONSULT NOTE ADULT - ATTENDING COMMENTS
Mr. Barraza is a 71-year-old gentleman with advanced dementia, coronary artery and peripheral arterial disease.  He has a documented history of postural hypotension.  Today he experienced an episode of shaking and collapsing while standing.  He appears to be back at baseline with significant postural hypotension.    The etiology of his dysautonomia is unclear.  An atypical parkinsonian syndrome is possible.    Impression: Near syncope due to postural hypotension.  Suggestions: Avoid dehydration.  Continue midodrine.  Support hose.  Situational precautions.  He will follow-up with his neurologist Dr. Paris.

## 2022-01-25 NOTE — ED ADULT NURSE NOTE - OBJECTIVE STATEMENT
71M, AAO2 baseline dementia, arrived with wife c/o seizure like activity this AM. Wife reports seizure activity lasting approx 10 minutes, did not fall but was assisted to floor. Reports pt currently at baseline, had contacted neurologist and was told to f/u with ED. No gross deformities pt able to safely transfer for wheelchair to bed. Skin pale. No complaints of pain, RR even and unlabored. Follows commands. 71M, AAO1 baseline dementia, arrived with wife c/o seizure like activity this AM. Wife reports leg trembling and weakness x 10 minutes, did not fall but was assisted to floor. Pt not postictal, did not bite tongues, no reported incontinence. Reports pt currently at baseline, had contacted neurologist and was told to f/u with ED. No gross deformities pt able to safely transfer for wheelchair to bed. Skin pale. No complaints of pain, RR even and unlabored. Follows commands.

## 2022-01-25 NOTE — H&P ADULT - HISTORY OF PRESENT ILLNESS
CC: 70 y/o M presenting with b/l leg shaking this morning    HPI: 70 y/o M with h/o CAD s/p stent, PAD s/p R fem endarterectomy artery bypass, HTN, HLD, DM2, dementia, multiple presentations for orthostatic hypotension presenting after leg shaking.  Patient was standing this am in shower where wife noticed b/l leg shaking and slow progression to ground. Denies LOC. Wife layed him on the ground. Denies LOC/tongue biting/loss of urine or post episode confusion. During the episode patient not responding to wife. prior to episode, denies dizziness/lightheadedness/papitations. Has had poor po intake intermittently particularly with fluids. BP was normal prior to episode although taken when seated/lying down.   Of note, patient has had multiple presentations for this over the past month, last to an ED on new years. Started on midodrine and bp meds held. Since then has been using compression stockings and abdominal binder. Only received a few doses of midodrine given at home bp usually 130s-150s systolic without medication administration.   Denies f/chills/cough/sob.

## 2022-01-25 NOTE — ED PROVIDER NOTE - IV ALTEPLASE INCLUSION HIDDEN
show
Patient awake, alert, eating food in bed. No complaints. Answering questions coherently. No tremors, fasciculations on exam. Expressed the desire to leave.

## 2022-01-25 NOTE — ED PROVIDER NOTE - OBJECTIVE STATEMENT
71 yr old male with hx of CAD, hypertension, diabetes mellitus, Alzheimer Disease, prostate ca, atherosclerosis femoral artery p/w possible seizure.  Wife took him to shower where she noticed his legs were shaking.  Pt. was talking to her during this time, but wife felt this was a seizure.  Pt. is on depakote but states neurologist asked her to give it to him to help sleep and not for seizure.  Pt. sees a neurologist for dementia and not seizure.  Denies any f/c, sick contacts, CP, SOB, abd pain, urinary sxs, rectal bleeding.  Pt. is incontinent at baseline.

## 2022-01-25 NOTE — ED PROVIDER NOTE - PROGRESS NOTE DETAILS
Attending MD Butcher : Signed out by Dr Abebe. Patient with recurrent tremors and near-syncopal episodes vs seizures. Evaluated by neurology, felt to not be seizures at this time. Patient accepted to medicine.

## 2022-01-25 NOTE — ED PROVIDER NOTE - PHYSICAL EXAMINATION
GENERAL: Patient awake alert NAD.  HEENT: NC/AT, Moist mucous membranes, PERRL, no [place]ona sign/raccoon eyes, no CSF rhinorrhea/otorrhea. No lateral tongue laceration.  LUNGS: CTAB, no wheezes or crackles.  CARDIAC: RRR, no m/r/g.  ABDOMEN: Soft, NT, ND, No rebound, guarding.  EXT: No edema. No calf tenderness.  MSK: No pain with movement, no deformities.  NEURO: A&Ox2. Moving all extremities. Motor strength 5/5 in all four extremities, sensation intact.  SKIN: Warm and dry. No rash.  PSYCH: Normal affect.

## 2022-01-25 NOTE — CONSULT NOTE ADULT - ASSESSMENT
A 71 year old male with PMh of CAD w/ cardiac 1 stent, atherosclerotic femoral artery (PAD) s/p R femoral endarterectomy artery bipass in bl groin,  HTN, HLD, DM II, Alzheimer's dx, Bladder CA s/p bladder wash (no chemo or radiation), has presented to the ED due to an episode of whole body shaking while standing after which his legs "gave out" and he went down vertically this AM, per wife. There was no LOC, head trauma, tongue biting, urinary incontinence or postictal confusion. Pt denies any lightheadedness/dizziness prior to episode and wife says BP was normal, pt had cheerios and orange juice in the AM. Pt has had 3 other similar episodes with first episode in 12/2021. He was admitted on January 2022 for similar complaint at another hospital and diagnosed with orthostatic hypotension and put on midodrine. Pt currently denies any headache, n/v, changes in vision/hearing, numbness, tingling, weakness, hx of strokes. Is on baby ASA, plavix, memantine.     Neuro exam revealed oriented to self only. Non fluent speech. Postural tremor (LUE > RUE). Increased reflexes in all extremities. Toes mute bl.  Decreased vibration and proprioception in lower extremities. CT head: Age-appropriate involutional change and microvascular ischemic disease. No evidence of mass lesion. No intracranial hemorrhage.    Impression: body shaking episodes with legs "giving out" may be 2/2 decompensation 2/2 dementia vs. other etiology. Less concerned about a seizure given lack of classic seizure signs.     Recommendations:   Orthostatics  Fall precautions  Patient can follow up with general neurology at [address] [place] [address] 1-2 weeks after discharge. Please instruct the patient to call [phone] to schedule this appointment.   Neurology to follow up with further recs once pt seen with attending    Case to be discussed with neurologist, Dr. Dunlap.  A 71 year old male with PMh of CAD w/ cardiac 1 stent, atherosclerotic femoral artery (PAD) s/p R femoral endarterectomy artery bipass in bl groin,  HTN, HLD, DM II, Alzheimer's dx, Bladder CA s/p bladder wash (no chemo or radiation), has presented to the ED due to an episode of whole body shaking while standing after which his legs "gave out" and he went down vertically this AM, per wife. There was no LOC, head trauma, tongue biting, urinary incontinence or postictal confusion, but pt is unresponsive during episode. Pt denies any lightheadedness/dizziness prior to episode and wife says BP was normal, pt had cheerios and orange juice in the AM. Pt has had 3 other similar episodes with first episode in 12/2021. He was admitted on January 2022 for similar complaint at another hospital and diagnosed with orthostatic hypotension and put on midodrine. Pt currently denies any headache, n/v, changes in vision/hearing, numbness, tingling, weakness, hx of strokes. Is on baby ASA, plavix, memantine.     Neuro exam revealed oriented to self only. Non fluent speech. Postural tremor (LUE > RUE). Increased reflexes in all extremities. Toes mute bl.  Decreased vibration and proprioception in lower extremities. CT head: Age-appropriate involutional change and microvascular ischemic disease. No evidence of mass lesion. No intracranial hemorrhage.    Impression: body shaking episodes with legs "giving out" may be 2/2 decompensation 2/2 dementia vs. other etiology. Less concerned about a seizure given lack of classic seizure signs.     Recommendations:   Orthostatics  Fall precautions  Patient can follow up with general neurology at [address] [place] [address] 1-2 weeks after discharge. Please instruct the patient to call [phone] to schedule this appointment.   Neurology to follow up with further recs once pt seen with attending    Case to be discussed with neurologist, Dr. Dunlap.  A 71 year old male with PMh of CAD w/ cardiac 1 stent, atherosclerotic femoral artery (PAD) s/p R femoral endarterectomy artery bipass in bl groin,  HTN, HLD, DM II, Alzheimer's dx, Bladder CA s/p bladder wash (no chemo or radiation), has presented to the ED due to an episode of whole body shaking while standing after which his legs "gave out" and he went down vertically this AM, per wife. There was no LOC, head trauma, tongue biting, urinary incontinence or postictal confusion, but pt is unresponsive during episode. Pt denies any lightheadedness/dizziness prior to episode and wife says BP was normal, pt had cheerios and orange juice in the AM. Pt has had 3 other similar episodes with first episode in 12/2021. He was admitted on January 2022 for similar complaint at another hospital and diagnosed with orthostatic hypotension and put on midodrine. Pt currently denies any headache, n/v, changes in vision/hearing, numbness, tingling, weakness, hx of strokes. Is on baby ASA, plavix, memantine. Pt's neurologist is Dr. Tim Paris.     Neuro exam revealed oriented to self only. Non fluent speech. Postural tremor (LUE > RUE). Increased reflexes in all extremities. Toes mute bl.  Decreased vibration and proprioception in lower extremities. CT head: Age-appropriate involutional change and microvascular ischemic disease. No evidence of mass lesion. No intracranial hemorrhage.    Impression: body shaking episodes with legs "giving out" may be 2/2 decompensation 2/2 dementia vs. other etiology. Less concerned about a seizure given lack of classic seizure signs.     Recommendations:   Orthostatics  Fall precautions  CPK, CK levels  Patient can follow up with general neurology at [address] [place] [address] 1-2 weeks after discharge. Please instruct the patient to call [phone] to schedule this appointment.   Neurology to follow up with further recs once pt seen with attending    Case to be discussed with neurologist, Dr. Dunlap.  A 71 year old male with PMh of CAD w/ cardiac 1 stent, atherosclerotic femoral artery (PAD) s/p R femoral endarterectomy artery bipass in bl groin,  HTN, HLD, DM II, Alzheimer's dx, Bladder CA s/p bladder wash (no chemo or radiation), has presented to the ED due to an episode of whole body shaking while standing after which his legs "gave out" and he went down vertically this AM, per wife. There was no LOC, head trauma, tongue biting, urinary incontinence or postictal confusion, but pt is unresponsive during episode. Pt denies any lightheadedness/dizziness prior to episode and wife says BP was normal, pt had cheerios and orange juice in the AM. Pt has had 3 other similar episodes with first episode in 12/2021. He was admitted on January 2022 for similar complaint at another hospital and diagnosed with orthostatic hypotension and put on midodrine. Pt currently denies any headache, n/v, changes in vision/hearing, numbness, tingling, weakness, hx of strokes. Is on baby ASA, plavix, memantine. Pt's neurologist is Dr. Tim Paris.     Neuro exam revealed oriented to self only. Non fluent speech. Postural tremor (LUE > RUE). Increased reflexes in all extremities. Toes mute bl.  Decreased vibration and proprioception in lower extremities. CT head: Age-appropriate involutional change and microvascular ischemic disease. No evidence of mass lesion. No intracranial hemorrhage.    Impression: body shaking episodes with legs "giving out" may be 2/2 decompensation 2/2 dementia vs. other etiology. Less concerned about a seizure given lack of classic seizure signs.     Recommendations:   Orthostatics  Fall precautions  CPK, CK levels  Peripheral neuropathy work up: B1, B6, B12, folate, methylmalonic acid, homocysteine, Vit E, Heavy metal toxicology screen (lead, arsenic, mercury), RPR, West Nile   Patient can follow up with general neurology at [address] [place] [address] 1-2 weeks after discharge. Please instruct the patient to call [phone] to schedule this appointment.   Neurology to follow up with further recs once pt seen with attending    Case to be discussed with neurologist, Dr. Dunlap.  A 71 year old male with PMh of CAD w/ cardiac 1 stent, atherosclerotic femoral artery (PAD) s/p R femoral endarterectomy artery bipass in bl groin,  HTN, HLD, DM II, Alzheimer's dx, Bladder CA s/p bladder wash (no chemo or radiation), has presented to the ED due to an episode of whole body shaking while standing after which his legs "gave out" and he went down vertically this AM, per wife. There was no LOC, head trauma, tongue biting, urinary incontinence or postictal confusion, but pt is unresponsive during episode. Pt denies any lightheadedness/dizziness prior to episode and wife says BP was normal, pt had cheerios and orange juice in the AM. Pt has had 3 other similar episodes with first episode in 12/2021. He was admitted on January 2022 for similar complaint at another hospital and diagnosed with orthostatic hypotension and put on midodrine. Pt currently denies any headache, n/v, changes in vision/hearing, numbness, tingling, weakness, hx of strokes. Is on baby ASA, plavix, memantine. Pt's neurologist is Dr. Tim Paris.     Neuro exam revealed oriented to self only. Non fluent speech. Postural tremor (LUE > RUE). Increased reflexes in all extremities. Toes mute bl.  Decreased vibration and proprioception in lower extremities. CT head: Age-appropriate involutional change and microvascular ischemic disease. No evidence of mass lesion. No intracranial hemorrhage.    Impression: body shaking episodes with legs "giving out" may be 2/2 orthostatic hypotension vs decompensation 2/2 dementia vs. other etiology. Less concerned about a seizure given lack of classic seizure signs.     Recommendations:   Orthostatics  Fall precautions  CPK, CK levels  Peripheral neuropathy work up: B1, B6, B12, folate, methylmalonic acid, homocysteine, Vit E, Heavy metal toxicology screen (lead, arsenic, mercury), RPR, West Nile   Patient can follow up with general neurology at [address] [place] [address] 1-2 weeks after discharge. Please instruct the patient to call [phone] to schedule this appointment.   Neurology to follow up with further recs once pt seen with attending    Case to be discussed with neurologist, Dr. Dunlap.  A 71 year old male with PMh of CAD w/ cardiac 1 stent, atherosclerotic femoral artery (PAD) s/p R femoral endarterectomy artery bipass in bl groin,  HTN, HLD, DM II, Alzheimer's dx, Bladder CA s/p bladder wash (no chemo or radiation), has presented to the ED due to an episode of whole body shaking while standing after which his legs "gave out" and he went down vertically this AM, per wife. There was no LOC, head trauma, tongue biting, urinary incontinence or postictal confusion, but pt is unresponsive during episode. Pt denies any lightheadedness/dizziness prior to episode and wife says BP was normal, pt had cheerios and orange juice in the AM. Pt has had 3 other similar episodes with first episode in 12/2021. He was admitted on January 2022 for similar complaint at another hospital and diagnosed with orthostatic hypotension and put on midodrine. Pt currently denies any headache, n/v, changes in vision/hearing, numbness, tingling, weakness, hx of strokes. Is on baby ASA, plavix, memantine. Pt's neurologist is Dr. Tim Paris.     Neuro exam revealed oriented to self only. Non fluent speech. Postural tremor (LUE > RUE). Increased reflexes in all extremities. Toes mute bl.  Decreased vibration and proprioception in lower extremities.  Small steps, steady but cautious gait. Romberg negative. CT head: Age-appropriate involutional change and microvascular ischemic disease. No evidence of mass lesion. No intracranial hemorrhage.    Orthostatics positive     Impression: body shaking episodes with legs "giving out" may be 2/2 likely orthostatic hypotension vs decompensation 2/2 dementia vs. other etiology. Less concerned about a seizure given lack of classic seizure signs.     Recommendations:   Orthostatics  Fall precautions  CPK, CK levels  Midodrine 10 mg TID, avoid before bed time  Pt should f/u with PCP when possible  Patient can follow up with his neurologist when possible    Case to be discussed with neurologist, Dr. Dunlap.

## 2022-01-25 NOTE — H&P ADULT - NSHPLABSRESULTS_GEN_ALL_CORE
EKG: NSR  CXR personally reviewed: low lung volumes, likely poor inspiratory effort, no infiltrates or effusions appreciated

## 2022-01-25 NOTE — H&P ADULT - PROBLEM SELECTOR PLAN 3
Per charts dz as alzheimers dementia. Currently AO*1, dependent on Wife for adls/ialds  -unclear if parkinsonian component given orthostatic hypotension + mild tremor  -continue memantine  -continue rivastigmine 13.3/24hr patch  -continue seroquel 50 qhs given agitation  -continue depakote 500mg qhs

## 2022-01-25 NOTE — H&P ADULT - ASSESSMENT
70 y/o M with h/o CAD s/p stent, PAD s/p R fem endarterectomy artery bypass, HTN, HLD, DM2, dementia, multiple presentations for orthostatic hypotension presenting after leg shaking and nonresponsiveness c/w known orthostatic hypotension episodes.

## 2022-01-25 NOTE — H&P ADULT - PROBLEM SELECTOR PLAN 2
120s-150s systolic on presentation although orthostatic  -holding losartan and metop  -consider higher tolerable bp on d/c

## 2022-01-26 DIAGNOSIS — U07.1 COVID-19: ICD-10-CM

## 2022-01-26 LAB
ANION GAP SERPL CALC-SCNC: 12 MMOL/L — SIGNIFICANT CHANGE UP (ref 5–17)
BUN SERPL-MCNC: 25 MG/DL — HIGH (ref 7–23)
CALCIUM SERPL-MCNC: 10.2 MG/DL — SIGNIFICANT CHANGE UP (ref 8.4–10.5)
CHLORIDE SERPL-SCNC: 105 MMOL/L — SIGNIFICANT CHANGE UP (ref 96–108)
CO2 SERPL-SCNC: 25 MMOL/L — SIGNIFICANT CHANGE UP (ref 22–31)
CREAT SERPL-MCNC: 1.19 MG/DL — SIGNIFICANT CHANGE UP (ref 0.5–1.3)
CULTURE RESULTS: NO GROWTH — SIGNIFICANT CHANGE UP
GLUCOSE BLDC GLUCOMTR-MCNC: 161 MG/DL — HIGH (ref 70–99)
GLUCOSE BLDC GLUCOMTR-MCNC: 198 MG/DL — HIGH (ref 70–99)
GLUCOSE SERPL-MCNC: 109 MG/DL — HIGH (ref 70–99)
HCV AB S/CO SERPL IA: 0.1 S/CO — SIGNIFICANT CHANGE UP (ref 0–0.99)
HCV AB SERPL-IMP: SIGNIFICANT CHANGE UP
POTASSIUM SERPL-MCNC: 4.8 MMOL/L — SIGNIFICANT CHANGE UP (ref 3.5–5.3)
POTASSIUM SERPL-SCNC: 4.8 MMOL/L — SIGNIFICANT CHANGE UP (ref 3.5–5.3)
SODIUM SERPL-SCNC: 142 MMOL/L — SIGNIFICANT CHANGE UP (ref 135–145)
SPECIMEN SOURCE: SIGNIFICANT CHANGE UP

## 2022-01-26 PROCEDURE — 99233 SBSQ HOSP IP/OBS HIGH 50: CPT

## 2022-01-26 RX ORDER — SODIUM CHLORIDE 9 MG/ML
1000 INJECTION, SOLUTION INTRAVENOUS
Refills: 0 | Status: DISCONTINUED | OUTPATIENT
Start: 2022-01-26 | End: 2022-01-27

## 2022-01-26 RX ORDER — DEXTROSE 50 % IN WATER 50 %
15 SYRINGE (ML) INTRAVENOUS ONCE
Refills: 0 | Status: DISCONTINUED | OUTPATIENT
Start: 2022-01-26 | End: 2022-01-27

## 2022-01-26 RX ORDER — METOPROLOL TARTRATE 50 MG
12.5 TABLET ORAL
Qty: 0 | Refills: 0 | DISCHARGE

## 2022-01-26 RX ORDER — DEXTROSE 50 % IN WATER 50 %
12.5 SYRINGE (ML) INTRAVENOUS ONCE
Refills: 0 | Status: DISCONTINUED | OUTPATIENT
Start: 2022-01-26 | End: 2022-01-27

## 2022-01-26 RX ORDER — LOSARTAN POTASSIUM 100 MG/1
25 TABLET, FILM COATED ORAL AT BEDTIME
Refills: 0 | Status: DISCONTINUED | OUTPATIENT
Start: 2022-01-26 | End: 2022-01-27

## 2022-01-26 RX ORDER — GLUCAGON INJECTION, SOLUTION 0.5 MG/.1ML
1 INJECTION, SOLUTION SUBCUTANEOUS ONCE
Refills: 0 | Status: DISCONTINUED | OUTPATIENT
Start: 2022-01-26 | End: 2022-01-27

## 2022-01-26 RX ORDER — DEXTROSE 50 % IN WATER 50 %
25 SYRINGE (ML) INTRAVENOUS ONCE
Refills: 0 | Status: DISCONTINUED | OUTPATIENT
Start: 2022-01-26 | End: 2022-01-27

## 2022-01-26 RX ORDER — INSULIN LISPRO 100/ML
VIAL (ML) SUBCUTANEOUS
Refills: 0 | Status: DISCONTINUED | OUTPATIENT
Start: 2022-01-26 | End: 2022-01-27

## 2022-01-26 RX ADMIN — Medication 1: at 17:09

## 2022-01-26 RX ADMIN — MEMANTINE HYDROCHLORIDE 10 MILLIGRAM(S): 10 TABLET ORAL at 05:15

## 2022-01-26 RX ADMIN — MIDODRINE HYDROCHLORIDE 5 MILLIGRAM(S): 2.5 TABLET ORAL at 05:15

## 2022-01-26 RX ADMIN — ENOXAPARIN SODIUM 40 MILLIGRAM(S): 100 INJECTION SUBCUTANEOUS at 11:43

## 2022-01-26 RX ADMIN — LOSARTAN POTASSIUM 25 MILLIGRAM(S): 100 TABLET, FILM COATED ORAL at 21:07

## 2022-01-26 RX ADMIN — DULOXETINE HYDROCHLORIDE 60 MILLIGRAM(S): 30 CAPSULE, DELAYED RELEASE ORAL at 11:43

## 2022-01-26 RX ADMIN — RIVASTIGMINE 1 PATCH: 4.6 PATCH, EXTENDED RELEASE TRANSDERMAL at 05:17

## 2022-01-26 RX ADMIN — MIDODRINE HYDROCHLORIDE 5 MILLIGRAM(S): 2.5 TABLET ORAL at 12:00

## 2022-01-26 RX ADMIN — ATORVASTATIN CALCIUM 80 MILLIGRAM(S): 80 TABLET, FILM COATED ORAL at 21:06

## 2022-01-26 RX ADMIN — RIVASTIGMINE 1 PATCH: 4.6 PATCH, EXTENDED RELEASE TRANSDERMAL at 08:43

## 2022-01-26 RX ADMIN — SODIUM CHLORIDE 500 MILLILITER(S): 9 INJECTION, SOLUTION INTRAVENOUS at 13:55

## 2022-01-26 RX ADMIN — MIDODRINE HYDROCHLORIDE 5 MILLIGRAM(S): 2.5 TABLET ORAL at 17:13

## 2022-01-26 RX ADMIN — QUETIAPINE FUMARATE 50 MILLIGRAM(S): 200 TABLET, FILM COATED ORAL at 21:06

## 2022-01-26 RX ADMIN — Medication 81 MILLIGRAM(S): at 11:43

## 2022-01-26 RX ADMIN — MEMANTINE HYDROCHLORIDE 10 MILLIGRAM(S): 10 TABLET ORAL at 17:08

## 2022-01-26 RX ADMIN — CLOPIDOGREL BISULFATE 75 MILLIGRAM(S): 75 TABLET, FILM COATED ORAL at 11:44

## 2022-01-26 RX ADMIN — RIVASTIGMINE 1 PATCH: 4.6 PATCH, EXTENDED RELEASE TRANSDERMAL at 20:57

## 2022-01-26 NOTE — PHYSICAL THERAPY INITIAL EVALUATION ADULT - MANUAL MUSCLE TESTING RESULTS, REHAB EVAL
Unable to formally assess, appears to have at least 3+/5 strength in BLE & BUE/grossly assessed due to

## 2022-01-26 NOTE — PHYSICAL THERAPY INITIAL EVALUATION ADULT - GENERAL OBSERVATIONS, REHAB EVAL
Pt tolerated 40min PT initial evaluation poor to fair. Pt rec'd in bed in NAD, lethartic. A&Ox1, following ~10% of all commands.

## 2022-01-26 NOTE — PROGRESS NOTE ADULT - PROBLEM SELECTOR PLAN 5
on alogliptin and metformin outpatient  - ISS while admitted s/p remote stent  - continue aspirin, plavix, atorvastatin  - holding metop and losartan

## 2022-01-26 NOTE — PROGRESS NOTE ADULT - PROBLEM SELECTOR PLAN 4
s/p remote stent  - continue aspirin, plavix, atorvastatin  - holding metop and losartan Per charts diagnosed with  alzheimers dementia. Currently AO*1, dependent on Wife for adls/ialds  - unclear if parkinsonian component given orthostatic hypotension + mild tremor  - continue memantine  - continue rivastigmine 13.3/24hr patch  - continue seroquel 50 qhs given agitation  - continue depakote 500mg qhs

## 2022-01-26 NOTE — PROGRESS NOTE ADULT - PROBLEM SELECTOR PLAN 2
120s-150s systolic on presentation although orthostatic  - holding home losartan and metoprolol as above COVID-19 PCR+ on 1/25/22. fully vaccinated with booster (Moderna).   - patient asymptomatic  - not hypoxic, satting well on RA  - does not meet criteria for inpatient treatment with remdesivir nor dexamethasone at this time  - monitor SpO2 with goal >90%

## 2022-01-26 NOTE — PHYSICAL THERAPY INITIAL EVALUATION ADULT - CRITERIA FOR SKILLED THERAPEUTIC INTERVENTIONS
MD Han contacted. Pt verbalizing that she wants to leave, pt son left to go home leaving contact information in chart. Per MD han pt does not have capacity to leave ED and that son offered to be available all night to reassure mother. Pt repositioned in bed and given a pillow. Pt refusing tylenol at this time
Report received from CUONG Oakley V. Pt resting comfortably, NAD. Awaiting d/c by MD, pt does not wish to stay for admission. Safety maintained at all times, bed in lowest position, call bell in reach. Will continue to monitor closely.
impairments found

## 2022-01-26 NOTE — PROGRESS NOTE ADULT - NSPROGADDITIONALINFOA_GEN_ALL_CORE
.  Adrienne Cedillo MD  Division of Hospital Medicine  [facility]   Pager: [phone]    Possible discharge home tomorrow if orthostatics improved pending PT eval.    Plan discussed with patient, wife Estefany via phone, and medicine NP Maritza.

## 2022-01-26 NOTE — PATIENT PROFILE ADULT - FALL HARM RISK - HARM RISK INTERVENTIONS
Assistance with ambulation/Assistance OOB with selected safe patient handling equipment/Communicate Risk of Fall with Harm to all staff/Monitor for mental status changes/Move patient closer to nurses' station/Reinforce activity limits and safety measures with patient and family/Reorient to person, place and time as needed/Tailored Fall Risk Interventions/Toileting schedule using arm’s reach rule for commode and bathroom/Use of alarms - bed, chair and/or voice tab/Visual Cue: Yellow wristband and red socks/Bed in lowest position, wheels locked, appropriate side rails in place/Call bell, personal items and telephone in reach/Instruct patient to call for assistance before getting out of bed or chair/Non-slip footwear when patient is out of bed/[place] to call system/Physically safe environment - no spills, clutter or unnecessary equipment/Purposeful Proactive Rounding/Room/bathroom lighting operational, light cord in reach

## 2022-01-26 NOTE — PHYSICAL THERAPY INITIAL EVALUATION ADULT - ADDITIONAL COMMENTS
Pt lethargic & poor historian. Reports he lives with family in a private home with 2 steps to enter & 1st floor set up. Pt refused further social assessment. KARINA barillas, reports she will contact pt's emergency contact with her assessment. Per CM note: As per spouse family  recently sold her home and closed on her new home which is being remolded  to make same handicap accessible for patient. Patient current Address is  [facility]  [address] , [place] [address]. Pt is currently in a private house with  4 entry steps+ and additional 10 steps to navigate, needs assistance with ADLs and ambulation, [facility] 13 hrs [facility] from [facility] ([phone]. Contacted agency , spoke to Yadira BAKER who confirmed services from  agency. As per spouse  patient owns a cane, walker, transport and electric chair and he is approved by the [facility] for wheel Chair/walker.

## 2022-01-26 NOTE — PHYSICAL THERAPY INITIAL EVALUATION ADULT - PLANNED THERAPY INTERVENTIONS, PT EVAL
Stair training... GOAL: In 2 weeks pt will negotiate 2 stairs independently with least restrictive device./balance training/bed mobility training/gait training/strengthening/transfer training

## 2022-01-26 NOTE — PHARMACOTHERAPY INTERVENTION NOTE - COMMENTS
Performed medication reconciliation and home medication list updated in outpatient medication review. Medications verified with patient's wife Rosemary. Pharmacy is [facility] in [facility] (if meds are sent early), [facility] in [place] (if sent late since this location is 24/7), also explained we have [facility] downstairs and pt would like all discharge meds sent to [facility].     Home Medications:  Alogliptin 25 mg daily  aspirin 81 mg daily in morning  atorvastatin 80 mg daily in evening  clopidogrel 75 mg daily  Depakote  mg at bedtime   DULoxetine 60 mg daily in morning  losartan 50 mg daily - *new dose decrease from 50mg daily*  memantine 10 mg twice a day  metFORMIN 500 mg twice a day  metoprolol: 25 mg twice a day - *new dose increase for 12.5mg BID*  midodrine 5 mg three times a day - outpatient PCP says to hold for BP >130  rivastigmine 13.3 mg/24 hr transdermal film, extended release: 1 patch transdermal every 24 hours  SEROquel 50 mg daily at bedtime  Toviaz 8 mg oral tablet, extended release daily   Melatonin HS    Please refer to specifics in home medication list (outpatient medication review).    Recommendations: BP meds/ Toviaz on hold as pt has orthostatic hypotension. All home medications reconciled.     Hanny Mckeon, Roberto CarlosD, [facility]  Clinical Pharmacy Specialist  [phone] or Teams

## 2022-01-26 NOTE — PROGRESS NOTE ADULT - PROBLEM SELECTOR PLAN 3
Per charts diagnosed with  alzheimers dementia. Currently AO*1, dependent on Wife for adls/ialds  - unclear if parkinsonian component given orthostatic hypotension + mild tremor  - continue memantine  - continue rivastigmine 13.3/24hr patch  - continue seroquel 50 qhs given agitation  - continue depakote 500mg qhs 120s-150s systolic on presentation although orthostatic  - holding home losartan and metoprolol as above

## 2022-01-27 ENCOUNTER — TRANSCRIPTION ENCOUNTER (OUTPATIENT)
Age: 72
End: 2022-01-27

## 2022-01-27 VITALS
DIASTOLIC BLOOD PRESSURE: 82 MMHG | HEART RATE: 85 BPM | SYSTOLIC BLOOD PRESSURE: 159 MMHG | TEMPERATURE: 98 F | OXYGEN SATURATION: 96 % | RESPIRATION RATE: 16 BRPM

## 2022-01-27 LAB
A1C WITH ESTIMATED AVERAGE GLUCOSE RESULT: 6.7 % — HIGH (ref 4–5.6)
ANION GAP SERPL CALC-SCNC: 13 MMOL/L — SIGNIFICANT CHANGE UP (ref 5–17)
BUN SERPL-MCNC: 22 MG/DL — SIGNIFICANT CHANGE UP (ref 7–23)
CALCIUM SERPL-MCNC: 9.9 MG/DL — SIGNIFICANT CHANGE UP (ref 8.4–10.5)
CHLORIDE SERPL-SCNC: 106 MMOL/L — SIGNIFICANT CHANGE UP (ref 96–108)
CO2 SERPL-SCNC: 23 MMOL/L — SIGNIFICANT CHANGE UP (ref 22–31)
CREAT SERPL-MCNC: 1.29 MG/DL — SIGNIFICANT CHANGE UP (ref 0.5–1.3)
ESTIMATED AVERAGE GLUCOSE: 146 MG/DL — HIGH (ref 68–114)
GLUCOSE BLDC GLUCOMTR-MCNC: 125 MG/DL — HIGH (ref 70–99)
GLUCOSE BLDC GLUCOMTR-MCNC: 170 MG/DL — HIGH (ref 70–99)
GLUCOSE BLDC GLUCOMTR-MCNC: 172 MG/DL — HIGH (ref 70–99)
GLUCOSE SERPL-MCNC: 129 MG/DL — HIGH (ref 70–99)
HCT VFR BLD CALC: 41.2 % — SIGNIFICANT CHANGE UP (ref 39–50)
HGB BLD-MCNC: 13.6 G/DL — SIGNIFICANT CHANGE UP (ref 13–17)
MCHC RBC-ENTMCNC: 30.8 PG — SIGNIFICANT CHANGE UP (ref 27–34)
MCHC RBC-ENTMCNC: 33 GM/DL — SIGNIFICANT CHANGE UP (ref 32–36)
MCV RBC AUTO: 93.4 FL — SIGNIFICANT CHANGE UP (ref 80–100)
NRBC # BLD: 0 /100 WBCS — SIGNIFICANT CHANGE UP (ref 0–0)
PLATELET # BLD AUTO: 165 K/UL — SIGNIFICANT CHANGE UP (ref 150–400)
POTASSIUM SERPL-MCNC: 4 MMOL/L — SIGNIFICANT CHANGE UP (ref 3.5–5.3)
POTASSIUM SERPL-SCNC: 4 MMOL/L — SIGNIFICANT CHANGE UP (ref 3.5–5.3)
RBC # BLD: 4.41 M/UL — SIGNIFICANT CHANGE UP (ref 4.2–5.8)
RBC # FLD: 13.5 % — SIGNIFICANT CHANGE UP (ref 10.3–14.5)
SODIUM SERPL-SCNC: 142 MMOL/L — SIGNIFICANT CHANGE UP (ref 135–145)
WBC # BLD: 6.62 K/UL — SIGNIFICANT CHANGE UP (ref 3.8–10.5)
WBC # FLD AUTO: 6.62 K/UL — SIGNIFICANT CHANGE UP (ref 3.8–10.5)

## 2022-01-27 PROCEDURE — 85025 COMPLETE CBC W/AUTO DIFF WBC: CPT

## 2022-01-27 PROCEDURE — 85027 COMPLETE CBC AUTOMATED: CPT

## 2022-01-27 PROCEDURE — 80164 ASSAY DIPROPYLACETIC ACD TOT: CPT

## 2022-01-27 PROCEDURE — U0005: CPT

## 2022-01-27 PROCEDURE — 87086 URINE CULTURE/COLONY COUNT: CPT

## 2022-01-27 PROCEDURE — 71045 X-RAY EXAM CHEST 1 VIEW: CPT

## 2022-01-27 PROCEDURE — 70450 CT HEAD/BRAIN W/O DYE: CPT | Mod: MA

## 2022-01-27 PROCEDURE — 80048 BASIC METABOLIC PNL TOTAL CA: CPT

## 2022-01-27 PROCEDURE — 83036 HEMOGLOBIN GLYCOSYLATED A1C: CPT

## 2022-01-27 PROCEDURE — 99239 HOSP IP/OBS DSCHRG MGMT >30: CPT

## 2022-01-27 PROCEDURE — 80053 COMPREHEN METABOLIC PANEL: CPT

## 2022-01-27 PROCEDURE — U0003: CPT

## 2022-01-27 PROCEDURE — 84484 ASSAY OF TROPONIN QUANT: CPT

## 2022-01-27 PROCEDURE — 99285 EMERGENCY DEPT VISIT HI MDM: CPT

## 2022-01-27 PROCEDURE — 81001 URINALYSIS AUTO W/SCOPE: CPT

## 2022-01-27 PROCEDURE — 97116 GAIT TRAINING THERAPY: CPT

## 2022-01-27 PROCEDURE — 93005 ELECTROCARDIOGRAM TRACING: CPT

## 2022-01-27 PROCEDURE — 86803 HEPATITIS C AB TEST: CPT

## 2022-01-27 PROCEDURE — 36415 COLL VENOUS BLD VENIPUNCTURE: CPT

## 2022-01-27 PROCEDURE — 97530 THERAPEUTIC ACTIVITIES: CPT

## 2022-01-27 PROCEDURE — 82962 GLUCOSE BLOOD TEST: CPT

## 2022-01-27 PROCEDURE — 97162 PT EVAL MOD COMPLEX 30 MIN: CPT

## 2022-01-27 RX ORDER — SODIUM CHLORIDE 9 MG/ML
1000 INJECTION, SOLUTION INTRAVENOUS
Refills: 0 | Status: DISCONTINUED | OUTPATIENT
Start: 2022-01-27 | End: 2022-01-27

## 2022-01-27 RX ORDER — METOPROLOL TARTRATE 50 MG
25 TABLET ORAL
Qty: 0 | Refills: 0 | DISCHARGE

## 2022-01-27 RX ADMIN — MIDODRINE HYDROCHLORIDE 5 MILLIGRAM(S): 2.5 TABLET ORAL at 17:21

## 2022-01-27 RX ADMIN — Medication 81 MILLIGRAM(S): at 11:45

## 2022-01-27 RX ADMIN — RIVASTIGMINE 1 PATCH: 4.6 PATCH, EXTENDED RELEASE TRANSDERMAL at 04:58

## 2022-01-27 RX ADMIN — RIVASTIGMINE 1 PATCH: 4.6 PATCH, EXTENDED RELEASE TRANSDERMAL at 05:00

## 2022-01-27 RX ADMIN — SODIUM CHLORIDE 500 MILLILITER(S): 9 INJECTION, SOLUTION INTRAVENOUS at 13:40

## 2022-01-27 RX ADMIN — ENOXAPARIN SODIUM 40 MILLIGRAM(S): 100 INJECTION SUBCUTANEOUS at 11:45

## 2022-01-27 RX ADMIN — CLOPIDOGREL BISULFATE 75 MILLIGRAM(S): 75 TABLET, FILM COATED ORAL at 11:46

## 2022-01-27 RX ADMIN — RIVASTIGMINE 1 PATCH: 4.6 PATCH, EXTENDED RELEASE TRANSDERMAL at 08:59

## 2022-01-27 RX ADMIN — MIDODRINE HYDROCHLORIDE 5 MILLIGRAM(S): 2.5 TABLET ORAL at 11:45

## 2022-01-27 RX ADMIN — MEMANTINE HYDROCHLORIDE 10 MILLIGRAM(S): 10 TABLET ORAL at 17:21

## 2022-01-27 RX ADMIN — Medication 1: at 12:19

## 2022-01-27 RX ADMIN — DULOXETINE HYDROCHLORIDE 60 MILLIGRAM(S): 30 CAPSULE, DELAYED RELEASE ORAL at 11:45

## 2022-01-27 RX ADMIN — Medication 1: at 17:19

## 2022-01-27 NOTE — DISCHARGE NOTE NURSING/CASE MANAGEMENT/SOCIAL WORK - NSDCPEFALRISK_GEN_ALL_CORE
For information on Fall & Injury Prevention, visit: [email or web address] OR  [email or web address] OR  https://www.cdc.gov/steadi/patient.html

## 2022-01-27 NOTE — PROGRESS NOTE ADULT - PROBLEM SELECTOR PLAN 3
120s-150s systolic on presentation although orthostatic  - holding home metoprolol as above  - resumed losartan but re-scheduled for bedtime which I instructed wife via phone that should be given at bedtime at home a well

## 2022-01-27 NOTE — DISCHARGE NOTE PROVIDER - CARE PROVIDERS DIRECT ADDRESSES
[email or web address]

## 2022-01-27 NOTE — PROGRESS NOTE ADULT - ASSESSMENT
70 y/o M with h/o CAD s/p stent, PAD s/p R fem endarterectomy artery bypass, HTN, HLD, DM2, dementia, multiple presentations for orthostatic hypotension presenting after leg shaking and nonresponsiveness c/w known orthostatic hypotension episodes found to have +orthostatics.
70 y/o M with h/o CAD s/p stent, PAD s/p R fem endarterectomy artery bypass, HTN, HLD, DM2, dementia, multiple presentations for orthostatic hypotension presenting after leg shaking and nonresponsiveness c/w known orthostatic hypotension episodes found to have acute on chronic +orthostatic hypotension.

## 2022-01-27 NOTE — PROGRESS NOTE ADULT - PROBLEM SELECTOR PLAN 1
Multiple presentations for orthostasis, orthostatics positive.   He actually has supine hypertension orthostatic hypotension (SHOH)  would benefit from taking his BP meds at bedtime rather than AM  - neuro following appreciate recs  - restart midodrine 5mg TID during the day Per his PCP,  had been instructed not to receive if bp >130 systolic  - s/p 2L total this admission. orthostatics improved earlier today but worse after working with PT today  - will give additional 1L of LR today  - patient to continue losartan 25mg PO at bedtime  - hold metoprolol to allow for chronotropic response  - resume abdominal binder + compression stockings which he uses at home as well  - PT recs appreciated: home PT  - f/u with Tim Paris (neurologist) when feasible
Multiple presentations for orthostasis, orthostatics positive. Was unable to get on compression stockings/binder this am. Minimally taking midodrine.  - neuro following appreciate recs  - restart midodrine 5mg TID. Per his PCP,  had been instructed not to receive if bp >130 systolic  - s/p 1L bolus in the ED  - repeat orthostatics still positive, will give additional 1L LR  - hold losartan  - hold metoprolol to allow for chronotropic response  - resume abdominal binder + compression stockings which he uses at home as well  - PT eval  - f/u with Tim Paris (neurologist) when feasible

## 2022-01-27 NOTE — PROGRESS NOTE ADULT - NSPROGADDITIONALINFOA_GEN_ALL_CORE
.  Adrienne Cedillo MD  Division of Hospital Medicine  [facility]   Pager: [phone]    Will give additional 1L today given known poor PO intake of fluids and +orthostatics.  Discussed medication changes and timing of administration with wife via phone.  Medically stable for discharge home today 1/27/22 after IVF with home PT.  Will follow-up with his PCP and neurologist as outpatient.  Discharge planning time spent: 40 minutes.    Plan discussed with patient, wife Estefany via phone, and medicine NP Candis .  Adrienne Cedillo MD  Division of Hospital Medicine  [facility]   Pager: [phone]    Will give additional 1L today given known poor PO intake of fluids and +orthostatics.  Discussed medication changes and timing of administration with wife via phone.  Medically stable for discharge home today 1/27/22 after IVF with home PT.  Will follow-up with his PCP and neurologist as outpatient.  Discharge planning time spent: 40 minutes.    Patient's PCP Dr. Mervin Pantoja emailed today 1/27/22 with updates.    Plan discussed with patient, wife Estefany via phone, and medicine NP Candis

## 2022-01-27 NOTE — PROGRESS NOTE ADULT - SUBJECTIVE AND OBJECTIVE BOX
[facility] Division of Hospital Medicine  Adrienne Cedillo MD  Pager (NATHAN-MARII, 5R-0B): [phone]  Other Times:  [phone]      Patient is a 71y old  Male who presents with a chief complaint of b/l leg shaking (2022 16:07)      SUBJECTIVE / OVERNIGHT EVENTS: no acute events overnight. refused to work with PT in the morning because was too early. agreeable to work with PT next time they come when we discussed it bedside. agreeable to orthostatics check which were positive. otherwise feels well. no lightheadedness, dizziness, dyspnea, nor cough.  ADDITIONAL REVIEW OF SYSTEMS:    Tele: sinus HR 50-80s    MEDICATIONS  (STANDING):  aspirin enteric coated 81 milliGRAM(s) Oral daily  atorvastatin 80 milliGRAM(s) Oral at bedtime  clopidogrel Tablet 75 milliGRAM(s) Oral daily  dextrose 40% Gel 15 Gram(s) Oral once  dextrose 5%. 1000 milliLiter(s) (50 mL/Hr) IV Continuous <Continuous>  dextrose 5%. 1000 milliLiter(s) (100 mL/Hr) IV Continuous <Continuous>  dextrose 50% Injectable 25 Gram(s) IV Push once  dextrose 50% Injectable 12.5 Gram(s) IV Push once  dextrose 50% Injectable 25 Gram(s) IV Push once  DULoxetine 60 milliGRAM(s) Oral daily  enoxaparin Injectable 40 milliGRAM(s) SubCutaneous daily  glucagon  Injectable 1 milliGRAM(s) IntraMuscular once  insulin lispro (ADMELOG) corrective regimen sliding scale   SubCutaneous three times a day before meals  lactated ringers. 1000 milliLiter(s) (500 mL/Hr) IV Continuous <Continuous>  memantine 10 milliGRAM(s) Oral two times a day  midodrine. 5 milliGRAM(s) Oral three times a day  QUEtiapine 50 milliGRAM(s) Oral at bedtime  rivastigmine patch 13.3 mG/24 Hr(s) 1 Patch Transdermal every 24 hours    MEDICATIONS  (PRN):      CAPILLARY BLOOD GLUCOSE      POCT Blood Glucose.: 130 mg/dL (2022 11:59)  POCT Blood Glucose.: 119 mg/dL (2022 08:17)    I&O's Summary      PHYSICAL EXAM:  Vital Signs Last 24 Hrs  T(C): 37.2 (2022 12:00), Max: 37.2 (2022 12:00)  T(F): 98.9 (2022 12:00), Max: 98.9 (2022 12:00)  HR: 67 (2022 12:00) (61 - 81)  BP: 188/82 (2022 12:00) (143/61 - 188/82)  BP(mean): 88 (2022 16:07) (88 - 88)  RR: 17 (2022 12:00) (16 - 17)  SpO2: 95% (2022 12:00) (94% - 97%)    CONSTITUTIONAL: elderly gentleman in no acute distress  EYES: PERRLA; conjunctiva and sclera clear  ENMT: Moist oral mucosa, no pharyngeal injection or exudates; normal dentition  NECK: Supple, no palpable masses; no thyromegaly  RESPIRATORY: Normal respiratory effort; lungs are clear to auscultation bilaterally  CARDIOVASCULAR: Regular rate and rhythm, normal S1 and S2, no murmur/rub/gallop; No lower extremity edema; Peripheral pulses are 2+ bilaterally  ABDOMEN: Soft, Nondistended, Nontender to palpation, normoactive bowel sounds  MUSCULOSKELETAL:  No clubbing or cyanosis of digits; no joint swelling or tenderness to palpation  PSYCH: A+O to person only, not place nor time, flat affect  NEUROLOGY: CN 2-12 are intact and symmetric; no gross sensory deficits   SKIN: No rashes; no palpable lesions    LABS:                        12.9   9.23  )-----------( 159      ( 2022 11:05 )             39.9         142  |  105  |  25<H>  ----------------------------<  109<H>  4.8   |  25  |  1.19    Ca    10.2      2022 05:31    TPro  6.9  /  Alb  4.3  /  TBili  0.2  /  DBili  x   /  AST  29  /  ALT  29  /  AlkPhos  109          Urinalysis Basic - ( 2022 14:39 )    Color: Light Yellow / Appearance: Clear / S.019 / pH: x  Gluc: x / Ketone: Trace  / Bili: Negative / Urobili: Negative   Blood: x / Protein: Negative / Nitrite: Negative   Leuk Esterase: Negative / RBC: 1 /hpf / WBC 1 /HPF   Sq Epi: x / Non Sq Epi: 1 /hpf / Bacteria: Negative        RADIOLOGY & ADDITIONAL TESTS:  Results Reviewed: no leukocytosis, H/H stable, Cr improved  Imaging Personally Reviewed:  Electrocardiogram Personally Reviewed:    COORDINATION OF CARE:  Care Discussed with Consultants/Other Providers [Y]: medicine RAJ Salas  Prior or Outpatient Records Reviewed [Y/N]:  
[facility] Division of Hospital Medicine  Adrienne Cedillo MD  Pager (NATHAN-MARII, 6L-1N): [phone]  Other Times:  [phone]      Patient is a 71y old  Male who presents with a chief complaint of b/l leg shaking (2022 11:22)      SUBJECTIVE / OVERNIGHT EVENTS: no acute events overnight. feels well without complaints. pleasantly confused. thinks he is home; reminded him he is in the hospital. no fever, chills, chest pain, nor dyspnea. denies any dizziness nor lightheadedness.  ADDITIONAL REVIEW OF SYSTEMS:    MEDICATIONS  (STANDING):  aspirin enteric coated 81 milliGRAM(s) Oral daily  atorvastatin 80 milliGRAM(s) Oral at bedtime  clopidogrel Tablet 75 milliGRAM(s) Oral daily  dextrose 40% Gel 15 Gram(s) Oral once  dextrose 5%. 1000 milliLiter(s) (50 mL/Hr) IV Continuous <Continuous>  dextrose 5%. 1000 milliLiter(s) (100 mL/Hr) IV Continuous <Continuous>  dextrose 50% Injectable 25 Gram(s) IV Push once  dextrose 50% Injectable 12.5 Gram(s) IV Push once  dextrose 50% Injectable 25 Gram(s) IV Push once  DULoxetine 60 milliGRAM(s) Oral daily  enoxaparin Injectable 40 milliGRAM(s) SubCutaneous daily  glucagon  Injectable 1 milliGRAM(s) IntraMuscular once  insulin lispro (ADMELOG) corrective regimen sliding scale   SubCutaneous three times a day before meals  lactated ringers. 1000 milliLiter(s) (500 mL/Hr) IV Continuous <Continuous>  losartan 25 milliGRAM(s) Oral at bedtime  memantine 10 milliGRAM(s) Oral two times a day  midodrine. 5 milliGRAM(s) Oral three times a day  QUEtiapine 50 milliGRAM(s) Oral at bedtime  rivastigmine patch 13.3 mG/24 Hr(s) 1 Patch Transdermal every 24 hours    MEDICATIONS  (PRN):      CAPILLARY BLOOD GLUCOSE      POCT Blood Glucose.: 172 mg/dL (2022 11:22)  POCT Blood Glucose.: 125 mg/dL (2022 08:17)  POCT Blood Glucose.: 161 mg/dL (2022 22:01)  POCT Blood Glucose.: 198 mg/dL (2022 17:02)    I&O's Summary    2022 07:01  -  2022 07:00  --------------------------------------------------------  IN: 600 mL / OUT: 0 mL / NET: 600 mL        PHYSICAL EXAM:  Vital Signs Last 24 Hrs  T(C): 37 (2022 09:12), Max: 37 (2022 20:38)  T(F): 98.6 (2022 09:12), Max: 98.6 (2022 20:38)  HR: 80 (2022 11:40) (69 - 98)  BP: 109/85 (2022 11:40) (109/85 - 189/82)  BP(mean): --  RR: 17 (2022 09:12) (17 - 18)  SpO2: 95% (2022 12:04) (93% - 96%)    CONSTITUTIONAL: elderly gentleman in no acute distress  EYES: PERRLA; conjunctiva and sclera clear  ENMT: Moist oral mucosa, no pharyngeal injection or exudates; normal dentition  NECK: Supple, no palpable masses; no thyromegaly  RESPIRATORY: Normal respiratory effort; lungs are clear to auscultation bilaterally  CARDIOVASCULAR: Regular rate and rhythm, normal S1 and S2, no murmur/rub/gallop; No lower extremity edema; Peripheral pulses are 2+ bilaterally  ABDOMEN: Soft, Nondistended, Nontender to palpation, normoactive bowel sounds  MUSCULOSKELETAL:  No clubbing or cyanosis of digits; no joint swelling or tenderness to palpation  PSYCH: A+O to person only, not place nor time, flat affect  NEUROLOGY: CN 2-12 are intact and symmetric; no gross sensory deficits   SKIN: No rashes; no palpable lesions    LABS:                        13.6   6.62  )-----------( 165      ( 2022 05:31 )             41.2         142  |  106  |  22  ----------------------------<  129<H>  4.0   |  23  |  1.29    Ca    9.9      2022 05:34      Urinalysis Basic - ( 2022 14:39 )    Color: Light Yellow / Appearance: Clear / S.019 / pH: x  Gluc: x / Ketone: Trace  / Bili: Negative / Urobili: Negative   Blood: x / Protein: Negative / Nitrite: Negative   Leuk Esterase: Negative / RBC: 1 /hpf / WBC 1 /HPF   Sq Epi: x / Non Sq Epi: 1 /hpf / Bacteria: Negative        Culture - Urine (collected 2022 18:56)  Source: Clean Catch Clean Catch (Midstream)  Final Report (2022 16:50):    No growth        RADIOLOGY & ADDITIONAL TESTS:  Results Reviewed:  no leukocytosis, H/H table, Cr stable,  urine cx neg  Imaging Personally Reviewed:  Electrocardiogram Personally Reviewed:    COORDINATION OF CARE:  Care Discussed with Consultants/Other Providers [Y]: medicine NP Candis  Prior or Outpatient Records Reviewed [Y/N]:

## 2022-01-27 NOTE — DISCHARGE NOTE PROVIDER - CARE PROVIDER_API CALL
Mervin Pantoja)  Internal Medicine; Pulmonary Disease  [address] [place] [address]  Phone: [phone]  Fax: [phone]  Follow Up Time: 1 week

## 2022-01-27 NOTE — PROGRESS NOTE ADULT - PROBLEM SELECTOR PLAN 5
s/p remote stent  - continue aspirin, plavix, atorvastatin  - holding metop  - resumed losartan as above

## 2022-01-27 NOTE — DISCHARGE NOTE NURSING/CASE MANAGEMENT/SOCIAL WORK - PATIENT PORTAL LINK FT
You can access the FollowMyHealth Patient Portal offered by [facility] by registering at the following website: [email or web address]. By joining [facility]’s FollowMyHealth portal, you will also be able to view your health information using other applications (apps) compatible with our system.

## 2022-01-27 NOTE — PROGRESS NOTE ADULT - PROBLEM SELECTOR PLAN 2
COVID-19 PCR+ on 1/25/22. fully vaccinated with booster (Moderna).   - patient asymptomatic  - not hypoxic, satting well on RA  - does not meet criteria for inpatient treatment with remdesivir nor dexamethasone at this time  - monitor SpO2 with goal >90%

## 2022-01-27 NOTE — DISCHARGE NOTE PROVIDER - NSDCMRMEDTOKEN_GEN_ALL_CORE_FT
acetaminophen 325 mg oral tablet: 1 tab(s) orally every 6 hours, As Needed for pain  Alogliptin 25 mg oral tablet: 1 tab(s) orally once a day  aspirin 81 mg oral delayed release tablet: 1 tab(s) orally once a day  atorvastatin 80 mg oral tablet: 1 tab(s) orally once a day (at bedtime)  clopidogrel 75 mg oral tablet: 1 tab(s) orally once a day  Depakote  mg oral tablet, extended release: 1 tab(s) orally once a day (at bedtime)  DULoxetine 60 mg oral delayed release capsule: 1 cap(s) orally once a day  losartan 50 mg oral tablet: 0.5 tab(s) orally once a day  memantine 10 mg oral tablet: 1 tab(s) orally 2 times a day  metFORMIN 500 mg oral tablet: 1 tab(s) orally 2 times a day; last dose 24 hrs prior to sx  metoprolol: 25 milligram(s) orally 2 times a day  midodrine 5 mg oral tablet: 1 tab(s) orally 3 times a day  rivastigmine 13.3 mg/24 hr transdermal film, extended release: 1 patch transdermal every 24 hours  SEROquel 50 mg oral tablet: 50 milligram(s) orally once a day (at bedtime)  Toviaz 8 mg oral tablet, extended release: 1 tab(s) orally once a day   acetaminophen 325 mg oral tablet: 1 tab(s) orally every 6 hours, As Needed for pain  Alogliptin 25 mg oral tablet: 1 tab(s) orally once a day  aspirin 81 mg oral delayed release tablet: 1 tab(s) orally once a day  atorvastatin 80 mg oral tablet: 1 tab(s) orally once a day (at bedtime)  clopidogrel 75 mg oral tablet: 1 tab(s) orally once a day  Depakote  mg oral tablet, extended release: 1 tab(s) orally once a day (at bedtime)  DULoxetine 60 mg oral delayed release capsule: 1 cap(s) orally once a day  losartan 50 mg oral tablet: 0.5 tab(s) orally once a day at bedtime   memantine 10 mg oral tablet: 1 tab(s) orally 2 times a day  metFORMIN 500 mg oral tablet: 1 tab(s) orally 2 times a day; last dose 24 hrs prior to sx  midodrine 5 mg oral tablet: 1 tab(s) orally 3 times a day  rivastigmine 13.3 mg/24 hr transdermal film, extended release: 1 patch transdermal every 24 hours  SEROquel 50 mg oral tablet: 50 milligram(s) orally once a day (at bedtime)  Toviaz 8 mg oral tablet, extended release: 1 tab(s) orally once a day

## 2022-01-27 NOTE — PROGRESS NOTE ADULT - PROBLEM SELECTOR PLAN 4
Per charts diagnosed with  alzheimers dementia. Currently AO*1, dependent on Wife for adls/ialds  - unclear if parkinsonian component given orthostatic hypotension + mild tremor  - continue memantine  - continue rivastigmine 13.3/24hr patch  - continue seroquel 50 qhs given agitation  - continue depakote 500mg qhs

## 2022-01-27 NOTE — DISCHARGE NOTE PROVIDER - NSDCCPCAREPLAN_GEN_ALL_CORE_FT
PRINCIPAL DISCHARGE DIAGNOSIS  Diagnosis: Syncope, near  Assessment and Plan of Treatment: HOME CARE INSTRUCTIONS  Have someone stay with you until you feel stable.  Do not drive, operate machinery, or play sports until your caregiver says it is okay.  Keep all follow-up appointments as directed by your caregiver.   Lie down right away if you start feeling like you might faint. Breathe deeply and steadily. Wait until all the symptoms have passed.Drink enough fluids to keep your urine clear or pale yellow.  If you are taking blood pressure or heart medicine, get up slowly, taking several minutes to sit and then stand. This can reduce dizziness.  SEEK IMMEDIATE MEDICAL CARE IF:  You have a severe headache.  You have unusual pain in the chest, abdomen, or back.  You are bleeding from the mouth or rectum, or you have black or tarry stool.  You have an irregular or very fast heartbeat.  You have pain with breathing.  You have repeated fainting or seizure-like jerking during an episode.  You faint when sitting or lying down.  You have confusion.  You have difficulty walking.  You have severe weakness.  You have vision problems.  If you fainted, call your local emergency services (_____________________). Do not drive yourself to the hospital

## 2022-01-27 NOTE — DISCHARGE NOTE PROVIDER - HOSPITAL COURSE
70 y/o M with h/o CAD s/p stent, PAD s/p R fem endarterectomy artery bypass, HTN, HLD, DM2, dementia, multiple presentations for orthostatic hypotension presenting after leg shaking and nonresponsiveness c/w known orthostatic hypotension episodes found to have +orthostatics.     Problem/Plan - 1:  ·  Problem: Syncope due to orthostatic hypotension.   ·  Plan: Multiple presentations for orthostasis, orthostatics positive. Was unable to get on compression stockings/binder this am. Minimally taking midodrine.  - neuro following appreciate recs  - restart midodrine 5mg TID. Per his PCP,  had been instructed not to receive if bp >130 systolic  - s/p 1L bolus in the ED  - repeat orthostatics still positive, will give additional 1L LR  - hold losartan  - hold metoprolol to allow for chronotropic response  - resume abdominal binder + compression stockings which he uses at home as well  - PT eval  - f/u with Tim Paris (neurologist) when feasible.     Problem/Plan - 2:  ·  Problem: 2019 novel coronavirus disease (COVID-19).  ·  Plan: COVID-19 PCR+ on 1/25/22. fully vaccinated with booster (Moderna).   - patient asymptomatic  - not hypoxic, satting well on RA  - does not meet criteria for inpatient treatment with remdesivir nor dexamethasone at this time  - monitor SpO2 with goal >90%.     Problem/Plan - 3:  ·  Problem: HTN (hypertension).  ·  Plan: 120s-150s systolic on presentation although orthostatic  - holding home losartan and metoprolol as above.     Problem/Plan - 4:  ·  Problem: Dementia.  ·  Plan: Per charts diagnosed with  alzheimers dementia. Currently AO*1, dependent on Wife for adls/ialds  - unclear if parkinsonian component given orthostatic hypotension + mild tremor  - continue memantine  - continue rivastigmine 13.3/24hr patch  - continue seroquel 50 qhs given agitation  - continue depakote 500mg qhs.     Problem/Plan - 5:  ·  Problem: CAD (coronary artery disease).  ·  Plan: s/p remote stent  - continue aspirin, plavix, atorvastatin  - holding metop and losartan.     Problem/Plan - 6:  ·  Problem: Type 2 diabetes mellitus.  ·  Plan: on alogliptin and metformin outpatient  - ISS while admitted.     72 y/o M with h/o CAD s/p stent, PAD s/p R fem endarterectomy artery bypass, HTN, HLD, DM2, dementia, multiple presentations for orthostatic hypotension presenting after leg shaking and nonresponsiveness c/w known orthostatic hypotension episodes found to have +orthostatics.     Problem/Plan - 1:  ·  Problem: Syncope due to orthostatic hypotension.   ·  Plan: Multiple presentations for orthostasis, orthostatics positive. Was unable to get on compression stockings/binder this am. Minimally taking midodrine.  - neuro following appreciate recs  - restart midodrine 5mg TID. Per his PCP,  had been instructed not to receive if bp >130 systolic  - s/p 1L bolus in the ED  - repeat orthostatics still positive, will give additional 1L LR  - hold losartan  - hold metoprolol to allow for chronotropic response  - resume abdominal binder + compression stockings which he uses at home as well  - PT eval  - f/u with Tim Paris (neurologist) when feasible.     Problem/Plan - 2:  ·  Problem: 2019 novel coronavirus disease (COVID-19).  ·  Plan: COVID-19 PCR+ on 1/25/22. fully vaccinated with booster (Moderna).   - patient asymptomatic  - not hypoxic, satting well on RA  - does not meet criteria for inpatient treatment with remdesivir nor dexamethasone at this time  - monitor SpO2 with goal >90%.     Problem/Plan - 3:  ·  Problem: HTN (hypertension).  ·  Plan: 120s-150s systolic on presentation although orthostatic  - holding home losartan and metoprolol as above.     Problem/Plan - 4:  ·  Problem: Dementia.  ·  Plan: Per charts diagnosed with  alzheimers dementia. Currently AO*1, dependent on Wife for adls/ialds  - unclear if parkinsonian component given orthostatic hypotension + mild tremor  - continue memantine  - continue rivastigmine 13.3/24hr patch  - continue seroquel 50 qhs given agitation  - continue depakote 500mg qhs.     Problem/Plan - 5:  ·  Problem: CAD (coronary artery disease).  ·  Plan: s/p remote stent  - continue aspirin, plavix, atorvastatin  - holding metop and losartan.     Problem/Plan - 6:  ·  Problem: Type 2 diabetes mellitus.  ·  Plan: on alogliptin and metformin outpatient  - ISS while admitted.    Medically cleared for discharge home    72 y/o M with h/o CAD s/p stent, PAD s/p R fem endarterectomy artery bypass, HTN, HLD, DM2, dementia, multiple presentations for orthostatic hypotension presenting after leg shaking and nonresponsiveness c/w known orthostatic hypotension episodes found to have +orthostatics.     Problem/Plan - 1:  ·  Problem: Syncope due to orthostatic hypotension.   ·  Plan: Multiple presentations for orthostasis, orthostatics positive. Was unable to get on compression stockings/binder this am. Minimally taking midodrine.  - neuro following appreciate recs  - restart midodrine 5mg TID. Per his PCP,  had been instructed not to receive if bp >130 systolic  - s/p 1L bolus in the ED  - repeat orthostatics still positive, will give additional 1L LR  - hold losartan  - hold metoprolol to allow for chronotropic response  - resume abdominal binder + compression stockings which he uses at home as well  - PT eval: home with assist  - f/u with Tim Paris (neurologist) when feasible.     Problem/Plan - 2:  ·  Problem: 2019 novel coronavirus disease (COVID-19).  ·  Plan: COVID-19 PCR+ on 1/25/22. fully vaccinated with booster (Moderna).   - patient asymptomatic  - not hypoxic, satting well on RA  - does not meet criteria for inpatient treatment with remdesivir nor dexamethasone at this time  - monitor SpO2 with goal >90%.     Problem/Plan - 3:  ·  Problem: HTN (hypertension).  ·  Plan: 120s-150s systolic on presentation although orthostatic  - holding home losartan and metoprolol as above.     Problem/Plan - 4:  ·  Problem: Dementia.  ·  Plan: Per charts diagnosed with  alzheimers dementia. Currently AO*1, dependent on Wife for adls/ialds  - unclear if parkinsonian component given orthostatic hypotension + mild tremor  - continue memantine  - continue rivastigmine 13.3/24hr patch  - continue seroquel 50 qhs given agitation  - continue depakote 500mg qhs.     Problem/Plan - 5:  ·  Problem: CAD (coronary artery disease).  ·  Plan: s/p remote stent  - continue aspirin, plavix, atorvastatin  - holding metop and losartan.     Problem/Plan - 6:  ·  Problem: Type 2 diabetes mellitus.  ·  Plan: on alogliptin and metformin outpatient  - ISS while admitted.    Medically cleared for discharge home    72 y/o M with h/o CAD s/p stent, PAD s/p R fem endarterectomy artery bypass, HTN, HLD, DM2, dementia, multiple presentations for orthostatic hypotension presenting after leg shaking and nonresponsiveness c/w known orthostatic hypotension episodes found to have +orthostatics.     Problem/Plan - 1:  ·  Problem: Syncope due to orthostatic hypotension.   ·  Plan: Multiple presentations for orthostasis, orthostatics positive.   He actually has supine hypertension orthostatic hypotension (SHOH)  would benefit from taking his BP meds at bedtime rather than AM  - neuro following appreciate recs  - restart midodrine 5mg TID during the day Per his PCP,  had been instructed not to receive if bp >130 systolic  - s/p 2L total this admission. orthostatics improved earlier today but worse after working with PT today  - will give additional 1L of LR today  - patient to continue losartan 25mg PO at bedtime  - hold metoprolol to allow for chronotropic response  - resume abdominal binder + compression stockings which he uses at home as well  - PT recs appreciated: home PT  - f/u with Tim Paris (neurologist) when feasible.     Problem/Plan - 2:  ·  Problem: 2019 novel coronavirus disease (COVID-19).  ·  Plan: COVID-19 PCR+ on 1/25/22. fully vaccinated with booster (Moderna).   - patient asymptomatic  - not hypoxic, satting well on RA  - does not meet criteria for inpatient treatment with remdesivir nor dexamethasone at this time  - monitor SpO2 with goal >90%.     Problem/Plan - 3:  ·  Problem: HTN (hypertension).  ·  BP 120s-150s systolic on presentation although orthostatic  - holding home metoprolol as above  - resumed losartan but re-scheduled for bedtime which I instructed wife via phone that should be given at bedtime at home a well.     Problem/Plan - 4:  ·  Problem: Dementia.  ·  Plan: Per charts diagnosed with  alzheimers dementia. Currently AO*1, dependent on Wife for adls/ialds  - unclear if parkinsonian component given orthostatic hypotension + mild tremor  - continue memantine  - continue rivastigmine 13.3/24hr patch  - continue seroquel 50 qhs given agitation  - continue depakote 500mg qhs.     Problem/Plan - 5:  ·  Problem: CAD (coronary artery disease).  ·  Plan: s/p remote stent  - continue aspirin, plavix, atorvastatin  - holding metop  - resumed losartan as above.     Problem/Plan - 6:  ·  Problem: Type 2 diabetes mellitus.  ·  Plan: on alogliptin and metformin outpatient  - ISS while admitted.    Medically cleared for discharge home with home PT.

## 2022-01-28 ENCOUNTER — NON-APPOINTMENT (OUTPATIENT)
Age: 72
End: 2022-01-28

## 2022-01-28 ENCOUNTER — APPOINTMENT (OUTPATIENT)
Dept: CARDIOLOGY | Facility: CLINIC | Age: 72
End: 2022-01-28

## 2022-02-08 ENCOUNTER — APPOINTMENT (OUTPATIENT)
Dept: INTERNAL MEDICINE | Facility: CLINIC | Age: 72
End: 2022-02-08

## 2022-02-09 ENCOUNTER — APPOINTMENT (OUTPATIENT)
Dept: CARDIOLOGY | Facility: CLINIC | Age: 72
End: 2022-02-09

## 2022-02-14 ENCOUNTER — NON-APPOINTMENT (OUTPATIENT)
Age: 72
End: 2022-02-14

## 2022-03-02 ENCOUNTER — APPOINTMENT (OUTPATIENT)
Dept: INTERNAL MEDICINE | Facility: CLINIC | Age: 72
End: 2022-03-02
Payer: MEDICARE

## 2022-03-02 VITALS
HEIGHT: 71 IN | WEIGHT: 188 LBS | HEART RATE: 65 BPM | SYSTOLIC BLOOD PRESSURE: 130 MMHG | OXYGEN SATURATION: 95 % | BODY MASS INDEX: 26.32 KG/M2 | TEMPERATURE: 98.2 F | DIASTOLIC BLOOD PRESSURE: 80 MMHG

## 2022-03-02 DIAGNOSIS — I95.1 ORTHOSTATIC HYPOTENSION: ICD-10-CM

## 2022-03-02 PROCEDURE — 99214 OFFICE O/P EST MOD 30 MIN: CPT

## 2022-03-02 RX ORDER — MIDODRINE HYDROCHLORIDE 5 MG/1
5 TABLET ORAL 3 TIMES DAILY
Refills: 0 | Status: DISCONTINUED | COMMUNITY
Start: 2022-01-13 | End: 2022-03-02

## 2022-03-02 RX ORDER — MEMANTINE HYDROCHLORIDE 10 MG/1
10 TABLET, FILM COATED ORAL TWICE DAILY
Qty: 180 | Refills: 0 | Status: ACTIVE | COMMUNITY
Start: 2022-03-02

## 2022-03-02 RX ORDER — METOPROLOL TARTRATE 25 MG/1
25 TABLET, FILM COATED ORAL
Qty: 90 | Refills: 3 | Status: DISCONTINUED | COMMUNITY
Start: 2018-11-20 | End: 2022-03-02

## 2022-03-02 RX ORDER — MEMANTINE HYDROCHLORIDE 10 MG/1
10 TABLET, FILM COATED ORAL
Qty: 90 | Refills: 2 | Status: DISCONTINUED | COMMUNITY
Start: 2020-07-06 | End: 2022-03-02

## 2022-03-02 NOTE — HISTORY OF PRESENT ILLNESS
[FreeTextEntry1] : hospitalized after passing out in early february thought  due to orthostatic hypotension.  did not tolerate midodrine  due to high  bp and now has has a compression band on his waist  and has had no further problems.  remains on rx for bp lipids  and on plavix post  cardiac stent  he is incontinent  of urine and stool  he   feeds himself and eats enough.he is hallucinating a lot  and is due to see a neurologist

## 2022-03-02 NOTE — PHYSICAL EXAM
[No Acute Distress] : no acute distress [Well Nourished] : well nourished [Well Developed] : well developed [Well-Appearing] : well-appearing [Normal Sclera/Conjunctiva] : normal sclera/conjunctiva [Normal Outer Ear/Nose] : the outer ears and nose were normal in appearance [No JVD] : no jugular venous distention [No Respiratory Distress] : no respiratory distress  [No Accessory Muscle Use] : no accessory muscle use [Clear to Auscultation] : lungs were clear to auscultation bilaterally [Normal Rate] : normal rate  [Regular Rhythm] : with a regular rhythm [Normal S1, S2] : normal S1 and S2 [No Edema] : there was no peripheral edema [Soft] : abdomen soft [Non Tender] : non-tender [Non-distended] : non-distended [No HSM] : no HSM [Normal Gait] : normal gait [Coordination Grossly Intact] : coordination grossly intact [Normal] : affect was normal and insight and judgment were intact

## 2022-03-10 ENCOUNTER — APPOINTMENT (OUTPATIENT)
Dept: NEUROLOGY | Facility: CLINIC | Age: 72
End: 2022-03-10
Payer: MEDICARE

## 2022-03-10 VITALS
HEART RATE: 63 BPM | DIASTOLIC BLOOD PRESSURE: 77 MMHG | WEIGHT: 188 LBS | HEIGHT: 71 IN | SYSTOLIC BLOOD PRESSURE: 160 MMHG | BODY MASS INDEX: 26.32 KG/M2

## 2022-03-10 PROCEDURE — 99215 OFFICE O/P EST HI 40 MIN: CPT

## 2022-03-10 NOTE — HISTORY OF PRESENT ILLNESS
[FreeTextEntry1] : Patient is seeing me for evaluation of Parkinson's. \par \par Patient has AD managed by Dr. Del Rio. His baseline, per his wife, includes limited ability to do ADLs, disorientation, minimal conversation. He also developed hallucinations 6months to 1 yr after onset of memory issues. He also talks in his sleep and enacted his dreams. \par \par In the fall he developed orthostatic hypotension with syncope. Midodrine caused BP spikes and was stopped. He uses compression stocking primarily. He shuffles when walking which started about 6months ago. His wife says that there are times that he can walk normally. He has not fallen. Unclear whether he has freezing episodes. Also has mild rest tremors and appears to his family as stiff.\par \par Had covid in January - asymptomatic \par + constipation managed with miralax and senna\par Exposed to agent orange. in [place]\par Doing PT twice per week\par \par PMH: AD, PTSD, DMII, CAD s/p PCI, LE claudication s/p bypass, bladder cancer s/p chemo\par FH: mother, father = AD\par \par Neuro Meds\par seroquel 125mg qhs\par VPA 250mg qhs\par namenda 10mg qd\par exelon 13.3mg TD qd\par xanax prn\par miralax /senna\par cymbalta 60mg qd

## 2022-03-10 NOTE — PHYSICAL EXAM
[FreeTextEntry1] : + ideamotor apraxia. Speech fluent. EOMI. Followed some simple commands. No spontaneous speech. There was mild cogwheel rigidity in all limbs. Mild -moderate overall bradykinesia. Unable to assess limb bradykinesia due to inattentiveness. Pushed off to stand and used a walker. SL short and turned en bloc. Posture mildly stooped. No obvious FOG or tremors

## 2022-03-10 NOTE — DISCUSSION/SUMMARY
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 \par \par Patient was counseled on the following recommendations:\par \par Based on discussed with his wife, we decided to focus treatment on PT for the next several months before trying low dose levodopa. \par He will have repeat brain MRI soon and requested that reports be sent to me to review. \par I will see him back in 3-4 months for re-evaluation.

## 2022-06-02 ENCOUNTER — APPOINTMENT (OUTPATIENT)
Dept: INTERNAL MEDICINE | Facility: CLINIC | Age: 72
End: 2022-06-02
Payer: MEDICARE

## 2022-06-02 VITALS
DIASTOLIC BLOOD PRESSURE: 65 MMHG | SYSTOLIC BLOOD PRESSURE: 135 MMHG | HEART RATE: 79 BPM | OXYGEN SATURATION: 96 % | BODY MASS INDEX: 26.32 KG/M2 | TEMPERATURE: 97.8 F | HEIGHT: 71 IN | WEIGHT: 188 LBS

## 2022-06-02 VITALS — SYSTOLIC BLOOD PRESSURE: 125 MMHG | DIASTOLIC BLOOD PRESSURE: 75 MMHG

## 2022-06-02 DIAGNOSIS — E11.9 TYPE 2 DIABETES MELLITUS W/OUT COMPLICATIONS: ICD-10-CM

## 2022-06-02 DIAGNOSIS — E78.5 HYPERLIPIDEMIA, UNSPECIFIED: ICD-10-CM

## 2022-06-02 DIAGNOSIS — I25.10 ATHEROSCLEROTIC HEART DISEASE OF NATIVE CORONARY ARTERY W/OUT ANGINA PECTORIS: ICD-10-CM

## 2022-06-02 LAB
BILIRUB UR QL STRIP: NORMAL
CLARITY UR: NORMAL
COLLECTION METHOD: NORMAL
GLUCOSE UR-MCNC: NORMAL
HCG UR QL: 0.2 EU/DL
HGB UR QL STRIP.AUTO: NORMAL
KETONES UR-MCNC: NORMAL
LEUKOCYTE ESTERASE UR QL STRIP: NORMAL
NITRITE UR QL STRIP: NORMAL
PH UR STRIP: 7
PROT UR STRIP-MCNC: NORMAL
SP GR UR STRIP: 1.01

## 2022-06-02 PROCEDURE — 81003 URINALYSIS AUTO W/O SCOPE: CPT | Mod: QW

## 2022-06-02 PROCEDURE — 99214 OFFICE O/P EST MOD 30 MIN: CPT | Mod: 25

## 2022-06-02 PROCEDURE — 36415 COLL VENOUS BLD VENIPUNCTURE: CPT

## 2022-06-02 NOTE — PHYSICAL EXAM
[Normal] : no acute distress, well nourished, well developed and well-appearing [Normal Sclera/Conjunctiva] : normal sclera/conjunctiva [Normal Outer Ear/Nose] : the outer ears and nose were normal in appearance [No JVD] : no jugular venous distention [No Respiratory Distress] : no respiratory distress  [No Accessory Muscle Use] : no accessory muscle use [Normal Rate] : normal rate  [Regular Rhythm] : with a regular rhythm [Normal S1, S2] : normal S1 and S2 [No Edema] : there was no peripheral edema [Soft] : abdomen soft [Non Tender] : non-tender [Non-distended] : non-distended [No HSM] : no HSM [Normal Bowel Sounds] : normal bowel sounds [Coordination Grossly Intact] : coordination grossly intact [Normal Gait] : normal gait [de-identified] : minimally verbal. [de-identified] : m

## 2022-06-02 NOTE — HISTORY OF PRESENT ILLNESS
[FreeTextEntry1] : last  2-3 days cloudy urine.  hard to tell if  he has any symptoms  follows up  for med problems.he is being given a good diet.  mental state with is alzheimers about the same.  no apparent cardiopulm sx  bms ok appetite is good  remains on all same meds  with no apparent side effects.

## 2022-06-03 LAB
BASOPHILS # BLD AUTO: 0.02 K/UL
BASOPHILS NFR BLD AUTO: 0.2 %
EOSINOPHIL # BLD AUTO: 0.13 K/UL
EOSINOPHIL NFR BLD AUTO: 1.5 %
HCT VFR BLD CALC: 37.2 %
HGB BLD-MCNC: 12 G/DL
IMM GRANULOCYTES NFR BLD AUTO: 1.5 %
LYMPHOCYTES # BLD AUTO: 1.43 K/UL
LYMPHOCYTES NFR BLD AUTO: 16.7 %
MAN DIFF?: NORMAL
MCHC RBC-ENTMCNC: 32.3 GM/DL
MCHC RBC-ENTMCNC: 32.6 PG
MCV RBC AUTO: 101.1 FL
MONOCYTES # BLD AUTO: 0.75 K/UL
MONOCYTES NFR BLD AUTO: 8.7 %
NEUTROPHILS # BLD AUTO: 6.12 K/UL
NEUTROPHILS NFR BLD AUTO: 71.4 %
PLATELET # BLD AUTO: 186 K/UL
RBC # BLD: 3.68 M/UL
RBC # FLD: 13.9 %
WBC # FLD AUTO: 8.58 K/UL

## 2022-06-06 DIAGNOSIS — R79.89 OTHER SPECIFIED ABNORMAL FINDINGS OF BLOOD CHEMISTRY: ICD-10-CM

## 2022-06-06 LAB
ALBUMIN SERPL ELPH-MCNC: 4.7 G/DL
ALP BLD-CCNC: 126 U/L
ALT SERPL-CCNC: 25 U/L
ANION GAP SERPL CALC-SCNC: 13 MMOL/L
AST SERPL-CCNC: 18 U/L
BACTERIA UR CULT: ABNORMAL
BILIRUB SERPL-MCNC: <0.2 MG/DL
BUN SERPL-MCNC: 36 MG/DL
CALCIUM SERPL-MCNC: 10.5 MG/DL
CHLORIDE SERPL-SCNC: 104 MMOL/L
CHOLEST SERPL-MCNC: 150 MG/DL
CO2 SERPL-SCNC: 25 MMOL/L
CREAT SERPL-MCNC: 1.65 MG/DL
EGFR: 44 ML/MIN/1.73M2
ESTIMATED AVERAGE GLUCOSE: 126 MG/DL
GLUCOSE SERPL-MCNC: 97 MG/DL
HBA1C MFR BLD HPLC: 6 %
HDLC SERPL-MCNC: 39 MG/DL
LDLC SERPL CALC-MCNC: 71 MG/DL
NONHDLC SERPL-MCNC: 111 MG/DL
POTASSIUM SERPL-SCNC: 5.4 MMOL/L
PROT SERPL-MCNC: 6.7 G/DL
SODIUM SERPL-SCNC: 143 MMOL/L
TRIGL SERPL-MCNC: 199 MG/DL

## 2022-06-07 ENCOUNTER — NON-APPOINTMENT (OUTPATIENT)
Age: 72
End: 2022-06-07

## 2022-06-17 LAB
ANION GAP SERPL CALC-SCNC: 15 MMOL/L
BUN SERPL-MCNC: 48 MG/DL
CALCIUM SERPL-MCNC: 10.1 MG/DL
CHLORIDE SERPL-SCNC: 102 MMOL/L
CO2 SERPL-SCNC: 21 MMOL/L
CREAT SERPL-MCNC: 1.71 MG/DL
EGFR: 42 ML/MIN/1.73M2
GLUCOSE SERPL-MCNC: 207 MG/DL
POTASSIUM SERPL-SCNC: 5.4 MMOL/L
SODIUM SERPL-SCNC: 139 MMOL/L

## 2022-07-03 LAB
ANION GAP SERPL CALC-SCNC: 12 MMOL/L
BUN SERPL-MCNC: 32 MG/DL
CALCIUM SERPL-MCNC: 10.3 MG/DL
CHLORIDE SERPL-SCNC: 103 MMOL/L
CO2 SERPL-SCNC: 27 MMOL/L
CREAT SERPL-MCNC: 1.42 MG/DL
EGFR: 52 ML/MIN/1.73M2
GLUCOSE SERPL-MCNC: 56 MG/DL
POTASSIUM SERPL-SCNC: 5.2 MMOL/L
SODIUM SERPL-SCNC: 142 MMOL/L

## 2022-07-12 ENCOUNTER — NON-APPOINTMENT (OUTPATIENT)
Age: 72
End: 2022-07-12

## 2022-08-01 RX ORDER — METOPROLOL SUCCINATE 50 MG/1
50 TABLET, EXTENDED RELEASE ORAL DAILY
Qty: 5 | Refills: 0 | Status: ACTIVE | COMMUNITY
Start: 2022-03-02 | End: 1900-01-01

## 2022-08-14 LAB
ANION GAP SERPL CALC-SCNC: 11 MMOL/L
BUN SERPL-MCNC: 22 MG/DL
CALCIUM SERPL-MCNC: 11.1 MG/DL
CHLORIDE SERPL-SCNC: 100 MMOL/L
CO2 SERPL-SCNC: 28 MMOL/L
CREAT SERPL-MCNC: 1.3 MG/DL
EGFR: 58 ML/MIN/1.73M2
GLUCOSE SERPL-MCNC: 176 MG/DL
POTASSIUM SERPL-SCNC: 5.2 MMOL/L
SODIUM SERPL-SCNC: 139 MMOL/L

## 2022-08-19 ENCOUNTER — RX RENEWAL (OUTPATIENT)
Age: 72
End: 2022-08-19

## 2022-08-19 ENCOUNTER — APPOINTMENT (OUTPATIENT)
Dept: NEUROLOGY | Facility: CLINIC | Age: 72
End: 2022-08-19

## 2022-08-19 VITALS
BODY MASS INDEX: 27.3 KG/M2 | WEIGHT: 195 LBS | HEART RATE: 69 BPM | DIASTOLIC BLOOD PRESSURE: 73 MMHG | HEIGHT: 71 IN | SYSTOLIC BLOOD PRESSURE: 133 MMHG | RESPIRATION RATE: 14 BRPM

## 2022-08-19 PROCEDURE — 99214 OFFICE O/P EST MOD 30 MIN: CPT

## 2022-08-19 NOTE — PHYSICAL EXAM
[FreeTextEntry1] : + ideamotor apraxia. Speech fluent. EOMI. Followed some simple commands. + L/R confusion. No spontaneous speech. There was mild cogwheel rigidity in all limbs. Mild -moderate overall bradykinesia. Unable to assess limb bradykinesia due to inattentiveness. Pushed off to stand and able to walk without assistance. SL are reasonable with 2-3 step turns. No FOG

## 2022-08-19 NOTE — DISCUSSION/SUMMARY
[FreeTextEntry1] : AD/PD with apraxia, RBD and bradykinesia. \par \par Patient was counseled on the following recommendations:\par \par I do not believe that his condition would improve dramatically with levodopa as he has minimal limb rigidity and considerable component of his gait issues is related to underlying PN and PAD. \par \par Refer to home PT and will re-evaluate after complete course of therapy. \par f/u ENT for dysphagia\par cont melatonin for RBD. take 6-10mg qhs\par f/u psychiatry \par Wife will provide me with recent brain MRI results done in the spring\par \par f/u 4-6 months\par \par

## 2022-08-19 NOTE — HISTORY OF PRESENT ILLNESS
[FreeTextEntry1] : Patient has been has decline in walking in part related to right foot ulcer. \par He is tired during the day but has not had any recent syncopal episodes. \par Talks in his sleep and thrashes around. Tends to take 3-6mg melatonin qhs\par Can feed himself. Cannot dress or do toileting w/o assistance\par Not doing PT\par BPs have been stable \par depression and behavioral dysregulation under control. [facility]\par Had covid in January - asymptomatic \par + constipation managed with miralax and senna\par Has mild dysphagia to pills. Pending ENT evaluation\par Exposed to agent orange. in [place]\par \par \par PMH: AD, PTSD, DMII, CAD s/p PCI, LE claudication s/p bypass, bladder cancer s/p chemo\par PN 2/2 DM, ETOH use B12 def\par FH: mother, father = AD\par \par Neuro Meds\par seroquel 50mg qhs\par melatonin 3mg qhs\par VPA 250mg qhs\par namenda 10mg qd\par exelon 13.3mg TD qd\par xanax prn\par miralax /senna\par cymbalta 60mg qd

## 2022-08-25 ENCOUNTER — NON-APPOINTMENT (OUTPATIENT)
Age: 72
End: 2022-08-25

## 2022-08-25 DIAGNOSIS — E83.52 HYPERCALCEMIA: ICD-10-CM

## 2022-08-26 ENCOUNTER — NON-APPOINTMENT (OUTPATIENT)
Age: 72
End: 2022-08-26

## 2022-09-13 ENCOUNTER — NON-APPOINTMENT (OUTPATIENT)
Age: 72
End: 2022-09-13

## 2022-09-15 LAB
CALCIUM SERPL-MCNC: 10 MG/DL
CALCIUM SERPL-MCNC: 10 MG/DL
MAGNESIUM SERPL-MCNC: 2 MG/DL
PARATHYROID HORMONE INTACT: 34 PG/ML
PHOSPHATE SERPL-MCNC: 4.8 MG/DL

## 2022-09-16 ENCOUNTER — NON-APPOINTMENT (OUTPATIENT)
Age: 72
End: 2022-09-16

## 2022-09-21 RX ORDER — LOSARTAN POTASSIUM 25 MG/1
25 TABLET, FILM COATED ORAL DAILY
Qty: 90 | Refills: 1 | Status: COMPLETED | COMMUNITY
Start: 2022-01-13 | End: 2022-09-21

## 2022-09-22 NOTE — BRIEF OPERATIVE NOTE - PRIMARY SURGEON
Detail Level: Detailed Dr. Bailey Quality 110: Preventive Care And Screening: Influenza Immunization: Influenza Immunization Administered during Influenza season

## 2022-09-30 ENCOUNTER — APPOINTMENT (OUTPATIENT)
Dept: INTERNAL MEDICINE | Facility: CLINIC | Age: 72
End: 2022-09-30

## 2022-09-30 ENCOUNTER — APPOINTMENT (OUTPATIENT)
Dept: CARDIOLOGY | Facility: CLINIC | Age: 72
End: 2022-09-30

## 2022-09-30 ENCOUNTER — NON-APPOINTMENT (OUTPATIENT)
Age: 72
End: 2022-09-30

## 2022-09-30 VITALS
HEART RATE: 80 BPM | WEIGHT: 195 LBS | HEIGHT: 71 IN | BODY MASS INDEX: 27.3 KG/M2 | SYSTOLIC BLOOD PRESSURE: 120 MMHG | DIASTOLIC BLOOD PRESSURE: 68 MMHG

## 2022-09-30 DIAGNOSIS — F02.80 ALZHEIMER'S DISEASE, UNSPECIFIED: ICD-10-CM

## 2022-09-30 DIAGNOSIS — Z01.810 ENCOUNTER FOR PREPROCEDURAL CARDIOVASCULAR EXAMINATION: ICD-10-CM

## 2022-09-30 DIAGNOSIS — Z95.5 PRESENCE OF CORONARY ANGIOPLASTY IMPLANT AND GRAFT: ICD-10-CM

## 2022-09-30 DIAGNOSIS — R55 SYNCOPE AND COLLAPSE: ICD-10-CM

## 2022-09-30 DIAGNOSIS — G30.9 ALZHEIMER'S DISEASE, UNSPECIFIED: ICD-10-CM

## 2022-09-30 DIAGNOSIS — I70.219 ATHEROSCLEROSIS OF NATIVE ARTERIES OF EXTREMITIES WITH INTERMITTENT CLAUDICATION, UNSPECIFIED EXTREMITY: ICD-10-CM

## 2022-09-30 DIAGNOSIS — I10 ESSENTIAL (PRIMARY) HYPERTENSION: ICD-10-CM

## 2022-09-30 PROCEDURE — 99495 TRANSJ CARE MGMT MOD F2F 14D: CPT

## 2022-09-30 PROCEDURE — 93000 ELECTROCARDIOGRAM COMPLETE: CPT

## 2022-09-30 PROCEDURE — 99214 OFFICE O/P EST MOD 30 MIN: CPT

## 2022-09-30 RX ORDER — QUETIAPINE FUMARATE 25 MG/1
25 TABLET ORAL AT BEDTIME
Refills: 0 | Status: ACTIVE | COMMUNITY
Start: 2022-09-30

## 2022-09-30 RX ORDER — ALPRAZOLAM 0.25 MG/1
0.25 TABLET ORAL AT BEDTIME
Refills: 0 | Status: ACTIVE | COMMUNITY
Start: 2022-09-30

## 2022-09-30 RX ORDER — QUETIAPINE FUMARATE 100 MG/1
100 TABLET ORAL
Qty: 90 | Refills: 3 | Status: DISCONTINUED | COMMUNITY
Start: 2021-11-09 | End: 2022-09-30

## 2022-09-30 RX ORDER — SULFAMETHOXAZOLE AND TRIMETHOPRIM 800; 160 MG/1; MG/1
800-160 TABLET ORAL TWICE DAILY
Qty: 14 | Refills: 0 | Status: DISCONTINUED | COMMUNITY
Start: 2022-06-02 | End: 2022-09-30

## 2022-09-30 RX ORDER — ATORVASTATIN CALCIUM 80 MG/1
80 TABLET, FILM COATED ORAL
Refills: 0 | Status: ACTIVE | COMMUNITY
Start: 2022-09-30

## 2022-09-30 NOTE — HISTORY OF PRESENT ILLNESS
[FreeTextEntry1] : fainted after a shower was shaking and then went down and wife made sure he did not hit his head or hurt himself was admitted overnight and evaluation according to  wife including  head ct was  ok.acc to wife his  mental state is no different now than before but there has been a clear worsening over the last year.  he is being seen by vascular  surgery for a gangrenous toe.on lower seroquel dose and xanax added  off losartandose and is better. he is eating well and drinking

## 2022-09-30 NOTE — HISTORY OF PRESENT ILLNESS
[FreeTextEntry1] : 72M with PAD and claudication, DM, HTN, HLD, CAD s/p PCI (RCA), former smoker s/p L femoral endarterectomy, pending R LE bypass for gangrenous toe \par \par [facility], thought to be from orthostatic hypotension\par Recommended for Florinef, but neuro suggested to hold off pending further discussion with us\par Worsening PD, dementia, mostly nonverbal\par Pending RLE bypass \par Losartan d/c'ed due ALEXA, now improved \par \par HISTORY:\par \par In October 2018, pt was seen in the office with worsening exertional chest pain, was sent for cardiac cath and was noted with 99% RCA occlusion s/p GELA.  \par ---------------------------------------\par ECG: SR, no ST-T wave changes\par Cath 2018 (RRA): 100% RCA, other arteries normal\par TTE 5/2021: 60-65%, mild aortic root calcification\par LE angio 2021: L sided iliac and external iliac- mod stenosis with prior stents. Bilateral SFA occlusions with profundus femoral artery \par NST (2009): Mild inferior ischemia, EF 58%\par \par Asa 81mg, \par Clopidogrel 75mg\par atorvastatin 80mg\par \par Metoprolol 50mg\par

## 2022-09-30 NOTE — PHYSICAL EXAM
[Normal] : no acute distress, well nourished, well developed and well-appearing [Normal Sclera/Conjunctiva] : normal sclera/conjunctiva [Normal Outer Ear/Nose] : the outer ears and nose were normal in appearance [No JVD] : no jugular venous distention [No Respiratory Distress] : no respiratory distress  [No Accessory Muscle Use] : no accessory muscle use [Clear to Auscultation] : lungs were clear to auscultation bilaterally [Regular Rhythm] : with a regular rhythm [Normal S1, S2] : normal S1 and S2 [No Edema] : there was no peripheral edema [Soft] : abdomen soft [Non Tender] : non-tender [Non-distended] : non-distended [No HSM] : no HSM [Coordination Grossly Intact] : coordination grossly intact [de-identified] : very quiet

## 2022-09-30 NOTE — PHYSICAL EXAM
[Well Developed] : well developed [Well Nourished] : well nourished [No Acute Distress] : no acute distress [Normal Conjunctiva] : normal conjunctiva [Normal Venous Pressure] : normal venous pressure [No Carotid Bruit] : no carotid bruit [Normal S1, S2] : normal S1, S2 [No Murmur] : no murmur [No Rub] : no rub [No Gallop] : no gallop [Clear Lung Fields] : clear lung fields [Good Air Entry] : good air entry [No Respiratory Distress] : no respiratory distress  [Soft] : abdomen soft [Non Tender] : non-tender [No Masses/organomegaly] : no masses/organomegaly [Normal Bowel Sounds] : normal bowel sounds [No Edema] : no edema [No Cyanosis] : no cyanosis [No Clubbing] : no clubbing [No Varicosities] : no varicosities [Normal Radial B/L] : normal radial B/L [No Rash] : no rash [No Skin Lesions] : no skin lesions [Memory Deficit] : memory deficit [Cognitive Impairment] : cognitive impairment [de-identified] : Speaking slowly, mostly nonverbal

## 2022-09-30 NOTE — DISCUSSION/SUMMARY
[FreeTextEntry1] : 72M with CAD s/p PCI, PAD, DM, HTN, HLD. Pending RLE bypass\par \par 1. CAD: Cont with asa, plavix, lipitor daily.\par \par 2. PAD: On DAPT and lipitor. Pending RLE bypass\par \par 3. HTN: Well controlled-- possible orthostatic hypotension, now off losartan. Cont Toprol\par \par 4. HLD:  On statin, [facility]\par \par 5. DM: Care with PCP\par \par Last echo normal. ECG has been normal. No active cardiac symptoms.\par \par Pt nonverbal at baseline, in wheelchair. R toe gangrene for which he needs a RLE bypass. No evidence of ongoing ischemia, arrhythmia, valvular heart disease or decompensated heart failure.  This patient is optimized from a cardiac standpoint for this intermediate to high risk surgery.  No further cardiac testing is necessary prior to surgery and will not . \par \par

## 2022-10-10 ENCOUNTER — OUTPATIENT (OUTPATIENT)
Dept: OUTPATIENT SERVICES | Facility: HOSPITAL | Age: 72
LOS: 1 days | End: 2022-10-10
Payer: MEDICARE

## 2022-10-10 VITALS
DIASTOLIC BLOOD PRESSURE: 70 MMHG | HEIGHT: 71 IN | SYSTOLIC BLOOD PRESSURE: 120 MMHG | WEIGHT: 195.11 LBS | OXYGEN SATURATION: 100 % | HEART RATE: 90 BPM | RESPIRATION RATE: 18 BRPM | TEMPERATURE: 97 F

## 2022-10-10 DIAGNOSIS — Z01.818 ENCOUNTER FOR OTHER PREPROCEDURAL EXAMINATION: ICD-10-CM

## 2022-10-10 DIAGNOSIS — Z98.49 CATARACT EXTRACTION STATUS, UNSPECIFIED EYE: Chronic | ICD-10-CM

## 2022-10-10 DIAGNOSIS — Z98.890 OTHER SPECIFIED POSTPROCEDURAL STATES: Chronic | ICD-10-CM

## 2022-10-10 DIAGNOSIS — E11.9 TYPE 2 DIABETES MELLITUS WITHOUT COMPLICATIONS: ICD-10-CM

## 2022-10-10 DIAGNOSIS — G47.33 OBSTRUCTIVE SLEEP APNEA (ADULT) (PEDIATRIC): ICD-10-CM

## 2022-10-10 DIAGNOSIS — G30.9 ALZHEIMER'S DISEASE, UNSPECIFIED: ICD-10-CM

## 2022-10-10 DIAGNOSIS — I10 ESSENTIAL (PRIMARY) HYPERTENSION: ICD-10-CM

## 2022-10-10 DIAGNOSIS — I70.261 ATHEROSCLEROSIS OF NATIVE ARTERIES OF EXTREMITIES WITH GANGRENE, RIGHT LEG: ICD-10-CM

## 2022-10-10 DIAGNOSIS — I73.9 PERIPHERAL VASCULAR DISEASE, UNSPECIFIED: ICD-10-CM

## 2022-10-10 LAB
A1C WITH ESTIMATED AVERAGE GLUCOSE RESULT: 6.9 % — HIGH (ref 4–5.6)
ALBUMIN SERPL ELPH-MCNC: 4.2 G/DL — SIGNIFICANT CHANGE UP (ref 3.3–5)
ALP SERPL-CCNC: 144 U/L — HIGH (ref 40–120)
ALT FLD-CCNC: 19 U/L — SIGNIFICANT CHANGE UP (ref 10–45)
ANION GAP SERPL CALC-SCNC: 13 MMOL/L — SIGNIFICANT CHANGE UP (ref 5–17)
AST SERPL-CCNC: 16 U/L — SIGNIFICANT CHANGE UP (ref 10–40)
BILIRUB SERPL-MCNC: 0.2 MG/DL — SIGNIFICANT CHANGE UP (ref 0.2–1.2)
BLD GP AB SCN SERPL QL: NEGATIVE — SIGNIFICANT CHANGE UP
BUN SERPL-MCNC: 33 MG/DL — HIGH (ref 7–23)
CALCIUM SERPL-MCNC: 10.7 MG/DL — HIGH (ref 8.4–10.5)
CHLORIDE SERPL-SCNC: 101 MMOL/L — SIGNIFICANT CHANGE UP (ref 96–108)
CO2 SERPL-SCNC: 26 MMOL/L — SIGNIFICANT CHANGE UP (ref 22–31)
CREAT SERPL-MCNC: 1.35 MG/DL — HIGH (ref 0.5–1.3)
EGFR: 55 ML/MIN/1.73M2 — LOW
ESTIMATED AVERAGE GLUCOSE: 151 MG/DL — HIGH (ref 68–114)
GLUCOSE SERPL-MCNC: 222 MG/DL — HIGH (ref 70–99)
HCT VFR BLD CALC: 33.8 % — LOW (ref 39–50)
HGB BLD-MCNC: 10.8 G/DL — LOW (ref 13–17)
MCHC RBC-ENTMCNC: 30.8 PG — SIGNIFICANT CHANGE UP (ref 27–34)
MCHC RBC-ENTMCNC: 32 GM/DL — SIGNIFICANT CHANGE UP (ref 32–36)
MCV RBC AUTO: 96.3 FL — SIGNIFICANT CHANGE UP (ref 80–100)
NRBC # BLD: 0 /100 WBCS — SIGNIFICANT CHANGE UP (ref 0–0)
PLATELET # BLD AUTO: 270 K/UL — SIGNIFICANT CHANGE UP (ref 150–400)
POTASSIUM SERPL-MCNC: 4.8 MMOL/L — SIGNIFICANT CHANGE UP (ref 3.5–5.3)
POTASSIUM SERPL-SCNC: 4.8 MMOL/L — SIGNIFICANT CHANGE UP (ref 3.5–5.3)
PROT SERPL-MCNC: 7.2 G/DL — SIGNIFICANT CHANGE UP (ref 6–8.3)
RBC # BLD: 3.51 M/UL — LOW (ref 4.2–5.8)
RBC # FLD: 13.6 % — SIGNIFICANT CHANGE UP (ref 10.3–14.5)
RH IG SCN BLD-IMP: POSITIVE — SIGNIFICANT CHANGE UP
SODIUM SERPL-SCNC: 140 MMOL/L — SIGNIFICANT CHANGE UP (ref 135–145)
WBC # BLD: 9.19 K/UL — SIGNIFICANT CHANGE UP (ref 3.8–10.5)
WBC # FLD AUTO: 9.19 K/UL — SIGNIFICANT CHANGE UP (ref 3.8–10.5)

## 2022-10-10 PROCEDURE — 83036 HEMOGLOBIN GLYCOSYLATED A1C: CPT

## 2022-10-10 PROCEDURE — 86901 BLOOD TYPING SEROLOGIC RH(D): CPT

## 2022-10-10 PROCEDURE — 80053 COMPREHEN METABOLIC PANEL: CPT

## 2022-10-10 PROCEDURE — G0463: CPT

## 2022-10-10 PROCEDURE — 86850 RBC ANTIBODY SCREEN: CPT

## 2022-10-10 PROCEDURE — 85027 COMPLETE CBC AUTOMATED: CPT

## 2022-10-10 PROCEDURE — 86923 COMPATIBILITY TEST ELECTRIC: CPT

## 2022-10-10 PROCEDURE — 86900 BLOOD TYPING SEROLOGIC ABO: CPT

## 2022-10-10 RX ORDER — CHLORHEXIDINE GLUCONATE 213 G/1000ML
1 SOLUTION TOPICAL ONCE
Refills: 0 | Status: DISCONTINUED | OUTPATIENT
Start: 2022-10-31 | End: 2022-11-06

## 2022-10-10 RX ORDER — QUETIAPINE FUMARATE 200 MG/1
50 TABLET, FILM COATED ORAL
Qty: 0 | Refills: 0 | DISCHARGE

## 2022-10-10 RX ORDER — DIVALPROEX SODIUM 500 MG/1
1 TABLET, DELAYED RELEASE ORAL
Qty: 0 | Refills: 0 | DISCHARGE

## 2022-10-10 RX ORDER — METFORMIN HYDROCHLORIDE 850 MG/1
1 TABLET ORAL
Qty: 0 | Refills: 0 | DISCHARGE

## 2022-10-10 RX ORDER — LIDOCAINE HCL 20 MG/ML
0.2 VIAL (ML) INJECTION ONCE
Refills: 0 | Status: DISCONTINUED | OUTPATIENT
Start: 2022-10-31 | End: 2022-11-09

## 2022-10-10 RX ORDER — VANCOMYCIN HCL 1 G
1250 VIAL (EA) INTRAVENOUS ONCE
Refills: 0 | Status: DISCONTINUED | OUTPATIENT
Start: 2022-10-31 | End: 2022-11-01

## 2022-10-10 RX ORDER — FESOTERODINE FUMARATE 8 MG/1
1 TABLET, FILM COATED, EXTENDED RELEASE ORAL
Qty: 0 | Refills: 0 | DISCHARGE

## 2022-10-10 RX ORDER — MIDODRINE HYDROCHLORIDE 2.5 MG/1
1 TABLET ORAL
Qty: 0 | Refills: 0 | DISCHARGE

## 2022-10-10 NOTE — H&P PST ADULT - ATTENDING COMMENTS
We plan right femoral popliteal bypass for limb threatening ischemia.  All risks and alternatives were reiterated to the Millers who agree with this plan.

## 2022-10-10 NOTE — H&P PST ADULT - NSICDXPASTMEDICALHX_GEN_ALL_CORE_FT
PAST MEDICAL HISTORY:  Alzheimer disease     Anxiety     Atherosclerosis of native artery of extremity     Cancer of Bladder 2000    HTN (Hypertension)     Hyperlipidemia     Malignant neoplasm of urinary bladder, unspecified site      [facility]    DOLORES on CPAP     PAD (peripheral artery disease)     Peripheral neuropathy     Rotator Cuff Disorder     Type 2 diabetes mellitus      PAST MEDICAL HISTORY:  ALEXA (acute kidney injury) losartan discontinued and renal funciton improved    Alzheimer disease     Anxiety     Atherosclerosis of native artery of extremity     CAD (coronary artery disease) GELA RCA 2018    Cancer of Bladder 2000    H/O orthostatic hypotension     HTN (Hypertension)     Hyperlipidemia     Malignant neoplasm of urinary bladder, unspecified site      [facility]    DOLORES on CPAP non complaint with CPAP    PAD (peripheral artery disease)     Peripheral neuropathy     Rotator Cuff Disorder     Type 2 diabetes mellitus

## 2022-10-10 NOTE — H&P PST ADULT - PROBLEM SELECTOR PLAN 1
Right leg femoral popliteal bypass  Pt. spouse to stop plavix one week prior to procedure and continue daily aspirin  Email sent to Dr. Bailey regarding preop antiplatelet instructions  Instructed to take scheduled dose of aspirin morning of procedure with minimal water

## 2022-10-10 NOTE — H&P PST ADULT - ABUSE SCREEN COMMENT, PROFILE
pt. non verbal, appears well cared for by spouse. No signs of abuse or neglect noted during PST evaluation

## 2022-10-10 NOTE — H&P PST ADULT - PROBLEM SELECTOR PLAN 5
Instructed to stop metformin 48 hours prior to procedure  Stat fingerstick glucose upon arrival  HgBA1C drawn at PST

## 2022-10-10 NOTE — H&P PST ADULT - HAVE YOU HAD A FIRST COVID-19 BOOSTER?
Per MD pt stable for d/c.  Pt's spouse is transporting pt to [place]. RENEE spoke with Eric Rojas who is aware of d/c time. SW faxed d/c summary, scripts, PPD results to [place] ahead of pt's d/c. Chart copied and given to spouse to give to staff at [place]. Yes

## 2022-10-10 NOTE — H&P PST ADULT - NSICDXPASTSURGICALHX_GEN_ALL_CORE_FT
PAST SURGICAL HISTORY:  arthroscopy right shoulderx2 2008    bladder surgery 2000 washout/laser    H/O cardiac catheterization 2018- stent x1 mid RCA    H/O endarterectomy right common femoral artery 10/21    H/O peripheral artery bypass left femoral May 2021    Knee Surgery 1998- meniscal    S/P cataract surgery bilateral     PAST SURGICAL HISTORY:  arthroscopy right shoulderx 2 2008    bladder surgery 2000 washout/laser    H/O cardiac catheterization 2018- stent x1 mid RCA    H/O endarterectomy right common femoral artery 10/21    H/O peripheral artery bypass left femoral May 2021    Knee Surgery 1998- meniscal    S/P cataract surgery bilateral

## 2022-10-10 NOTE — H&P PST ADULT - HISTORY OF PRESENT ILLNESS
PAD  right great toe and pinky toe necrotic X 6 months    bypass  72 year old male presents for right leg femoral-popliteal bypass. Pt. with a history of PAD; s/p right endarterectomy of the right common femoral artery 10/21. Currently has necrotic lesion on his right great toe which is progressively worsening over the past few months. Pt. with Alzheimer's dementia, minimally verbal, requires full assistance for ADLs.    Pre procedure COVID PCR scheduled for 10/28/22; pt. had COVID infection 3/21 and was asymptomatic.

## 2022-10-10 NOTE — H&P PST ADULT - ASSESSMENT
CAPRINI SCORE [CLOT]    AGE RELATED RISK FACTORS                                                       MOBILITY RELATED FACTORS  [ ] Age 41-60 years                                            (1 Point)                  [ ] Bed rest                                                        (1 Point)  [X ] Age: 61-74 years                                           (2 Points)                 [ ] Plaster cast                                                   (2 Points)  [ ] Age= 75 years                                              (3 Points)                 [ ] Bed bound for more than 72 hours                 (2 Points)    DISEASE RELATED RISK FACTORS                                               GENDER SPECIFIC FACTORS  [ ] Edema in the lower extremities                       (1 Point)                  [ ] Pregnancy                                                     (1 Point)  [ ] Varicose veins                                               (1 Point)                  [ ] Post-partum < 6 weeks                                   (1 Point)             [ X] BMI > 25 Kg/m2                                            (1 Point)                  [ ] Hormonal therapy  or oral contraception          (1 Point)                 [ ] Sepsis (in the previous month)                        (1 Point)                  [ ] History of pregnancy complications                 (1 point)  [ ] Pneumonia or serious lung disease                                               [ ] Unexplained or recurrent                     (1 Point)           (in the previous month)                               (1 Point)  [ ] Abnormal pulmonary function test                     (1 Point)                 SURGERY RELATED RISK FACTORS  [ ] Acute myocardial infarction                              (1 Point)                 [ ]  Section                                             (1 Point)  [ ] Congestive heart failure (in the previous month)  (1 Point)               [ ] Minor surgery                                                  (1 Point)   [ ] Inflammatory bowel disease                             (1 Point)                 [ ] Arthroscopic surgery                                        (2 Points)  [ ] Central venous access                                      (2 Points)                [ X] General surgery lasting more than 45 minutes   (2 Points)       [ ] Stroke (in the previous month)                          (5 Points)               [ ] Elective arthroplasty                                         (5 Points)                                                                                                                                               HEMATOLOGY RELATED FACTORS                                                 TRAUMA RELATED RISK FACTORS  [ ] Prior episodes of VTE                                     (3 Points)                 [ ] Fracture of the hip, pelvis, or leg                       (5 Points)  [ ] Positive family history for VTE                         (3 Points)                 [ ] Acute spinal cord injury (in the previous month)  (5 Points)  [ ] Prothrombin 01484 A                                     (3 Points)                 [ ] Paralysis  (less than 1 month)                             (5 Points)  [ ] Factor V Leiden                                             (3 Points)                  [ ] Multiple Trauma within 1 month                        (5 Points)  [ ] Lupus anticoagulants                                     (3 Points)                                                           [ ] Anticardiolipin antibodies                               (3 Points)                                                       [ ] High homocysteine in the blood                      (3 Points)                                             [ ] Other congenital or acquired thrombophilia      (3 Points)                                                [ ] Heparin induced thrombocytopenia                  (3 Points)                                          Total Score [   5       ]

## 2022-10-28 ENCOUNTER — OUTPATIENT (OUTPATIENT)
Dept: OUTPATIENT SERVICES | Facility: HOSPITAL | Age: 72
LOS: 1 days | End: 2022-10-28

## 2022-10-28 DIAGNOSIS — Z98.49 CATARACT EXTRACTION STATUS, UNSPECIFIED EYE: Chronic | ICD-10-CM

## 2022-10-28 DIAGNOSIS — Z98.890 OTHER SPECIFIED POSTPROCEDURAL STATES: Chronic | ICD-10-CM

## 2022-10-28 DIAGNOSIS — Z11.52 ENCOUNTER FOR SCREENING FOR COVID-19: ICD-10-CM

## 2022-10-28 LAB — SARS-COV-2 RNA SPEC QL NAA+PROBE: SIGNIFICANT CHANGE UP

## 2022-10-28 NOTE — PATIENT PROFILE ADULT - NSPROPOAURINARYCATHETER_GEN_A_NUR
no
Nicky, PGY1 - patient reassessed. Urinalysis shows signs of pyelonephritis. Will give 1 dose of cefpodoxime 200mg. Will prescribe Cefpodoxime 200mg BID for 14 days.

## 2022-10-30 ENCOUNTER — TRANSCRIPTION ENCOUNTER (OUTPATIENT)
Age: 72
End: 2022-10-30

## 2022-10-31 ENCOUNTER — INPATIENT (INPATIENT)
Facility: HOSPITAL | Age: 72
LOS: 8 days | Discharge: HOME HEALTH | DRG: 239 | End: 2022-11-09
Attending: SURGERY | Admitting: SURGERY
Payer: MEDICARE

## 2022-10-31 VITALS
WEIGHT: 195.11 LBS | OXYGEN SATURATION: 100 % | DIASTOLIC BLOOD PRESSURE: 83 MMHG | SYSTOLIC BLOOD PRESSURE: 148 MMHG | HEIGHT: 71 IN | HEART RATE: 106 BPM | TEMPERATURE: 98 F | RESPIRATION RATE: 16 BRPM

## 2022-10-31 DIAGNOSIS — I70.261 ATHEROSCLEROSIS OF NATIVE ARTERIES OF EXTREMITIES WITH GANGRENE, RIGHT LEG: ICD-10-CM

## 2022-10-31 DIAGNOSIS — Z98.49 CATARACT EXTRACTION STATUS, UNSPECIFIED EYE: Chronic | ICD-10-CM

## 2022-10-31 DIAGNOSIS — Z98.890 OTHER SPECIFIED POSTPROCEDURAL STATES: Chronic | ICD-10-CM

## 2022-10-31 LAB
ANION GAP SERPL CALC-SCNC: 13 MMOL/L — SIGNIFICANT CHANGE UP (ref 5–17)
BLD GP AB SCN SERPL QL: NEGATIVE — SIGNIFICANT CHANGE UP
BUN SERPL-MCNC: 30 MG/DL — HIGH (ref 7–23)
CALCIUM SERPL-MCNC: 8.9 MG/DL — SIGNIFICANT CHANGE UP (ref 8.4–10.5)
CHLORIDE SERPL-SCNC: 107 MMOL/L — SIGNIFICANT CHANGE UP (ref 96–108)
CO2 SERPL-SCNC: 21 MMOL/L — LOW (ref 22–31)
CREAT SERPL-MCNC: 1.35 MG/DL — HIGH (ref 0.5–1.3)
EGFR: 55 ML/MIN/1.73M2 — LOW
GAS PNL BLDA: SIGNIFICANT CHANGE UP
GLUCOSE BLDC GLUCOMTR-MCNC: 172 MG/DL — HIGH (ref 70–99)
GLUCOSE BLDC GLUCOMTR-MCNC: 188 MG/DL — HIGH (ref 70–99)
GLUCOSE BLDC GLUCOMTR-MCNC: 252 MG/DL — HIGH (ref 70–99)
GLUCOSE BLDC GLUCOMTR-MCNC: 255 MG/DL — HIGH (ref 70–99)
GLUCOSE SERPL-MCNC: 217 MG/DL — HIGH (ref 70–99)
HCT VFR BLD CALC: 21.3 % — LOW (ref 39–50)
HCT VFR BLD CALC: 26.2 % — LOW (ref 39–50)
HGB BLD-MCNC: 6.8 G/DL — CRITICAL LOW (ref 13–17)
HGB BLD-MCNC: 8.4 G/DL — LOW (ref 13–17)
MAGNESIUM SERPL-MCNC: 1.8 MG/DL — SIGNIFICANT CHANGE UP (ref 1.6–2.6)
MCHC RBC-ENTMCNC: 30.3 PG — SIGNIFICANT CHANGE UP (ref 27–34)
MCHC RBC-ENTMCNC: 30.8 PG — SIGNIFICANT CHANGE UP (ref 27–34)
MCHC RBC-ENTMCNC: 31.9 GM/DL — LOW (ref 32–36)
MCHC RBC-ENTMCNC: 32.1 GM/DL — SIGNIFICANT CHANGE UP (ref 32–36)
MCV RBC AUTO: 94.6 FL — SIGNIFICANT CHANGE UP (ref 80–100)
MCV RBC AUTO: 96.4 FL — SIGNIFICANT CHANGE UP (ref 80–100)
NRBC # BLD: 0 /100 WBCS — SIGNIFICANT CHANGE UP (ref 0–0)
NRBC # BLD: 0 /100 WBCS — SIGNIFICANT CHANGE UP (ref 0–0)
PHOSPHATE SERPL-MCNC: 6.8 MG/DL — HIGH (ref 2.5–4.5)
PLATELET # BLD AUTO: 159 K/UL — SIGNIFICANT CHANGE UP (ref 150–400)
PLATELET # BLD AUTO: 172 K/UL — SIGNIFICANT CHANGE UP (ref 150–400)
POTASSIUM SERPL-MCNC: 4.8 MMOL/L — SIGNIFICANT CHANGE UP (ref 3.5–5.3)
POTASSIUM SERPL-SCNC: 4.8 MMOL/L — SIGNIFICANT CHANGE UP (ref 3.5–5.3)
RBC # BLD: 2.21 M/UL — LOW (ref 4.2–5.8)
RBC # BLD: 2.77 M/UL — LOW (ref 4.2–5.8)
RBC # FLD: 13.9 % — SIGNIFICANT CHANGE UP (ref 10.3–14.5)
RBC # FLD: 13.9 % — SIGNIFICANT CHANGE UP (ref 10.3–14.5)
RH IG SCN BLD-IMP: POSITIVE — SIGNIFICANT CHANGE UP
SODIUM SERPL-SCNC: 141 MMOL/L — SIGNIFICANT CHANGE UP (ref 135–145)
WBC # BLD: 13.17 K/UL — HIGH (ref 3.8–10.5)
WBC # BLD: 8.51 K/UL — SIGNIFICANT CHANGE UP (ref 3.8–10.5)
WBC # FLD AUTO: 13.17 K/UL — HIGH (ref 3.8–10.5)
WBC # FLD AUTO: 8.51 K/UL — SIGNIFICANT CHANGE UP (ref 3.8–10.5)

## 2022-10-31 PROCEDURE — 35666 BPG FEM-ANT TIB PST TIB/PRNL: CPT | Mod: RT,62

## 2022-10-31 DEVICE — CLIP APPLIER COVIDIEN SURGICLIP III 9" SM: Type: IMPLANTABLE DEVICE | Site: RIGHT | Status: FUNCTIONAL

## 2022-10-31 DEVICE — KIT A-LINE 1LUM 20G X 12CM SAFE KIT: Type: IMPLANTABLE DEVICE | Site: RIGHT | Status: FUNCTIONAL

## 2022-10-31 DEVICE — CLIP APPLIER COVIDIEN SURGICLIP II 9.75" MEDIUM: Type: IMPLANTABLE DEVICE | Site: RIGHT | Status: FUNCTIONAL

## 2022-10-31 DEVICE — GRAFT VASC PROPATEN 6MMX60X80CM TW REMOV RING: Type: IMPLANTABLE DEVICE | Site: RIGHT | Status: FUNCTIONAL

## 2022-10-31 DEVICE — SURGIFOAM PAD 8CM X 12.5CM X 10MM (100): Type: IMPLANTABLE DEVICE | Site: RIGHT | Status: FUNCTIONAL

## 2022-10-31 RX ORDER — OXYCODONE HYDROCHLORIDE 5 MG/1
5 TABLET ORAL EVERY 4 HOURS
Refills: 0 | Status: DISCONTINUED | OUTPATIENT
Start: 2022-10-31 | End: 2022-11-01

## 2022-10-31 RX ORDER — SODIUM CHLORIDE 9 MG/ML
500 INJECTION INTRAMUSCULAR; INTRAVENOUS; SUBCUTANEOUS ONCE
Refills: 0 | Status: COMPLETED | OUTPATIENT
Start: 2022-10-31 | End: 2022-10-31

## 2022-10-31 RX ORDER — SODIUM CHLORIDE 9 MG/ML
3 INJECTION INTRAMUSCULAR; INTRAVENOUS; SUBCUTANEOUS EVERY 8 HOURS
Refills: 0 | Status: DISCONTINUED | OUTPATIENT
Start: 2022-10-31 | End: 2022-10-31

## 2022-10-31 RX ORDER — DIVALPROEX SODIUM 500 MG/1
250 TABLET, DELAYED RELEASE ORAL DAILY
Refills: 0 | Status: DISCONTINUED | OUTPATIENT
Start: 2022-10-31 | End: 2022-11-02

## 2022-10-31 RX ORDER — INSULIN LISPRO 100/ML
VIAL (ML) SUBCUTANEOUS
Refills: 0 | Status: DISCONTINUED | OUTPATIENT
Start: 2022-10-31 | End: 2022-11-01

## 2022-10-31 RX ORDER — SODIUM CHLORIDE 9 MG/ML
1000 INJECTION, SOLUTION INTRAVENOUS
Refills: 0 | Status: DISCONTINUED | OUTPATIENT
Start: 2022-10-31 | End: 2022-11-01

## 2022-10-31 RX ORDER — MEMANTINE HYDROCHLORIDE 10 MG/1
10 TABLET ORAL
Refills: 0 | Status: DISCONTINUED | OUTPATIENT
Start: 2022-10-31 | End: 2022-11-04

## 2022-10-31 RX ORDER — SODIUM CHLORIDE 9 MG/ML
1000 INJECTION, SOLUTION INTRAVENOUS
Refills: 0 | Status: DISCONTINUED | OUTPATIENT
Start: 2022-10-31 | End: 2022-11-04

## 2022-10-31 RX ORDER — QUETIAPINE FUMARATE 200 MG/1
25 TABLET, FILM COATED ORAL DAILY
Refills: 0 | Status: DISCONTINUED | OUTPATIENT
Start: 2022-10-31 | End: 2022-11-02

## 2022-10-31 RX ORDER — OXYCODONE HYDROCHLORIDE 5 MG/1
10 TABLET ORAL EVERY 4 HOURS
Refills: 0 | Status: DISCONTINUED | OUTPATIENT
Start: 2022-10-31 | End: 2022-11-01

## 2022-10-31 RX ORDER — ASPIRIN/CALCIUM CARB/MAGNESIUM 324 MG
81 TABLET ORAL DAILY
Refills: 0 | Status: DISCONTINUED | OUTPATIENT
Start: 2022-10-31 | End: 2022-11-04

## 2022-10-31 RX ORDER — DEXTROSE 50 % IN WATER 50 %
12.5 SYRINGE (ML) INTRAVENOUS ONCE
Refills: 0 | Status: DISCONTINUED | OUTPATIENT
Start: 2022-10-31 | End: 2022-11-04

## 2022-10-31 RX ORDER — ATORVASTATIN CALCIUM 80 MG/1
80 TABLET, FILM COATED ORAL AT BEDTIME
Refills: 0 | Status: DISCONTINUED | OUTPATIENT
Start: 2022-10-31 | End: 2022-11-04

## 2022-10-31 RX ORDER — DEXTROSE 50 % IN WATER 50 %
25 SYRINGE (ML) INTRAVENOUS ONCE
Refills: 0 | Status: DISCONTINUED | OUTPATIENT
Start: 2022-10-31 | End: 2022-11-04

## 2022-10-31 RX ORDER — INFLUENZA VIRUS VACCINE 15; 15; 15; 15 UG/.5ML; UG/.5ML; UG/.5ML; UG/.5ML
0.7 SUSPENSION INTRAMUSCULAR ONCE
Refills: 0 | Status: DISCONTINUED | OUTPATIENT
Start: 2022-10-31 | End: 2022-11-09

## 2022-10-31 RX ORDER — PHENYLEPHRINE HYDROCHLORIDE 10 MG/ML
0.15 INJECTION INTRAVENOUS
Qty: 40 | Refills: 0 | Status: DISCONTINUED | OUTPATIENT
Start: 2022-10-31 | End: 2022-11-01

## 2022-10-31 RX ORDER — INSULIN LISPRO 100/ML
VIAL (ML) SUBCUTANEOUS AT BEDTIME
Refills: 0 | Status: DISCONTINUED | OUTPATIENT
Start: 2022-10-31 | End: 2022-11-01

## 2022-10-31 RX ORDER — GLUCAGON INJECTION, SOLUTION 0.5 MG/.1ML
1 INJECTION, SOLUTION SUBCUTANEOUS ONCE
Refills: 0 | Status: DISCONTINUED | OUTPATIENT
Start: 2022-10-31 | End: 2022-11-04

## 2022-10-31 RX ORDER — SENNA PLUS 8.6 MG/1
2 TABLET ORAL AT BEDTIME
Refills: 0 | Status: DISCONTINUED | OUTPATIENT
Start: 2022-10-31 | End: 2022-11-04

## 2022-10-31 RX ORDER — METOPROLOL TARTRATE 50 MG
50 TABLET ORAL DAILY
Refills: 0 | Status: DISCONTINUED | OUTPATIENT
Start: 2022-10-31 | End: 2022-11-01

## 2022-10-31 RX ORDER — ACETAMINOPHEN 500 MG
650 TABLET ORAL EVERY 6 HOURS
Refills: 0 | Status: DISCONTINUED | OUTPATIENT
Start: 2022-10-31 | End: 2022-11-04

## 2022-10-31 RX ORDER — DULOXETINE HYDROCHLORIDE 30 MG/1
60 CAPSULE, DELAYED RELEASE ORAL DAILY
Refills: 0 | Status: DISCONTINUED | OUTPATIENT
Start: 2022-10-31 | End: 2022-11-04

## 2022-10-31 RX ORDER — HEPARIN SODIUM 5000 [USP'U]/ML
5000 INJECTION INTRAVENOUS; SUBCUTANEOUS EVERY 8 HOURS
Refills: 0 | Status: DISCONTINUED | OUTPATIENT
Start: 2022-10-31 | End: 2022-11-01

## 2022-10-31 RX ORDER — POLYETHYLENE GLYCOL 3350 17 G/17G
17 POWDER, FOR SOLUTION ORAL DAILY
Refills: 0 | Status: DISCONTINUED | OUTPATIENT
Start: 2022-10-31 | End: 2022-11-04

## 2022-10-31 RX ORDER — OXYCODONE HYDROCHLORIDE 5 MG/1
5 TABLET ORAL ONCE
Refills: 0 | Status: DISCONTINUED | OUTPATIENT
Start: 2022-10-31 | End: 2022-11-01

## 2022-10-31 RX ORDER — ONDANSETRON 8 MG/1
4 TABLET, FILM COATED ORAL ONCE
Refills: 0 | Status: DISCONTINUED | OUTPATIENT
Start: 2022-10-31 | End: 2022-11-01

## 2022-10-31 RX ORDER — DEXTROSE 50 % IN WATER 50 %
15 SYRINGE (ML) INTRAVENOUS ONCE
Refills: 0 | Status: DISCONTINUED | OUTPATIENT
Start: 2022-10-31 | End: 2022-11-04

## 2022-10-31 RX ORDER — HYDROMORPHONE HYDROCHLORIDE 2 MG/ML
0.25 INJECTION INTRAMUSCULAR; INTRAVENOUS; SUBCUTANEOUS
Refills: 0 | Status: DISCONTINUED | OUTPATIENT
Start: 2022-10-31 | End: 2022-11-01

## 2022-10-31 RX ADMIN — Medication 1: at 21:57

## 2022-10-31 RX ADMIN — HEPARIN SODIUM 5000 UNIT(S): 5000 INJECTION INTRAVENOUS; SUBCUTANEOUS at 21:57

## 2022-10-31 RX ADMIN — MEMANTINE HYDROCHLORIDE 10 MILLIGRAM(S): 10 TABLET ORAL at 18:31

## 2022-10-31 RX ADMIN — SODIUM CHLORIDE 60 MILLILITER(S): 9 INJECTION, SOLUTION INTRAVENOUS at 17:04

## 2022-10-31 RX ADMIN — SODIUM CHLORIDE 1000 MILLILITER(S): 9 INJECTION INTRAMUSCULAR; INTRAVENOUS; SUBCUTANEOUS at 21:28

## 2022-10-31 RX ADMIN — SODIUM CHLORIDE 60 MILLILITER(S): 9 INJECTION, SOLUTION INTRAVENOUS at 16:59

## 2022-10-31 RX ADMIN — PHENYLEPHRINE HYDROCHLORIDE 5 MICROGRAM(S)/KG/MIN: 10 INJECTION INTRAVENOUS at 22:16

## 2022-10-31 NOTE — PATIENT PROFILE ADULT - FALL HARM RISK - HARM RISK INTERVENTIONS

## 2022-10-31 NOTE — PROGRESS NOTE ADULT - ASSESSMENT
72 M with PMhx of Alzheimer, CAD, bladder CA, DM, HTN, HLD, PAD s/p right fem-tib bypass (10/31/22)    - Neuro: Hx of Alzheimer, nonverbal at baseline  - CV: hypotensive to SBP 70's in PACU, started Hiren gtt, post-op H/H 8.4/26.2, repeat H/H (10p) 6.8/21.3, order 2u PRBC. s/p 500cc bolus x 1  - Pulm: on RA, no issues  - GI: Diabetic diet, ISS. lactate 3.0, continue hydration, will repeat lactate  - : oliguria post-op ~20cc/h. continue IVF  - Endo: K 6.1, Insulin/D50, ECG, repeat BMP after shifting, switch LR to NS  - Pain: tylenol, oxycodone prn   - DVT ppx: SQH. started ASA  - started home meds  - Appreciate SICU consult for requiring pressor for BP support    d/w Dr. Grissom Vascular Surgery fellow    Vascular Surgery   [id number]

## 2022-10-31 NOTE — PROVIDER CONTACT NOTE (CRITICAL VALUE NOTIFICATION) - BACKGROUND
rt leg femoral popliteal Bypass
rt leg femoral popliteals bypass
s/p rt leg femoral popliteal bypass

## 2022-10-31 NOTE — PROGRESS NOTE ADULT - SUBJECTIVE AND OBJECTIVE BOX
STATUS POST:  right fem-tib bypass  POST OPERATIVE DAY #: 0    Patient seen and examined at bedside. Nonverbal at baseline, alert    Vital Signs Last 24 Hrs  T(C): 36.5 (31 Oct 2022 23:00), Max: 36.5 (31 Oct 2022 23:00)  T(F): 97.7 (31 Oct 2022 23:00), Max: 97.7 (31 Oct 2022 23:00)  HR: 61 (01 Nov 2022 00:00) (53 - 106)  BP: 132/65 (01 Nov 2022 00:00) (70/47 - 159/67)  BP(mean): 93 (01 Nov 2022 00:00) (55 - 119)  RR: 16 (01 Nov 2022 00:00) (15 - 16)  SpO2: 100% (01 Nov 2022 00:00) (99% - 100%)    Parameters below as of 01 Nov 2022 00:00  Patient On (Oxygen Delivery Method): nasal cannula  O2 Flow (L/min): 2    Physical Exam    Gen: nonverbal at baseline, NAD  Heart: RRR  Pulm: nonlabor breathing   Heart: RRR  Abd: soft, ND/NT  Ext: groins soft, palpable femoral pulses b/l        RLE: compartments soft, no hematoma or strikethrough bleeding, aquacel dressing in place. + DP/PT signal, weak AT pulse  Skin: warm to touch    I&O's Summary    31 Oct 2022 07:01  -  01 Nov 2022 00:51  --------------------------------------------------------  IN: 1246.5 mL / OUT: 210 mL / NET: 1036.5 mL      I&O's Detail    31 Oct 2022 07:01  -  01 Nov 2022 00:51  --------------------------------------------------------  IN:    Lactated Ringers: 480 mL    Oral Fluid: 150 mL    Phenylephrine: 16.5 mL    PRBCs (Packed Red Blood Cells): 600 mL  Total IN: 1246.5 mL    OUT:    Indwelling Catheter - Urethral (mL): 210 mL  Total OUT: 210 mL    Total NET: 1036.5 mL          MEDICATIONS  (STANDING):  acetaminophen    Suspension .. 650 milliGRAM(s) Oral every 6 hours  acetaminophen   IVPB .. 1000 milliGRAM(s) IV Intermittent once  aspirin enteric coated 81 milliGRAM(s) Oral daily  atorvastatin 80 milliGRAM(s) Oral at bedtime  chlorhexidine 2% Cloths 1 Application(s) Topical once  dextrose 5%. 1000 milliLiter(s) (50 mL/Hr) IV Continuous <Continuous>  dextrose 5%. 1000 milliLiter(s) (100 mL/Hr) IV Continuous <Continuous>  dextrose 50% Injectable 25 Gram(s) IV Push once  dextrose 50% Injectable 12.5 Gram(s) IV Push once  dextrose 50% Injectable 25 Gram(s) IV Push once  dextrose 50% Injectable 50 milliLiter(s) IV Push once  diVALproex  milliGRAM(s) Oral daily  DULoxetine 60 milliGRAM(s) Oral daily  glucagon  Injectable 1 milliGRAM(s) IntraMuscular once  heparin   Injectable 5000 Unit(s) SubCutaneous every 8 hours  influenza  Vaccine (HIGH DOSE) 0.7 milliLiter(s) IntraMuscular once  insulin lispro (ADMELOG) corrective regimen sliding scale   SubCutaneous three times a day before meals  insulin lispro (ADMELOG) corrective regimen sliding scale   SubCutaneous at bedtime  insulin regular  human recombinant 10 Unit(s) IV Push once  lidocaine 1% Injectable 0.2 milliLiter(s) Local Injection once  memantine 10 milliGRAM(s) Oral two times a day  metoprolol succinate ER 50 milliGRAM(s) Oral daily  phenylephrine    Infusion 0.151 MICROgram(s)/kG/Min (5 mL/Hr) IV Continuous <Continuous>  polyethylene glycol 3350 17 Gram(s) Oral daily  QUEtiapine 25 milliGRAM(s) Oral daily  senna 2 Tablet(s) Oral at bedtime  sodium chloride 0.9%. 1000 milliLiter(s) (75 mL/Hr) IV Continuous <Continuous>  vancomycin  IVPB 1250 milliGRAM(s) IV Intermittent once    MEDICATIONS  (PRN):  dextrose Oral Gel 15 Gram(s) Oral once PRN Blood Glucose LESS THAN 70 milliGRAM(s)/deciliter  HYDROmorphone  Injectable 0.25 milliGRAM(s) IV Push every 10 minutes PRN Severe Pain (7 - 10)  ondansetron Injectable 4 milliGRAM(s) IV Push once PRN Nausea and/or Vomiting  oxyCODONE    IR 5 milliGRAM(s) Oral once PRN Moderate Pain (4 - 6)  oxyCODONE    IR 5 milliGRAM(s) Oral every 4 hours PRN Moderate Pain (4 - 6)  oxyCODONE    IR 10 milliGRAM(s) Oral every 4 hours PRN Severe Pain (7 - 10)      LABS:                        6.8    13.17 )-----------( 159      ( 31 Oct 2022 22:09 )             21.3     10-31    138  |  105  |  35<H>  ----------------------------<  264<H>  6.1<H>   |  20<L>  |  1.63<H>    Ca    9.1      31 Oct 2022 23:58  Phos  6.8     10-31  Mg     1.8     10-31            RADIOLOGY & ADDITIONAL STUDIES:

## 2022-10-31 NOTE — PROVIDER CONTACT NOTE (CRITICAL VALUE NOTIFICATION) - ASSESSMENT
pt on MARIELA gtt .given 500 Ml bolus as per order
denies pain on Hiren GTT
pt MARIELA Gtt  denies pain

## 2022-10-31 NOTE — BRIEF OPERATIVE NOTE - OPERATION/FINDINGS
GSV diminutive and diseased on pre-op US.  Fem-proximal AT bypass with 6mm ringed PTFE anatomic tunnel with palpable distal AT at case completion.

## 2022-10-31 NOTE — PRE-ANESTHESIA EVALUATION ADULT - NSANTHTOBACCOSD_GEN_ALL_CORE
ONCOLOGIST: JACQUELINE Toney MD    CC: Breast cancer followup.    HPI: 89-year-old female with a history of bilateral breast cancer with metastatic disease to the lung status post lung resection, bilateral mastectomies, and radiation therapy/ chemo in 1985. She has been on long term Tamoxifen with no adverse side effects. She has been followed every 6 mons with clinical exams, labs and chest x-rays.     Pt has dips in her platelet counts intermittently dating back to Sept 2012. Saw Dr. Toney 9/25/15- no further recommendations- just follow cbc. Dr. Toney would like for her to remain on Tamoxifen indefinitely. Had gyn exam with Dr. Coronado on 10/7/15.     Pt comes in for 6 mon chest x-ray and chest wall exam. Lab review.     ROS: Denies sob, chest pain, weakness or fatigue, dizziness, nausea, wt loss or gain. No sore throat. No sinus congestion, dry cough or sneezing. No fever or night sweats. No itchy eyes. No lela aches or pains out of the ordinary. No change in bowel or bladder function or character. No chest wall lumps. No appetite changes. No vaginal discharge or bleeding. No lower extremity edema or pain. Has hearing aids and hears very well. No gingival bleeding, nose bleeds or unusual bruising. Not walking- admits needs to get back in to it. States feels good.  Balance of ROS is negative otherwise.     PAST MEDICAL HISTORY: Arthritis, obstructive sleep apnea, bilateral breast cancer with metastatic disease to lung s/p resection, osteoporosis, thrombocytopenia.     PAST SURGICAL HISTORY: Bilateral mastectomies. Lung resection.     FAMILY HISTORY: Sisters with hypertension, father and mother with heart disease, sister with asthma, sister with breast cancer, aunt with colon cancer. Niece in mid 30's breast cancer. There is also a family history of osteoporosis.     SOCIAL HISTORY: She is single, never , no children. Does not smoke and reports alcohol usage of 1 to 2 per week, caffeine 4 to 5 per day.  Denies any regular exercise program. Works or volunteers several times a month with [employer]     PE:   GENERAL: Awake, alert, adult female. Appears younger than her stated age. Well-nourished and developed.  HEAD: Atraumatic and normocephalic. Pupils equal round reactive to light. Conjunctiva is non-icteric. Oral mucosa pink and moist with no posterior erythema. Nasal passages patent and pale/boggy in appearance.   NECK: Soft, supple, symmetric. No thyromegaly. No bruit garett carotids.   LYMPHATICS: There is no cervical, supraclavicular, infraclavicular or axillary adenopathy.  CV: Regular rate rhythm with no murmurs or gallops.  CHEST: Clear to auscultation. Unlabored respiratory effort.  CHEST WALL: Breasts are surgically absent. There is no visible rash, lump, erythema, dimpling, or architectural distortion over bilateral chest walls. No palpable abnormality appreciated over the chest walls.  ABDOMEN: Soft, nontender, no hepatosplenomegaly.  SKIN: Warm and dry to touch. Skin turgor within normal limits.    EXTREMITIES: There is no clubbing, cyanosis, or edema. No evidence of lymphedema in the upper extremities.  PSYCH: Affect is appropriate and pleasant.    CHEST X-ray today - stable findings.   Cbc and cmp pending  Last colonoscopy 2/2005 - had polyp excised. Was recommended by Dr. Pierre to have 5 year follow-up. This was ordered and Gastro decided due to her age she would be ok not to have the scope.     ASSESSMENT:  1. Bilateral breast cancer with metastatic disease to the lung. Status post bilateral mastectomies and lung resection. Status post chemotherapy and chest wall radiation and on long-term tamoxifen.  2. Sleep apnea. Using CPAP without difficulty  3. Health maintenance. Colonoscopy canceled by gastro - decided not needed  4. Hearing loss corrected with bilateral hearing aides  5. Thrombocytopenia intermittent and relatively stable- labs pending  6. CXR - stable    PLAN:  1. RTC 6 mons with PA and  lateral chest x-ray, CBC, tsh, lipids and chem 20  2. B12 sublinguinal daily.   3. Patient was asked to perform monthly chest wall examinations and was instructed on what to look for during the exam.  4. Patient encouraged to get back into her walking and she agrees.      Clarice Lombardi, RN, MSN, NP-C           former, quit/Yes

## 2022-10-31 NOTE — BRIEF OPERATIVE NOTE - NSICDXBRIEFPREOP_GEN_ALL_CORE_FT
PRE-OP DIAGNOSIS:  PAD (peripheral artery disease) 31-Oct-2022 16:00:19  Kathy Kam  Gangrene of toe of right foot 31-Oct-2022 16:01:03  Kathy Kam

## 2022-11-01 LAB
ANION GAP SERPL CALC-SCNC: 13 MMOL/L — SIGNIFICANT CHANGE UP (ref 5–17)
ANION GAP SERPL CALC-SCNC: 13 MMOL/L — SIGNIFICANT CHANGE UP (ref 5–17)
ANION GAP SERPL CALC-SCNC: 14 MMOL/L — SIGNIFICANT CHANGE UP (ref 5–17)
APTT BLD: 24.5 SEC — LOW (ref 27.5–35.5)
BUN SERPL-MCNC: 35 MG/DL — HIGH (ref 7–23)
BUN SERPL-MCNC: 35 MG/DL — HIGH (ref 7–23)
BUN SERPL-MCNC: 37 MG/DL — HIGH (ref 7–23)
CALCIUM SERPL-MCNC: 9.1 MG/DL — SIGNIFICANT CHANGE UP (ref 8.4–10.5)
CALCIUM SERPL-MCNC: 9.1 MG/DL — SIGNIFICANT CHANGE UP (ref 8.4–10.5)
CALCIUM SERPL-MCNC: 9.2 MG/DL — SIGNIFICANT CHANGE UP (ref 8.4–10.5)
CHLORIDE SERPL-SCNC: 105 MMOL/L — SIGNIFICANT CHANGE UP (ref 96–108)
CHLORIDE SERPL-SCNC: 106 MMOL/L — SIGNIFICANT CHANGE UP (ref 96–108)
CHLORIDE SERPL-SCNC: 106 MMOL/L — SIGNIFICANT CHANGE UP (ref 96–108)
CO2 SERPL-SCNC: 19 MMOL/L — LOW (ref 22–31)
CO2 SERPL-SCNC: 20 MMOL/L — LOW (ref 22–31)
CO2 SERPL-SCNC: 20 MMOL/L — LOW (ref 22–31)
CREAT SERPL-MCNC: 1.6 MG/DL — HIGH (ref 0.5–1.3)
CREAT SERPL-MCNC: 1.63 MG/DL — HIGH (ref 0.5–1.3)
CREAT SERPL-MCNC: 1.77 MG/DL — HIGH (ref 0.5–1.3)
EGFR: 40 ML/MIN/1.73M2 — LOW
EGFR: 44 ML/MIN/1.73M2 — LOW
EGFR: 45 ML/MIN/1.73M2 — LOW
GAS PNL BLDA: SIGNIFICANT CHANGE UP
GAS PNL BLDA: SIGNIFICANT CHANGE UP
GLUCOSE BLDC GLUCOMTR-MCNC: 177 MG/DL — HIGH (ref 70–99)
GLUCOSE BLDC GLUCOMTR-MCNC: 200 MG/DL — HIGH (ref 70–99)
GLUCOSE BLDC GLUCOMTR-MCNC: 227 MG/DL — HIGH (ref 70–99)
GLUCOSE BLDC GLUCOMTR-MCNC: 228 MG/DL — HIGH (ref 70–99)
GLUCOSE BLDC GLUCOMTR-MCNC: 239 MG/DL — HIGH (ref 70–99)
GLUCOSE BLDC GLUCOMTR-MCNC: 326 MG/DL — HIGH (ref 70–99)
GLUCOSE SERPL-MCNC: 179 MG/DL — HIGH (ref 70–99)
GLUCOSE SERPL-MCNC: 238 MG/DL — HIGH (ref 70–99)
GLUCOSE SERPL-MCNC: 264 MG/DL — HIGH (ref 70–99)
HCT VFR BLD CALC: 30.2 % — LOW (ref 39–50)
HCT VFR BLD CALC: 30.5 % — LOW (ref 39–50)
HGB BLD-MCNC: 10.2 G/DL — LOW (ref 13–17)
HGB BLD-MCNC: 10.3 G/DL — LOW (ref 13–17)
INR BLD: 1.08 RATIO — SIGNIFICANT CHANGE UP (ref 0.88–1.16)
LACTATE SERPL-SCNC: 2.3 MMOL/L — HIGH (ref 0.5–2)
MAGNESIUM SERPL-MCNC: 1.7 MG/DL — SIGNIFICANT CHANGE UP (ref 1.6–2.6)
MAGNESIUM SERPL-MCNC: 2.3 MG/DL — SIGNIFICANT CHANGE UP (ref 1.6–2.6)
MCHC RBC-ENTMCNC: 30.7 PG — SIGNIFICANT CHANGE UP (ref 27–34)
MCHC RBC-ENTMCNC: 31 PG — SIGNIFICANT CHANGE UP (ref 27–34)
MCHC RBC-ENTMCNC: 33.8 GM/DL — SIGNIFICANT CHANGE UP (ref 32–36)
MCHC RBC-ENTMCNC: 33.8 GM/DL — SIGNIFICANT CHANGE UP (ref 32–36)
MCV RBC AUTO: 91 FL — SIGNIFICANT CHANGE UP (ref 80–100)
MCV RBC AUTO: 91.8 FL — SIGNIFICANT CHANGE UP (ref 80–100)
NRBC # BLD: 0 /100 WBCS — SIGNIFICANT CHANGE UP (ref 0–0)
NRBC # BLD: 0 /100 WBCS — SIGNIFICANT CHANGE UP (ref 0–0)
PHOSPHATE SERPL-MCNC: 3.6 MG/DL — SIGNIFICANT CHANGE UP (ref 2.5–4.5)
PHOSPHATE SERPL-MCNC: 4.7 MG/DL — HIGH (ref 2.5–4.5)
PLATELET # BLD AUTO: 163 K/UL — SIGNIFICANT CHANGE UP (ref 150–400)
PLATELET # BLD AUTO: 174 K/UL — SIGNIFICANT CHANGE UP (ref 150–400)
POTASSIUM SERPL-MCNC: 5.1 MMOL/L — SIGNIFICANT CHANGE UP (ref 3.5–5.3)
POTASSIUM SERPL-MCNC: 5.4 MMOL/L — HIGH (ref 3.5–5.3)
POTASSIUM SERPL-MCNC: 6.1 MMOL/L — HIGH (ref 3.5–5.3)
POTASSIUM SERPL-SCNC: 5.1 MMOL/L — SIGNIFICANT CHANGE UP (ref 3.5–5.3)
POTASSIUM SERPL-SCNC: 5.4 MMOL/L — HIGH (ref 3.5–5.3)
POTASSIUM SERPL-SCNC: 6.1 MMOL/L — HIGH (ref 3.5–5.3)
PROTHROM AB SERPL-ACNC: 12.5 SEC — SIGNIFICANT CHANGE UP (ref 10.5–13.4)
RBC # BLD: 3.29 M/UL — LOW (ref 4.2–5.8)
RBC # BLD: 3.35 M/UL — LOW (ref 4.2–5.8)
RBC # FLD: 14.1 % — SIGNIFICANT CHANGE UP (ref 10.3–14.5)
RBC # FLD: 14.3 % — SIGNIFICANT CHANGE UP (ref 10.3–14.5)
SODIUM SERPL-SCNC: 138 MMOL/L — SIGNIFICANT CHANGE UP (ref 135–145)
SODIUM SERPL-SCNC: 138 MMOL/L — SIGNIFICANT CHANGE UP (ref 135–145)
SODIUM SERPL-SCNC: 140 MMOL/L — SIGNIFICANT CHANGE UP (ref 135–145)
WBC # BLD: 11.64 K/UL — HIGH (ref 3.8–10.5)
WBC # BLD: 9.87 K/UL — SIGNIFICANT CHANGE UP (ref 3.8–10.5)
WBC # FLD AUTO: 11.64 K/UL — HIGH (ref 3.8–10.5)
WBC # FLD AUTO: 9.87 K/UL — SIGNIFICANT CHANGE UP (ref 3.8–10.5)

## 2022-11-01 PROCEDURE — 93010 ELECTROCARDIOGRAM REPORT: CPT

## 2022-11-01 RX ORDER — MAGNESIUM SULFATE 500 MG/ML
2 VIAL (ML) INJECTION ONCE
Refills: 0 | Status: COMPLETED | OUTPATIENT
Start: 2022-11-01 | End: 2022-11-01

## 2022-11-01 RX ORDER — DEXTROSE 50 % IN WATER 50 %
50 SYRINGE (ML) INTRAVENOUS ONCE
Refills: 0 | Status: COMPLETED | OUTPATIENT
Start: 2022-11-01 | End: 2022-11-01

## 2022-11-01 RX ORDER — INSULIN LISPRO 100/ML
VIAL (ML) SUBCUTANEOUS
Refills: 0 | Status: DISCONTINUED | OUTPATIENT
Start: 2022-11-01 | End: 2022-11-04

## 2022-11-01 RX ORDER — ACETAMINOPHEN 500 MG
1000 TABLET ORAL ONCE
Refills: 0 | Status: COMPLETED | OUTPATIENT
Start: 2022-11-01 | End: 2022-11-01

## 2022-11-01 RX ORDER — SODIUM CHLORIDE 9 MG/ML
1000 INJECTION INTRAMUSCULAR; INTRAVENOUS; SUBCUTANEOUS
Refills: 0 | Status: DISCONTINUED | OUTPATIENT
Start: 2022-11-01 | End: 2022-11-04

## 2022-11-01 RX ORDER — DEXTROSE 50 % IN WATER 50 %
50 SYRINGE (ML) INTRAVENOUS ONCE
Refills: 0 | Status: DISCONTINUED | OUTPATIENT
Start: 2022-11-01 | End: 2022-11-01

## 2022-11-01 RX ORDER — FUROSEMIDE 40 MG
20 TABLET ORAL ONCE
Refills: 0 | Status: COMPLETED | OUTPATIENT
Start: 2022-11-01 | End: 2022-11-01

## 2022-11-01 RX ORDER — INSULIN HUMAN 100 [IU]/ML
10 INJECTION, SOLUTION SUBCUTANEOUS ONCE
Refills: 0 | Status: DISCONTINUED | OUTPATIENT
Start: 2022-11-01 | End: 2022-11-01

## 2022-11-01 RX ORDER — INSULIN HUMAN 100 [IU]/ML
10 INJECTION, SOLUTION SUBCUTANEOUS ONCE
Refills: 0 | Status: COMPLETED | OUTPATIENT
Start: 2022-11-01 | End: 2022-11-01

## 2022-11-01 RX ORDER — RIVAROXABAN 15 MG-20MG
2.5 KIT ORAL
Refills: 0 | Status: DISCONTINUED | OUTPATIENT
Start: 2022-11-01 | End: 2022-11-04

## 2022-11-01 RX ORDER — INSULIN LISPRO 100/ML
VIAL (ML) SUBCUTANEOUS AT BEDTIME
Refills: 0 | Status: DISCONTINUED | OUTPATIENT
Start: 2022-11-01 | End: 2022-11-04

## 2022-11-01 RX ADMIN — MEMANTINE HYDROCHLORIDE 10 MILLIGRAM(S): 10 TABLET ORAL at 06:29

## 2022-11-01 RX ADMIN — Medication 50 MILLILITER(S): at 00:55

## 2022-11-01 RX ADMIN — MEMANTINE HYDROCHLORIDE 10 MILLIGRAM(S): 10 TABLET ORAL at 18:27

## 2022-11-01 RX ADMIN — Medication 2: at 06:46

## 2022-11-01 RX ADMIN — Medication 1000 MILLIGRAM(S): at 03:49

## 2022-11-01 RX ADMIN — HEPARIN SODIUM 5000 UNIT(S): 5000 INJECTION INTRAVENOUS; SUBCUTANEOUS at 06:43

## 2022-11-01 RX ADMIN — Medication 25 GRAM(S): at 04:55

## 2022-11-01 RX ADMIN — QUETIAPINE FUMARATE 25 MILLIGRAM(S): 200 TABLET, FILM COATED ORAL at 12:58

## 2022-11-01 RX ADMIN — DIVALPROEX SODIUM 250 MILLIGRAM(S): 500 TABLET, DELAYED RELEASE ORAL at 12:57

## 2022-11-01 RX ADMIN — RIVAROXABAN 2.5 MILLIGRAM(S): KIT at 23:55

## 2022-11-01 RX ADMIN — SENNA PLUS 2 TABLET(S): 8.6 TABLET ORAL at 21:18

## 2022-11-01 RX ADMIN — DULOXETINE HYDROCHLORIDE 60 MILLIGRAM(S): 30 CAPSULE, DELAYED RELEASE ORAL at 13:00

## 2022-11-01 RX ADMIN — RIVAROXABAN 2.5 MILLIGRAM(S): KIT at 12:58

## 2022-11-01 RX ADMIN — Medication 650 MILLIGRAM(S): at 18:21

## 2022-11-01 RX ADMIN — POLYETHYLENE GLYCOL 3350 17 GRAM(S): 17 POWDER, FOR SOLUTION ORAL at 12:59

## 2022-11-01 RX ADMIN — Medication 650 MILLIGRAM(S): at 17:51

## 2022-11-01 RX ADMIN — Medication 400 MILLIGRAM(S): at 03:47

## 2022-11-01 RX ADMIN — INSULIN HUMAN 10 UNIT(S): 100 INJECTION, SOLUTION SUBCUTANEOUS at 00:56

## 2022-11-01 RX ADMIN — SODIUM CHLORIDE 75 MILLILITER(S): 9 INJECTION INTRAMUSCULAR; INTRAVENOUS; SUBCUTANEOUS at 00:54

## 2022-11-01 RX ADMIN — Medication 2: at 15:21

## 2022-11-01 RX ADMIN — Medication 4: at 21:22

## 2022-11-01 RX ADMIN — Medication 650 MILLIGRAM(S): at 21:18

## 2022-11-01 RX ADMIN — Medication 20 MILLIGRAM(S): at 03:40

## 2022-11-01 RX ADMIN — Medication 4: at 18:27

## 2022-11-01 RX ADMIN — ATORVASTATIN CALCIUM 80 MILLIGRAM(S): 80 TABLET, FILM COATED ORAL at 21:18

## 2022-11-01 RX ADMIN — Medication 81 MILLIGRAM(S): at 12:58

## 2022-11-01 NOTE — CONSULT NOTE ADULT - NS PANP COMMENT GEN_ALL_CORE FT
patient was noted to have low urine output, remained off vasopressor support after blood transfusion. also noted to have hyperkalemia with some hemolysis. treated with temporizing measure as well as lasix. please monitor for urine output and repeat labs.   at this time patient does not need ICU admission. please call us with any questions or concerns.

## 2022-11-01 NOTE — PHYSICAL THERAPY INITIAL EVALUATION ADULT - NSPTDISCHREC_GEN_A_CORE
pt is currently at his baseline, return home with prior level of assist and home PT/Home PT pt is currently at his baseline level of function, return home with prior level of assist and home PT/Home PT

## 2022-11-01 NOTE — PHYSICAL THERAPY INITIAL EVALUATION ADULT - ACTIVE RANGE OF MOTION EXAMINATION, REHAB EVAL
assessed via spontaneous movement of BUE at least WFL, BLE ankle WFL, BLE hip/knee NA 2/2 to pt not following commands/bilateral upper extremity Active ROM was WFL (within functional limits)

## 2022-11-01 NOTE — PHYSICAL THERAPY INITIAL EVALUATION ADULT - ADDITIONAL COMMENTS
Pt lives in a private home with wife, there are 0 steps to enter, first floor set up. handicap accessible. Pt performed ADL/IADLs with assistance. Primarily used w/c for mobility. Owns DME: motorized chair, transport w/c, commode, shower chair, cane, RW Pt lives in a private home with wife, there are 0 steps to enter, first floor set up. handicap accessible. Pt performed ADL/IADLs with assistance. Primarily used w/c for mobility. Pt required total assist for transfers from bed to chair, wife states currently in the process of obtaining bobby lift. Owns DME: motorized chair, transport w/c, commode, shower chair, cane, RW. Pt currently at baseline level of function.

## 2022-11-01 NOTE — CONSULT NOTE ADULT - SUBJECTIVE AND OBJECTIVE BOX
HISTORY:  71 y/o male w/ a PMHx of CAD s/p stent (GELA to RCA in 2018), HTN, HLD, T2DM c/b peripheral neuropathy, DOLORES (non-compliant w/ CPAP), bladder CA s/p laser ablation, Alzheimer's dementia (minimally verbal & requires full assistance w/ ADLs), anxiety, and PAD s/p right CFA stent c/b occlusion s/p endarterectomy (Oct 2021) & left CFA endarterectomy (May 2021) presenting for a scheduled right femoral-proximal AT bypass w/ PTFE for a non-healing right foot wound. Case was ~4 hours. He was given 2.2 L of crystalloid. EBL was 100 mL and UOP was 350 mL. Patient required vasopressor support intra-operatively but was off by the end of the case so he was extubated and transferred to PACU for further management. However, patient became hypotensive again and was given a 500 mL bolus of NS w/ no improvement so he was restarted on a phenylephrine infusion. H&H was found to have trended down from 10.8/33.8 pre-operatively to 8.4/26.2 to 6.8/21.3 post-operatively so 2 units of PRBCs were ordered. SICU consulted for hemodynamic monitoring. Unable to obtain ROS as patient does not answer questions appropriately.    HISTORY OF PRESENT ILLNESS:  PAST MEDICAL HISTORY: HTN (Hypertension)    Diabetes    Hyperlipidemia    Rotator Cuff Disorder    Anxiety    Cancer of Bladder    PAD (peripheral artery disease)    Peripheral neuropathy    DOLORES on CPAP    Malignant neoplasm of urinary bladder, unspecified site    Dementia    Atherosclerosis of native artery of extremity    Type 2 diabetes mellitus    Alzheimer disease     activity    CAD (coronary artery disease)    ALEXA (acute kidney injury)    H/O orthostatic hypotension      PAST SURGICAL HISTORY: History of Coronary Angioplasty    arthroscopy    Knee Surgery    bladder surgery    H/O cardiac catheterization    S/P cataract surgery    H/O peripheral artery bypass    H/O endarterectomy      HOME MEDICATIONS: Home Medications:  Alogliptin 25 mg oral tablet: 1 tab(s) orally once a day (31 Oct 2022 10:11)  aspirin 81 mg oral delayed release tablet: 1 tab(s) orally once a day (31 Oct 2022 10:11)  atorvastatin 80 mg oral tablet: 1 tab(s) orally once a day (at bedtime) (31 Oct 2022 10:11)  clopidogrel 75 mg oral tablet: 1 tab(s) orally once a day (31 Oct 2022 10:11)  divalproex sodium 250 mg oral tablet, extended release: 1 tab(s) orally once a day (31 Oct 2022 10:11)  DULoxetine 60 mg oral delayed release capsule: 1 cap(s) orally once a day (31 Oct 2022 10:11)  memantine 10 mg oral tablet: 1 tab(s) orally 2 times a day (31 Oct 2022 10:11)  metFORMIN 1000 mg oral tablet: 1 tab(s) orally 2 times a day (31 Oct 2022 10:11)  metoprolol: 50 milligram(s) orally once a day (31 Oct 2022 10:11)  MiraLax oral powder for reconstitution: 1 dose(s) orally once a day (31 Oct 2022 10:11)  QUEtiapine 25 mg oral tablet: 1 tab(s) orally once a day (31 Oct 2022 10:11)  rivastigmine 13.3 mg/24 hr transdermal film, extended release: 1 patch transdermal every 24 hours (31 Oct 2022 10:11)  senna (sennosides) 8.6 mg oral tablet: 2 tab(s) orally once a day (at bedtime) (31 Oct 2022 10:11)  Vitamin B-12 1000 mcg oral tablet: 1 tab(s) orally once a day (31 Oct 2022 10:11)  Xanax: 0.125 milligram(s) orally once a day (at bedtime) (31 Oct 2022 10:11)    ALLERGIES: Ceclor (Rash; Urticaria)    FAMILY HISTORY: No pertinent family history in first degree relatives    FH: senile dementia (Father, Mother)    FH: diabetes mellitus (Father)    FH: breast cancer (Mother)      SOCIAL HISTORY:  REVIEW OF SYSTEMS:  Constitutional - no fatigue, fever, weakness, or night sweats  HEENT - no vision changes, ear pain, vertigo, post-nasal drip, changes to smell, sore throat, voice changes, throat swelling, or swollen glands  CV - no chest pain, chest tightness, or palpitations  Resp - no shortness of breath, cough, or wheezing  GI - no abdominal pain, nausea/vomiting, bowel habit changes, indigestion, diarrhea, or constipation  Renal - no pain w/ urination, flank pain, or incontinence  MSK - no muscle cramps, muscle pain, or difficulty walking  Skin - no itching, hives, rashes, unusual bruising, or unusual bleeding  VITAL SIGNS:  ICU Vital Signs Last 24 Hrs  T(C): 36.2 (01 Nov 2022 01:45), Max: 36.5 (31 Oct 2022 23:00)  T(F): 97.2 (01 Nov 2022 01:45), Max: 97.7 (31 Oct 2022 23:00)  HR: 63 (01 Nov 2022 01:45) (53 - 106)  BP: 126/77 (01 Nov 2022 01:45) (70/47 - 159/67)  BP(mean): 92 (01 Nov 2022 01:45) (55 - 120)  ABP: 145/52 (01 Nov 2022 01:45) (61/36 - 159/62)  ABP(mean): 85 (01 Nov 2022 01:45) (45 - 96)  RR: 16 (01 Nov 2022 01:45) (15 - 16)  SpO2: 100% (01 Nov 2022 01:45) (99% - 100%)    O2 Parameters below as of 01 Nov 2022 01:00  Patient On (Oxygen Delivery Method): nasal cannula  O2 Flow (L/min): 2        PHYSICAL EXAMINATION:  General -   Neuro -   HEENT -   Lungs -   Heart -   Abdomen -   Extremities -   Skin -   LABS:                        6.8    13.17 )-----------( 159      ( 31 Oct 2022 22:09 )             21.3     10-31    138  |  105  |  35<H>  ----------------------------<  264<H>  6.1<H>   |  20<L>  |  1.63<H>    Ca    9.1      31 Oct 2022 23:58  Phos  6.8     10-31  Mg     1.8     10-31        ABG - ( 31 Oct 2022 22:43 )  pH: 7.40  /  pCO2: 32    /  pO2: 124   / HCO3: 20    / Base Excess: -4.4  /  SaO2: 97.8    Lactate: x                  RECENT CULTURES:    CAPILLARY BLOOD GLUCOSE      POCT Blood Glucose.: 239 mg/dL (01 Nov 2022 01:41)  POCT Blood Glucose.: 255 mg/dL (31 Oct 2022 21:51)  POCT Blood Glucose.: 252 mg/dL (31 Oct 2022 21:50)  POCT Blood Glucose.: 188 mg/dL (31 Oct 2022 16:23)  POCT Blood Glucose.: 172 mg/dL (31 Oct 2022 09:50)    IMAGING STUDIES: HISTORY OF PRESENT ILLNESS:  71 y/o male w/ a PMHx of CAD s/p stent (GELA to RCA in 2018), HTN, HLD, T2DM c/b peripheral neuropathy, DOLORES (non-compliant w/ CPAP), bladder CA s/p laser ablation, Alzheimer's dementia (minimally verbal & requires full assistance w/ ADLs), anxiety, and PAD s/p right CFA stent w/ eventual need for endarterectomy (Oct 2021) & left CFA endarterectomy (May 2021) presenting for a scheduled right femoral-proximal AT bypass w/ PTFE for a non-healing right foot wound. Case was ~4 hours. He was given 2.2 L of crystalloid. EBL was 100 mL and UOP was 350 mL. Patient required vasopressor support intra-operatively but was off by the end of the case so he was extubated and transferred to PACU for further management. However, patient became hypotensive again and was given a 500 mL bolus of NS w/ no improvement so he was restarted on a phenylephrine infusion. H&H was found to have trended down from 10.8/33.8 pre-operatively to 8.4/26.2 to 6.8/21.3 post-operatively so 2 units of PRBCs were ordered. SICU consulted for hemodynamic monitoring. Unable to obtain ROS as patient does not answer questions appropriately.    PAST MEDICAL & SURGICAL HISTORY:  - CAD (coronary artery disease) S/P GELA to RCA in 2018  - HTN (Hypertension)  - Hyperlipidemia  - Type 2 diabetes mellitus  - Peripheral neuropathy  - DOLORES but non-compliant on CPAP  - Cancer of Bladder S/P laser ablation  - Alzheimer disease  - Anxiety  - PAD (peripheral artery disease)  - H/O right CFA stent  - H/O bilateral CFA endarterectomy  - H/O peripheral artery bypass  - Rotator Cuff Disorder  - Right shoulder arthroscopy  - Meniscal knee surgery  - S/P cataract surgery    HOME MEDICATIONS:  - Alogliptin 25 mg oral tablet: 1 tab(s) orally once a day  - aspirin 81 mg oral delayed release tablet: 1 tab(s) orally once a day  - atorvastatin 80 mg oral tablet: 1 tab(s) orally once a day (at bedtime)  - clopidogrel 75 mg oral tablet: 1 tab(s) orally once a day  - divalproex sodium 250 mg oral tablet, extended release: 1 tab(s) orally once a day  - DULoxetine 60 mg oral delayed release capsule: 1 cap(s) orally once a day  - memantine 10 mg oral tablet: 1 tab(s) orally 2 times a day  - metFORMIN 1000 mg oral tablet: 1 tab(s) orally 2 times a day  - metoprolol: 50 milligram(s) orally once a day  - MiraLax oral powder for reconstitution: 1 dose(s) orally once a day  - QUEtiapine 25 mg oral tablet: 1 tab(s) orally once a day  - rivastigmine 13.3 mg/24 hr transdermal film, extended release: 1 patch transdermal every 24 hours  - senna (sennosides) 8.6 mg oral tablet: 2 tab(s) orally once a day (at bedtime)  - Vitamin B-12 1000 mcg oral tablet: 1 tab(s) orally once a day  - Xanax: 0.125 milligram(s) orally once a day (at bedtime)    ALLERGIES: Ceclor (Rash; Urticaria)    FAMILY HISTORY:  - FH: senile dementia (Father, Mother)  - FH: diabetes mellitus (Father)  - FH: breast cancer (Mother)    SOCIAL HISTORY: Unable to obtain ROS as patient does not answer questions appropriately    REVIEW OF SYSTEMS: Unable to obtain ROS as patient does not answer questions appropriately    VITAL SIGNS:  T(C): 36.2 (01 Nov 2022 01:45), Max: 36.5 (31 Oct 2022 23:00)  T(F): 97.2 (01 Nov 2022 01:45), Max: 97.7 (31 Oct 2022 23:00)  HR: 63 (01 Nov 2022 01:45) (53 - 106)  BP: 126/77 (01 Nov 2022 01:45) (70/47 - 159/67)  BP(mean): 92 (01 Nov 2022 01:45) (55 - 120)  ABP: 145/52 (01 Nov 2022 01:45) (61/36 - 159/62)  ABP(mean): 85 (01 Nov 2022 01:45) (45 - 96)  RR: 16 (01 Nov 2022 01:45) (15 - 16)  SpO2: 100% (01 Nov 2022 01:45) (99% - 100%)    PHYSICAL EXAMINATION:  General -   Neuro - awake  HEENT -   Lungs -   Heart -   Abdomen -   Extremities -   Skin -   LABS:                        6.8    13.17 )-----------( 159      ( 31 Oct 2022 22:09 )             21.3     10-31    138  |  105  |  35<H>  ----------------------------<  264<H>  6.1<H>   |  20<L>  |  1.63<H>    Ca    9.1      31 Oct 2022 23:58  Phos  6.8     10-31  Mg     1.8     10-31        ABG - ( 31 Oct 2022 22:43 )  pH: 7.40  /  pCO2: 32    /  pO2: 124   / HCO3: 20    / Base Excess: -4.4  /  SaO2: 97.8    Lactate: x                  RECENT CULTURES:    CAPILLARY BLOOD GLUCOSE      POCT Blood Glucose.: 239 mg/dL (01 Nov 2022 01:41)  POCT Blood Glucose.: 255 mg/dL (31 Oct 2022 21:51)  POCT Blood Glucose.: 252 mg/dL (31 Oct 2022 21:50)  POCT Blood Glucose.: 188 mg/dL (31 Oct 2022 16:23)  POCT Blood Glucose.: 172 mg/dL (31 Oct 2022 09:50)    IMAGING STUDIES: HISTORY OF PRESENT ILLNESS:  73 y/o male w/ a PMHx of CAD s/p stent (GELA to RCA in 2018), HTN, HLD, T2DM c/b peripheral neuropathy, DOLORES (non-compliant w/ CPAP), bladder CA s/p laser ablation, Alzheimer's dementia (minimally verbal & requires full assistance w/ ADLs), anxiety, and PAD s/p right CFA stent w/ eventual need for endarterectomy (Oct 2021) & left CFA endarterectomy (May 2021) presenting for a scheduled right femoral-proximal AT bypass w/ PTFE for a non-healing right foot wound. Case was ~4 hours. He was given 2.2 L of crystalloid. EBL was 100 mL and UOP was 350 mL. Patient required vasopressor support intra-operatively but was off by the end of the case so he was extubated and transferred to PACU for further management. However, patient became hypotensive again and was given a 500 mL bolus of NS w/ no improvement so he was restarted on a phenylephrine infusion. H&H was found to have trended down from 10.8/33.8 pre-operatively to 8.4/26.2 to 6.8/21.3 post-operatively so 2 units of PRBCs were ordered. SICU consulted for hemodynamic monitoring. Unable to obtain ROS as patient does not answer questions appropriately.    PAST MEDICAL & SURGICAL HISTORY:  - CAD (coronary artery disease) S/P GELA to RCA in 2018  - HTN (Hypertension)  - Hyperlipidemia  - Type 2 diabetes mellitus  - Peripheral neuropathy  - DOLORES but non-compliant on CPAP  - Cancer of Bladder S/P laser ablation  - Alzheimer disease  - Anxiety  - PAD (peripheral artery disease)  - H/O right CFA stent  - H/O bilateral CFA endarterectomy  - H/O peripheral artery bypass  - Rotator Cuff Disorder  - Right shoulder arthroscopy  - Meniscal knee surgery  - S/P cataract surgery    HOME MEDICATIONS:  - Alogliptin 25 mg oral tablet: 1 tab(s) orally once a day  - aspirin 81 mg oral delayed release tablet: 1 tab(s) orally once a day  - atorvastatin 80 mg oral tablet: 1 tab(s) orally once a day (at bedtime)  - clopidogrel 75 mg oral tablet: 1 tab(s) orally once a day  - divalproex sodium 250 mg oral tablet, extended release: 1 tab(s) orally once a day  - DULoxetine 60 mg oral delayed release capsule: 1 cap(s) orally once a day  - memantine 10 mg oral tablet: 1 tab(s) orally 2 times a day  - metFORMIN 1000 mg oral tablet: 1 tab(s) orally 2 times a day  - metoprolol: 50 milligram(s) orally once a day  - MiraLax oral powder for reconstitution: 1 dose(s) orally once a day  - QUEtiapine 25 mg oral tablet: 1 tab(s) orally once a day  - rivastigmine 13.3 mg/24 hr transdermal film, extended release: 1 patch transdermal every 24 hours  - senna (sennosides) 8.6 mg oral tablet: 2 tab(s) orally once a day (at bedtime)  - Vitamin B-12 1000 mcg oral tablet: 1 tab(s) orally once a day  - Xanax: 0.125 milligram(s) orally once a day (at bedtime)    ALLERGIES: Ceclor (Rash; Urticaria)    FAMILY HISTORY:  - FH: senile dementia (Father, Mother)  - FH: diabetes mellitus (Father)  - FH: breast cancer (Mother)    SOCIAL HISTORY: Unable to obtain ROS as patient does not answer questions appropriately    REVIEW OF SYSTEMS: Unable to obtain ROS as patient does not answer questions appropriately    VITAL SIGNS:  T(C): 36.2 (01 Nov 2022 01:45), Max: 36.5 (31 Oct 2022 23:00)  T(F): 97.2 (01 Nov 2022 01:45), Max: 97.7 (31 Oct 2022 23:00)  HR: 63 (01 Nov 2022 01:45) (53 - 106)  BP: 126/77 (01 Nov 2022 01:45) (70/47 - 159/67)  BP(mean): 92 (01 Nov 2022 01:45) (55 - 120)  ABP: 145/52 (01 Nov 2022 01:45) (61/36 - 159/62)  ABP(mean): 85 (01 Nov 2022 01:45) (45 - 96)  RR: 16 (01 Nov 2022 01:45) (15 - 16)  SpO2: 100% (01 Nov 2022 01:45) (99% - 100%)    PHYSICAL EXAMINATION:  General - well-nourished, no acute distress  Neuro - somnolent, opens eyes spontaneously, does not follow commands or answer questions appropriately (baseline), moving all four extremities spontaneously  HEENT - normocephalic, PERRL, moist mucous membranes  Lungs - clear to auscultation bilaterally  Heart - regular rate and rhythm, S1S2  Abdomen - soft, nontender, nondistended  Extremities - all four extremities are warm & pink, RLE AT signal  Skin - warm & pink w/ no rashes or lesions on gross examination    LABS:                        6.8    13.17 )-----------( 159      ( 31 Oct 2022 22:09 )             21.3     138  |  105  |  35<H>  ----------------------------<  264<H>  6.1<H>   |  20<L>  |  1.63<H>    Ca    9.1      31 Oct 2022 23:58  Phos  6.8  Mg     1.8    ABG - ( 31 Oct 2022 22:43 )  pH: 7.40  /  pCO2: 32    /  pO2: 124   / HCO3: 20    / Base Excess: -4.4  /  SaO2: 97.8  /  Lactate: 2.8    CAPILLARY BLOOD GLUCOSE  - 239 mg/dL (01 Nov 2022 01:41)  - 255 mg/dL (31 Oct 2022 21:51)  - 252 mg/dL (31 Oct 2022 21:50)  - 188 mg/dL (31 Oct 2022 16:23)  - 172 mg/dL (31 Oct 2022 09:50)    IMAGING STUDIES:

## 2022-11-01 NOTE — CONSULT NOTE ADULT - SUBJECTIVE AND OBJECTIVE BOX
Podiatry pager #: [phone]/ [phone]    Patient is a 72y old  Male who presents with a chief complaint of right fem-tib bypass (31 Oct 2022 22:35)      HPI:  72 year old male presents for right leg femoral-popliteal bypass. Pt. with a history of PAD; s/p right endarterectomy of the right common femoral artery 10/21. Currently has necrotic lesion on his right great toe which is progressively worsening over the past few months. Pt. with Alzheimer's dementia, minimally verbal, requires full assistance for ADLs.    Pre procedure COVID PCR scheduled for 10/28/22; pt. had COVID infection 3/21 and was asymptomatic. (10 Oct 2022 15:41)      PAST MEDICAL & SURGICAL HISTORY:  HTN (Hypertension)      Hyperlipidemia      Rotator Cuff Disorder      Anxiety      Cancer of Bladder  2000      PAD (peripheral artery disease)      Peripheral neuropathy      DOLORES on CPAP  non complaint with CPAP      Malignant neoplasm of urinary bladder, unspecified site      Atherosclerosis of native artery of extremity      Type 2 diabetes mellitus      Alzheimer disease       activity  [facility]      CAD (coronary artery disease)  GELA RCA 2018      ALEXA (acute kidney injury)  losartan discontinued and renal funciton improved      H/O orthostatic hypotension      arthroscopy  right shoulderx 2 2008      Knee Surgery  1998- meniscal      bladder surgery  2000 washout/laser      H/O cardiac catheterization  2018- stent x1 mid RCA      S/P cataract surgery  bilateral      H/O peripheral artery bypass  left femoral May 2021      H/O endarterectomy  right common femoral artery 10/21          MEDICATIONS  (STANDING):  acetaminophen    Suspension .. 650 milliGRAM(s) Oral every 6 hours  aspirin enteric coated 81 milliGRAM(s) Oral daily  atorvastatin 80 milliGRAM(s) Oral at bedtime  chlorhexidine 2% Cloths 1 Application(s) Topical once  dextrose 5%. 1000 milliLiter(s) (50 mL/Hr) IV Continuous <Continuous>  dextrose 5%. 1000 milliLiter(s) (100 mL/Hr) IV Continuous <Continuous>  dextrose 50% Injectable 25 Gram(s) IV Push once  dextrose 50% Injectable 12.5 Gram(s) IV Push once  dextrose 50% Injectable 25 Gram(s) IV Push once  diVALproex  milliGRAM(s) Oral daily  DULoxetine 60 milliGRAM(s) Oral daily  glucagon  Injectable 1 milliGRAM(s) IntraMuscular once  influenza  Vaccine (HIGH DOSE) 0.7 milliLiter(s) IntraMuscular once  insulin lispro (ADMELOG) corrective regimen sliding scale   SubCutaneous three times a day before meals  insulin lispro (ADMELOG) corrective regimen sliding scale   SubCutaneous at bedtime  lidocaine 1% Injectable 0.2 milliLiter(s) Local Injection once  memantine 10 milliGRAM(s) Oral two times a day  polyethylene glycol 3350 17 Gram(s) Oral daily  QUEtiapine 25 milliGRAM(s) Oral daily  rivaroxaban 2.5 milliGRAM(s) Oral two times a day  senna 2 Tablet(s) Oral at bedtime  sodium chloride 0.9%. 1000 milliLiter(s) (75 mL/Hr) IV Continuous <Continuous>    MEDICATIONS  (PRN):  dextrose Oral Gel 15 Gram(s) Oral once PRN Blood Glucose LESS THAN 70 milliGRAM(s)/deciliter      Allergies    Ceclor (Rash; Urticaria)    Intolerances        VITALS:    Vital Signs Last 24 Hrs  T(C): 37 (01 Nov 2022 13:30), Max: 37.1 (01 Nov 2022 10:25)  T(F): 98.6 (01 Nov 2022 13:30), Max: 98.7 (01 Nov 2022 10:25)  HR: 92 (01 Nov 2022 13:30) (53 - 93)  BP: 138/72 (01 Nov 2022 13:30) (70/47 - 168/69)  BP(mean): 83 (01 Nov 2022 09:30) (55 - 120)  RR: 18 (01 Nov 2022 13:30) (14 - 20)  SpO2: 95% (01 Nov 2022 13:30) (95% - 100%)    Parameters below as of 01 Nov 2022 13:30  Patient On (Oxygen Delivery Method): room air        LABS:                          10.2   9.87  )-----------( 174      ( 01 Nov 2022 07:50 )             30.2       11-01    140  |  106  |  37<H>  ----------------------------<  179<H>  5.1   |  20<L>  |  1.77<H>    Ca    9.1      01 Nov 2022 07:48  Phos  3.6     11-01  Mg     2.3     11-01        CAPILLARY BLOOD GLUCOSE      POCT Blood Glucose.: 200 mg/dL (01 Nov 2022 14:33)  POCT Blood Glucose.: 177 mg/dL (01 Nov 2022 06:26)  POCT Blood Glucose.: 227 mg/dL (01 Nov 2022 02:49)  POCT Blood Glucose.: 239 mg/dL (01 Nov 2022 01:41)  POCT Blood Glucose.: 255 mg/dL (31 Oct 2022 21:51)  POCT Blood Glucose.: 252 mg/dL (31 Oct 2022 21:50)      PT/INR - ( 01 Nov 2022 02:58 )   PT: 12.5 sec;   INR: 1.08 ratio         PTT - ( 01 Nov 2022 02:58 )  PTT:24.5 sec    LOWER EXTREMITY PHYSICAL EXAM:    Vasular: DP/PT 0/4, B/L, CFT < 3 seconds all pedal digits besides R foot 1 and 5 which is absent, Temperature gradient warm to warm R, warm to cool left  Neuro: Epicritic sensation intact to the level of the digits, B/L.  Musculoskeletal/Ortho: non-ambi  Skin: R foot fullthickness dry gangren to level of MPJ, no bogginess or drainage, R foot distal 5th tip dry gangrene, no drainage, tracking or erythema, L foot 2nd and 3rd toe distal abrasions, granular base, no clinical signs of infection      RADIOLOGY & ADDITIONAL STUDIES:

## 2022-11-01 NOTE — CONSULT NOTE ADULT - ASSESSMENT
73 y/o male w/ a PMHx of CAD s/p stent, HTN, HLD, T2DM c/b peripheral neuropathy, DOLORES but non-compliant w/ CPAP, bladder CA s/p laser ablation, Alzheimer's dementia (minimally verbal & requires full assistance w/ ADLs), anxiety, and PAD s/p right CFA stent w/ eventual need for endarterectomy & left CFA endarterectomy now presenting s/p right femoral-proximal AT bypass w/ PTFE for a non-healing right foot wound w/ a post-op course c/b hemorrhagic shock    Neuro: Alzheimer's, anxiety  - Assess neurological neurovascular status every  hours in the setting of  - Sedation while intubated w/  - Multimodal pain control w/ acetaminophen, oxycodone, and Dilaudid IVP PRN  - Home Depakote, duloxetine, memantine, rivastigmine, and Seroquel for Alzheimer's & anxiety  - Holding home Xanax for now given high risk for causing delirium in the ICU but will clarify how much patient takes w/ family so that if the patient does take it every night, it can be restarted to prevent benzodiazepine withdrawal    Resp: DOLORES non-compliant w/ CPAP  - Out of bed to chair, ambulate as tolerated, and incentive spirometry to prevent atelectasis    CV: CAD, hemorrhagic shock, history of HTN  - Quickly weaned off vasopressor support while blood transfusion was in progress  - Home ASA for CAD & PAD w/ plans to restart Plavix at later date  - Holding home metoprolol for CAD & HTN due to recent hemorrhagic shock    GI: no acute issues  - Regular diet as tolerated  - Bowel regimen with senna & Miralax    Renal: oliguric ALEXA on CKD stage III (baseline Cr 1.3), hyperkalemia  - NS at 75 mL/hr until patient has adequate PO intake  - POCUS demonstrates euvolemia so ALEXA may likely be due to ATN in the setting of hemorrhagic shock  - Will give calcium and shift potassium w/ D50 & insulin w/ plan to diurese w/ Lasix if patient remains off vasopressor support in 1-2 hours    Heme: acute blood loss anemia  - Monitor CBC & coags  - Blood loss likely occurred intra-operatively as RLE has soft compartments w/ no swelling and surgical site appears C/D/I  - Lovenox for VTE prophylaxis  - Home ASA for CAD & PAD    ID: no acute issues    Endo: T2DM, HLD  - Monitor glucose w/ ISS AC & QHS for glycemic control  - HgbA1C 6.9% on 10/10  - Home atorvastatin for HLD    MSK: PAD s/p right femoral-proximal AT bypass  - Will assess vascular status q4hrs    Code Status: Full code    Disposition: Accepted to SICU but will remain in PACU pending bed availability in SICU    Janis Levi PA-C     [id number]

## 2022-11-01 NOTE — PROGRESS NOTE ADULT - ASSESSMENT
71 y/o male w/ a PMHx of CAD s/p stent, HTN, HLD, T2DM c/b peripheral neuropathy, DOLORES but non-compliant w/ CPAP, bladder CA s/p laser ablation, Alzheimer's dementia (minimally verbal & requires full assistance w/ ADLs), anxiety, and PAD s/p right CFA stent w/ eventual need for endarterectomy & left CFA endarterectomy now presenting s/p right femoral-proximal AT bypass w/ PTFE for a non-healing right foot wound w/ a post-op course c/b hemorrhagic shock.     - Multimodal pain control   - Encourage OOB to chair, IS  - Diet: Regular diet, bowel regimen with senna and miralax  - K+ 5.1 this AM s/p shift x1. Will monitor closely  - PVD: Xarelto 2.5 mg started today  - Lovenox for VTE prophylaxis  - Appreciate podiatry recs for right foot wound  - Appreciate PT eval today      [phone]  Vascular Surgery

## 2022-11-01 NOTE — PHYSICAL THERAPY INITIAL EVALUATION ADULT - MANUAL MUSCLE TESTING RESULTS, REHAB EVAL
spontaneous movement of BUE at least 2+/5, BLE ankle at least 3/5, BLE hip/knee NA 2/2 to pt not following commands/grossly assessed due to

## 2022-11-01 NOTE — PROGRESS NOTE ADULT - SUBJECTIVE AND OBJECTIVE BOX
SURGERY DAILY PROGRESS NOTE:     SUBJECTIVE/ROS: Patient feels well. Patient not responsive to full ROS.     MEDICATIONS  (STANDING):  acetaminophen    Suspension .. 650 milliGRAM(s) Oral every 6 hours  aspirin enteric coated 81 milliGRAM(s) Oral daily  atorvastatin 80 milliGRAM(s) Oral at bedtime  chlorhexidine 2% Cloths 1 Application(s) Topical once  dextrose 5%. 1000 milliLiter(s) (50 mL/Hr) IV Continuous <Continuous>  dextrose 5%. 1000 milliLiter(s) (100 mL/Hr) IV Continuous <Continuous>  dextrose 50% Injectable 25 Gram(s) IV Push once  dextrose 50% Injectable 12.5 Gram(s) IV Push once  dextrose 50% Injectable 25 Gram(s) IV Push once  diVALproex  milliGRAM(s) Oral daily  DULoxetine 60 milliGRAM(s) Oral daily  glucagon  Injectable 1 milliGRAM(s) IntraMuscular once  heparin   Injectable 5000 Unit(s) SubCutaneous every 8 hours  influenza  Vaccine (HIGH DOSE) 0.7 milliLiter(s) IntraMuscular once  insulin lispro (ADMELOG) corrective regimen sliding scale   SubCutaneous three times a day before meals  insulin lispro (ADMELOG) corrective regimen sliding scale   SubCutaneous at bedtime  lidocaine 1% Injectable 0.2 milliLiter(s) Local Injection once  memantine 10 milliGRAM(s) Oral two times a day  phenylephrine    Infusion 0.151 MICROgram(s)/kG/Min (5 mL/Hr) IV Continuous <Continuous>  polyethylene glycol 3350 17 Gram(s) Oral daily  QUEtiapine 25 milliGRAM(s) Oral daily  rivaroxaban 2.5 milliGRAM(s) Oral two times a day  senna 2 Tablet(s) Oral at bedtime  sodium chloride 0.9%. 1000 milliLiter(s) (75 mL/Hr) IV Continuous <Continuous>    MEDICATIONS  (PRN):  dextrose Oral Gel 15 Gram(s) Oral once PRN Blood Glucose LESS THAN 70 milliGRAM(s)/deciliter  ondansetron Injectable 4 milliGRAM(s) IV Push once PRN Nausea and/or Vomiting      OBJECTIVE:  Vital Signs Last 24 Hrs  T(C): 36.5 (01 Nov 2022 07:30), Max: 36.7 (01 Nov 2022 04:00)  T(F): 97.7 (01 Nov 2022 07:30), Max: 98.1 (01 Nov 2022 04:00)  HR: 88 (01 Nov 2022 08:30) (53 - 106)  BP: 117/83 (01 Nov 2022 08:30) (70/47 - 168/69)  BP(mean): 94 (01 Nov 2022 08:30) (55 - 120)  RR: 14 (01 Nov 2022 08:30) (14 - 16)  SpO2: 100% (01 Nov 2022 08:30) (99% - 100%)    Parameters below as of 01 Nov 2022 08:30  Patient On (Oxygen Delivery Method): room air    I&O's Detail  31 Oct 2022 07:01  -  01 Nov 2022 07:00  --------------------------------------------------------  IN:    IV PiggyBack: 150 mL    Lactated Ringers: 480 mL    Oral Fluid: 150 mL    Phenylephrine: 16.5 mL    PRBCs (Packed Red Blood Cells): 600 mL    sodium chloride 0.9%: 525 mL  Total IN: 1921.5 mL    OUT:    Indwelling Catheter - Urethral (mL): 770 mL  Total OUT: 770 mL  Total NET: 1151.5 mL      01 Nov 2022 07:01  -  01 Nov 2022 09:17  --------------------------------------------------------  IN:    Oral Fluid: 200 mL    sodium chloride 0.9%: 75 mL  Total IN: 275 mL    OUT:    Indwelling Catheter - Urethral (mL): 75 mL    Phenylephrine: 0 mL  Total OUT: 75 mL  Total NET: 200 mL    Daily Height in cm: 180.34 (31 Oct 2022 10:34)    Daily     LABS:                        10.2   9.87  )-----------( 174      ( 01 Nov 2022 07:50 )             30.2     11-01    140  |  106  |  37<H>  ----------------------------<  179<H>  5.1   |  20<L>  |  1.77<H>    Ca    9.1      01 Nov 2022 07:48  Phos  3.6     11-01  Mg     2.3     11-01      PT/INR - ( 01 Nov 2022 02:58 )   PT: 12.5 sec;   INR: 1.08 ratio    PTT - ( 01 Nov 2022 02:58 )  PTT:24.5 sec    PHYSICAL EXAMINATION:  General - well-nourished, no acute distress  Neuro - somnolent, opens eyes spontaneously, does not follow commands or answer questions appropriately (baseline), moving all four extremities spontaneously  HEENT - normocephalic, PERRL, moist mucous membranes  Lungs - clear to auscultation bilaterally  Abdomen - soft, nontender, nondistended  Extremities - all four extremities are warm & pink, RLE AT signal  Skin - warm & pink w/ no rashes or lesions on gross examination       SURGERY DAILY PROGRESS NOTE:     SUBJECTIVE/ROS: Patient feels well. Patient not responsive to full ROS. Appears comfortable     MEDICATIONS  (STANDING):  acetaminophen    Suspension .. 650 milliGRAM(s) Oral every 6 hours  aspirin enteric coated 81 milliGRAM(s) Oral daily  atorvastatin 80 milliGRAM(s) Oral at bedtime  chlorhexidine 2% Cloths 1 Application(s) Topical once  dextrose 5%. 1000 milliLiter(s) (50 mL/Hr) IV Continuous <Continuous>  dextrose 5%. 1000 milliLiter(s) (100 mL/Hr) IV Continuous <Continuous>  dextrose 50% Injectable 25 Gram(s) IV Push once  dextrose 50% Injectable 12.5 Gram(s) IV Push once  dextrose 50% Injectable 25 Gram(s) IV Push once  diVALproex  milliGRAM(s) Oral daily  DULoxetine 60 milliGRAM(s) Oral daily  glucagon  Injectable 1 milliGRAM(s) IntraMuscular once  heparin   Injectable 5000 Unit(s) SubCutaneous every 8 hours  influenza  Vaccine (HIGH DOSE) 0.7 milliLiter(s) IntraMuscular once  insulin lispro (ADMELOG) corrective regimen sliding scale   SubCutaneous three times a day before meals  insulin lispro (ADMELOG) corrective regimen sliding scale   SubCutaneous at bedtime  lidocaine 1% Injectable 0.2 milliLiter(s) Local Injection once  memantine 10 milliGRAM(s) Oral two times a day  phenylephrine    Infusion 0.151 MICROgram(s)/kG/Min (5 mL/Hr) IV Continuous <Continuous>  polyethylene glycol 3350 17 Gram(s) Oral daily  QUEtiapine 25 milliGRAM(s) Oral daily  rivaroxaban 2.5 milliGRAM(s) Oral two times a day  senna 2 Tablet(s) Oral at bedtime  sodium chloride 0.9%. 1000 milliLiter(s) (75 mL/Hr) IV Continuous <Continuous>    MEDICATIONS  (PRN):  dextrose Oral Gel 15 Gram(s) Oral once PRN Blood Glucose LESS THAN 70 milliGRAM(s)/deciliter  ondansetron Injectable 4 milliGRAM(s) IV Push once PRN Nausea and/or Vomiting      OBJECTIVE:  Vital Signs Last 24 Hrs  T(C): 36.5 (01 Nov 2022 07:30), Max: 36.7 (01 Nov 2022 04:00)  T(F): 97.7 (01 Nov 2022 07:30), Max: 98.1 (01 Nov 2022 04:00)  HR: 88 (01 Nov 2022 08:30) (53 - 106)  BP: 117/83 (01 Nov 2022 08:30) (70/47 - 168/69)  BP(mean): 94 (01 Nov 2022 08:30) (55 - 120)  RR: 14 (01 Nov 2022 08:30) (14 - 16)  SpO2: 100% (01 Nov 2022 08:30) (99% - 100%)    Parameters below as of 01 Nov 2022 08:30  Patient On (Oxygen Delivery Method): room air    I&O's Detail  31 Oct 2022 07:01  -  01 Nov 2022 07:00  --------------------------------------------------------  IN:    IV PiggyBack: 150 mL    Lactated Ringers: 480 mL    Oral Fluid: 150 mL    Phenylephrine: 16.5 mL    PRBCs (Packed Red Blood Cells): 600 mL    sodium chloride 0.9%: 525 mL  Total IN: 1921.5 mL    OUT:    Indwelling Catheter - Urethral (mL): 770 mL  Total OUT: 770 mL  Total NET: 1151.5 mL      01 Nov 2022 07:01  -  01 Nov 2022 09:17  --------------------------------------------------------  IN:    Oral Fluid: 200 mL    sodium chloride 0.9%: 75 mL  Total IN: 275 mL    OUT:    Indwelling Catheter - Urethral (mL): 75 mL    Phenylephrine: 0 mL  Total OUT: 75 mL  Total NET: 200 mL    Daily Height in cm: 180.34 (31 Oct 2022 10:34)    Daily     LABS:                        10.2   9.87  )-----------( 174      ( 01 Nov 2022 07:50 )             30.2     11-01    140  |  106  |  37<H>  ----------------------------<  179<H>  5.1   |  20<L>  |  1.77<H>    Ca    9.1      01 Nov 2022 07:48  Phos  3.6     11-01  Mg     2.3     11-01      PT/INR - ( 01 Nov 2022 02:58 )   PT: 12.5 sec;   INR: 1.08 ratio    PTT - ( 01 Nov 2022 02:58 )  PTT:24.5 sec    PHYSICAL EXAMINATION:  General - well-nourished, no acute distress  Neuro - opens eyes spontaneously, does not follow commands or answer questions appropriately (baseline), moving all four extremities spontaneously  HEENT - normocephalic, PERRL, moist mucous membranes  Abdomen - soft, nontender, nondistended  Extremities - all four extremities are warm & pink, RLE AT signal  Skin - warm & pink w/ no rashes or lesions on gross examination

## 2022-11-02 LAB
ANION GAP SERPL CALC-SCNC: 10 MMOL/L — SIGNIFICANT CHANGE UP (ref 5–17)
ANION GAP SERPL CALC-SCNC: 9 MMOL/L — SIGNIFICANT CHANGE UP (ref 5–17)
BUN SERPL-MCNC: 36 MG/DL — HIGH (ref 7–23)
BUN SERPL-MCNC: 38 MG/DL — HIGH (ref 7–23)
CALCIUM SERPL-MCNC: 8.6 MG/DL — SIGNIFICANT CHANGE UP (ref 8.4–10.5)
CALCIUM SERPL-MCNC: 8.7 MG/DL — SIGNIFICANT CHANGE UP (ref 8.4–10.5)
CHLORIDE SERPL-SCNC: 108 MMOL/L — SIGNIFICANT CHANGE UP (ref 96–108)
CHLORIDE SERPL-SCNC: 109 MMOL/L — HIGH (ref 96–108)
CO2 SERPL-SCNC: 23 MMOL/L — SIGNIFICANT CHANGE UP (ref 22–31)
CO2 SERPL-SCNC: 24 MMOL/L — SIGNIFICANT CHANGE UP (ref 22–31)
CREAT SERPL-MCNC: 1.63 MG/DL — HIGH (ref 0.5–1.3)
CREAT SERPL-MCNC: 1.71 MG/DL — HIGH (ref 0.5–1.3)
CRP SERPL-MCNC: 22 MG/L — HIGH (ref 0–4)
EGFR: 42 ML/MIN/1.73M2 — LOW
EGFR: 44 ML/MIN/1.73M2 — LOW
GLUCOSE BLDC GLUCOMTR-MCNC: 145 MG/DL — HIGH (ref 70–99)
GLUCOSE BLDC GLUCOMTR-MCNC: 172 MG/DL — HIGH (ref 70–99)
GLUCOSE BLDC GLUCOMTR-MCNC: 201 MG/DL — HIGH (ref 70–99)
GLUCOSE BLDC GLUCOMTR-MCNC: 206 MG/DL — HIGH (ref 70–99)
GLUCOSE SERPL-MCNC: 158 MG/DL — HIGH (ref 70–99)
GLUCOSE SERPL-MCNC: 159 MG/DL — HIGH (ref 70–99)
HCT VFR BLD CALC: 28.2 % — LOW (ref 39–50)
HCT VFR BLD CALC: 28.9 % — LOW (ref 39–50)
HGB BLD-MCNC: 9.3 G/DL — LOW (ref 13–17)
HGB BLD-MCNC: 9.5 G/DL — LOW (ref 13–17)
LACTATE SERPL-SCNC: 1.5 MMOL/L — SIGNIFICANT CHANGE UP (ref 0.5–2)
LACTATE SERPL-SCNC: 1.7 MMOL/L — SIGNIFICANT CHANGE UP (ref 0.5–2)
MAGNESIUM SERPL-MCNC: 2.1 MG/DL — SIGNIFICANT CHANGE UP (ref 1.6–2.6)
MAGNESIUM SERPL-MCNC: 2.2 MG/DL — SIGNIFICANT CHANGE UP (ref 1.6–2.6)
MCHC RBC-ENTMCNC: 30.9 PG — SIGNIFICANT CHANGE UP (ref 27–34)
MCHC RBC-ENTMCNC: 30.9 PG — SIGNIFICANT CHANGE UP (ref 27–34)
MCHC RBC-ENTMCNC: 32.9 GM/DL — SIGNIFICANT CHANGE UP (ref 32–36)
MCHC RBC-ENTMCNC: 33 GM/DL — SIGNIFICANT CHANGE UP (ref 32–36)
MCV RBC AUTO: 93.7 FL — SIGNIFICANT CHANGE UP (ref 80–100)
MCV RBC AUTO: 94.1 FL — SIGNIFICANT CHANGE UP (ref 80–100)
NRBC # BLD: 0 /100 WBCS — SIGNIFICANT CHANGE UP (ref 0–0)
NRBC # BLD: 0 /100 WBCS — SIGNIFICANT CHANGE UP (ref 0–0)
PHOSPHATE SERPL-MCNC: 3.9 MG/DL — SIGNIFICANT CHANGE UP (ref 2.5–4.5)
PHOSPHATE SERPL-MCNC: 3.9 MG/DL — SIGNIFICANT CHANGE UP (ref 2.5–4.5)
PLATELET # BLD AUTO: 145 K/UL — LOW (ref 150–400)
PLATELET # BLD AUTO: 156 K/UL — SIGNIFICANT CHANGE UP (ref 150–400)
POTASSIUM SERPL-MCNC: 4.6 MMOL/L — SIGNIFICANT CHANGE UP (ref 3.5–5.3)
POTASSIUM SERPL-MCNC: 4.7 MMOL/L — SIGNIFICANT CHANGE UP (ref 3.5–5.3)
POTASSIUM SERPL-SCNC: 4.6 MMOL/L — SIGNIFICANT CHANGE UP (ref 3.5–5.3)
POTASSIUM SERPL-SCNC: 4.7 MMOL/L — SIGNIFICANT CHANGE UP (ref 3.5–5.3)
RBC # BLD: 3.01 M/UL — LOW (ref 4.2–5.8)
RBC # BLD: 3.07 M/UL — LOW (ref 4.2–5.8)
RBC # FLD: 15.1 % — HIGH (ref 10.3–14.5)
RBC # FLD: 15.1 % — HIGH (ref 10.3–14.5)
SODIUM SERPL-SCNC: 141 MMOL/L — SIGNIFICANT CHANGE UP (ref 135–145)
SODIUM SERPL-SCNC: 142 MMOL/L — SIGNIFICANT CHANGE UP (ref 135–145)
WBC # BLD: 9.17 K/UL — SIGNIFICANT CHANGE UP (ref 3.8–10.5)
WBC # BLD: 9.39 K/UL — SIGNIFICANT CHANGE UP (ref 3.8–10.5)
WBC # FLD AUTO: 9.17 K/UL — SIGNIFICANT CHANGE UP (ref 3.8–10.5)
WBC # FLD AUTO: 9.39 K/UL — SIGNIFICANT CHANGE UP (ref 3.8–10.5)

## 2022-11-02 PROCEDURE — 73630 X-RAY EXAM OF FOOT: CPT | Mod: 26,RT

## 2022-11-02 RX ORDER — ASCORBIC ACID 60 MG
500 TABLET,CHEWABLE ORAL DAILY
Refills: 0 | Status: DISCONTINUED | OUTPATIENT
Start: 2022-11-02 | End: 2022-11-04

## 2022-11-02 RX ADMIN — Medication 1 TABLET(S): at 15:05

## 2022-11-02 RX ADMIN — DULOXETINE HYDROCHLORIDE 60 MILLIGRAM(S): 30 CAPSULE, DELAYED RELEASE ORAL at 11:14

## 2022-11-02 RX ADMIN — Medication 81 MILLIGRAM(S): at 11:13

## 2022-11-02 RX ADMIN — SENNA PLUS 2 TABLET(S): 8.6 TABLET ORAL at 21:41

## 2022-11-02 RX ADMIN — Medication 2: at 17:07

## 2022-11-02 RX ADMIN — Medication 500 MILLIGRAM(S): at 15:05

## 2022-11-02 RX ADMIN — MEMANTINE HYDROCHLORIDE 10 MILLIGRAM(S): 10 TABLET ORAL at 05:20

## 2022-11-02 RX ADMIN — Medication 650 MILLIGRAM(S): at 16:05

## 2022-11-02 RX ADMIN — POLYETHYLENE GLYCOL 3350 17 GRAM(S): 17 POWDER, FOR SOLUTION ORAL at 11:14

## 2022-11-02 RX ADMIN — MEMANTINE HYDROCHLORIDE 10 MILLIGRAM(S): 10 TABLET ORAL at 17:06

## 2022-11-02 RX ADMIN — RIVAROXABAN 2.5 MILLIGRAM(S): KIT at 12:19

## 2022-11-02 RX ADMIN — Medication 650 MILLIGRAM(S): at 15:05

## 2022-11-02 RX ADMIN — Medication 650 MILLIGRAM(S): at 08:55

## 2022-11-02 RX ADMIN — ATORVASTATIN CALCIUM 80 MILLIGRAM(S): 80 TABLET, FILM COATED ORAL at 21:41

## 2022-11-02 RX ADMIN — Medication 4: at 12:53

## 2022-11-02 RX ADMIN — Medication 650 MILLIGRAM(S): at 08:48

## 2022-11-02 NOTE — CHART NOTE - NSCHARTNOTEFT_GEN_A_CORE
Wound Care Team Note:    Request for wound care consult for foot wounds received. Wound care consult deferred to Podiatry. Please refer to podiatry for management/questions/concerns. Will not follow.    Beatris Tan NP-C, CWOCN [id number]

## 2022-11-02 NOTE — DIETITIAN INITIAL EVALUATION ADULT - NSICDXPASTSURGICALHX_GEN_ALL_CORE_FT
PAST SURGICAL HISTORY:  arthroscopy right shoulderx 2 2008    bladder surgery 2000 washout/laser    H/O cardiac catheterization 2018- stent x1 mid RCA    H/O endarterectomy right common femoral artery 10/21    H/O peripheral artery bypass left femoral May 2021    Knee Surgery 1998- meniscal    S/P cataract surgery bilateral

## 2022-11-02 NOTE — DIETITIAN INITIAL EVALUATION ADULT - NSICDXPASTMEDICALHX_GEN_ALL_CORE_FT
PAST MEDICAL HISTORY:  ALEXA (acute kidney injury) losartan discontinued and renal funciton improved    Alzheimer disease     Anxiety     Atherosclerosis of native artery of extremity     CAD (coronary artery disease) GELA RCA 2018    Cancer of Bladder 2000    H/O orthostatic hypotension     HTN (Hypertension)     Hyperlipidemia     Malignant neoplasm of urinary bladder, unspecified site      [facility]    DOLORES on CPAP non complaint with CPAP    PAD (peripheral artery disease)     Peripheral neuropathy     Rotator Cuff Disorder     Type 2 diabetes mellitus

## 2022-11-02 NOTE — DIETITIAN INITIAL EVALUATION ADULT - REASON FOR ADMISSION
Chart Reviewed, Events Noted:  "72 year old male presents for right leg femoral-popliteal bypass. Pt. with a history of PAD; s/p right endarterectomy of the right common femoral artery 10/21. Currently has necrotic lesion on his right great toe which is progressively worsening over the past few months. Pt. with Alzheimer's dementia, minimally verbal, requires full assistance for ADLs."

## 2022-11-02 NOTE — DIETITIAN INITIAL EVALUATION ADULT - REASON INDICATOR FOR ASSESSMENT
Suspected Deep tissue pressure injuries  Information obtained from: Review of pt. current medical record, interview with pt spouse (Estefany Barraza) who was at pt's bedside   during RD visit. Pt. with dx of Alzheimer disease, not a candidate to be interviewed.

## 2022-11-02 NOTE — DIETITIAN INITIAL EVALUATION ADULT - ORAL INTAKE PTA/DIET HISTORY
Prior to admission:  Spouse confirms no known food allergies /intolerances   Appetite/ PO intakes:  spouse reports  pt had a good appetite, required assistance during meal times  Chewing/Swallowing: Denies difficulty chewing/swallowing.   GI:  Spouse denies nausea, vomiting, diarrhea; reports that pt had occasional constipation and took Miralax at home  Vitamins/Minerals: As per H&P, took vitamin B12 1000 mcg PTA   Oral nutritional supplements: as per spouse, pt did not regularly take oral nutritional supplements at home PTA

## 2022-11-02 NOTE — PROGRESS NOTE ADULT - SUBJECTIVE AND OBJECTIVE BOX
Patient is a 72y old  Male who presents with a chief complaint of right fem-tib bypass (31 Oct 2022 22:35)       INTERVAL HPI/OVERNIGHT EVENTS:  Patient seen and evaluated at bedside.  Pt is resting comfortable in NAD. Denies N/V/F/C.    Allergies    Ceclor (Rash; Urticaria)    Intolerances        Vital Signs Last 24 Hrs  T(C): 36.9 (02 Nov 2022 10:24), Max: 37.1 (01 Nov 2022 17:09)  T(F): 98.5 (02 Nov 2022 10:24), Max: 98.7 (01 Nov 2022 17:09)  HR: 90 (02 Nov 2022 10:24) (85 - 104)  BP: 141/70 (02 Nov 2022 10:24) (110/60 - 146/88)  BP(mean): --  RR: 18 (02 Nov 2022 10:24) (18 - 20)  SpO2: 96% (02 Nov 2022 10:24) (94% - 98%)    Parameters below as of 02 Nov 2022 10:24  Patient On (Oxygen Delivery Method): room air        LABS:                        9.3    9.39  )-----------( 156      ( 02 Nov 2022 09:33 )             28.2     11-02    142  |  109<H>  |  36<H>  ----------------------------<  159<H>  4.7   |  24  |  1.71<H>    Ca    8.7      02 Nov 2022 09:33  Phos  3.9     11-02  Mg     2.1     11-02      PT/INR - ( 01 Nov 2022 02:58 )   PT: 12.5 sec;   INR: 1.08 ratio         PTT - ( 01 Nov 2022 02:58 )  PTT:24.5 sec    CAPILLARY BLOOD GLUCOSE      POCT Blood Glucose.: 145 mg/dL (02 Nov 2022 07:49)  POCT Blood Glucose.: 326 mg/dL (01 Nov 2022 21:20)  POCT Blood Glucose.: 228 mg/dL (01 Nov 2022 18:18)  POCT Blood Glucose.: 200 mg/dL (01 Nov 2022 14:33)      Lower Extremity Physical Exam:  Vasular: DP/PT 0/4, B/L, CFT < 3 seconds all pedal digits besides R foot 1 and 5 which is absent, Temperature gradient warm to warm R, warm to cool left  Neuro: Epicritic sensation intact to the level of the digits, B/L.  Musculoskeletal/Ortho: non-ambi  Skin: R foot full thickness dry gangren to level of MPJ, no bogginess or drainage, R foot distal 5th tip dry gangrene, no drainage, tracking or erythema, L foot 2nd and 3rd toe distal abrasions, granular base, no clinical signs of infection    RADIOLOGY & ADDITIONAL TESTS:   Patient is a 72y old  Male who presents with a chief complaint of right fem-tib bypass (31 Oct 2022 22:35)       INTERVAL HPI/OVERNIGHT EVENTS:  Patient seen and evaluated at bedside.  Pt is resting comfortable in NAD.    Allergies    Ceclor (Rash; Urticaria)    Intolerances        Vital Signs Last 24 Hrs  T(C): 36.9 (02 Nov 2022 10:24), Max: 37.1 (01 Nov 2022 17:09)  T(F): 98.5 (02 Nov 2022 10:24), Max: 98.7 (01 Nov 2022 17:09)  HR: 90 (02 Nov 2022 10:24) (85 - 104)  BP: 141/70 (02 Nov 2022 10:24) (110/60 - 146/88)  BP(mean): --  RR: 18 (02 Nov 2022 10:24) (18 - 20)  SpO2: 96% (02 Nov 2022 10:24) (94% - 98%)    Parameters below as of 02 Nov 2022 10:24  Patient On (Oxygen Delivery Method): room air        LABS:                        9.3    9.39  )-----------( 156      ( 02 Nov 2022 09:33 )             28.2     11-02    142  |  109<H>  |  36<H>  ----------------------------<  159<H>  4.7   |  24  |  1.71<H>    Ca    8.7      02 Nov 2022 09:33  Phos  3.9     11-02  Mg     2.1     11-02      PT/INR - ( 01 Nov 2022 02:58 )   PT: 12.5 sec;   INR: 1.08 ratio         PTT - ( 01 Nov 2022 02:58 )  PTT:24.5 sec    CAPILLARY BLOOD GLUCOSE      POCT Blood Glucose.: 145 mg/dL (02 Nov 2022 07:49)  POCT Blood Glucose.: 326 mg/dL (01 Nov 2022 21:20)  POCT Blood Glucose.: 228 mg/dL (01 Nov 2022 18:18)  POCT Blood Glucose.: 200 mg/dL (01 Nov 2022 14:33)      Lower Extremity Physical Exam:  Vasular: DP/PT 0/4, B/L, CFT < 3 seconds all pedal digits besides R foot 1 and 5 which is absent, Temperature gradient warm to warm R, warm to cool left  Neuro: Epicritic sensation intact to the level of the digits, B/L.  Musculoskeletal/Ortho: non-ambi  Skin: R foot full thickness dry gangren to level of MPJ, no bogginess or drainage, R foot distal 5th tip dry gangrene, no drainage, tracking or erythema, L foot 2nd and 3rd toe distal abrasions, granular base, no clinical signs of infection    RADIOLOGY & ADDITIONAL TESTS:

## 2022-11-02 NOTE — DIETITIAN INITIAL EVALUATION ADULT - ADD RECOMMEND
1) Continue Consistent Carbohydrate dietary restriction, Defer diet/texture modification to medical team/SLP as indicated    2) RD will provide diet mighty shakes 2x/day  3) Consider multivitamin and vitamin C supplements if no medical contraindications to aid in wound healing.   4) Encouraged PO intake as tolerated. Honor food preferences as appropriate and available. Diet education provided to spouse.   5) Monitor PO intake, GI tolerance, skin integrity and labs. RD remains available if needed, pt is aware.

## 2022-11-02 NOTE — DIETITIAN INITIAL EVALUATION ADULT - NSFNSADHERENCEPTAFT_GEN_A_CORE
Wife reported that she monitored pt. intake of "junk food & sweets" but pt wasn't on any specific dietary restrictions at home. Most recent A1C: 6.9 % (10-10-22), indicates fair glycemic control. As per H&P, pt took metformin for diabetes medical management

## 2022-11-02 NOTE — CONSULT NOTE ADULT - SUBJECTIVE AND OBJECTIVE BOX
CHIEF COMPLAINT:Patient is a 72y old  Male who presents with a chief complaint of Chart Reviewed, Events Noted:  "72 year old male presents for right leg femoral-popliteal bypass. Pt. with a history of PAD; s/p right endarterectomy of the right common femoral artery 10/21. Currently has necrotic lesion on his right great toe which is progressively worsening over the past few months. Pt. with Alzheimer's dementia, minimally verbal, requires full assistance for ADLs."     02 Nov 2022      HISTORY OF PRESENT ILLNESS:HPI:  72 year old male presents for right leg femoral-popliteal bypass. Pt. with a history of PAD; s/p right endarterectomy of the right common femoral artery 10/21. Currently has necrotic lesion on his right great toe which is progressively worsening over the past few months. Pt. with Alzheimer's dementia, minimally verbal, requires full assistance for ADLs.          PAST MEDICAL & SURGICAL HISTORY:  HTN (Hypertension)      Hyperlipidemia      Rotator Cuff Disorder      Anxiety      Cancer of Bladder  2000      PAD (peripheral artery disease)      Peripheral neuropathy      DOLORES on CPAP  non complaint with CPAP      Malignant neoplasm of urinary bladder, unspecified site      Atherosclerosis of native artery of extremity      Type 2 diabetes mellitus      Alzheimer disease       [facility]  [facility]      CAD (coronary artery disease)  GELA RCA 2018      ALEXA (acute kidney injury)  losartan discontinued and renal funciton improved      H/O orthostatic hypotension      arthroscopy  right shoulderx 2 2008      Knee Surgery  1998- meniscal      bladder surgery  2000 washout/laser      H/O cardiac catheterization  2018- stent x1 mid RCA      S/P cataract surgery  bilateral      H/O peripheral artery bypass  left femoral May 2021      H/O endarterectomy  right common femoral artery 10/21              MEDICATIONS:  aspirin enteric coated 81 milliGRAM(s) Oral daily  rivaroxaban 2.5 milliGRAM(s) Oral two times a day        acetaminophen    Suspension .. 650 milliGRAM(s) Oral every 6 hours  DULoxetine 60 milliGRAM(s) Oral daily  memantine 10 milliGRAM(s) Oral two times a day    polyethylene glycol 3350 17 Gram(s) Oral daily  senna 2 Tablet(s) Oral at bedtime    atorvastatin 80 milliGRAM(s) Oral at bedtime  dextrose 50% Injectable 25 Gram(s) IV Push once  dextrose 50% Injectable 12.5 Gram(s) IV Push once  dextrose 50% Injectable 25 Gram(s) IV Push once  dextrose Oral Gel 15 Gram(s) Oral once PRN  glucagon  Injectable 1 milliGRAM(s) IntraMuscular once  insulin lispro (ADMELOG) corrective regimen sliding scale   SubCutaneous three times a day before meals  insulin lispro (ADMELOG) corrective regimen sliding scale   SubCutaneous at bedtime    ascorbic acid 500 milliGRAM(s) Oral daily  chlorhexidine 2% Cloths 1 Application(s) Topical once  dextrose 5%. 1000 milliLiter(s) IV Continuous <Continuous>  dextrose 5%. 1000 milliLiter(s) IV Continuous <Continuous>  influenza  Vaccine (HIGH DOSE) 0.7 milliLiter(s) IntraMuscular once  multivitamin 1 Tablet(s) Oral daily  sodium chloride 0.9%. 1000 milliLiter(s) IV Continuous <Continuous>      FAMILY HISTORY:  FH: senile dementia (Father, Mother)    FH: diabetes mellitus (Father)    FH: breast cancer (Mother)        Non-contributory    SOCIAL HISTORY:    [ ] Tobacco  [ ] Drugs  [ ] Alcohol    Allergies    Ceclor (Rash; Urticaria)    Intolerances    	    REVIEW OF SYSTEMS:  CONSTITUTIONAL: No fever  EYES: No eye pain, visual disturbances, or discharge  ENMT:  No difficulty hearing, tinnitus  NECK: No pain or stiffness  RESPIRATORY: No cough, wheezing,  CARDIOVASCULAR: No chest pain, palpitations, passing out, dizziness, or leg swelling  GASTROINTESTINAL:  No nausea, vomiting, diarrhea or constipation. No melena.  GENITOURINARY: No dysuria, hematuria  NEUROLOGICAL: No stroke like symptoms  SKIN: No burning or lesions   ENDOCRINE: No heat or cold intolerance  MUSCULOSKELETAL: No joint pain or swelling  PSYCHIATRIC: No  anxiety, mood swings  HEME/LYMPH: No bleeding gums  ALLERGY AND IMMUNOLOGIC: No hives or eczema	    All other ROS negative  PHYSICAL EXAM:  T(C): 36.8 (11-02-22 @ 21:05), Max: 37.2 (11-02-22 @ 13:23)  HR: 95 (11-02-22 @ 21:05) (83 - 99)  BP: 156/75 (11-02-22 @ 21:05) (138/80 - 160/78)  RR: 18 (11-02-22 @ 21:05) (18 - 18)  SpO2: 97% (11-02-22 @ 21:05) (94% - 98%)  Wt(kg): --  I&O's Summary    01 Nov 2022 07:01  -  02 Nov 2022 07:00  --------------------------------------------------------  IN: 2397 mL / OUT: 1900 mL / NET: 497 mL    02 Nov 2022 07:01  -  03 Nov 2022 00:25  --------------------------------------------------------  IN: 1640 mL / OUT: 1700 mL / NET: -60 mL        Appearance: Normal	  HEENT:   Normal oral mucosa, EOMI	  Cardiovascular:  S1 S2, No JVD,    Respiratory: Lungs clear to auscultation	  Psychiatry: Alert  Gastrointestinal:  Soft, Non-tender, + BS	  Skin: No rashes   Neurologic: Non-focal  Extremities:  dressing +

## 2022-11-02 NOTE — DIETITIAN INITIAL EVALUATION ADULT - PERTINENT LABORATORY DATA
11-02    142  |  109<H>  |  36<H>  ----------------------------<  159<H>  4.7   |  24  |  1.71<H>    Ca    8.7      02 Nov 2022 09:33  Phos  3.9     11-02  Mg     2.1     11-02      POCT Blood Glucose.: 145 mg/dL (11-02-22 @ 07:49)  POCT Blood Glucose.: 326 mg/dL (11-01-22 @ 21:20)  POCT Blood Glucose.: 228 mg/dL (11-01-22 @ 18:18)  POCT Blood Glucose.: 200 mg/dL (11-01-22 @ 14:33)    A1C with Estimated Average Glucose Result: 6.9 % (10-10-22 @ 19:16)  A1C with Estimated Average Glucose Result: 6.7 % (01-27-22 @ 09:42)

## 2022-11-02 NOTE — CONSULT NOTE ADULT - ASSESSMENT
72 year old male presents for right leg femoral-popliteal bypass. Pt. with a history of PAD; s/p right endarterectomy of the right common femoral artery 10/21. Currently has necrotic lesion on his right great toe which is progressively worsening over the past few months    1 PAD  - s/p right CFA stent w/ eventual need for endarterectomy & left CFA endarterectomy now presenting s/p right femoral-proximal AT bypass w/ PTFE for a non-healing right foot wound  - podiatry and vascular following  - Pt is scheduled for right foot partial first and fifth ray resection    2 DM  - monitor FS  - ISS    3 CAD  - cw asa, stable   - cards following

## 2022-11-02 NOTE — PROGRESS NOTE ADULT - ASSESSMENT
73 y/o male w/ a PMHx of CAD s/p stent, HTN, HLD, T2DM c/b peripheral neuropathy, DOLORES but non-compliant w/ CPAP, bladder CA s/p laser ablation, Alzheimer's dementia (minimally verbal & requires full assistance w/ ADLs), anxiety, and PAD s/p right CFA stent w/ eventual need for endarterectomy & left CFA endarterectomy now presenting s/p right femoral-proximal AT bypass w/ PTFE for a non-healing right foot wound w/ a post-op course c/b hemorrhagic shock.     - Multimodal pain control   - Encourage OOB to chair, IS  - Diet: Regular diet, bowel regimen with senna and miralax  - K+ 5.1 this AM s/p shift x1. Will monitor closely  - PVD: Xarelto 2.5 mg started today  - Lovenox for VTE prophylaxis  - Appreciate podiatry recs for right foot wound  - Appreciate PT eval today      [phone]  Vascular Surgery  73 y/o male w/ a PMHx of CAD s/p stent, HTN, HLD, T2DM c/b peripheral neuropathy, DOLORES but non-compliant w/ CPAP, bladder CA s/p laser ablation, Alzheimer's dementia (minimally verbal & requires full assistance w/ ADLs), anxiety, and PAD s/p right CFA stent w/ eventual need for endarterectomy & left CFA endarterectomy now presenting s/p right femoral-proximal AT bypass w/ PTFE for a non-healing right foot wound w/ a post-op course c/b hemorrhagic shock.     - Multimodal pain control   - Encourage OOB to chair, IS  - Diet: Regular diet, bowel regimen with senna and miralax  - Elevated lactate POD1, downtrending to 1.7 this AM  - PVD: Xarelto 2.5 mg  - Lovenox for VTE prophylaxis  - Appreciate podiatry recs for right foot wound    [phone]  Vascular Surgery

## 2022-11-02 NOTE — DIETITIAN INITIAL EVALUATION ADULT - NSFNSPHYEXAMSKINFT_GEN_A_CORE
Pressure Injury 1: Left:,second toe, Suspected deep tissue injury  Pressure Injury 2: Right:,posterior,foot, Suspected deep tissue injury  Pressure Injury 3: none, none  Pressure Injury 4: none, none  Pressure Injury 5: none, none  Pressure Injury 6: none, none  Pressure Injury 7: none, none  Pressure Injury 8: none, none  Pressure Injury 9: none, none  Pressure Injury 10: none, none  Pressure Injury 11: none, none

## 2022-11-02 NOTE — CONSULT NOTE ADULT - SUBJECTIVE AND OBJECTIVE BOX
DATE OF SERVICE: 11-03-22 @ 00:25    CHIEF COMPLAINT:Patient is a 72y old  Male who presents with a chief complaint of Chart Reviewed, Events Noted:  "72 year old male presents for right leg femoral-popliteal bypass. Pt. with a history of PAD; s/p right endarterectomy of the right common femoral artery 10/21. Currently has necrotic lesion on his right great toe which is progressively worsening over the past few months. Pt. with Alzheimer's dementia, minimally verbal, requires full assistance for ADLs."     (02 Nov 2022 11:58)      HISTORY OF PRESENT ILLNESS:HPI:  72 year old male presents for right leg femoral-popliteal bypass. Pt. with a history of PAD; s/p right endarterectomy of the right common femoral artery 10/21. Currently has necrotic lesion on his right great toe which is progressively worsening over the past few months. Pt. with Alzheimer's dementia, minimally verbal, requires full assistance for ADLs.    Pre procedure COVID PCR scheduled for 10/28/22; pt. had COVID infection 3/21 and was asymptomatic. (10 Oct 2022 15:41)      PAST MEDICAL & SURGICAL HISTORY:  HTN (Hypertension)      Hyperlipidemia      Rotator Cuff Disorder      Anxiety      Cancer of Bladder  2000      PAD (peripheral artery disease)      Peripheral neuropathy      DOLORES on CPAP  non complaint with CPAP      Malignant neoplasm of urinary bladder, unspecified site      Atherosclerosis of native artery of extremity      Type 2 diabetes mellitus      Alzheimer disease       activity  [facility]      CAD (coronary artery disease)  GELA RCA 2018      ALEXA (acute kidney injury)  losartan discontinued and renal funciton improved      H/O orthostatic hypotension      arthroscopy  right shoulderx 2 2008      Knee Surgery  1998- meniscal      bladder surgery  2000 washout/laser      H/O cardiac catheterization  2018- stent x1 mid RCA      S/P cataract surgery  bilateral      H/O peripheral artery bypass  left femoral May 2021      H/O endarterectomy  right common femoral artery 10/21              MEDICATIONS:  aspirin enteric coated 81 milliGRAM(s) Oral daily  rivaroxaban 2.5 milliGRAM(s) Oral two times a day        acetaminophen    Suspension .. 650 milliGRAM(s) Oral every 6 hours  DULoxetine 60 milliGRAM(s) Oral daily  memantine 10 milliGRAM(s) Oral two times a day    polyethylene glycol 3350 17 Gram(s) Oral daily  senna 2 Tablet(s) Oral at bedtime    atorvastatin 80 milliGRAM(s) Oral at bedtime  dextrose 50% Injectable 25 Gram(s) IV Push once  dextrose 50% Injectable 12.5 Gram(s) IV Push once  dextrose 50% Injectable 25 Gram(s) IV Push once  dextrose Oral Gel 15 Gram(s) Oral once PRN  glucagon  Injectable 1 milliGRAM(s) IntraMuscular once  insulin lispro (ADMELOG) corrective regimen sliding scale   SubCutaneous three times a day before meals  insulin lispro (ADMELOG) corrective regimen sliding scale   SubCutaneous at bedtime    ascorbic acid 500 milliGRAM(s) Oral daily  chlorhexidine 2% Cloths 1 Application(s) Topical once  dextrose 5%. 1000 milliLiter(s) IV Continuous <Continuous>  dextrose 5%. 1000 milliLiter(s) IV Continuous <Continuous>  influenza  Vaccine (HIGH DOSE) 0.7 milliLiter(s) IntraMuscular once  multivitamin 1 Tablet(s) Oral daily  sodium chloride 0.9%. 1000 milliLiter(s) IV Continuous <Continuous>      FAMILY HISTORY:  FH: senile dementia (Father, Mother)    FH: diabetes mellitus (Father)    FH: breast cancer (Mother)        Non-contributory    SOCIAL HISTORY:    [ ] Tobacco  [ ] Drugs  [ ] Alcohol    Allergies    Ceclor (Rash; Urticaria)    Intolerances    	    REVIEW OF SYSTEMS:  CONSTITUTIONAL: No fever  EYES: No eye pain, visual disturbances, or discharge  ENMT:  No difficulty hearing, tinnitus  NECK: No pain or stiffness  RESPIRATORY: No cough, wheezing,  CARDIOVASCULAR: No chest pain, palpitations, passing out, dizziness, or leg swelling  GASTROINTESTINAL:  No nausea, vomiting, diarrhea or constipation. No melena.  GENITOURINARY: No dysuria, hematuria  NEUROLOGICAL: No stroke like symptoms  SKIN: No burning or lesions   ENDOCRINE: No heat or cold intolerance  MUSCULOSKELETAL: No joint pain or swelling  PSYCHIATRIC: No  anxiety, mood swings  HEME/LYMPH: No bleeding gums  ALLERGY AND IMMUNOLOGIC: No hives or eczema	    All other ROS negative    PHYSICAL EXAM:  T(C): 36.8 (11-02-22 @ 21:05), Max: 37.2 (11-02-22 @ 13:23)  HR: 95 (11-02-22 @ 21:05) (83 - 99)  BP: 156/75 (11-02-22 @ 21:05) (138/80 - 160/78)  RR: 18 (11-02-22 @ 21:05) (18 - 18)  SpO2: 97% (11-02-22 @ 21:05) (94% - 98%)  Wt(kg): --  I&O's Summary    01 Nov 2022 07:01  -  02 Nov 2022 07:00  --------------------------------------------------------  IN: 2397 mL / OUT: 1900 mL / NET: 497 mL    02 Nov 2022 07:01  -  03 Nov 2022 00:25  --------------------------------------------------------  IN: 1640 mL / OUT: 1700 mL / NET: -60 mL        Appearance: Normal	  HEENT:   Normal oral mucosa, EOMI	  Cardiovascular:  S1 S2, No JVD,    Respiratory: Lungs clear to auscultation	  Psychiatry: Alert  Gastrointestinal:  Soft, Non-tender, + BS	  Skin: No rashes   Neurologic: Non-focal  Extremities:  No edema  Vascular: Peripheral pulses palpable    	    	  	  CARDIAC MARKERS:  Labs personally reviewed by me                                  9.3    9.39  )-----------( 156      ( 02 Nov 2022 09:33 )             28.2     11-02    142  |  109<H>  |  36<H>  ----------------------------<  159<H>  4.7   |  24  |  1.71<H>    Ca    8.7      02 Nov 2022 09:33  Phos  3.9     11-02  Mg     2.1     11-02            EKG: Personally reviewed by me - NSR with nonspecific ST changes  Radiology: Personally reviewed by me -       Assessment /Plan:   Inpt and outpt charts reviewed  Pt was seen by his OP cardio Dr Medina 9/30/22 for preop evaluation for open RLE bypass and was deemed optimized to proceed  No ACS, euvolemic, no tachy/deric arrythmia, no murmur to suggest severe AS/MS - will confirm with DIPESH  Mod risk for mod risk pod surgery, no contraindication to proceed pending echo (ordered)            Differential diagnosis and plan of care discussed with patient after the evaluation. Counseling on diet, nutritional counseling, weight management, exercise and medication compliance was done.   Advanced care planning/advanced directives discussed with patient/family. DNR status including forceful chest compressions to attempt to restart the heart, ventilator support/artificial breathing, electric shock, artificial nutrition, health care proxy, Molst form all discussed with pt. Pt wishes to consider. More than fifteen minutes spent on discussing advanced directives.         Zuhair Martinez DO [facility]  Cardiovascular Medicine  [address] [place] [address], [address]  Office [phone]  Available via call/text on [employer] Teams

## 2022-11-02 NOTE — PROGRESS NOTE ADULT - ASSESSMENT
71 y/o M w/ R 1st & 5th toe dry gangrene  - pt seen and evaluated  - Afebrile, no leukocytosis  - R foot dry gangrene of 1st digit to level of 1st MPJ, distal 5th toe dry gangrene, no clinical signs of infection  - No wound culture obtained as it will likely yield skin contaminant  - R foot xray pending  - Pt is s/p RLE fem-AT bypass with vasc, rec okay to proceed with podiatry procedure, appreciated  - Pod plan R foot partial 1st and 5th ray amputation on friday at 11:30am with Dr. Lopez  - Please document medical optimization under general anesthesia   - Seen with attending   71 y/o M w/ R 1st & 5th toe dry gangrene  - pt seen and evaluated  - Afebrile, no leukocytosis  - R foot dry gangrene of 1st digit to level of 1st MPJ, distal 5th toe dry gangrene, no clinical signs of infection  - No wound culture obtained as it will likely yield skin contaminant  - R foot xray pending  - Pt is s/p RLE fem-AT bypass with vasc, rec okay to proceed with podiatry procedure, appreciated  - Pod plan R foot partial 1st and 5th ray amputation on friday at 9:30 am with Dr. Lopez  - Please document medical optimization under general anesthesia   - Seen with attending

## 2022-11-02 NOTE — DIETITIAN INITIAL EVALUATION ADULT - PERTINENT MEDS FT
MEDICATIONS  (STANDING):  acetaminophen    Suspension .. 650 milliGRAM(s) Oral every 6 hours  aspirin enteric coated 81 milliGRAM(s) Oral daily  atorvastatin 80 milliGRAM(s) Oral at bedtime  chlorhexidine 2% Cloths 1 Application(s) Topical once  dextrose 5%. 1000 milliLiter(s) (50 mL/Hr) IV Continuous <Continuous>  dextrose 5%. 1000 milliLiter(s) (100 mL/Hr) IV Continuous <Continuous>  dextrose 50% Injectable 25 Gram(s) IV Push once  dextrose 50% Injectable 12.5 Gram(s) IV Push once  dextrose 50% Injectable 25 Gram(s) IV Push once  DULoxetine 60 milliGRAM(s) Oral daily  glucagon  Injectable 1 milliGRAM(s) IntraMuscular once  influenza  Vaccine (HIGH DOSE) 0.7 milliLiter(s) IntraMuscular once  insulin lispro (ADMELOG) corrective regimen sliding scale   SubCutaneous three times a day before meals  insulin lispro (ADMELOG) corrective regimen sliding scale   SubCutaneous at bedtime  lidocaine 1% Injectable 0.2 milliLiter(s) Local Injection once  memantine 10 milliGRAM(s) Oral two times a day  polyethylene glycol 3350 17 Gram(s) Oral daily  rivaroxaban 2.5 milliGRAM(s) Oral two times a day  senna 2 Tablet(s) Oral at bedtime  sodium chloride 0.9%. 1000 milliLiter(s) (75 mL/Hr) IV Continuous <Continuous>    MEDICATIONS  (PRN):  dextrose Oral Gel 15 Gram(s) Oral once PRN Blood Glucose LESS THAN 70 milliGRAM(s)/deciliter

## 2022-11-02 NOTE — PROGRESS NOTE ADULT - SUBJECTIVE AND OBJECTIVE BOX
SURGERY DAILY PROGRESS NOTE:       SUBJECTIVE/ROS: Patient seen and evaluated on AM rounds. Pt minimally verbal at baseline.        OBJECTIVE:  Vital Signs Last 24 Hrs  T(C): 37 (02 Nov 2022 05:31), Max: 37.1 (01 Nov 2022 10:25)  T(F): 98.6 (02 Nov 2022 05:31), Max: 98.7 (01 Nov 2022 10:25)  HR: 85 (02 Nov 2022 05:31) (85 - 104)  BP: 146/88 (02 Nov 2022 05:31) (110/60 - 146/88)  BP(mean): --  RR: 18 (02 Nov 2022 05:31) (15 - 20)  SpO2: 94% (02 Nov 2022 05:31) (94% - 100%)    Parameters below as of 02 Nov 2022 05:31  Patient On (Oxygen Delivery Method): room air      I&O's Detail    01 Nov 2022 07:01  -  02 Nov 2022 07:00  --------------------------------------------------------  IN:    Oral Fluid: 550 mL    sodium chloride 0.9%: 1847 mL  Total IN: 2397 mL    OUT:    Indwelling Catheter - Urethral (mL): 1900 mL    Phenylephrine: 0 mL  Total OUT: 1900 mL    Total NET: 497 mL        Daily     Daily   MEDICATIONS  (STANDING):  acetaminophen    Suspension .. 650 milliGRAM(s) Oral every 6 hours  aspirin enteric coated 81 milliGRAM(s) Oral daily  atorvastatin 80 milliGRAM(s) Oral at bedtime  chlorhexidine 2% Cloths 1 Application(s) Topical once  dextrose 5%. 1000 milliLiter(s) (50 mL/Hr) IV Continuous <Continuous>  dextrose 5%. 1000 milliLiter(s) (100 mL/Hr) IV Continuous <Continuous>  dextrose 50% Injectable 25 Gram(s) IV Push once  dextrose 50% Injectable 12.5 Gram(s) IV Push once  dextrose 50% Injectable 25 Gram(s) IV Push once  diVALproex  milliGRAM(s) Oral daily  DULoxetine 60 milliGRAM(s) Oral daily  glucagon  Injectable 1 milliGRAM(s) IntraMuscular once  influenza  Vaccine (HIGH DOSE) 0.7 milliLiter(s) IntraMuscular once  insulin lispro (ADMELOG) corrective regimen sliding scale   SubCutaneous three times a day before meals  insulin lispro (ADMELOG) corrective regimen sliding scale   SubCutaneous at bedtime  lidocaine 1% Injectable 0.2 milliLiter(s) Local Injection once  memantine 10 milliGRAM(s) Oral two times a day  polyethylene glycol 3350 17 Gram(s) Oral daily  QUEtiapine 25 milliGRAM(s) Oral daily  rivaroxaban 2.5 milliGRAM(s) Oral two times a day  senna 2 Tablet(s) Oral at bedtime  sodium chloride 0.9%. 1000 milliLiter(s) (75 mL/Hr) IV Continuous <Continuous>    MEDICATIONS  (PRN):  dextrose Oral Gel 15 Gram(s) Oral once PRN Blood Glucose LESS THAN 70 milliGRAM(s)/deciliter      LABS:                        9.5    9.17  )-----------( 145      ( 02 Nov 2022 07:43 )             28.9     11-02    141  |  108  |  38<H>  ----------------------------<  158<H>  4.6   |  23  |  1.63<H>    Ca    8.6      02 Nov 2022 07:43  Phos  3.9     11-02  Mg     2.2     11-02      PT/INR - ( 01 Nov 2022 02:58 )   PT: 12.5 sec;   INR: 1.08 ratio         PTT - ( 01 Nov 2022 02:58 )  PTT:24.5 sec                PHYSICAL EXAM:  General: well developed, well nourished, NAD  HEENT: NCAT, trachea midline  Respiratory: respirations non labored  Gastrointestinal: soft, nontender, nondistended  Extremities: FROM, warm. R AT signal present  Neurological: non-focal

## 2022-11-02 NOTE — DIETITIAN INITIAL EVALUATION ADULT - PHYSCIAL ASSESSMENT
Current Dosing Weight: 88.5 kg/195.1 pounds (10/31/22), Bedscale weight obtained by RD on 11/2- 88.3 kg/ 194.26 pounds    IBW:172 pounds   %IBW: 113%   UBW:  Wife reported pt UBW to be around ~190-195 pounds   Weight History: As per [facility]ante HIE: 88.5 kg/195.1 pounds (9/30/22) 89.4 kg/ 196.7 pounds (11/9/21)  Weight trend: Wt status appears stable over the past ~ 1 year Current Dosing Weight: 88.5 kg/195.1 pounds (10/31/22), Bedscale weight obtained by RD on 11/2- 88.3 kg/ 194.26 pounds      IBW:172 pounds   %IBW: 113%   UBW:  Wife reported pt UBW to be around ~190-195 pounds     Weight History: As per Chante HIE: 88.5 kg/195.1 pounds (9/30/22) 89.4 kg/ 196.7 pounds (11/9/21)  Weight trend: Wt status appears stable over the past ~ 1 year

## 2022-11-02 NOTE — DIETITIAN INITIAL EVALUATION ADULT - ENERGY INTAKE
Fair (50-75%) Wife reports pt with good appetite and PO intakes, consuming 50-75% of meals thus far. Wife is ordering meals for pt with the diet office and has a copy of the menu.

## 2022-11-02 NOTE — DIETITIAN INITIAL EVALUATION ADULT - EDUCATION DIETARY MODIFICATIONS
Provided recommendations to optimize PO and protein intake, recommended small frequent meals/snacks  by ordering nutrient-dense foods and leaving non-perishable food away from tray for later consumption during the day or between meals, to start with protein, and sips of nourishment throughout the day; reviewed foods with protein and menu order procedures in hospital./(1) partially meets; needs review/practice/verbalization

## 2022-11-03 ENCOUNTER — TRANSCRIPTION ENCOUNTER (OUTPATIENT)
Age: 72
End: 2022-11-03

## 2022-11-03 LAB
ANION GAP SERPL CALC-SCNC: 8 MMOL/L — SIGNIFICANT CHANGE UP (ref 5–17)
BUN SERPL-MCNC: 25 MG/DL — HIGH (ref 7–23)
CALCIUM SERPL-MCNC: 8.7 MG/DL — SIGNIFICANT CHANGE UP (ref 8.4–10.5)
CHLORIDE SERPL-SCNC: 108 MMOL/L — SIGNIFICANT CHANGE UP (ref 96–108)
CO2 SERPL-SCNC: 23 MMOL/L — SIGNIFICANT CHANGE UP (ref 22–31)
CREAT SERPL-MCNC: 1.23 MG/DL — SIGNIFICANT CHANGE UP (ref 0.5–1.3)
EGFR: 62 ML/MIN/1.73M2 — SIGNIFICANT CHANGE UP
ERYTHROCYTE [SEDIMENTATION RATE] IN BLOOD: 2 MM/HR — SIGNIFICANT CHANGE UP (ref 0–20)
GLUCOSE BLDC GLUCOMTR-MCNC: 142 MG/DL — HIGH (ref 70–99)
GLUCOSE BLDC GLUCOMTR-MCNC: 167 MG/DL — HIGH (ref 70–99)
GLUCOSE BLDC GLUCOMTR-MCNC: 186 MG/DL — HIGH (ref 70–99)
GLUCOSE BLDC GLUCOMTR-MCNC: 191 MG/DL — HIGH (ref 70–99)
GLUCOSE BLDC GLUCOMTR-MCNC: 238 MG/DL — HIGH (ref 70–99)
GLUCOSE SERPL-MCNC: 144 MG/DL — HIGH (ref 70–99)
HCT VFR BLD CALC: 29.9 % — LOW (ref 39–50)
HGB BLD-MCNC: 9.4 G/DL — LOW (ref 13–17)
MAGNESIUM SERPL-MCNC: 2 MG/DL — SIGNIFICANT CHANGE UP (ref 1.6–2.6)
MCHC RBC-ENTMCNC: 30.6 PG — SIGNIFICANT CHANGE UP (ref 27–34)
MCHC RBC-ENTMCNC: 31.4 GM/DL — LOW (ref 32–36)
MCV RBC AUTO: 97.4 FL — SIGNIFICANT CHANGE UP (ref 80–100)
NRBC # BLD: 0 /100 WBCS — SIGNIFICANT CHANGE UP (ref 0–0)
PHOSPHATE SERPL-MCNC: 3.1 MG/DL — SIGNIFICANT CHANGE UP (ref 2.5–4.5)
PLATELET # BLD AUTO: 136 K/UL — LOW (ref 150–400)
POTASSIUM SERPL-MCNC: 4.6 MMOL/L — SIGNIFICANT CHANGE UP (ref 3.5–5.3)
POTASSIUM SERPL-SCNC: 4.6 MMOL/L — SIGNIFICANT CHANGE UP (ref 3.5–5.3)
RBC # BLD: 3.07 M/UL — LOW (ref 4.2–5.8)
RBC # FLD: 14.8 % — HIGH (ref 10.3–14.5)
SARS-COV-2 RNA SPEC QL NAA+PROBE: SIGNIFICANT CHANGE UP
SODIUM SERPL-SCNC: 139 MMOL/L — SIGNIFICANT CHANGE UP (ref 135–145)
WBC # BLD: 10.01 K/UL — SIGNIFICANT CHANGE UP (ref 3.8–10.5)
WBC # FLD AUTO: 10.01 K/UL — SIGNIFICANT CHANGE UP (ref 3.8–10.5)

## 2022-11-03 PROCEDURE — 93306 TTE W/DOPPLER COMPLETE: CPT | Mod: 26

## 2022-11-03 PROCEDURE — 71045 X-RAY EXAM CHEST 1 VIEW: CPT | Mod: 26

## 2022-11-03 RX ADMIN — Medication 81 MILLIGRAM(S): at 12:39

## 2022-11-03 RX ADMIN — Medication 1 TABLET(S): at 12:39

## 2022-11-03 RX ADMIN — MEMANTINE HYDROCHLORIDE 10 MILLIGRAM(S): 10 TABLET ORAL at 17:35

## 2022-11-03 RX ADMIN — MEMANTINE HYDROCHLORIDE 10 MILLIGRAM(S): 10 TABLET ORAL at 05:27

## 2022-11-03 RX ADMIN — Medication 650 MILLIGRAM(S): at 10:37

## 2022-11-03 RX ADMIN — RIVAROXABAN 2.5 MILLIGRAM(S): KIT at 01:13

## 2022-11-03 RX ADMIN — Medication 2: at 13:10

## 2022-11-03 RX ADMIN — ATORVASTATIN CALCIUM 80 MILLIGRAM(S): 80 TABLET, FILM COATED ORAL at 21:28

## 2022-11-03 RX ADMIN — Medication 650 MILLIGRAM(S): at 22:20

## 2022-11-03 RX ADMIN — SENNA PLUS 2 TABLET(S): 8.6 TABLET ORAL at 21:28

## 2022-11-03 RX ADMIN — POLYETHYLENE GLYCOL 3350 17 GRAM(S): 17 POWDER, FOR SOLUTION ORAL at 12:39

## 2022-11-03 RX ADMIN — SODIUM CHLORIDE 75 MILLILITER(S): 9 INJECTION INTRAMUSCULAR; INTRAVENOUS; SUBCUTANEOUS at 20:37

## 2022-11-03 RX ADMIN — Medication 2: at 17:35

## 2022-11-03 RX ADMIN — Medication 650 MILLIGRAM(S): at 11:30

## 2022-11-03 RX ADMIN — Medication 650 MILLIGRAM(S): at 21:28

## 2022-11-03 RX ADMIN — Medication 500 MILLIGRAM(S): at 12:39

## 2022-11-03 RX ADMIN — RIVAROXABAN 2.5 MILLIGRAM(S): KIT at 12:39

## 2022-11-03 RX ADMIN — DULOXETINE HYDROCHLORIDE 60 MILLIGRAM(S): 30 CAPSULE, DELAYED RELEASE ORAL at 12:39

## 2022-11-03 NOTE — PROGRESS NOTE ADULT - SUBJECTIVE AND OBJECTIVE BOX
DATE OF SERVICE: 11-03-22 @ 10:50    Patient is a 72y old  Male who presents with a chief complaint of PAD (02 Nov 2022 17:24)      INTERVAL HISTORY: No complaints.    REVIEW OF SYSTEMS: Limited d/t baseline dementia  CONSTITUTIONAL: No weakness  EYES/ENT: No visual changes;  No throat pain   NECK: No pain or stiffness  RESPIRATORY: No cough, wheezing; No shortness of breath  CARDIOVASCULAR: No chest pain or palpitations  GASTROINTESTINAL: No abdominal  pain. No nausea, vomiting, or hematemesis  GENITOURINARY: No dysuria, frequency or hematuria  NEUROLOGICAL: No stroke like symptoms  SKIN: No rashes    	  MEDICATIONS:        PHYSICAL EXAM:  T(C): 36.7 (11-03-22 @ 06:20), Max: 37.2 (11-02-22 @ 13:23)  HR: 81 (11-03-22 @ 06:20) (81 - 95)  BP: 148/67 (11-03-22 @ 06:20) (136/63 - 160/78)  RR: 18 (11-03-22 @ 06:20) (18 - 18)  SpO2: 96% (11-03-22 @ 06:20) (96% - 98%)  Wt(kg): --  I&O's Summary    02 Nov 2022 07:01  -  03 Nov 2022 07:00  --------------------------------------------------------  IN: 2590 mL / OUT: 2400 mL / NET: 190 mL          Appearance: In no distress	  HEENT:    PERRL, EOMI	  Cardiovascular:  S1 S2, No JVD  Respiratory: Lungs clear to auscultation	  Gastrointestinal:  Soft, Non-tender, + BS	  Vascularature:  No edema of LE  Psychiatric: Appropriate affect   Neuro: no acute focal deficits                               9.4    10.01 )-----------( 136      ( 03 Nov 2022 07:22 )             29.9     11-03    139  |  108  |  25<H>  ----------------------------<  144<H>  4.6   |  23  |  1.23    Ca    8.7      03 Nov 2022 07:24  Phos  3.1     11-03  Mg     2.0     11-03          Labs personally reviewed      ASSESSMENT/PLAN: 	    72 year old male presents for right leg femoral-popliteal bypass. Pt. with a history of PAD; s/p right endarterectomy of the right common femoral artery 10/21. Currently has necrotic lesion on his right great toe which is progressively worsening over the past few months. Pt. with Alzheimer's dementia, minimally verbal, requires full assistance for ADLs.    1. Cardiac Risk Stratification  - Inpt and outpt charts reviewed  - Pt was seen by his OP cardio Dr Medina 9/30/22 for preop evaluation for open RLE bypass and was deemed optimized to proceed  - No ACS, euvolemic, no tachy/deric arrythmia, no murmur to suggest severe AS/MS - will confirm with TTE  - Mod risk for mod risk pod surgery, no contraindication to proceed pending echo (ordered)    2. PAD  - s/p right CFA stent w/ eventual need for endarterectomy & left CFA endarterectomy now presenting s/p right femoral-proximal AT bypass w/ PTFE for a non-healing right foot wound  - c/w Xarelto 2.5mg PO daily  - Pod plan R foot partial 1st and 5th ray amputation on friday at 9:30 am with Dr. Lopez    3. DVT PPx  - c/w Xarelto         Elsa Rose, AG-NP   Zuhair Martinez DO [facility]  Cardiovascular Medicine  [address] [place] [address], [address]  Available through call or text on [employer] TE[employer]  Office: [phone]   DATE OF SERVICE: 11-03-22 @ 10:50    Patient is a 72y old  Male who presents with a chief complaint of PAD (02 Nov 2022 17:24)      INTERVAL HISTORY: No complaints.    REVIEW OF SYSTEMS: Limited d/t baseline dementia  CONSTITUTIONAL: No weakness  EYES/ENT: No visual changes;  No throat pain   NECK: No pain or stiffness  RESPIRATORY: No cough, wheezing; No shortness of breath  CARDIOVASCULAR: No chest pain or palpitations  GASTROINTESTINAL: No abdominal  pain. No nausea, vomiting, or hematemesis  GENITOURINARY: No dysuria, frequency or hematuria  NEUROLOGICAL: No stroke like symptoms  SKIN: No rashes    	  MEDICATIONS:        PHYSICAL EXAM:  T(C): 36.7 (11-03-22 @ 06:20), Max: 37.2 (11-02-22 @ 13:23)  HR: 81 (11-03-22 @ 06:20) (81 - 95)  BP: 148/67 (11-03-22 @ 06:20) (136/63 - 160/78)  RR: 18 (11-03-22 @ 06:20) (18 - 18)  SpO2: 96% (11-03-22 @ 06:20) (96% - 98%)  Wt(kg): --  I&O's Summary    02 Nov 2022 07:01  -  03 Nov 2022 07:00  --------------------------------------------------------  IN: 2590 mL / OUT: 2400 mL / NET: 190 mL          Appearance: In no distress	  HEENT:    PERRL, EOMI	  Cardiovascular:  S1 S2, No JVD  Respiratory: Lungs clear to auscultation	  Gastrointestinal:  Soft, Non-tender, + BS	  Vascularature:  No edema of LE  Psychiatric: Appropriate affect   Neuro: no acute focal deficits                               9.4    10.01 )-----------( 136      ( 03 Nov 2022 07:22 )             29.9     11-03    139  |  108  |  25<H>  ----------------------------<  144<H>  4.6   |  23  |  1.23    Ca    8.7      03 Nov 2022 07:24  Phos  3.1     11-03  Mg     2.0     11-03          Labs personally reviewed      ASSESSMENT/PLAN: 	    72 year old male presents for right leg femoral-popliteal bypass. Pt. with a history of PAD; s/p right endarterectomy of the right common femoral artery 10/21. Currently has necrotic lesion on his right great toe which is progressively worsening over the past few months. Pt. with Alzheimer's dementia, minimally verbal, requires full assistance for ADLs.    1. Cardiac Risk Stratification  - Inpt and outpt charts reviewed  - Pt was seen by his OP cardio Dr Medina 9/30/22 for preop evaluation for open RLE bypass and was deemed optimized to proceed  - No ACS, euvolemic, no tachy/deric arrythmia, no murmur to suggest severe AS/MS - TTE unremarkable   - Mod risk for mod risk pod surgery, no contraindication to proceed      2. PAD  - s/p right CFA stent w/ eventual need for endarterectomy & left CFA endarterectomy now presenting s/p right femoral-proximal AT bypass w/ PTFE for a non-healing right foot wound  - c/w Xarelto 2.5mg PO daily  - Pod plan R foot partial 1st and 5th ray amputation on friday at 9:30 am with Dr. Lopez    3. DVT PPx  - c/w Xarelto         Elsa Rose, FÉLIX-NP   Zuhair Martinez DO [facility]  Cardiovascular Medicine  [address] [place] [address], [address]  Available through call or text on [employer] TEAM[employer]  Office: [phone]

## 2022-11-03 NOTE — PROGRESS NOTE ADULT - SUBJECTIVE AND OBJECTIVE BOX
Podiatry pager #: [phone] ([place])/ [phone] ([facility])    Patient is a 72y old  Male who presents with a chief complaint of PAD (02 Nov 2022 17:24)       INTERVAL HPI/OVERNIGHT EVENTS:  Patient seen and evaluated at bedside.  Pt is resting comfortable in NAD. Denies N/V/F/C.     Allergies    Ceclor (Rash; Urticaria)    Intolerances        Vital Signs Last 24 Hrs  T(C): 36.7 (03 Nov 2022 06:20), Max: 37.2 (02 Nov 2022 13:23)  T(F): 98 (03 Nov 2022 06:20), Max: 98.9 (02 Nov 2022 13:23)  HR: 81 (03 Nov 2022 06:20) (81 - 95)  BP: 148/67 (03 Nov 2022 06:20) (136/63 - 160/78)  BP(mean): --  RR: 18 (03 Nov 2022 06:20) (18 - 18)  SpO2: 96% (03 Nov 2022 06:20) (96% - 98%)    Parameters below as of 03 Nov 2022 06:20  Patient On (Oxygen Delivery Method): room air        LABS:                        9.4    10.01 )-----------( 136      ( 03 Nov 2022 07:22 )             29.9     11-03    139  |  108  |  25<H>  ----------------------------<  144<H>  4.6   |  23  |  1.23    Ca    8.7      03 Nov 2022 07:24  Phos  3.1     11-03  Mg     2.0     11-03          CAPILLARY BLOOD GLUCOSE      POCT Blood Glucose.: 142 mg/dL (03 Nov 2022 08:37)  POCT Blood Glucose.: 206 mg/dL (02 Nov 2022 21:34)  POCT Blood Glucose.: 172 mg/dL (02 Nov 2022 16:53)  POCT Blood Glucose.: 201 mg/dL (02 Nov 2022 12:51)      Lower Extremity Physical Exam:  Vasular: DP/PT 0/4, B/L, CFT < 3 seconds all pedal digits besides R foot 1 and 5 which is absent, Temperature gradient warm to warm R, warm to cool left  Neuro: Epicritic sensation intact to the level of the digits, B/L.  Musculoskeletal/Ortho: non-ambi  Skin: R foot 1st digit full thickness dry gangrene to level of MPJ, no bogginess or drainage, R foot distal 5th tip dry gangrene, no drainage, tracking or erythema, L foot 2nd and 3rd toe distal abrasions, granular base, no clinical signs of infection    RADIOLOGY & ADDITIONAL TESTS:

## 2022-11-03 NOTE — PROGRESS NOTE ADULT - ASSESSMENT
73 y/o male w/ a PMHx of CAD s/p stent, HTN, HLD, T2DM c/b peripheral neuropathy, DOLORES but non-compliant w/ CPAP, bladder CA s/p laser ablation, Alzheimer's dementia (minimally verbal & requires full assistance w/ ADLs), anxiety, and PAD s/p right CFA stent w/ eventual need for endarterectomy & left CFA endarterectomy now presenting s/p right femoral-proximal AT bypass w/ PTFE for a non-healing right foot wound w/ a post-op course c/b hemorrhagic shock.     - Multimodal pain control   - Encourage OOB to chair, IS  - Diet: Regular diet, bowel regimen with senna and miralax  - Elevated lactate POD1, downtrending to 1.7 this AM  - PVD: Xarelto 2.5 mg  - Lovenox for VTE prophylaxis  - Appreciate podiatry recs for right foot wound    [id number]  Vascular Surgery

## 2022-11-03 NOTE — PROGRESS NOTE ADULT - ASSESSMENT
73 y/o M w/ R 1st & 5th toe dry gangrene  - pt seen and evaluated  - Afebrile, WBC 10.01  - R foot dry gangrene of 1st digit to level of 1st MPJ, distal 5th toe dry gangrene, no clinical signs of infection  - No wound culture obtained as it will likely yield skin contaminant  - R foot xray results show: Focally atrophied/shrunken right hallux soft tissues compatible with gangrene. No tracking gas collections or gross agraphic evidence for osteomyelitis  - Pt is s/p RLE fem-AT bypass with vasc, rec okay to proceed with podiatry procedure, appreciated  - Pod plan R foot partial 1st and 5th ray amputation tomorrow Friday at 9:30 am with Dr. Lopez  - Please document medical optimization under general anesthesia   - Discussed with attending 73 y/o M w/ R 1st & 5th toe dry gangrene  - pt seen and evaluated  - Afebrile, WBC 10.01  - R foot dry gangrene of 1st digit to level of 1st MPJ, distal 5th toe dry gangrene, no clinical signs of infection  - No wound culture obtained as it will likely yield skin contaminant  - R foot xray results show: Focally atrophied/shrunken right hallux soft tissues compatible with gangrene. No tracking gas collections or gross agraphic evidence for osteomyelitis  - Pt is s/p RLE fem-AT bypass with vasc, rec okay to proceed with podiatry procedure, appreciated  - Pod plan R foot partial 1st and 5th ray amputation tomorrow Friday at 9:30 am with Dr. Lopez  - Please document medical optimization under anesthesia   - Discussed with attending

## 2022-11-03 NOTE — PROGRESS NOTE ADULT - SUBJECTIVE AND OBJECTIVE BOX
VASCULAR SURGERY DAILY PROGRESS NOTE:     SUBJECTIVE/ROS: Patient seen and evaluated on AM rounds. Pt is comfortable. Pt minimally verbal at baseline.      MEDICATIONS  (STANDING):  acetaminophen    Suspension .. 650 milliGRAM(s) Oral every 6 hours  ascorbic acid 500 milliGRAM(s) Oral daily  aspirin enteric coated 81 milliGRAM(s) Oral daily  atorvastatin 80 milliGRAM(s) Oral at bedtime  chlorhexidine 2% Cloths 1 Application(s) Topical once  dextrose 5%. 1000 milliLiter(s) (50 mL/Hr) IV Continuous <Continuous>  dextrose 5%. 1000 milliLiter(s) (100 mL/Hr) IV Continuous <Continuous>  dextrose 50% Injectable 25 Gram(s) IV Push once  dextrose 50% Injectable 12.5 Gram(s) IV Push once  dextrose 50% Injectable 25 Gram(s) IV Push once  DULoxetine 60 milliGRAM(s) Oral daily  glucagon  Injectable 1 milliGRAM(s) IntraMuscular once  influenza  Vaccine (HIGH DOSE) 0.7 milliLiter(s) IntraMuscular once  insulin lispro (ADMELOG) corrective regimen sliding scale   SubCutaneous three times a day before meals  insulin lispro (ADMELOG) corrective regimen sliding scale   SubCutaneous at bedtime  lidocaine 1% Injectable 0.2 milliLiter(s) Local Injection once  memantine 10 milliGRAM(s) Oral two times a day  multivitamin 1 Tablet(s) Oral daily  polyethylene glycol 3350 17 Gram(s) Oral daily  rivaroxaban 2.5 milliGRAM(s) Oral two times a day  senna 2 Tablet(s) Oral at bedtime  sodium chloride 0.9%. 1000 milliLiter(s) (75 mL/Hr) IV Continuous <Continuous>    MEDICATIONS  (PRN):  dextrose Oral Gel 15 Gram(s) Oral once PRN Blood Glucose LESS THAN 70 milliGRAM(s)/deciliter      OBJECTIVE:    Vital Signs Last 24 Hrs  T(C): 36.7 (03 Nov 2022 06:20), Max: 37.2 (02 Nov 2022 13:23)  T(F): 98 (03 Nov 2022 06:20), Max: 98.9 (02 Nov 2022 13:23)  HR: 81 (03 Nov 2022 06:20) (81 - 95)  BP: 148/67 (03 Nov 2022 06:20) (136/63 - 160/78)  BP(mean): --  RR: 18 (03 Nov 2022 06:20) (18 - 18)  SpO2: 96% (03 Nov 2022 06:20) (96% - 98%)    Parameters below as of 03 Nov 2022 06:20  Patient On (Oxygen Delivery Method): room air            I&O's Detail    02 Nov 2022 07:01  -  03 Nov 2022 07:00  --------------------------------------------------------  IN:    Oral Fluid: 790 mL    sodium chloride 0.9%: 1800 mL  Total IN: 2590 mL    OUT:    Indwelling Catheter - Urethral (mL): 2400 mL  Total OUT: 2400 mL    Total NET: 190 mL          Daily     Daily     LABS:                        9.4    10.01 )-----------( 136      ( 03 Nov 2022 07:22 )             29.9     11-03    139  |  108  |  25<H>  ----------------------------<  144<H>  4.6   |  23  |  1.23    Ca    8.7      03 Nov 2022 07:24  Phos  3.1     11-03  Mg     2.0     11-03                    PHYSICAL EXAM:    General: well developed, well nourished, NAD  HEENT: NCAT, trachea midline  Respiratory: respirations non labored  Gastrointestinal: soft, nontender, nondistended  Extremities: FROM, warm. R AT signal present  Neurological: non-focal

## 2022-11-04 ENCOUNTER — TRANSCRIPTION ENCOUNTER (OUTPATIENT)
Age: 72
End: 2022-11-04

## 2022-11-04 ENCOUNTER — RESULT REVIEW (OUTPATIENT)
Age: 72
End: 2022-11-04

## 2022-11-04 LAB
ANION GAP SERPL CALC-SCNC: 8 MMOL/L — SIGNIFICANT CHANGE UP (ref 5–17)
ANION GAP SERPL CALC-SCNC: 8 MMOL/L — SIGNIFICANT CHANGE UP (ref 5–17)
APTT BLD: 26 SEC — LOW (ref 27.5–35.5)
BLD GP AB SCN SERPL QL: NEGATIVE — SIGNIFICANT CHANGE UP
BUN SERPL-MCNC: 22 MG/DL — SIGNIFICANT CHANGE UP (ref 7–23)
BUN SERPL-MCNC: 23 MG/DL — SIGNIFICANT CHANGE UP (ref 7–23)
CALCIUM SERPL-MCNC: 8.6 MG/DL — SIGNIFICANT CHANGE UP (ref 8.4–10.5)
CALCIUM SERPL-MCNC: 8.8 MG/DL — SIGNIFICANT CHANGE UP (ref 8.4–10.5)
CHLORIDE SERPL-SCNC: 107 MMOL/L — SIGNIFICANT CHANGE UP (ref 96–108)
CHLORIDE SERPL-SCNC: 108 MMOL/L — SIGNIFICANT CHANGE UP (ref 96–108)
CO2 SERPL-SCNC: 22 MMOL/L — SIGNIFICANT CHANGE UP (ref 22–31)
CO2 SERPL-SCNC: 22 MMOL/L — SIGNIFICANT CHANGE UP (ref 22–31)
CREAT SERPL-MCNC: 1.35 MG/DL — HIGH (ref 0.5–1.3)
CREAT SERPL-MCNC: 1.37 MG/DL — HIGH (ref 0.5–1.3)
EGFR: 54 ML/MIN/1.73M2 — LOW
EGFR: 55 ML/MIN/1.73M2 — LOW
GLUCOSE BLDC GLUCOMTR-MCNC: 127 MG/DL — HIGH (ref 70–99)
GLUCOSE BLDC GLUCOMTR-MCNC: 172 MG/DL — HIGH (ref 70–99)
GLUCOSE BLDC GLUCOMTR-MCNC: 181 MG/DL — HIGH (ref 70–99)
GLUCOSE BLDC GLUCOMTR-MCNC: 214 MG/DL — HIGH (ref 70–99)
GLUCOSE SERPL-MCNC: 133 MG/DL — HIGH (ref 70–99)
GLUCOSE SERPL-MCNC: 218 MG/DL — HIGH (ref 70–99)
HCT VFR BLD CALC: 25.2 % — LOW (ref 39–50)
HCT VFR BLD CALC: 28.1 % — LOW (ref 39–50)
HGB BLD-MCNC: 8.3 G/DL — LOW (ref 13–17)
HGB BLD-MCNC: 8.7 G/DL — LOW (ref 13–17)
INR BLD: 1.04 RATIO — SIGNIFICANT CHANGE UP (ref 0.88–1.16)
MAGNESIUM SERPL-MCNC: 1.8 MG/DL — SIGNIFICANT CHANGE UP (ref 1.6–2.6)
MCHC RBC-ENTMCNC: 30.5 PG — SIGNIFICANT CHANGE UP (ref 27–34)
MCHC RBC-ENTMCNC: 31 GM/DL — LOW (ref 32–36)
MCHC RBC-ENTMCNC: 31.3 PG — SIGNIFICANT CHANGE UP (ref 27–34)
MCHC RBC-ENTMCNC: 32.9 GM/DL — SIGNIFICANT CHANGE UP (ref 32–36)
MCV RBC AUTO: 95.1 FL — SIGNIFICANT CHANGE UP (ref 80–100)
MCV RBC AUTO: 98.6 FL — SIGNIFICANT CHANGE UP (ref 80–100)
NRBC # BLD: 0 /100 WBCS — SIGNIFICANT CHANGE UP (ref 0–0)
NRBC # BLD: 0 /100 WBCS — SIGNIFICANT CHANGE UP (ref 0–0)
PHOSPHATE SERPL-MCNC: 3.3 MG/DL — SIGNIFICANT CHANGE UP (ref 2.5–4.5)
PLATELET # BLD AUTO: 169 K/UL — SIGNIFICANT CHANGE UP (ref 150–400)
PLATELET # BLD AUTO: 174 K/UL — SIGNIFICANT CHANGE UP (ref 150–400)
POTASSIUM SERPL-MCNC: 4.6 MMOL/L — SIGNIFICANT CHANGE UP (ref 3.5–5.3)
POTASSIUM SERPL-MCNC: 4.6 MMOL/L — SIGNIFICANT CHANGE UP (ref 3.5–5.3)
POTASSIUM SERPL-SCNC: 4.6 MMOL/L — SIGNIFICANT CHANGE UP (ref 3.5–5.3)
POTASSIUM SERPL-SCNC: 4.6 MMOL/L — SIGNIFICANT CHANGE UP (ref 3.5–5.3)
PROTHROM AB SERPL-ACNC: 12.1 SEC — SIGNIFICANT CHANGE UP (ref 10.5–13.4)
RBC # BLD: 2.65 M/UL — LOW (ref 4.2–5.8)
RBC # BLD: 2.85 M/UL — LOW (ref 4.2–5.8)
RBC # FLD: 14.3 % — SIGNIFICANT CHANGE UP (ref 10.3–14.5)
RBC # FLD: 14.6 % — HIGH (ref 10.3–14.5)
RH IG SCN BLD-IMP: POSITIVE — SIGNIFICANT CHANGE UP
SODIUM SERPL-SCNC: 137 MMOL/L — SIGNIFICANT CHANGE UP (ref 135–145)
SODIUM SERPL-SCNC: 138 MMOL/L — SIGNIFICANT CHANGE UP (ref 135–145)
WBC # BLD: 11.29 K/UL — HIGH (ref 3.8–10.5)
WBC # BLD: 8.83 K/UL — SIGNIFICANT CHANGE UP (ref 3.8–10.5)
WBC # FLD AUTO: 11.29 K/UL — HIGH (ref 3.8–10.5)
WBC # FLD AUTO: 8.83 K/UL — SIGNIFICANT CHANGE UP (ref 3.8–10.5)

## 2022-11-04 PROCEDURE — 88307 TISSUE EXAM BY PATHOLOGIST: CPT | Mod: 26

## 2022-11-04 PROCEDURE — 88305 TISSUE EXAM BY PATHOLOGIST: CPT | Mod: 26

## 2022-11-04 PROCEDURE — 88311 DECALCIFY TISSUE: CPT | Mod: 26

## 2022-11-04 PROCEDURE — 93010 ELECTROCARDIOGRAM REPORT: CPT

## 2022-11-04 PROCEDURE — 73630 X-RAY EXAM OF FOOT: CPT | Mod: 26,RT

## 2022-11-04 DEVICE — ARISTA 3GR: Type: IMPLANTABLE DEVICE | Status: FUNCTIONAL

## 2022-11-04 RX ORDER — INSULIN LISPRO 100/ML
VIAL (ML) SUBCUTANEOUS AT BEDTIME
Refills: 0 | Status: DISCONTINUED | OUTPATIENT
Start: 2022-11-04 | End: 2022-11-09

## 2022-11-04 RX ORDER — SODIUM CHLORIDE 9 MG/ML
500 INJECTION, SOLUTION INTRAVENOUS ONCE
Refills: 0 | Status: COMPLETED | OUTPATIENT
Start: 2022-11-04 | End: 2022-11-04

## 2022-11-04 RX ORDER — MORPHINE SULFATE 50 MG/1
2 CAPSULE, EXTENDED RELEASE ORAL EVERY 4 HOURS
Refills: 0 | Status: DISCONTINUED | OUTPATIENT
Start: 2022-11-04 | End: 2022-11-09

## 2022-11-04 RX ORDER — ONDANSETRON 8 MG/1
4 TABLET, FILM COATED ORAL ONCE
Refills: 0 | Status: DISCONTINUED | OUTPATIENT
Start: 2022-11-04 | End: 2022-11-09

## 2022-11-04 RX ORDER — DEXTROSE 50 % IN WATER 50 %
15 SYRINGE (ML) INTRAVENOUS ONCE
Refills: 0 | Status: DISCONTINUED | OUTPATIENT
Start: 2022-11-04 | End: 2022-11-09

## 2022-11-04 RX ORDER — INSULIN LISPRO 100/ML
2 VIAL (ML) SUBCUTANEOUS ONCE
Refills: 0 | Status: DISCONTINUED | OUTPATIENT
Start: 2022-11-04 | End: 2022-11-09

## 2022-11-04 RX ORDER — DEXTROSE 50 % IN WATER 50 %
12.5 SYRINGE (ML) INTRAVENOUS ONCE
Refills: 0 | Status: DISCONTINUED | OUTPATIENT
Start: 2022-11-04 | End: 2022-11-09

## 2022-11-04 RX ORDER — HYDROMORPHONE HYDROCHLORIDE 2 MG/ML
0.2 INJECTION INTRAMUSCULAR; INTRAVENOUS; SUBCUTANEOUS
Refills: 0 | Status: DISCONTINUED | OUTPATIENT
Start: 2022-11-04 | End: 2022-11-04

## 2022-11-04 RX ORDER — DEXTROSE 50 % IN WATER 50 %
25 SYRINGE (ML) INTRAVENOUS ONCE
Refills: 0 | Status: DISCONTINUED | OUTPATIENT
Start: 2022-11-04 | End: 2022-11-09

## 2022-11-04 RX ORDER — INSULIN LISPRO 100/ML
VIAL (ML) SUBCUTANEOUS
Refills: 0 | Status: DISCONTINUED | OUTPATIENT
Start: 2022-11-04 | End: 2022-11-05

## 2022-11-04 RX ORDER — ATORVASTATIN CALCIUM 80 MG/1
80 TABLET, FILM COATED ORAL AT BEDTIME
Refills: 0 | Status: DISCONTINUED | OUTPATIENT
Start: 2022-11-04 | End: 2022-11-09

## 2022-11-04 RX ORDER — MEMANTINE HYDROCHLORIDE 10 MG/1
10 TABLET ORAL
Refills: 0 | Status: DISCONTINUED | OUTPATIENT
Start: 2022-11-04 | End: 2022-11-09

## 2022-11-04 RX ORDER — ASPIRIN/CALCIUM CARB/MAGNESIUM 324 MG
81 TABLET ORAL DAILY
Refills: 0 | Status: DISCONTINUED | OUTPATIENT
Start: 2022-11-04 | End: 2022-11-09

## 2022-11-04 RX ORDER — ASCORBIC ACID 60 MG
500 TABLET,CHEWABLE ORAL DAILY
Refills: 0 | Status: DISCONTINUED | OUTPATIENT
Start: 2022-11-04 | End: 2022-11-09

## 2022-11-04 RX ORDER — SODIUM CHLORIDE 9 MG/ML
1000 INJECTION INTRAMUSCULAR; INTRAVENOUS; SUBCUTANEOUS
Refills: 0 | Status: ACTIVE | OUTPATIENT
Start: 2022-11-04 | End: 2023-10-03

## 2022-11-04 RX ORDER — INSULIN LISPRO 100/ML
VIAL (ML) SUBCUTANEOUS
Refills: 0 | Status: DISCONTINUED | OUTPATIENT
Start: 2022-11-04 | End: 2022-11-09

## 2022-11-04 RX ORDER — SODIUM CHLORIDE 9 MG/ML
1000 INJECTION, SOLUTION INTRAVENOUS
Refills: 0 | Status: DISCONTINUED | OUTPATIENT
Start: 2022-11-04 | End: 2022-11-09

## 2022-11-04 RX ORDER — HYDRALAZINE HCL 50 MG
10 TABLET ORAL ONCE
Refills: 0 | Status: COMPLETED | OUTPATIENT
Start: 2022-11-04 | End: 2022-11-04

## 2022-11-04 RX ORDER — SODIUM CHLORIDE 9 MG/ML
1000 INJECTION, SOLUTION INTRAVENOUS
Refills: 0 | Status: DISCONTINUED | OUTPATIENT
Start: 2022-11-04 | End: 2022-11-05

## 2022-11-04 RX ORDER — GLUCAGON INJECTION, SOLUTION 0.5 MG/.1ML
1 INJECTION, SOLUTION SUBCUTANEOUS ONCE
Refills: 0 | Status: DISCONTINUED | OUTPATIENT
Start: 2022-11-04 | End: 2022-11-09

## 2022-11-04 RX ORDER — INSULIN LISPRO 100/ML
VIAL (ML) SUBCUTANEOUS AT BEDTIME
Refills: 0 | Status: DISCONTINUED | OUTPATIENT
Start: 2022-11-04 | End: 2022-11-05

## 2022-11-04 RX ORDER — DULOXETINE HYDROCHLORIDE 30 MG/1
60 CAPSULE, DELAYED RELEASE ORAL DAILY
Refills: 0 | Status: DISCONTINUED | OUTPATIENT
Start: 2022-11-04 | End: 2022-11-09

## 2022-11-04 RX ORDER — ACETAMINOPHEN 500 MG
650 TABLET ORAL EVERY 6 HOURS
Refills: 0 | Status: DISCONTINUED | OUTPATIENT
Start: 2022-11-04 | End: 2022-11-08

## 2022-11-04 RX ORDER — ONDANSETRON 8 MG/1
4 TABLET, FILM COATED ORAL ONCE
Refills: 0 | Status: DISCONTINUED | OUTPATIENT
Start: 2022-11-04 | End: 2022-11-04

## 2022-11-04 RX ORDER — OXYCODONE AND ACETAMINOPHEN 5; 325 MG/1; MG/1
1 TABLET ORAL EVERY 6 HOURS
Refills: 0 | Status: DISCONTINUED | OUTPATIENT
Start: 2022-11-04 | End: 2022-11-08

## 2022-11-04 RX ORDER — HYDROMORPHONE HYDROCHLORIDE 2 MG/ML
0.2 INJECTION INTRAMUSCULAR; INTRAVENOUS; SUBCUTANEOUS
Refills: 0 | Status: DISCONTINUED | OUTPATIENT
Start: 2022-11-04 | End: 2022-11-09

## 2022-11-04 RX ORDER — POLYETHYLENE GLYCOL 3350 17 G/17G
17 POWDER, FOR SOLUTION ORAL DAILY
Refills: 0 | Status: DISCONTINUED | OUTPATIENT
Start: 2022-11-04 | End: 2022-11-09

## 2022-11-04 RX ORDER — SODIUM CHLORIDE 9 MG/ML
1000 INJECTION, SOLUTION INTRAVENOUS
Refills: 0 | Status: DISCONTINUED | OUTPATIENT
Start: 2022-11-04 | End: 2022-11-04

## 2022-11-04 RX ORDER — SENNA PLUS 8.6 MG/1
2 TABLET ORAL AT BEDTIME
Refills: 0 | Status: DISCONTINUED | OUTPATIENT
Start: 2022-11-04 | End: 2022-11-09

## 2022-11-04 RX ADMIN — SODIUM CHLORIDE 75 MILLILITER(S): 9 INJECTION INTRAMUSCULAR; INTRAVENOUS; SUBCUTANEOUS at 05:48

## 2022-11-04 RX ADMIN — POLYETHYLENE GLYCOL 3350 17 GRAM(S): 17 POWDER, FOR SOLUTION ORAL at 17:46

## 2022-11-04 RX ADMIN — SENNA PLUS 2 TABLET(S): 8.6 TABLET ORAL at 21:50

## 2022-11-04 RX ADMIN — SODIUM CHLORIDE 1000 MILLILITER(S): 9 INJECTION, SOLUTION INTRAVENOUS at 20:19

## 2022-11-04 RX ADMIN — Medication 10 MILLIGRAM(S): at 14:50

## 2022-11-04 RX ADMIN — HYDROMORPHONE HYDROCHLORIDE 0.2 MILLIGRAM(S): 2 INJECTION INTRAMUSCULAR; INTRAVENOUS; SUBCUTANEOUS at 14:22

## 2022-11-04 RX ADMIN — MEMANTINE HYDROCHLORIDE 10 MILLIGRAM(S): 10 TABLET ORAL at 17:48

## 2022-11-04 RX ADMIN — Medication 81 MILLIGRAM(S): at 17:47

## 2022-11-04 RX ADMIN — SODIUM CHLORIDE 75 MILLILITER(S): 9 INJECTION INTRAMUSCULAR; INTRAVENOUS; SUBCUTANEOUS at 21:50

## 2022-11-04 RX ADMIN — MEMANTINE HYDROCHLORIDE 10 MILLIGRAM(S): 10 TABLET ORAL at 05:01

## 2022-11-04 RX ADMIN — DULOXETINE HYDROCHLORIDE 60 MILLIGRAM(S): 30 CAPSULE, DELAYED RELEASE ORAL at 17:47

## 2022-11-04 RX ADMIN — Medication 500 MILLIGRAM(S): at 17:48

## 2022-11-04 RX ADMIN — ATORVASTATIN CALCIUM 80 MILLIGRAM(S): 80 TABLET, FILM COATED ORAL at 21:50

## 2022-11-04 RX ADMIN — HYDROMORPHONE HYDROCHLORIDE 0.2 MILLIGRAM(S): 2 INJECTION INTRAMUSCULAR; INTRAVENOUS; SUBCUTANEOUS at 14:41

## 2022-11-04 RX ADMIN — SODIUM CHLORIDE 1000 MILLILITER(S): 9 INJECTION, SOLUTION INTRAVENOUS at 23:30

## 2022-11-04 RX ADMIN — Medication 2: at 18:34

## 2022-11-04 RX ADMIN — Medication 650 MILLIGRAM(S): at 03:18

## 2022-11-04 RX ADMIN — Medication 650 MILLIGRAM(S): at 04:15

## 2022-11-04 NOTE — DISCHARGE NOTE PROVIDER - CARE PROVIDER_API CALL
Lalo Bailey)  Vascular Surgery  [address], [address]  [place] [address]  Phone: [phone]  Fax: [phone]  Follow Up Time: 1 week   Lalo Bailey)  Vascular Surgery  [address], [address]  [place] [address]  Phone: [phone]  Fax: [phone]  Follow Up Time: 1 week    Marcos Lopez (DPM)  Podiatric Medicine and Surgery  [address] [place] [id number]  Phone: [phone]  Fax: [phone]  Follow Up Time: 1 week

## 2022-11-04 NOTE — PROGRESS NOTE ADULT - ASSESSMENT
72 year old male presents for right leg femoral-popliteal bypass. Pt. with a history of PAD; s/p right endarterectomy of the right common femoral artery 10/21. Currently has necrotic lesion on his right great toe which is progressively worsening over the past few months    1 PAD  - s/p right CFA stent w/ eventual need for endarterectomy & left CFA endarterectomy now presenting s/p right femoral-proximal AT bypass w/ PTFE for a non-healing right foot wound  - podiatry and vascular following  - Pt is scheduled for right foot partial first and fifth ray resection  - medically cleared for OR     2 DM  - monitor FS  - ISS    3 CAD  - cw asa, stable   - cards following      Pt medically optimized/cleared for OR

## 2022-11-04 NOTE — PROGRESS NOTE ADULT - ASSESSMENT
Plan:   Pt is scheduled for right foot partial first and fifth ray resection with Dr. Lopez at 9:30 AM. Patient is aware of procedure and is NPO since midnight..   CXR on sunrise.  EKG on sunrise.  Medical/Cardiac clearance since 11/3 and documented in chart.  Consent signed and in chart.  Procedure was explained to patient in detail. All alternatives, risks and complications were discussed. All questions answered.   Plan:   Pt is scheduled for right foot partial first and fifth ray resection with Dr. Lopez at 9:30 AM. Patient is aware of procedure and is NPO since midnight..   CXR on sunrise.  EKG on sunrise.  Cardiac clearance since 11/3 and documented in chart.  Consent signed and in chart.  Procedure was explained to patient in detail. All alternatives, risks and complications were discussed. All questions answered.

## 2022-11-04 NOTE — DISCHARGE NOTE PROVIDER - NSDCFUADDAPPT_GEN_ALL_CORE_FT
Podiatry Discharge Instructions:  Follow up: Please follow up with Dr. Marcos Lopez within 1 week of discharge from the hospital, please call [phone] for appointment and discuss that you recently were seen in the hospital.  Wound Care: Please leave your dressing clean dry intact until your follow up appointment  Weight bearing: Please weight bear as tolerated in a surgical shoe to the right heel   Antibiotics: Please continue as instructed. Podiatry Discharge Instructions:  Follow up: Please follow up with Dr. Marcos Lopez within 1 week of discharge from the hospital, please call [phone] for appointment and discuss that you recently were seen in the hospital.  Wound Care: Please leave your dressing clean dry intact until your follow up appointment  Weight bearing: Please no weight bearing to the left lower extremity while wearing posterior splint.    Antibiotics: Please continue as instructed. Podiatry Discharge Instructions:  Follow up: Please follow up with Dr. Marcos Lopez within 1 week of discharge from the hospital, please call [phone] for appointment and discuss that you recently were seen in the hospital.  Wound Care: Please leave your dressing clean dry intact until your follow up appointment  Weight bearing: Please partial weight bear in surgical shoe to the right heel as tolerated.   Antibiotics: Please continue as instructed.

## 2022-11-04 NOTE — DISCHARGE NOTE PROVIDER - NSDCMRMEDTOKEN_GEN_ALL_CORE_FT
Alogliptin 25 mg oral tablet: 1 tab(s) orally once a day  aspirin 81 mg oral delayed release tablet: 1 tab(s) orally once a day  atorvastatin 80 mg oral tablet: 1 tab(s) orally once a day (at bedtime)  clopidogrel 75 mg oral tablet: 1 tab(s) orally once a day  divalproex sodium 250 mg oral tablet, extended release: 1 tab(s) orally once a day  DULoxetine 60 mg oral delayed release capsule: 1 cap(s) orally once a day  memantine 10 mg oral tablet: 1 tab(s) orally 2 times a day  metFORMIN 1000 mg oral tablet: 1 tab(s) orally 2 times a day  metoprolol: 50 milligram(s) orally once a day  MiraLax oral powder for reconstitution: 1 dose(s) orally once a day  QUEtiapine 25 mg oral tablet: 1 tab(s) orally once a day  rivastigmine 13.3 mg/24 hr transdermal film, extended release: 1 patch transdermal every 24 hours  senna (sennosides) 8.6 mg oral tablet: 2 tab(s) orally once a day (at bedtime)  Vitamin B-12 1000 mcg oral tablet: 1 tab(s) orally once a day  Xanax: 0.125 milligram(s) orally once a day (at bedtime)   acetaminophen 325 mg oral tablet: 3 tab(s) orally every 6 hours  Alogliptin 25 mg oral tablet: 1 tab(s) orally once a day  aspirin 81 mg oral delayed release tablet: 1 tab(s) orally once a day  atorvastatin 80 mg oral tablet: 1 tab(s) orally once a day (at bedtime)  clopidogrel 75 mg oral tablet: 1 tab(s) orally once a day  divalproex sodium 250 mg oral tablet, extended release: 1 tab(s) orally once a day  DULoxetine 60 mg oral delayed release capsule: 1 cap(s) orally once a day  memantine 10 mg oral tablet: 1 tab(s) orally 2 times a day  metFORMIN 1000 mg oral tablet: 1 tab(s) orally 2 times a day  metoprolol succinate 50 mg oral tablet, extended release: 1 tab(s) orally once a day  MiraLax oral powder for reconstitution: 1 dose(s) orally once a day  oxyCODONE 5 mg oral tablet: 1 tab(s) orally every 6 hours, As Needed -Moderate Pain (4 - 6) MDD:4  pantoprazole 40 mg oral delayed release tablet: 1 tab(s) orally once a day (before a meal)  QUEtiapine 25 mg oral tablet: 1 tab(s) orally once a day  rivaroxaban 2.5 mg oral tablet: 1 tab(s) orally 2 times a day  rivastigmine 13.3 mg/24 hr transdermal film, extended release: 1 patch transdermal every 24 hours  senna (sennosides) 8.6 mg oral tablet: 2 tab(s) orally once a day (at bedtime)  Vitamin B-12 1000 mcg oral tablet: 1 tab(s) orally once a day  Xanax: 0.125 milligram(s) orally once a day (at bedtime)

## 2022-11-04 NOTE — DISCHARGE NOTE PROVIDER - NSDCQMERRANDS_GEN_ALL_CORE
Add 68050 Cpt? (Important Note: In 2017 The Use Of 33230 Is Being Tracked By Cms To Determine Future Global Period Reimbursement For Global Periods): yes Detail Level: Simple Yes

## 2022-11-04 NOTE — CHART NOTE - NSCHARTNOTEFT_GEN_A_CORE
Post Operative Note  Patient: SHERICE RAMIREZ 72y (1950) Male   MRN: [id number]  Location: [facility] 9MON 911 W1  Visit: 10-31-22 Inpatient  Date: 11-04-22 @ 16:52    Procedure: S/P 1&5th right ray resection per podiatry    Subjective: Patient seen and examined post operatively. Subjective exam limited due to patient non-verbal at baseline. Wife reports patient appears well though.      Objective:  Vitals: T(F): 98.1 (11-04-22 @ 15:40), Max: 100.2 (11-04-22 @ 09:17)  HR: 111 (11-04-22 @ 15:40)  BP: 145/73 (11-04-22 @ 15:40) (128/77 - 188/86)  RR: 18 (11-04-22 @ 15:40)  SpO2: 99% (11-04-22 @ 15:40)  Vent Settings:     In:   11-03-22 @ 07:01  -  11-04-22 @ 07:00  --------------------------------------------------------  IN: 900 mL    11-04-22 @ 07:01  -  11-04-22 @ 16:52  --------------------------------------------------------  IN: 225 mL      IV Fluids: dextrose 5%. 1000 milliLiter(s) (100 mL/Hr) IV Continuous <Continuous>  dextrose 5%. 1000 milliLiter(s) (50 mL/Hr) IV Continuous <Continuous>  sodium chloride 0.9%. 1000 milliLiter(s) (75 mL/Hr) IV Continuous <Continuous>      Out:   11-03-22 @ 07:01  -  11-04-22 @ 07:00  --------------------------------------------------------  OUT: 1650 mL    11-04-22 @ 07:01  -  11-04-22 @ 16:52  --------------------------------------------------------  OUT: 215 mL      EBL:     Voided Urine:   11-03-22 @ 07:01  -  11-04-22 @ 07:00  --------------------------------------------------------  OUT: 1650 mL    11-04-22 @ 07:01  -  11-04-22 @ 16:52  --------------------------------------------------------  OUT: 215 mL      Physical Examination:  General: NAD, resting comfortably in bed  HEENT: Normocephalic atraumatic  Respiratory: Nonlabored respirations, normal CW expansion.  Vascular: extremities are warm and well perfused.   Extermities: right AT (+), foot wrapped in ACE, bandage cdi.    Imaging:  No post-op imaging studies    Assessment:  72yMale patient S/P 1&5th right ray resection per podiatry. Patient recovering well.     Plan:  - Pain control PRN  -ASA  - Diet: regular, carb consistent  - IVF  -PT eval post  op pending  - DVT ppx: SCD's     Vascular Surgery  [id number]    Date/Time: 11-04-22 @ 16:52

## 2022-11-04 NOTE — BRIEF OPERATIVE NOTE - OPERATION/FINDINGS
s/p Right foot partial first and fifth ray resection   - Bone at proximal resection bone was hard and of good quality.  - Adequate intraop bleeding.  - No evidence of purulence or soft tissue infection.  - Incision primarily closed with 3-0 nylon

## 2022-11-04 NOTE — PROGRESS NOTE ADULT - SUBJECTIVE AND OBJECTIVE BOX
DATE OF SERVICE: 11-04-22 @ 15:14    Patient is a 72y old  Male who presents with a chief complaint of PAD (02 Nov 2022 17:24)      INTERVAL HISTORY: In no acute distress.     REVIEW OF SYSTEMS: Unable to participate in ROS d/t baseline AMS  CONSTITUTIONAL: No weakness  EYES/ENT: No visual changes;  No throat pain   NECK: No pain or stiffness  RESPIRATORY: No cough, wheezing; No shortness of breath  CARDIOVASCULAR: No chest pain or palpitations  GASTROINTESTINAL: No abdominal  pain. No nausea, vomiting, or hematemesis  GENITOURINARY: No dysuria, frequency or hematuria  NEUROLOGICAL: No stroke like symptoms  SKIN: No rashes    	  MEDICATIONS:        PHYSICAL EXAM:  T(C): 36.7 (11-04-22 @ 13:00), Max: 37.9 (11-04-22 @ 09:17)  HR: 92 (11-04-22 @ 15:00) (80 - 121)  BP: 162/68 (11-04-22 @ 15:00) (128/77 - 188/86)  RR: 16 (11-04-22 @ 15:00) (14 - 18)  SpO2: 99% (11-04-22 @ 15:00) (95% - 100%)  Wt(kg): --  I&O's Summary    03 Nov 2022 07:01  -  04 Nov 2022 07:00  --------------------------------------------------------  IN: 900 mL / OUT: 1650 mL / NET: -750 mL    04 Nov 2022 07:01  -  04 Nov 2022 15:14  --------------------------------------------------------  IN: 225 mL / OUT: 215 mL / NET: 10 mL      Height (cm): 180.3 (11-04 @ 09:57)  Weight (kg): 88.5 (11-04 @ 09:57)  BMI (kg/m2): 27.2 (11-04 @ 09:57)  BSA (m2): 2.09 (11-04 @ 09:57)    Appearance: In no distress	  HEENT:    PERRL, EOMI	  Cardiovascular:  S1 S2, No JVD  Respiratory: Lungs clear to auscultation	  Gastrointestinal:  Soft, Non-tender, + BS	  Vascularature:  No edema of LE  Psychiatric: Appropriate affect   Neuro: no acute focal deficits                               8.7    8.83  )-----------( 169      ( 04 Nov 2022 03:01 )             28.1     11-04    138  |  108  |  23  ----------------------------<  133<H>  4.6   |  22  |  1.37<H>    Ca    8.8      04 Nov 2022 03:01  Phos  3.1     11-03  Mg     2.0     11-03          Labs personally reviewed      ASSESSMENT/PLAN: 	      72 year old male presents for right leg femoral-popliteal bypass. Pt. with a history of PAD; s/p right endarterectomy of the right common femoral artery 10/21. Currently has necrotic lesion on his right great toe which is progressively worsening over the past few months. Pt. with Alzheimer's dementia, minimally verbal, requires full assistance for ADLs.    1. Cardiac Risk Stratification  - Inpt and outpt charts reviewed  - Pt was seen by his OP cardio Dr Medina 9/30/22 for preop evaluation for open RLE bypass and was deemed optimized to proceed  - No ACS, euvolemic, no tachy/deric arrythmia, no murmur to suggest severe AS/MS - TTE unremarkable   - Mod risk for mod risk pod surgery, no contraindication to proceed    - Tolerated surgery well.    2. PAD  - s/p right CFA stent w/ eventual need for endarterectomy & left CFA endarterectomy now presenting s/p right femoral-proximal AT bypass w/ PTFE for a non-healing right foot wound  - c/w Xarelto 2.5mg PO daily  -s/p R foot partial 1st and 5th ray amputation     3. DVT PPx  - Resume Xarelto as per surgery          Elsa Rose, AG-NP   Zuhair Martinez,  [facility]  Cardiovascular Medicine  [address], [address]  Available through call or text on [employer]  Office: [phone]

## 2022-11-04 NOTE — PROGRESS NOTE ADULT - ASSESSMENT
73 y/o male w/ a PMHx of CAD s/p stent, HTN, HLD, T2DM c/b peripheral neuropathy, DOLORES but non-compliant w/ CPAP, bladder CA s/p laser ablation, Alzheimer's dementia (minimally verbal & requires full assistance w/ ADLs), anxiety, and PAD s/p right CFA stent w/ eventual need for endarterectomy & left CFA endarterectomy now presenting s/p right femoral-proximal AT bypass w/ PTFE for a non-healing right foot wound w/ a post-op course c/b hemorrhagic shock.     - Multimodal pain control   - Encourage OOB to chair, IS  - Diet: Regular diet, bowel regimen with senna and miralax  - Elevated lactate POD1, downtrending to 1.7 this AM  - PVD: Xarelto 2.5 mg  - Lovenox for VTE prophylaxis  - Appreciate podiatry recs for right foot wound    [id number]  Vascular Surgery  73 y/o male w/ a PMHx of CAD s/p stent, HTN, HLD, T2DM c/b peripheral neuropathy, DOLORES but non-compliant w/ CPAP, bladder CA s/p laser ablation, Alzheimer's dementia (minimally verbal & requires full assistance w/ ADLs), anxiety, and PAD s/p right CFA stent w/ eventual need for endarterectomy & left CFA endarterectomy now presenting s/p right femoral-proximal AT bypass w/ PTFE for a non-healing right foot wound w/ a post-op course c/b hemorrhagic shock.     - Multimodal pain control   - Encourage OOB to chair, IS  - Diet: Regular diet, bowel regimen with senna and miralax  - PVD: Xarelto 2.5 mg -- held for podiatry procedure today  - Appreciate podiatry recs for right foot wound    [id number]  Vascular Surgery

## 2022-11-04 NOTE — PROGRESS NOTE ADULT - SUBJECTIVE AND OBJECTIVE BOX
Patient is a 72y old  Male who presents with a chief complaint of PAD (02 Nov 2022 17:24)      INTERVAL HPI/OVERNIGHT EVENTS:   Pt is scheduled for right foot partial first and fifth ray resection with Dr. Lopez at 9:30 AM. Patient is aware of procedure and is NPO since midnight.    MEDICATIONS  (STANDING):  acetaminophen    Suspension .. 650 milliGRAM(s) Oral every 6 hours  ascorbic acid 500 milliGRAM(s) Oral daily  aspirin enteric coated 81 milliGRAM(s) Oral daily  atorvastatin 80 milliGRAM(s) Oral at bedtime  chlorhexidine 2% Cloths 1 Application(s) Topical once  dextrose 5%. 1000 milliLiter(s) (50 mL/Hr) IV Continuous <Continuous>  dextrose 5%. 1000 milliLiter(s) (100 mL/Hr) IV Continuous <Continuous>  dextrose 50% Injectable 25 Gram(s) IV Push once  dextrose 50% Injectable 12.5 Gram(s) IV Push once  dextrose 50% Injectable 25 Gram(s) IV Push once  DULoxetine 60 milliGRAM(s) Oral daily  glucagon  Injectable 1 milliGRAM(s) IntraMuscular once  influenza  Vaccine (HIGH DOSE) 0.7 milliLiter(s) IntraMuscular once  insulin lispro (ADMELOG) corrective regimen sliding scale   SubCutaneous three times a day before meals  insulin lispro (ADMELOG) corrective regimen sliding scale   SubCutaneous at bedtime  lidocaine 1% Injectable 0.2 milliLiter(s) Local Injection once  memantine 10 milliGRAM(s) Oral two times a day  multivitamin 1 Tablet(s) Oral daily  polyethylene glycol 3350 17 Gram(s) Oral daily  senna 2 Tablet(s) Oral at bedtime  sodium chloride 0.9%. 1000 milliLiter(s) (75 mL/Hr) IV Continuous <Continuous>    MEDICATIONS  (PRN):  dextrose Oral Gel 15 Gram(s) Oral once PRN Blood Glucose LESS THAN 70 milliGRAM(s)/deciliter      Allergies    Ceclor (Rash; Urticaria)    Intolerances        Vital Signs Last 24 Hrs  T(C): 36.9 (04 Nov 2022 05:49), Max: 36.9 (03 Nov 2022 13:45)  T(F): 98.4 (04 Nov 2022 05:49), Max: 98.5 (03 Nov 2022 13:45)  HR: 94 (04 Nov 2022 05:49) (77 - 121)  BP: 162/79 (04 Nov 2022 05:49) (128/77 - 175/84)  BP(mean): --  RR: 18 (04 Nov 2022 05:49) (18 - 18)  SpO2: 95% (04 Nov 2022 05:49) (92% - 97%)    Parameters below as of 04 Nov 2022 01:19  Patient On (Oxygen Delivery Method): room air        LABS:                        8.7    8.83  )-----------( 169      ( 04 Nov 2022 03:01 )             28.1     11-04    138  |  108  |  23  ----------------------------<  133<H>  4.6   |  22  |  1.37<H>    Ca    8.8      04 Nov 2022 03:01  Phos  3.1     11-03  Mg     2.0     11-03      PT/INR - ( 04 Nov 2022 03:01 )   PT: 12.1 sec;   INR: 1.04 ratio         PTT - ( 04 Nov 2022 03:01 )  PTT:26.0 sec    CAPILLARY BLOOD GLUCOSE      POCT Blood Glucose.: 186 mg/dL (03 Nov 2022 21:59)  POCT Blood Glucose.: 238 mg/dL (03 Nov 2022 18:38)  POCT Blood Glucose.: 191 mg/dL (03 Nov 2022 17:33)  POCT Blood Glucose.: 167 mg/dL (03 Nov 2022 13:03)  POCT Blood Glucose.: 142 mg/dL (03 Nov 2022 08:37)      RADIOLOGY & ADDITIONAL TESTS:

## 2022-11-04 NOTE — BRIEF OPERATIVE NOTE - NSICDXBRIEFPOSTOP_GEN_ALL_CORE_FT
POST-OP DIAGNOSIS:  Gangrene of toe of right foot 31-Oct-2022 16:01:26  Kathy Kam  
POST-OP DIAGNOSIS:  PAD (peripheral artery disease) 31-Oct-2022 16:01:16  Kathy Kam  Gangrene of toe of right foot 31-Oct-2022 16:01:26  Kathy Kam

## 2022-11-04 NOTE — DISCHARGE NOTE PROVIDER - NSDCCPCAREPLAN_GEN_ALL_CORE_FT
PRINCIPAL DISCHARGE DIAGNOSIS  Diagnosis: PAD (peripheral artery disease)  Assessment and Plan of Treatment: WOUND CARE: Continue podiatry care to foot.  BATHING: You may shower and/or sponge bathe.  ACTIVITY: No heavy lifting anything more than 10-15lbs or straining. Otherwise, you may return to your usual level of physical activity. If you are taking narcotic pain medication (such as Percocet), do NOT drive a car, operate machinery or make important decisions.  NOTIFY YOUR SURGEON IF: You have any bleeding that does not stop, any fever (over 100.4 F) or chills, persistent weakness/numbness in either leg, or if your pain is not controlled on your discharge pain medications.  FOLLOW-UP:  Please follow up with  in one week regarding your hospitalization.      SECONDARY DISCHARGE DIAGNOSES  Diagnosis: Hypertension  Assessment and Plan of Treatment:      PRINCIPAL DISCHARGE DIAGNOSIS  Diagnosis: PAD (peripheral artery disease)  Assessment and Plan of Treatment: MEDICATIONS:  Continue Xarelto 2.5mg twice a day.  WOUND CARE: Continue podiatry care to foot.  BATHING: You may shower and/or sponge bathe.  ACTIVITY: No heavy lifting anything more than 10-15lbs or straining. Otherwise, you may return to your usual level of physical activity. If you are taking narcotic pain medication (such as Percocet), do NOT drive a car, operate machinery or make important decisions.  NOTIFY YOUR SURGEON IF: You have any bleeding that does not stop, any fever (over 100.4 F) or chills, persistent weakness/numbness in either leg, or if your pain is not controlled on your discharge pain medications.  FOLLOW-UP:  Please follow up with  in one week regarding your hospitalization.      SECONDARY DISCHARGE DIAGNOSES  Diagnosis: Hypertension  Assessment and Plan of Treatment: Metoprolol has been increased to 50mg TWICE a day.  Please continue this dose.

## 2022-11-04 NOTE — BRIEF OPERATIVE NOTE - NSICDXBRIEFPROCEDURE_GEN_ALL_CORE_FT
PROCEDURES:  Ray amputation of fifth toe 04-Nov-2022 11:57:52  Eliza Overton  Ray amputation of great toe 04-Nov-2022 11:58:05  Eliza Overton  
PROCEDURES:  Creation of bypass from right femoral artery to distal tibial artery 31-Oct-2022 16:00:10  Kathy Kam

## 2022-11-04 NOTE — BRIEF OPERATIVE NOTE - COMMENTS
Pt is s/p Right foot partial first and fifth ray resection, closed  - Low concern for residual soft tissue or bone infection.  - Low concern for viability, adequate intro-op bleeding.   - Podiatry Plan: Stable to discharge from podiatry standpoint pending 48 hours of no growth

## 2022-11-04 NOTE — BRIEF OPERATIVE NOTE - SPECIMENS
Dirty Bone of 1st and 5th digit sent to pathology, clean bone of 1st metatarsal sent to path and nir
none

## 2022-11-04 NOTE — DISCHARGE NOTE PROVIDER - PROVIDER TOKENS
PROVIDER:[TOKEN:[[id number]],FOLLOWUP:[1 week]] PROVIDER:[TOKEN:[[id number]],FOLLOWUP:[1 week]],PROVIDER:[TOKEN:[[id number]],FOLLOWUP:[1 week]]

## 2022-11-04 NOTE — DISCHARGE NOTE PROVIDER - CARE PROVIDERS DIRECT ADDRESSES
,DirectAddress_Unknown ,DirectAddress_Unknown,[email or web address] 24-Aug-2019 00:54

## 2022-11-04 NOTE — DISCHARGE NOTE PROVIDER - NSDCFUSCHEDAPPT_GEN_ALL_CORE_FT
Wero Wilkinson  [facility]  NEUROLOGY 1 [facility]  Scheduled Appointment: 02/03/2023

## 2022-11-04 NOTE — PROGRESS NOTE ADULT - SUBJECTIVE AND OBJECTIVE BOX
VASCULAR SURGERY DAILY PROGRESS NOTE:     SUBJECTIVE/ROS: Patient seen and evaluated on AM rounds. Pt is comfortable. Pt minimally verbal at baseline.      MEDICATIONS  (STANDING):  acetaminophen    Suspension .. 650 milliGRAM(s) Oral every 6 hours  ascorbic acid 500 milliGRAM(s) Oral daily  aspirin enteric coated 81 milliGRAM(s) Oral daily  atorvastatin 80 milliGRAM(s) Oral at bedtime  chlorhexidine 2% Cloths 1 Application(s) Topical once  dextrose 5%. 1000 milliLiter(s) (50 mL/Hr) IV Continuous <Continuous>  dextrose 5%. 1000 milliLiter(s) (100 mL/Hr) IV Continuous <Continuous>  dextrose 50% Injectable 25 Gram(s) IV Push once  dextrose 50% Injectable 12.5 Gram(s) IV Push once  dextrose 50% Injectable 25 Gram(s) IV Push once  DULoxetine 60 milliGRAM(s) Oral daily  glucagon  Injectable 1 milliGRAM(s) IntraMuscular once  influenza  Vaccine (HIGH DOSE) 0.7 milliLiter(s) IntraMuscular once  insulin lispro (ADMELOG) corrective regimen sliding scale   SubCutaneous three times a day before meals  insulin lispro (ADMELOG) corrective regimen sliding scale   SubCutaneous at bedtime  lidocaine 1% Injectable 0.2 milliLiter(s) Local Injection once  memantine 10 milliGRAM(s) Oral two times a day  multivitamin 1 Tablet(s) Oral daily  polyethylene glycol 3350 17 Gram(s) Oral daily  rivaroxaban 2.5 milliGRAM(s) Oral two times a day  senna 2 Tablet(s) Oral at bedtime  sodium chloride 0.9%. 1000 milliLiter(s) (75 mL/Hr) IV Continuous <Continuous>    MEDICATIONS  (PRN):  dextrose Oral Gel 15 Gram(s) Oral once PRN Blood Glucose LESS THAN 70 milliGRAM(s)/deciliter      OBJECTIVE:  Vital Signs Last 24 Hrs  T(C): 36.9 (04 Nov 2022 05:49), Max: 36.9 (03 Nov 2022 13:45)  T(F): 98.4 (04 Nov 2022 05:49), Max: 98.5 (03 Nov 2022 13:45)  HR: 94 (04 Nov 2022 05:49) (77 - 121)  BP: 162/79 (04 Nov 2022 05:49) (128/77 - 175/84)  BP(mean): --  ABP: --  ABP(mean): --  RR: 18 (04 Nov 2022 05:49) (18 - 18)  SpO2: 95% (04 Nov 2022 05:49) (92% - 97%)    O2 Parameters below as of 04 Nov 2022 01:19  Patient On (Oxygen Delivery Method): room air      I&O's Detail    03 Nov 2022 07:01  -  04 Nov 2022 07:00  --------------------------------------------------------  IN:    sodium chloride 0.9%: 900 mL  Total IN: 900 mL    OUT:    Indwelling Catheter - Urethral (mL): 300 mL    Oral Fluid: 0 mL    Voided (mL): 1350 mL  Total OUT: 1650 mL    Total NET: -750 mL              Daily     LABS:                        9.4    10.01 )-----------( 136      ( 03 Nov 2022 07:22 )             29.9     11-03    139  |  108  |  25<H>  ----------------------------<  144<H>  4.6   |  23  |  1.23    Ca    8.7      03 Nov 2022 07:24  Phos  3.1     11-03  Mg     2.0     11-03                    PHYSICAL EXAM:    General: well developed, well nourished, NAD  HEENT: NCAT, trachea midline  Respiratory: respirations non labored  Gastrointestinal: soft, nontender, nondistended  Extremities: FROM, warm. R AT signal present  Neurological: non-focal           VASCULAR SURGERY DAILY PROGRESS NOTE:     SUBJECTIVE/ROS: Patient seen and evaluated on AM rounds. Pt is comfortable. Pt minimally verbal at baseline.      MEDICATIONS  (STANDING):  acetaminophen    Suspension .. 650 milliGRAM(s) Oral every 6 hours  ascorbic acid 500 milliGRAM(s) Oral daily  aspirin enteric coated 81 milliGRAM(s) Oral daily  atorvastatin 80 milliGRAM(s) Oral at bedtime  chlorhexidine 2% Cloths 1 Application(s) Topical once  dextrose 5%. 1000 milliLiter(s) (50 mL/Hr) IV Continuous <Continuous>  dextrose 5%. 1000 milliLiter(s) (100 mL/Hr) IV Continuous <Continuous>  dextrose 50% Injectable 25 Gram(s) IV Push once  dextrose 50% Injectable 12.5 Gram(s) IV Push once  dextrose 50% Injectable 25 Gram(s) IV Push once  DULoxetine 60 milliGRAM(s) Oral daily  glucagon  Injectable 1 milliGRAM(s) IntraMuscular once  influenza  Vaccine (HIGH DOSE) 0.7 milliLiter(s) IntraMuscular once  insulin lispro (ADMELOG) corrective regimen sliding scale   SubCutaneous three times a day before meals  insulin lispro (ADMELOG) corrective regimen sliding scale   SubCutaneous at bedtime  lidocaine 1% Injectable 0.2 milliLiter(s) Local Injection once  memantine 10 milliGRAM(s) Oral two times a day  multivitamin 1 Tablet(s) Oral daily  polyethylene glycol 3350 17 Gram(s) Oral daily  rivaroxaban 2.5 milliGRAM(s) Oral two times a day  senna 2 Tablet(s) Oral at bedtime  sodium chloride 0.9%. 1000 milliLiter(s) (75 mL/Hr) IV Continuous <Continuous>    MEDICATIONS  (PRN):  dextrose Oral Gel 15 Gram(s) Oral once PRN Blood Glucose LESS THAN 70 milliGRAM(s)/deciliter      OBJECTIVE:  Vital Signs Last 24 Hrs  T(C): 36.9 (04 Nov 2022 05:49), Max: 36.9 (03 Nov 2022 13:45)  T(F): 98.4 (04 Nov 2022 05:49), Max: 98.5 (03 Nov 2022 13:45)  HR: 94 (04 Nov 2022 05:49) (77 - 121)  BP: 162/79 (04 Nov 2022 05:49) (128/77 - 175/84)  BP(mean): --  ABP: --  ABP(mean): --  RR: 18 (04 Nov 2022 05:49) (18 - 18)  SpO2: 95% (04 Nov 2022 05:49) (92% - 97%)    O2 Parameters below as of 04 Nov 2022 01:19  Patient On (Oxygen Delivery Method): room air      I&O's Detail    03 Nov 2022 07:01  -  04 Nov 2022 07:00  --------------------------------------------------------  IN:    sodium chloride 0.9%: 900 mL  Total IN: 900 mL    OUT:    Indwelling Catheter - Urethral (mL): 300 mL    Oral Fluid: 0 mL    Voided (mL): 1350 mL  Total OUT: 1650 mL    Total NET: -750 mL      Daily     LABS:                         8.7    8.83  )-----------( 169      ( 04 Nov 2022 03:01 )             28.1   11-04    138  |  108  |  23  ----------------------------<  133<H>  4.6   |  22  |  1.37<H>    Ca    8.8      04 Nov 2022 03:01  Phos  3.1     11-03  Mg     2.0     11-03    PT/INR - ( 04 Nov 2022 03:01 )   PT: 12.1 sec;   INR: 1.04 ratio         PTT - ( 04 Nov 2022 03:01 )  PTT:26.0 sec      PHYSICAL EXAM:    General: well developed, well nourished, NAD  HEENT: NCAT, trachea midline  Respiratory: respirations non labored  Gastrointestinal: soft, nontender, nondistended  Extremities: FROM, warm. R AT signal present  Neurological: non-focal           face/complains of pain/discomfort

## 2022-11-04 NOTE — PROGRESS NOTE ADULT - SUBJECTIVE AND OBJECTIVE BOX
Patient is a 72y old  Male who presents with a chief complaint of PAD (02 Nov 2022 17:24)    Date of servie : 11-04-22 @ 08:04  INTERVAL HPI/OVERNIGHT EVENTS:  T(C): 36.9 (11-04-22 @ 05:49), Max: 36.9 (11-03-22 @ 13:45)  HR: 94 (11-04-22 @ 05:49) (77 - 121)  BP: 162/79 (11-04-22 @ 05:49) (128/77 - 175/84)  RR: 18 (11-04-22 @ 05:49) (18 - 18)  SpO2: 95% (11-04-22 @ 05:49) (92% - 97%)  Wt(kg): --  I&O's Summary    03 Nov 2022 07:01  -  04 Nov 2022 07:00  --------------------------------------------------------  IN: 900 mL / OUT: 1650 mL / NET: -750 mL        LABS:                        8.7    8.83  )-----------( 169      ( 04 Nov 2022 03:01 )             28.1     11-04    138  |  108  |  23  ----------------------------<  133<H>  4.6   |  22  |  1.37<H>    Ca    8.8      04 Nov 2022 03:01  Phos  3.1     11-03  Mg     2.0     11-03      PT/INR - ( 04 Nov 2022 03:01 )   PT: 12.1 sec;   INR: 1.04 ratio         PTT - ( 04 Nov 2022 03:01 )  PTT:26.0 sec    CAPILLARY BLOOD GLUCOSE      POCT Blood Glucose.: 186 mg/dL (03 Nov 2022 21:59)  POCT Blood Glucose.: 238 mg/dL (03 Nov 2022 18:38)  POCT Blood Glucose.: 191 mg/dL (03 Nov 2022 17:33)  POCT Blood Glucose.: 167 mg/dL (03 Nov 2022 13:03)  POCT Blood Glucose.: 142 mg/dL (03 Nov 2022 08:37)            MEDICATIONS  (STANDING):  acetaminophen    Suspension .. 650 milliGRAM(s) Oral every 6 hours  ascorbic acid 500 milliGRAM(s) Oral daily  aspirin enteric coated 81 milliGRAM(s) Oral daily  atorvastatin 80 milliGRAM(s) Oral at bedtime  chlorhexidine 2% Cloths 1 Application(s) Topical once  dextrose 5%. 1000 milliLiter(s) (50 mL/Hr) IV Continuous <Continuous>  dextrose 5%. 1000 milliLiter(s) (100 mL/Hr) IV Continuous <Continuous>  dextrose 50% Injectable 25 Gram(s) IV Push once  dextrose 50% Injectable 12.5 Gram(s) IV Push once  dextrose 50% Injectable 25 Gram(s) IV Push once  DULoxetine 60 milliGRAM(s) Oral daily  glucagon  Injectable 1 milliGRAM(s) IntraMuscular once  influenza  Vaccine (HIGH DOSE) 0.7 milliLiter(s) IntraMuscular once  insulin lispro (ADMELOG) corrective regimen sliding scale   SubCutaneous three times a day before meals  insulin lispro (ADMELOG) corrective regimen sliding scale   SubCutaneous at bedtime  lidocaine 1% Injectable 0.2 milliLiter(s) Local Injection once  memantine 10 milliGRAM(s) Oral two times a day  multivitamin 1 Tablet(s) Oral daily  polyethylene glycol 3350 17 Gram(s) Oral daily  senna 2 Tablet(s) Oral at bedtime  sodium chloride 0.9%. 1000 milliLiter(s) (75 mL/Hr) IV Continuous <Continuous>    MEDICATIONS  (PRN):  dextrose Oral Gel 15 Gram(s) Oral once PRN Blood Glucose LESS THAN 70 milliGRAM(s)/deciliter          PHYSICAL EXAM:  GENERAL: FRAIL   HEAD:  Atraumatic, Normocephalic  CHEST/LUNG: Clear to percussion bilaterally; No rales, rhonchi, wheezing, or rubs  HEART: Regular rate and rhythm; No murmurs, rubs, or gallops  ABDOMEN: Soft, Nontender, Nondistended; Bowel sounds present  EXTREMITIES:  DRESSING +    Care Discussed with Consultants/Other Providers [ ] YES  [ ] NO

## 2022-11-05 LAB
ANION GAP SERPL CALC-SCNC: 10 MMOL/L — SIGNIFICANT CHANGE UP (ref 5–17)
BUN SERPL-MCNC: 22 MG/DL — SIGNIFICANT CHANGE UP (ref 7–23)
CALCIUM SERPL-MCNC: 8.7 MG/DL — SIGNIFICANT CHANGE UP (ref 8.4–10.5)
CHLORIDE SERPL-SCNC: 110 MMOL/L — HIGH (ref 96–108)
CO2 SERPL-SCNC: 21 MMOL/L — LOW (ref 22–31)
CREAT SERPL-MCNC: 1.18 MG/DL — SIGNIFICANT CHANGE UP (ref 0.5–1.3)
EGFR: 65 ML/MIN/1.73M2 — SIGNIFICANT CHANGE UP
GLUCOSE BLDC GLUCOMTR-MCNC: 144 MG/DL — HIGH (ref 70–99)
GLUCOSE BLDC GLUCOMTR-MCNC: 160 MG/DL — HIGH (ref 70–99)
GLUCOSE BLDC GLUCOMTR-MCNC: 177 MG/DL — HIGH (ref 70–99)
GLUCOSE BLDC GLUCOMTR-MCNC: 178 MG/DL — HIGH (ref 70–99)
GLUCOSE BLDC GLUCOMTR-MCNC: 202 MG/DL — HIGH (ref 70–99)
GLUCOSE SERPL-MCNC: 140 MG/DL — HIGH (ref 70–99)
GRAM STN FLD: SIGNIFICANT CHANGE UP
HCT VFR BLD CALC: 23.1 % — LOW (ref 39–50)
HCT VFR BLD CALC: 24.6 % — LOW (ref 39–50)
HGB BLD-MCNC: 7.2 G/DL — LOW (ref 13–17)
HGB BLD-MCNC: 8 G/DL — LOW (ref 13–17)
MCHC RBC-ENTMCNC: 29.9 PG — SIGNIFICANT CHANGE UP (ref 27–34)
MCHC RBC-ENTMCNC: 30.8 PG — SIGNIFICANT CHANGE UP (ref 27–34)
MCHC RBC-ENTMCNC: 31.2 GM/DL — LOW (ref 32–36)
MCHC RBC-ENTMCNC: 32.5 GM/DL — SIGNIFICANT CHANGE UP (ref 32–36)
MCV RBC AUTO: 91.8 FL — SIGNIFICANT CHANGE UP (ref 80–100)
MCV RBC AUTO: 98.7 FL — SIGNIFICANT CHANGE UP (ref 80–100)
NRBC # BLD: 0 /100 WBCS — SIGNIFICANT CHANGE UP (ref 0–0)
NRBC # BLD: 0 /100 WBCS — SIGNIFICANT CHANGE UP (ref 0–0)
PLATELET # BLD AUTO: 163 K/UL — SIGNIFICANT CHANGE UP (ref 150–400)
PLATELET # BLD AUTO: 172 K/UL — SIGNIFICANT CHANGE UP (ref 150–400)
POTASSIUM SERPL-MCNC: 4.5 MMOL/L — SIGNIFICANT CHANGE UP (ref 3.5–5.3)
POTASSIUM SERPL-SCNC: 4.5 MMOL/L — SIGNIFICANT CHANGE UP (ref 3.5–5.3)
RBC # BLD: 2.34 M/UL — LOW (ref 4.2–5.8)
RBC # BLD: 2.68 M/UL — LOW (ref 4.2–5.8)
RBC # FLD: 14.6 % — HIGH (ref 10.3–14.5)
RBC # FLD: 18.3 % — HIGH (ref 10.3–14.5)
SODIUM SERPL-SCNC: 141 MMOL/L — SIGNIFICANT CHANGE UP (ref 135–145)
SPECIMEN SOURCE: SIGNIFICANT CHANGE UP
WBC # BLD: 7.74 K/UL — SIGNIFICANT CHANGE UP (ref 3.8–10.5)
WBC # BLD: 8.73 K/UL — SIGNIFICANT CHANGE UP (ref 3.8–10.5)
WBC # FLD AUTO: 7.74 K/UL — SIGNIFICANT CHANGE UP (ref 3.8–10.5)
WBC # FLD AUTO: 8.73 K/UL — SIGNIFICANT CHANGE UP (ref 3.8–10.5)

## 2022-11-05 RX ADMIN — MEMANTINE HYDROCHLORIDE 10 MILLIGRAM(S): 10 TABLET ORAL at 06:02

## 2022-11-05 RX ADMIN — Medication 2: at 12:26

## 2022-11-05 RX ADMIN — SODIUM CHLORIDE 75 MILLILITER(S): 9 INJECTION INTRAMUSCULAR; INTRAVENOUS; SUBCUTANEOUS at 19:14

## 2022-11-05 RX ADMIN — POLYETHYLENE GLYCOL 3350 17 GRAM(S): 17 POWDER, FOR SOLUTION ORAL at 11:23

## 2022-11-05 RX ADMIN — Medication 500 MILLIGRAM(S): at 11:23

## 2022-11-05 RX ADMIN — SENNA PLUS 2 TABLET(S): 8.6 TABLET ORAL at 21:07

## 2022-11-05 RX ADMIN — Medication 81 MILLIGRAM(S): at 11:22

## 2022-11-05 RX ADMIN — MEMANTINE HYDROCHLORIDE 10 MILLIGRAM(S): 10 TABLET ORAL at 18:53

## 2022-11-05 RX ADMIN — Medication 2: at 18:53

## 2022-11-05 RX ADMIN — DULOXETINE HYDROCHLORIDE 60 MILLIGRAM(S): 30 CAPSULE, DELAYED RELEASE ORAL at 11:23

## 2022-11-05 RX ADMIN — ATORVASTATIN CALCIUM 80 MILLIGRAM(S): 80 TABLET, FILM COATED ORAL at 21:07

## 2022-11-05 NOTE — PHYSICAL THERAPY INITIAL EVALUATION ADULT - ADDITIONAL COMMENTS
Pt is dependent at baseline, receives assistance with all mobility by pt's wife and HHA. Pt owns hospital bed, rolling walker, wheelchair. is in process of getting bobby,

## 2022-11-05 NOTE — PHYSICAL THERAPY INITIAL EVALUATION ADULT - PLANNED THERAPY INTERVENTIONS, PT EVAL
Pt is dependent at baseline, not appropriate for PT and is discharged off PT service.
balance training/bed mobility training/gait training/strengthening/transfer training

## 2022-11-05 NOTE — PROGRESS NOTE ADULT - ASSESSMENT
73 y/o male w/ a PMHx of CAD s/p stent, HTN, HLD, T2DM c/b peripheral neuropathy, DOLORES but non-compliant w/ CPAP, bladder CA s/p laser ablation, Alzheimer's dementia (minimally verbal & requires full assistance w/ ADLs), anxiety, and PAD s/p right CFA stent w/ eventual need for endarterectomy & left CFA endarterectomy now presenting s/p right femoral-proximal AT bypass w/ PTFE for a non-healing right foot wound w/ a post-op course c/b hemorrhagic shock.     - Multimodal pain control   - Encourage OOB to chair, IS  - Diet: Regular diet, bowel regimen with senna and miralax  - PVD: Xarelto 2.5 mg -- held for podiatry procedure today  - Appreciate podiatry recs for right foot wound  - 1 unit pRBCs with H/H recheck given low H/H and tachycardic      [phone]  Vascular Surgery

## 2022-11-05 NOTE — PHYSICAL THERAPY INITIAL EVALUATION ADULT - PERTINENT HX OF CURRENT PROBLEM, REHAB EVAL
71 y/o male w/ a PMHx of CAD s/p stent, HTN, HLD, T2DM c/b peripheral neuropathy, DOLORES but non-compliant w/ CPAP, bladder CA s/p laser ablation, Alzheimer's dementia (minimally verbal & requires full assistance w/ ADLs), anxiety, and PAD s/p right CFA stent w/ eventual need for endarterectomy & left CFA endarterectomy now presenting s/p 10/31 right femoral-proximal AT bypass w/ PTFE for a non-healing right foot wound w/ a post-op course c/b hemorrhagic shock.
72 year old male presents for right leg femoral-popliteal bypass. Pt. with a history of PAD; s/p right endarterectomy of the right common femoral artery 10/21. Currently has necrotic lesion on his right great toe which is progressively worsening over the past few months. Pt. with Alzheimer's dementia, minimally verbal, requires full assistance for ADLs.

## 2022-11-05 NOTE — PHYSICAL THERAPY INITIAL EVALUATION ADULT - LEVEL OF INDEPENDENCE: SIT/STAND, REHAB EVAL
Pt is dependent at baseline, not appropriate for PT and is discharged off PT service./unable to perform

## 2022-11-05 NOTE — PROGRESS NOTE ADULT - SUBJECTIVE AND OBJECTIVE BOX
DATE OF SERVICE: 11-05-22 @ 11:51    Patient is a 72y old  Male who presents with a chief complaint of PAD (02 Nov 2022 17:24)      INTERVAL HISTORY: In no acute distress.     REVIEW OF SYSTEMS: Unable to participate in ROS d/t baseline AMS  CONSTITUTIONAL: No weakness  EYES/ENT: No visual changes;  No throat pain   NECK: No pain or stiffness  RESPIRATORY: No cough, wheezing; No shortness of breath  CARDIOVASCULAR: No chest pain or palpitations  GASTROINTESTINAL: No abdominal  pain. No nausea, vomiting, or hematemesis  GENITOURINARY: No dysuria, frequency or hematuria  NEUROLOGICAL: No stroke like symptoms  SKIN: No rashes    	  MEDICATIONS:        PHYSICAL EXAM:  T(C): 36.7 (11-05-22 @ 10:19), Max: 36.8 (11-05-22 @ 05:50)  HR: 98 (11-05-22 @ 10:19) (80 - 112)  BP: 152/58 (11-05-22 @ 10:50) (112/71 - 180/87)  RR: 18 (11-05-22 @ 10:19) (14 - 18)  SpO2: 97% (11-05-22 @ 10:19) (96% - 100%)  Wt(kg): --  I&O's Summary    04 Nov 2022 07:01  -  05 Nov 2022 07:00  --------------------------------------------------------  IN: 2750 mL / OUT: 1740 mL / NET: 1010 mL          Appearance: In no distress	  HEENT:    PERRL, EOMI	  Cardiovascular:  S1 S2, No JVD  Respiratory: Lungs clear to auscultation	  Gastrointestinal:  Soft, Non-tender, + BS	  Vascularature:  No edema of LE  Psychiatric: Appropriate affect   Neuro: no acute focal deficits                               7.2    8.73  )-----------( 163      ( 05 Nov 2022 07:33 )             23.1     11-05    141  |  110<H>  |  22  ----------------------------<  140<H>  4.5   |  21<L>  |  1.18    Ca    8.7      05 Nov 2022 07:32  Phos  3.3     11-04  Mg     1.8     11-04          Labs personally reviewed      ASSESSMENT/PLAN: 	      72 year old male presents for right leg femoral-popliteal bypass. Pt. with a history of PAD; s/p right endarterectomy of the right common femoral artery 10/21. Currently has necrotic lesion on his right great toe which is progressively worsening over the past few months. Pt. with Alzheimer's dementia, minimally verbal, requires full assistance for ADLs.    1. Cardiac Risk Stratification  - Inpt and outpt charts reviewed  - Pt was seen by his OP cardio Dr Medina 9/30/22 for preop evaluation for open RLE bypass and was deemed optimized to proceed  - No ACS, euvolemic, no tachy/deric arrythmia, no murmur to suggest severe AS/MS - TTE unremarkable   - Mod risk for mod risk pod surgery, no contraindication to proceed    - Tolerated surgery well.  - s/p transfusion, awaiting repeat CBC    2. PAD  - s/p right CFA stent w/ eventual need for endarterectomy & left CFA endarterectomy now presenting s/p right femoral-proximal AT bypass w/ PTFE for a non-healing right foot wound  - c/w Xarelto 2.5mg PO daily  -s/p R foot partial 1st and 5th ray amputation     3. DVT PPx  - Resume Xarelto as per surgery        FÉLIX Lopez-NP   Zuhair Martinez DO [facility]  Cardiovascular Medicine  [address] [place] [address], Suite [facility]  Available through call or text on [employer]  Office: [phone]

## 2022-11-05 NOTE — PROGRESS NOTE ADULT - SUBJECTIVE AND OBJECTIVE BOX
Patient is a 72y old  Male who presents with a chief complaint of PAD (02 Nov 2022 17:24)    Date of servie : 11-05-22 @ 18:32  INTERVAL HPI/OVERNIGHT EVENTS:  T(C): 37.2 (11-05-22 @ 16:48), Max: 37.2 (11-05-22 @ 16:48)  HR: 113 (11-05-22 @ 16:48) (98 - 113)  BP: 124/75 (11-05-22 @ 16:48) (114/61 - 166/78)  RR: 18 (11-05-22 @ 16:48) (18 - 18)  SpO2: 97% (11-05-22 @ 16:48) (96% - 100%)  Wt(kg): --  I&O's Summary    04 Nov 2022 07:01  -  05 Nov 2022 07:00  --------------------------------------------------------  IN: 2750 mL / OUT: 1740 mL / NET: 1010 mL    05 Nov 2022 08:01  -  05 Nov 2022 18:32  --------------------------------------------------------  IN: 240 mL / OUT: 1100 mL / NET: -860 mL        LABS:                        8.0    7.74  )-----------( 172      ( 05 Nov 2022 17:09 )             24.6     11-05    141  |  110<H>  |  22  ----------------------------<  140<H>  4.5   |  21<L>  |  1.18    Ca    8.7      05 Nov 2022 07:32  Phos  3.3     11-04  Mg     1.8     11-04      PT/INR - ( 04 Nov 2022 03:01 )   PT: 12.1 sec;   INR: 1.04 ratio         PTT - ( 04 Nov 2022 03:01 )  PTT:26.0 sec    CAPILLARY BLOOD GLUCOSE      POCT Blood Glucose.: 202 mg/dL (05 Nov 2022 17:32)  POCT Blood Glucose.: 160 mg/dL (05 Nov 2022 12:24)  POCT Blood Glucose.: 144 mg/dL (05 Nov 2022 08:32)  POCT Blood Glucose.: 214 mg/dL (04 Nov 2022 22:15)            MEDICATIONS  (STANDING):  ascorbic acid 500 milliGRAM(s) Oral daily  aspirin enteric coated 81 milliGRAM(s) Oral daily  atorvastatin 80 milliGRAM(s) Oral at bedtime  chlorhexidine 2% Cloths 1 Application(s) Topical once  dextrose 5%. 1000 milliLiter(s) (50 mL/Hr) IV Continuous <Continuous>  dextrose 5%. 1000 milliLiter(s) (100 mL/Hr) IV Continuous <Continuous>  dextrose 50% Injectable 25 Gram(s) IV Push once  dextrose 50% Injectable 12.5 Gram(s) IV Push once  dextrose 50% Injectable 25 Gram(s) IV Push once  dextrose 50% Injectable 25 Gram(s) IV Push once  dextrose 50% Injectable 12.5 Gram(s) IV Push once  dextrose 50% Injectable 25 Gram(s) IV Push once  DULoxetine 60 milliGRAM(s) Oral daily  glucagon  Injectable 1 milliGRAM(s) IntraMuscular once  influenza  Vaccine (HIGH DOSE) 0.7 milliLiter(s) IntraMuscular once  insulin lispro (ADMELOG) corrective regimen sliding scale   SubCutaneous three times a day before meals  insulin lispro (ADMELOG) corrective regimen sliding scale   SubCutaneous at bedtime  insulin lispro Injectable (ADMELOG). 2 Unit(s) SubCutaneous once  lidocaine 1% Injectable 0.2 milliLiter(s) Local Injection once  memantine 10 milliGRAM(s) Oral two times a day  polyethylene glycol 3350 17 Gram(s) Oral daily  senna 2 Tablet(s) Oral at bedtime  sodium chloride 0.9%. 1000 milliLiter(s) (75 mL/Hr) IV Continuous <Continuous>    MEDICATIONS  (PRN):  acetaminophen     Tablet .. 650 milliGRAM(s) Oral every 6 hours PRN Mild Pain (1 - 3)  dextrose Oral Gel 15 Gram(s) Oral once PRN Blood Glucose LESS THAN 70 milliGRAM(s)/deciliter  dextrose Oral Gel 15 Gram(s) Oral once PRN Blood Glucose LESS THAN 70 milliGRAM(s)/deciliter  HYDROmorphone  Injectable 0.2 milliGRAM(s) IV Push every 10 minutes PRN Moderate Pain (4 - 6)  morphine  - Injectable 2 milliGRAM(s) IV Push every 4 hours PRN Severe Pain (7 - 10)  ondansetron Injectable 4 milliGRAM(s) IV Push once PRN Nausea and/or Vomiting  ondansetron Injectable 4 milliGRAM(s) IV Push once PRN Nausea and/or Vomiting  oxycodone    5 mG/acetaminophen 325 mG 1 Tablet(s) Oral every 6 hours PRN Moderate Pain (4 - 6)          PHYSICAL EXAM:  GENERAL: NAD, well-groomed, well-developed  HEAD:  Atraumatic, Normocephalic  CHEST/LUNG: Clear to percussion bilaterally; No rales, rhonchi, wheezing, or rubs  HEART: Regular rate and rhythm; No murmurs, rubs, or gallops  ABDOMEN: Soft, Nontender, Nondistended; Bowel sounds present  EXTREMITIES:  2+ Peripheral Pulses, No clubbing, cyanosis, or edema  LYMPH: No lymphadenopathy noted  SKIN: No rashes or lesions    Care Discussed with Consultants/Other Providers [ ] YES  [ ] NO

## 2022-11-05 NOTE — PHYSICAL THERAPY INITIAL EVALUATION ADULT - DID THE PATIENT HAVE SURGERY?
right femoral-proximal AT bypass w/ PTFE for a non-healing right foot wound/yes
right 1st and 5th ray resection/yes

## 2022-11-05 NOTE — PHYSICAL THERAPY INITIAL EVALUATION ADULT - GENERAL OBSERVATIONS, REHAB EVAL
Pt received semi supine in bed +IVL, wound dressing c/d/i
Pt received semi-supine in bed in NAD, +IV, +bilateral cair boots, +miller, able to state name however very confused, easily agitated, not following any commands, at baseline is dependent.

## 2022-11-05 NOTE — PROGRESS NOTE ADULT - ASSESSMENT
72 year old male presents for right leg femoral-popliteal bypass. Pt. with a history of PAD; s/p right endarterectomy of the right common femoral artery 10/21. Currently has necrotic lesion on his right great toe which is progressively worsening over the past few months    1 PAD  - s/p right CFA stent w/ eventual need for endarterectomy & left CFA endarterectomy now presenting s/p right femoral-proximal AT bypass w/ PTFE for a non-healing right foot wound  - podiatry and vascular following  -  s/p 11/4 right foot partial 1st and 5th ray amputation     2 DM  - monitor FS  - ISS    3 CAD  - cw asa, stable   - cards following

## 2022-11-05 NOTE — PHYSICAL THERAPY INITIAL EVALUATION ADULT - CRITERIA FOR SKILLED THERAPEUTIC INTERVENTIONS
Pt is dependent at baseline, not appropriate for PT and is discharged off PT service.
impairments found

## 2022-11-05 NOTE — PHYSICAL THERAPY INITIAL EVALUATION ADULT - DIAGNOSIS, PT EVAL
Decreased functional mobility/capacity secondary to impairments listed below
Pt is dependent at baseline, not appropriate for PT and is discharged off PT service.

## 2022-11-05 NOTE — PHYSICAL THERAPY INITIAL EVALUATION ADULT - FOLLOWS COMMANDS/ANSWERS QUESTIONS, REHAB EVAL
less than 25% commands (squeeze my hand)/25% of the time/unable to follow commands
25% of the time/unable to answer questions

## 2022-11-05 NOTE — PROGRESS NOTE ADULT - SUBJECTIVE AND OBJECTIVE BOX
Patient is a 72y old  Male who presents with a chief complaint of PAD (02 Nov 2022 17:24)       INTERVAL HPI/OVERNIGHT EVENTS:  Patient seen and evaluated at bedside.  Pt is resting comfortable in NAD. Denies N/V/F/C.  Pain rated at X/10    Allergies    Ceclor (Rash; Urticaria)    Intolerances        Vital Signs Last 24 Hrs  T(C): 36.7 (05 Nov 2022 10:19), Max: 36.8 (04 Nov 2022 11:40)  T(F): 98.1 (05 Nov 2022 10:19), Max: 98.3 (05 Nov 2022 05:50)  HR: 98 (05 Nov 2022 10:19) (80 - 112)  BP: 152/58 (05 Nov 2022 10:50) (112/71 - 185/81)  BP(mean): 98 (04 Nov 2022 15:00) (97 - 124)  RR: 18 (05 Nov 2022 10:19) (14 - 18)  SpO2: 97% (05 Nov 2022 10:19) (96% - 100%)    Parameters below as of 05 Nov 2022 10:19  Patient On (Oxygen Delivery Method): room air        LABS:                        7.2    8.73  )-----------( 163      ( 05 Nov 2022 07:33 )             23.1     11-05    141  |  110<H>  |  22  ----------------------------<  140<H>  4.5   |  21<L>  |  1.18    Ca    8.7      05 Nov 2022 07:32  Phos  3.3     11-04  Mg     1.8     11-04      PT/INR - ( 04 Nov 2022 03:01 )   PT: 12.1 sec;   INR: 1.04 ratio         PTT - ( 04 Nov 2022 03:01 )  PTT:26.0 sec    CAPILLARY BLOOD GLUCOSE      POCT Blood Glucose.: 144 mg/dL (05 Nov 2022 08:32)  POCT Blood Glucose.: 214 mg/dL (04 Nov 2022 22:15)  POCT Blood Glucose.: 181 mg/dL (04 Nov 2022 18:29)  POCT Blood Glucose.: 172 mg/dL (04 Nov 2022 12:32)      Lower Extremity Physical Exam:  Vasular: DP/PT 0/4, B/L, CFT < 3 seconds all pedal digits besides R foot 1 and 5 which is absent, Temperature gradient warm to warm R, warm to cool left  Neuro: Epicritic sensation intact to the level of the digits, B/L.  Musculoskeletal/Ortho: non-ambi  Skin: s/p 11/4 right foot partial 1st and 5th ray amputation: flaps warm and viable, slight maceration to the partial 5th ray w periwound erythema, no drainage, no malodor, no hematoma.  L foot 2nd and 3rd toe distal abrasions, granular base, no clinical signs of infection    RADIOLOGY & ADDITIONAL TESTS:

## 2022-11-05 NOTE — PROGRESS NOTE ADULT - ASSESSMENT
73 y/o M w/ s/p 11/4 right foot partial 1st and 5th ray amputation  - pt seen and evaluated  - Afebrile, WBC 8.73  -  s/p 11/4 right foot partial 1st and 5th ray amputation: flaps warm and viable, slight maceration to the partial 5th ray w periwound erythema, no drainage, no malodor, no hematoma.  L foot 2nd and 3rd toe distal abrasions, granular base, no clinical signs of infection  - No wound culture obtained as it will likely yield skin contaminant  - R foot xray results show: Focally atrophied/shrunken right hallux soft tissues compatible with gangrene. No tracking gas collections or gross agraphic evidence for osteomyelitis  - s/p RLE fem-AT bypass with vasc, appreciated  - per intraop findings low concern for residual infection and viability   - OR data pending   - Recommend Unasyn while in house   - Pod plan local wound care pending clean bone no growth culture x 48hrs   - Discussed with attending

## 2022-11-05 NOTE — PHYSICAL THERAPY INITIAL EVALUATION ADULT - IMPAIRMENTS FOUND, PT EVAL
Pt is dependent at baseline, not appropriate for PT and is discharged off PT service.
gait, locomotion, and balance/muscle strength

## 2022-11-05 NOTE — PHYSICAL THERAPY INITIAL EVALUATION ADULT - LIVES WITH, PROFILE
spouse
wife in a house on first floor with no steps to enter, daughter's family is on 2nd floor/children/spouse

## 2022-11-05 NOTE — PHYSICAL THERAPY INITIAL EVALUATION ADULT - REHAB POTENTIAL, PT EVAL
fair, will monitor progress closely
Pt is dependent at baseline, not appropriate for PT and is discharged off PT service.

## 2022-11-05 NOTE — PHYSICAL THERAPY INITIAL EVALUATION ADULT - FUNCTIONAL LIMITATIONS, PT EVAL
Pt is dependent at baseline, not appropriate for PT and is discharged off PT service.
self-care/home management

## 2022-11-06 LAB
ANION GAP SERPL CALC-SCNC: 7 MMOL/L — SIGNIFICANT CHANGE UP (ref 5–17)
ANION GAP SERPL CALC-SCNC: 7 MMOL/L — SIGNIFICANT CHANGE UP (ref 5–17)
BUN SERPL-MCNC: 17 MG/DL — SIGNIFICANT CHANGE UP (ref 7–23)
BUN SERPL-MCNC: 22 MG/DL — SIGNIFICANT CHANGE UP (ref 7–23)
CALCIUM SERPL-MCNC: 7.8 MG/DL — LOW (ref 8.4–10.5)
CALCIUM SERPL-MCNC: 8.5 MG/DL — SIGNIFICANT CHANGE UP (ref 8.4–10.5)
CHLORIDE SERPL-SCNC: 108 MMOL/L — SIGNIFICANT CHANGE UP (ref 96–108)
CHLORIDE SERPL-SCNC: 112 MMOL/L — HIGH (ref 96–108)
CO2 SERPL-SCNC: 22 MMOL/L — SIGNIFICANT CHANGE UP (ref 22–31)
CO2 SERPL-SCNC: 24 MMOL/L — SIGNIFICANT CHANGE UP (ref 22–31)
CREAT SERPL-MCNC: 0.94 MG/DL — SIGNIFICANT CHANGE UP (ref 0.5–1.3)
CREAT SERPL-MCNC: 1.61 MG/DL — HIGH (ref 0.5–1.3)
EGFR: 45 ML/MIN/1.73M2 — LOW
EGFR: 86 ML/MIN/1.73M2 — SIGNIFICANT CHANGE UP
GLUCOSE BLDC GLUCOMTR-MCNC: 160 MG/DL — HIGH (ref 70–99)
GLUCOSE BLDC GLUCOMTR-MCNC: 167 MG/DL — HIGH (ref 70–99)
GLUCOSE BLDC GLUCOMTR-MCNC: 194 MG/DL — HIGH (ref 70–99)
GLUCOSE BLDC GLUCOMTR-MCNC: 204 MG/DL — HIGH (ref 70–99)
GLUCOSE SERPL-MCNC: 142 MG/DL — HIGH (ref 70–99)
GLUCOSE SERPL-MCNC: 178 MG/DL — HIGH (ref 70–99)
HCT VFR BLD CALC: 23 % — LOW (ref 39–50)
HCT VFR BLD CALC: 23.7 % — LOW (ref 39–50)
HGB BLD-MCNC: 7.5 G/DL — LOW (ref 13–17)
HGB BLD-MCNC: 7.7 G/DL — LOW (ref 13–17)
MAGNESIUM SERPL-MCNC: 1.6 MG/DL — SIGNIFICANT CHANGE UP (ref 1.6–2.6)
MAGNESIUM SERPL-MCNC: 1.6 MG/DL — SIGNIFICANT CHANGE UP (ref 1.6–2.6)
MCHC RBC-ENTMCNC: 29.9 PG — SIGNIFICANT CHANGE UP (ref 27–34)
MCHC RBC-ENTMCNC: 30 PG — SIGNIFICANT CHANGE UP (ref 27–34)
MCHC RBC-ENTMCNC: 32.5 GM/DL — SIGNIFICANT CHANGE UP (ref 32–36)
MCHC RBC-ENTMCNC: 32.6 GM/DL — SIGNIFICANT CHANGE UP (ref 32–36)
MCV RBC AUTO: 91.6 FL — SIGNIFICANT CHANGE UP (ref 80–100)
MCV RBC AUTO: 92.2 FL — SIGNIFICANT CHANGE UP (ref 80–100)
NRBC # BLD: 0 /100 WBCS — SIGNIFICANT CHANGE UP (ref 0–0)
NRBC # BLD: 0 /100 WBCS — SIGNIFICANT CHANGE UP (ref 0–0)
PHOSPHATE SERPL-MCNC: 2.1 MG/DL — LOW (ref 2.5–4.5)
PHOSPHATE SERPL-MCNC: 3.6 MG/DL — SIGNIFICANT CHANGE UP (ref 2.5–4.5)
PLATELET # BLD AUTO: 151 K/UL — SIGNIFICANT CHANGE UP (ref 150–400)
PLATELET # BLD AUTO: 170 K/UL — SIGNIFICANT CHANGE UP (ref 150–400)
POTASSIUM SERPL-MCNC: 3.8 MMOL/L — SIGNIFICANT CHANGE UP (ref 3.5–5.3)
POTASSIUM SERPL-MCNC: 4.6 MMOL/L — SIGNIFICANT CHANGE UP (ref 3.5–5.3)
POTASSIUM SERPL-SCNC: 3.8 MMOL/L — SIGNIFICANT CHANGE UP (ref 3.5–5.3)
POTASSIUM SERPL-SCNC: 4.6 MMOL/L — SIGNIFICANT CHANGE UP (ref 3.5–5.3)
RBC # BLD: 2.51 M/UL — LOW (ref 4.2–5.8)
RBC # BLD: 2.57 M/UL — LOW (ref 4.2–5.8)
RBC # FLD: 18.6 % — HIGH (ref 10.3–14.5)
RBC # FLD: 19.4 % — HIGH (ref 10.3–14.5)
SODIUM SERPL-SCNC: 139 MMOL/L — SIGNIFICANT CHANGE UP (ref 135–145)
SODIUM SERPL-SCNC: 141 MMOL/L — SIGNIFICANT CHANGE UP (ref 135–145)
WBC # BLD: 8.48 K/UL — SIGNIFICANT CHANGE UP (ref 3.8–10.5)
WBC # BLD: 9.6 K/UL — SIGNIFICANT CHANGE UP (ref 3.8–10.5)
WBC # FLD AUTO: 8.48 K/UL — SIGNIFICANT CHANGE UP (ref 3.8–10.5)
WBC # FLD AUTO: 9.6 K/UL — SIGNIFICANT CHANGE UP (ref 3.8–10.5)

## 2022-11-06 PROCEDURE — 93010 ELECTROCARDIOGRAM REPORT: CPT

## 2022-11-06 RX ORDER — MAGNESIUM SULFATE 500 MG/ML
1 VIAL (ML) INJECTION ONCE
Refills: 0 | Status: COMPLETED | OUTPATIENT
Start: 2022-11-06 | End: 2022-11-07

## 2022-11-06 RX ORDER — LABETALOL HCL 100 MG
10 TABLET ORAL ONCE
Refills: 0 | Status: COMPLETED | OUTPATIENT
Start: 2022-11-06 | End: 2022-11-06

## 2022-11-06 RX ORDER — SODIUM CHLORIDE 9 MG/ML
500 INJECTION INTRAMUSCULAR; INTRAVENOUS; SUBCUTANEOUS ONCE
Refills: 0 | Status: DISCONTINUED | OUTPATIENT
Start: 2022-11-06 | End: 2022-11-07

## 2022-11-06 RX ORDER — ALPRAZOLAM 0.25 MG
0.12 TABLET ORAL ONCE
Refills: 0 | Status: DISCONTINUED | OUTPATIENT
Start: 2022-11-06 | End: 2022-11-06

## 2022-11-06 RX ORDER — POTASSIUM PHOSPHATE, MONOBASIC POTASSIUM PHOSPHATE, DIBASIC 236; 224 MG/ML; MG/ML
15 INJECTION, SOLUTION INTRAVENOUS ONCE
Refills: 0 | Status: COMPLETED | OUTPATIENT
Start: 2022-11-06 | End: 2022-11-06

## 2022-11-06 RX ADMIN — Medication 10 MILLIGRAM(S): at 06:18

## 2022-11-06 RX ADMIN — POTASSIUM PHOSPHATE, MONOBASIC POTASSIUM PHOSPHATE, DIBASIC 62.5 MILLIMOLE(S): 236; 224 INJECTION, SOLUTION INTRAVENOUS at 17:41

## 2022-11-06 RX ADMIN — SODIUM CHLORIDE 75 MILLILITER(S): 9 INJECTION INTRAMUSCULAR; INTRAVENOUS; SUBCUTANEOUS at 21:58

## 2022-11-06 RX ADMIN — ATORVASTATIN CALCIUM 80 MILLIGRAM(S): 80 TABLET, FILM COATED ORAL at 21:48

## 2022-11-06 RX ADMIN — SODIUM CHLORIDE 75 MILLILITER(S): 9 INJECTION INTRAMUSCULAR; INTRAVENOUS; SUBCUTANEOUS at 04:56

## 2022-11-06 RX ADMIN — Medication 0.12 MILLIGRAM(S): at 21:48

## 2022-11-06 RX ADMIN — POLYETHYLENE GLYCOL 3350 17 GRAM(S): 17 POWDER, FOR SOLUTION ORAL at 11:35

## 2022-11-06 RX ADMIN — Medication 81 MILLIGRAM(S): at 11:36

## 2022-11-06 RX ADMIN — DULOXETINE HYDROCHLORIDE 60 MILLIGRAM(S): 30 CAPSULE, DELAYED RELEASE ORAL at 11:35

## 2022-11-06 RX ADMIN — MEMANTINE HYDROCHLORIDE 10 MILLIGRAM(S): 10 TABLET ORAL at 04:56

## 2022-11-06 RX ADMIN — Medication 2: at 08:47

## 2022-11-06 RX ADMIN — Medication 500 MILLIGRAM(S): at 11:35

## 2022-11-06 RX ADMIN — MEMANTINE HYDROCHLORIDE 10 MILLIGRAM(S): 10 TABLET ORAL at 17:19

## 2022-11-06 RX ADMIN — Medication 4: at 13:28

## 2022-11-06 RX ADMIN — SENNA PLUS 2 TABLET(S): 8.6 TABLET ORAL at 21:49

## 2022-11-06 RX ADMIN — Medication 2: at 17:37

## 2022-11-06 NOTE — PROGRESS NOTE ADULT - SUBJECTIVE AND OBJECTIVE BOX
Patient is a 72y old  Male who presents with a chief complaint of PAD (02 Nov 2022 17:24)       INTERVAL HPI/OVERNIGHT EVENTS:  Patient seen and evaluated at bedside.  Pt is resting comfortable in NAD. Denies N/V/F/C.  Pain rated at X/10    Allergies    Ceclor (Rash; Urticaria)    Intolerances        Vital Signs Last 24 Hrs  T(C): 36.6 (06 Nov 2022 13:04), Max: 37.2 (05 Nov 2022 16:48)  T(F): 97.9 (06 Nov 2022 13:04), Max: 99 (05 Nov 2022 16:48)  HR: 94 (06 Nov 2022 13:04) (70 - 115)  BP: 164/89 (06 Nov 2022 13:04) (116/67 - 180/88)  BP(mean): --  RR: 18 (06 Nov 2022 13:04) (18 - 18)  SpO2: 95% (06 Nov 2022 13:04) (95% - 98%)    Parameters below as of 06 Nov 2022 13:04  Patient On (Oxygen Delivery Method): room air        LABS:                        7.7    8.48  )-----------( 151      ( 06 Nov 2022 07:29 )             23.7     11-06    141  |  112<H>  |  17  ----------------------------<  142<H>  3.8   |  22  |  0.94    Ca    7.8<L>      06 Nov 2022 07:25  Phos  2.1     11-06  Mg     1.6     11-06          CAPILLARY BLOOD GLUCOSE      POCT Blood Glucose.: 204 mg/dL (06 Nov 2022 12:59)  POCT Blood Glucose.: 167 mg/dL (06 Nov 2022 08:43)  POCT Blood Glucose.: 178 mg/dL (05 Nov 2022 22:06)  POCT Blood Glucose.: 177 mg/dL (05 Nov 2022 18:51)  POCT Blood Glucose.: 202 mg/dL (05 Nov 2022 17:32)      Lower Extremity Physical Exam:  Vasular: DP/PT 0/4, B/L, CFT < 3 seconds all pedal digits besides R foot 1 and 5 which is absent, Temperature gradient warm to warm R, warm to cool left  Neuro: Epicritic sensation intact to the level of the digits, B/L.  Musculoskeletal/Ortho: non-ambi  Skin: s/p 11/4 right foot partial 1st and 5th ray amputation: flaps warm and viable, slight maceration to the partial 5th ray w periwound erythema, no drainage, no malodor, no hematoma.  L foot 2nd and 3rd toe distal abrasions, granular base, no clinical signs of infection    RADIOLOGY & ADDITIONAL TESTS:

## 2022-11-06 NOTE — PROGRESS NOTE ADULT - SUBJECTIVE AND OBJECTIVE BOX
VASCULAR SURGERY DAILY PROGRESS NOTE:     SUBJECTIVE/ROS: Patient seen and evaluated on AM rounds. Pt is comfortable. Pt minimally verbal at baseline.      MEDICATIONS  (STANDING):  acetaminophen    Suspension .. 650 milliGRAM(s) Oral every 6 hours  ascorbic acid 500 milliGRAM(s) Oral daily  aspirin enteric coated 81 milliGRAM(s) Oral daily  atorvastatin 80 milliGRAM(s) Oral at bedtime  chlorhexidine 2% Cloths 1 Application(s) Topical once  dextrose 5%. 1000 milliLiter(s) (50 mL/Hr) IV Continuous <Continuous>  dextrose 5%. 1000 milliLiter(s) (100 mL/Hr) IV Continuous <Continuous>  dextrose 50% Injectable 25 Gram(s) IV Push once  dextrose 50% Injectable 12.5 Gram(s) IV Push once  dextrose 50% Injectable 25 Gram(s) IV Push once  DULoxetine 60 milliGRAM(s) Oral daily  glucagon  Injectable 1 milliGRAM(s) IntraMuscular once  influenza  Vaccine (HIGH DOSE) 0.7 milliLiter(s) IntraMuscular once  insulin lispro (ADMELOG) corrective regimen sliding scale   SubCutaneous three times a day before meals  insulin lispro (ADMELOG) corrective regimen sliding scale   SubCutaneous at bedtime  lidocaine 1% Injectable 0.2 milliLiter(s) Local Injection once  memantine 10 milliGRAM(s) Oral two times a day  multivitamin 1 Tablet(s) Oral daily  polyethylene glycol 3350 17 Gram(s) Oral daily  rivaroxaban 2.5 milliGRAM(s) Oral two times a day  senna 2 Tablet(s) Oral at bedtime  sodium chloride 0.9%. 1000 milliLiter(s) (75 mL/Hr) IV Continuous <Continuous>    MEDICATIONS  (PRN):  dextrose Oral Gel 15 Gram(s) Oral once PRN Blood Glucose LESS THAN 70 milliGRAM(s)/deciliter      OBJECTIVE:  Vital Signs Last 24 Hrs  T(C): 36.7 (05 Nov 2022 20:33), Max: 37.2 (05 Nov 2022 16:48)  T(F): 98.1 (05 Nov 2022 20:33), Max: 99 (05 Nov 2022 16:48)  HR: 115 (05 Nov 2022 20:33) (98 - 115)  BP: 153/75 (05 Nov 2022 20:33) (114/61 - 166/78)  BP(mean): --  ABP: --  ABP(mean): --  RR: 18 (05 Nov 2022 20:33) (18 - 18)  SpO2: 98% (05 Nov 2022 20:33) (96% - 98%)    O2 Parameters below as of 05 Nov 2022 20:33  Patient On (Oxygen Delivery Method): room air      I&O's Detail    04 Nov 2022 07:01  -  05 Nov 2022 07:00  --------------------------------------------------------  IN:    Lactated Ringers Bolus: 1000 mL    Oral Fluid: 400 mL    sodium chloride 0.9%: 1350 mL  Total IN: 2750 mL    OUT:    Indwelling Catheter - Urethral (mL): 1740 mL  Total OUT: 1740 mL    Total NET: 1010 mL      05 Nov 2022 08:01  -  06 Nov 2022 00:07  --------------------------------------------------------  IN:    Oral Fluid: 480 mL    PRBCs (Packed Red Blood Cells): 300 mL    sodium chloride 0.9%: 560 mL  Total IN: 1340 mL    OUT:    Indwelling Catheter - Urethral (mL): 1200 mL    Voided (mL): 200 mL  Total OUT: 1400 mL    Total NET: -60 mL      Daily     LABS:                           8.0    7.74  )-----------( 172      ( 05 Nov 2022 17:09 )             24.6   11-05    141  |  110<H>  |  22  ----------------------------<  140<H>  4.5   |  21<L>  |  1.18    Ca    8.7      05 Nov 2022 07:32  Phos  3.3     11-04  Mg     1.8     11-04    PT/INR - ( 04 Nov 2022 03:01 )   PT: 12.1 sec;   INR: 1.04 ratio         PTT - ( 04 Nov 2022 03:01 )  PTT:26.0 sec    PHYSICAL EXAM:    General: well developed, well nourished, NAD  HEENT: NCAT, trachea midline  Respiratory: respirations non labored  Gastrointestinal: soft, nontender, nondistended  Extremities: FROM, warm. R AT signal present. Pt with right foot in ACE/kirlex s/p 1&5ray resection with podiatry  Neurological: non-focal           VASCULAR SURGERY DAILY PROGRESS NOTE:     SUBJECTIVE/ROS: Patient seen and evaluated on AM rounds. Pt is comfortable. Pt minimally verbal at baseline. Dressing changed this AM     MEDICATIONS  (STANDING):  acetaminophen    Suspension .. 650 milliGRAM(s) Oral every 6 hours  ascorbic acid 500 milliGRAM(s) Oral daily  aspirin enteric coated 81 milliGRAM(s) Oral daily  atorvastatin 80 milliGRAM(s) Oral at bedtime  chlorhexidine 2% Cloths 1 Application(s) Topical once  dextrose 5%. 1000 milliLiter(s) (50 mL/Hr) IV Continuous <Continuous>  dextrose 5%. 1000 milliLiter(s) (100 mL/Hr) IV Continuous <Continuous>  dextrose 50% Injectable 25 Gram(s) IV Push once  dextrose 50% Injectable 12.5 Gram(s) IV Push once  dextrose 50% Injectable 25 Gram(s) IV Push once  DULoxetine 60 milliGRAM(s) Oral daily  glucagon  Injectable 1 milliGRAM(s) IntraMuscular once  influenza  Vaccine (HIGH DOSE) 0.7 milliLiter(s) IntraMuscular once  insulin lispro (ADMELOG) corrective regimen sliding scale   SubCutaneous three times a day before meals  insulin lispro (ADMELOG) corrective regimen sliding scale   SubCutaneous at bedtime  lidocaine 1% Injectable 0.2 milliLiter(s) Local Injection once  memantine 10 milliGRAM(s) Oral two times a day  multivitamin 1 Tablet(s) Oral daily  polyethylene glycol 3350 17 Gram(s) Oral daily  rivaroxaban 2.5 milliGRAM(s) Oral two times a day  senna 2 Tablet(s) Oral at bedtime  sodium chloride 0.9%. 1000 milliLiter(s) (75 mL/Hr) IV Continuous <Continuous>    MEDICATIONS  (PRN):  dextrose Oral Gel 15 Gram(s) Oral once PRN Blood Glucose LESS THAN 70 milliGRAM(s)/deciliter      OBJECTIVE:  Vital Signs Last 24 Hrs  T(C): 36.7 (05 Nov 2022 20:33), Max: 37.2 (05 Nov 2022 16:48)  T(F): 98.1 (05 Nov 2022 20:33), Max: 99 (05 Nov 2022 16:48)  HR: 115 (05 Nov 2022 20:33) (98 - 115)  BP: 153/75 (05 Nov 2022 20:33) (114/61 - 166/78)  BP(mean): --  ABP: --  ABP(mean): --  RR: 18 (05 Nov 2022 20:33) (18 - 18)  SpO2: 98% (05 Nov 2022 20:33) (96% - 98%)    O2 Parameters below as of 05 Nov 2022 20:33  Patient On (Oxygen Delivery Method): room air      I&O's Detail    04 Nov 2022 07:01  -  05 Nov 2022 07:00  --------------------------------------------------------  IN:    Lactated Ringers Bolus: 1000 mL    Oral Fluid: 400 mL    sodium chloride 0.9%: 1350 mL  Total IN: 2750 mL    OUT:    Indwelling Catheter - Urethral (mL): 1740 mL  Total OUT: 1740 mL    Total NET: 1010 mL      05 Nov 2022 08:01  -  06 Nov 2022 00:07  --------------------------------------------------------  IN:    Oral Fluid: 480 mL    PRBCs (Packed Red Blood Cells): 300 mL    sodium chloride 0.9%: 560 mL  Total IN: 1340 mL    OUT:    Indwelling Catheter - Urethral (mL): 1200 mL    Voided (mL): 200 mL  Total OUT: 1400 mL    Total NET: -60 mL      Daily     LABS:                           8.0    7.74  )-----------( 172      ( 05 Nov 2022 17:09 )             24.6   11-05    141  |  110<H>  |  22  ----------------------------<  140<H>  4.5   |  21<L>  |  1.18    Ca    8.7      05 Nov 2022 07:32  Phos  3.3     11-04  Mg     1.8     11-04    PT/INR - ( 04 Nov 2022 03:01 )   PT: 12.1 sec;   INR: 1.04 ratio         PTT - ( 04 Nov 2022 03:01 )  PTT:26.0 sec    PHYSICAL EXAM:    General: well developed, well nourished, NAD  HEENT: NCAT, trachea midline  Respiratory: respirations non labored  Gastrointestinal: soft, nontender, nondistended  Extremities: FROM, warm. R AT signal present. Pt with right foot in ACE/kirlex s/p 1&5ray resection with podiatry  Neurological: non-focal

## 2022-11-06 NOTE — PROGRESS NOTE ADULT - ASSESSMENT
73 y/o M w/ s/p 11/4 right foot partial 1st and 5th ray amputation  - pt seen and evaluated  - Afebrile, WBC 8.48  -  s/p 11/4 right foot partial 1st and 5th ray amputation: flaps warm and viable, slight maceration to the partial 5th ray w periwound erythema, no drainage, no malodor, no hematoma.  L foot 2nd and 3rd toe distal abrasions, granular base, no clinical signs of infection  - No wound culture obtained as it will likely yield skin contaminant  - R foot xray results show: Focally atrophied/shrunken right hallux soft tissues compatible with gangrene. No tracking gas collections or gross agraphic evidence for osteomyelitis  - s/p RLE fem-AT bypass with vasc, appreciated  - per intraop findings low concern for residual infection and viability   - OR clean bone culture no growth prelim   - Recommend Unasyn while in house   - Pod plan local wound care pending clean bone no growth culture x 48hrs   - Discussed with attending

## 2022-11-06 NOTE — PROGRESS NOTE ADULT - ASSESSMENT
71 y/o male w/ a PMHx of CAD s/p stent, HTN, HLD, T2DM c/b peripheral neuropathy, DOLORES but non-compliant w/ CPAP, bladder CA s/p laser ablation, Alzheimer's dementia (minimally verbal & requires full assistance w/ ADLs), anxiety, and PAD s/p right CFA stent w/ eventual need for endarterectomy & left CFA endarterectomy now presenting s/p right femoral-proximal AT bypass w/ PTFE for a non-healing right foot wound w/ a post-op course c/b hemorrhagic shock.     - Multimodal pain control   - Encourage OOB to chair, IS  - Diet: Regular diet, bowel regimen with senna and miralax  - PVD: Xarelto 2.5 mg -- held for podiatry procedure today  - Appreciate podiatry recs for right foot wound  - s/p 1 unit pRBCs on 10/5; H/H recheck with appropriate increase (7.2 --> 8.0)  -Appreciate cardio recs for tachycardia      [id number]  Vascular Surgery  71 y/o male w/ a PMHx of CAD s/p stent, HTN, HLD, T2DM c/b peripheral neuropathy, DOLORES but non-compliant w/ CPAP, bladder CA s/p laser ablation, Alzheimer's dementia (minimally verbal & requires full assistance w/ ADLs), anxiety, and PAD s/p right CFA stent w/ eventual need for endarterectomy & left CFA endarterectomy now presenting s/p right femoral-proximal AT bypass w/ PTFE for a non-healing right foot wound w/ a post-op course c/b hemorrhagic shock.     - Multimodal pain control   - Encourage OOB to chair, IS  - Diet: Regular diet, bowel regimen with senna and miralax  - PVD: Xarelto 2.5 mg -- held for podiatry procedure today  - Appreciate podiatry recs for right foot wound  - s/p 1 unit pRBCs on 10/5; H/H recheck with appropriate increase (7.2 --> 8.0)  -Appreciate cardio recs for tachycardia  - Dispo planning      [id number]  Vascular Surgery

## 2022-11-06 NOTE — PROGRESS NOTE ADULT - SUBJECTIVE AND OBJECTIVE BOX
Patient is a 72y old  Male who presents with a chief complaint of PAD (02 Nov 2022 17:24)    Date of servie : 11-06-22 @ 17:02  INTERVAL HPI/OVERNIGHT EVENTS:  T(C): 36.6 (11-06-22 @ 13:04), Max: 37.2 (11-06-22 @ 10:05)  HR: 94 (11-06-22 @ 13:04) (70 - 115)  BP: 164/89 (11-06-22 @ 13:04) (116/67 - 180/88)  RR: 18 (11-06-22 @ 13:04) (18 - 18)  SpO2: 95% (11-06-22 @ 13:04) (95% - 98%)  Wt(kg): --  I&O's Summary    05 Nov 2022 08:01  -  06 Nov 2022 07:00  --------------------------------------------------------  IN: 1340 mL / OUT: 2200 mL / NET: -860 mL    06 Nov 2022 07:01  -  06 Nov 2022 17:02  --------------------------------------------------------  IN: 390 mL / OUT: 450 mL / NET: -60 mL        LABS:                        7.7    8.48  )-----------( 151      ( 06 Nov 2022 07:29 )             23.7     11-06    141  |  112<H>  |  17  ----------------------------<  142<H>  3.8   |  22  |  0.94    Ca    7.8<L>      06 Nov 2022 07:25  Phos  2.1     11-06  Mg     1.6     11-06          CAPILLARY BLOOD GLUCOSE      POCT Blood Glucose.: 204 mg/dL (06 Nov 2022 12:59)  POCT Blood Glucose.: 167 mg/dL (06 Nov 2022 08:43)  POCT Blood Glucose.: 178 mg/dL (05 Nov 2022 22:06)  POCT Blood Glucose.: 177 mg/dL (05 Nov 2022 18:51)  POCT Blood Glucose.: 202 mg/dL (05 Nov 2022 17:32)            MEDICATIONS  (STANDING):  ascorbic acid 500 milliGRAM(s) Oral daily  aspirin enteric coated 81 milliGRAM(s) Oral daily  atorvastatin 80 milliGRAM(s) Oral at bedtime  chlorhexidine 2% Cloths 1 Application(s) Topical once  dextrose 5%. 1000 milliLiter(s) (100 mL/Hr) IV Continuous <Continuous>  dextrose 5%. 1000 milliLiter(s) (50 mL/Hr) IV Continuous <Continuous>  dextrose 50% Injectable 25 Gram(s) IV Push once  dextrose 50% Injectable 12.5 Gram(s) IV Push once  dextrose 50% Injectable 25 Gram(s) IV Push once  dextrose 50% Injectable 25 Gram(s) IV Push once  dextrose 50% Injectable 12.5 Gram(s) IV Push once  dextrose 50% Injectable 25 Gram(s) IV Push once  DULoxetine 60 milliGRAM(s) Oral daily  glucagon  Injectable 1 milliGRAM(s) IntraMuscular once  influenza  Vaccine (HIGH DOSE) 0.7 milliLiter(s) IntraMuscular once  insulin lispro (ADMELOG) corrective regimen sliding scale   SubCutaneous at bedtime  insulin lispro (ADMELOG) corrective regimen sliding scale   SubCutaneous three times a day before meals  insulin lispro Injectable (ADMELOG). 2 Unit(s) SubCutaneous once  lidocaine 1% Injectable 0.2 milliLiter(s) Local Injection once  memantine 10 milliGRAM(s) Oral two times a day  polyethylene glycol 3350 17 Gram(s) Oral daily  potassium phosphate IVPB 15 milliMole(s) IV Intermittent once  senna 2 Tablet(s) Oral at bedtime  sodium chloride 0.9%. 1000 milliLiter(s) (75 mL/Hr) IV Continuous <Continuous>    MEDICATIONS  (PRN):  acetaminophen     Tablet .. 650 milliGRAM(s) Oral every 6 hours PRN Mild Pain (1 - 3)  dextrose Oral Gel 15 Gram(s) Oral once PRN Blood Glucose LESS THAN 70 milliGRAM(s)/deciliter  dextrose Oral Gel 15 Gram(s) Oral once PRN Blood Glucose LESS THAN 70 milliGRAM(s)/deciliter  HYDROmorphone  Injectable 0.2 milliGRAM(s) IV Push every 10 minutes PRN Moderate Pain (4 - 6)  morphine  - Injectable 2 milliGRAM(s) IV Push every 4 hours PRN Severe Pain (7 - 10)  ondansetron Injectable 4 milliGRAM(s) IV Push once PRN Nausea and/or Vomiting  ondansetron Injectable 4 milliGRAM(s) IV Push once PRN Nausea and/or Vomiting  oxycodone    5 mG/acetaminophen 325 mG 1 Tablet(s) Oral every 6 hours PRN Moderate Pain (4 - 6)          PHYSICAL EXAM:  GENERAL: NAD, well-groomed, well-developed  HEAD:  Atraumatic, Normocephalic  CHEST/LUNG: Clear to percussion bilaterally; No rales, rhonchi, wheezing, or rubs  HEART: Regular rate and rhythm; No murmurs, rubs, or gallops  ABDOMEN: Soft, Nontender, Nondistended; Bowel sounds present  EXTREMITIES:  dressing +    Care Discussed with Consultants/Other Providers [ ] YES  [ ] NO

## 2022-11-07 LAB
ANION GAP SERPL CALC-SCNC: 8 MMOL/L — SIGNIFICANT CHANGE UP (ref 5–17)
APPEARANCE UR: CLEAR — SIGNIFICANT CHANGE UP
BILIRUB UR-MCNC: NEGATIVE — SIGNIFICANT CHANGE UP
BUN SERPL-MCNC: 18 MG/DL — SIGNIFICANT CHANGE UP (ref 7–23)
CALCIUM SERPL-MCNC: 8.7 MG/DL — SIGNIFICANT CHANGE UP (ref 8.4–10.5)
CHLORIDE SERPL-SCNC: 108 MMOL/L — SIGNIFICANT CHANGE UP (ref 96–108)
CO2 SERPL-SCNC: 24 MMOL/L — SIGNIFICANT CHANGE UP (ref 22–31)
COLOR SPEC: SIGNIFICANT CHANGE UP
CREAT SERPL-MCNC: 1.21 MG/DL — SIGNIFICANT CHANGE UP (ref 0.5–1.3)
DIFF PNL FLD: NEGATIVE — SIGNIFICANT CHANGE UP
EGFR: 63 ML/MIN/1.73M2 — SIGNIFICANT CHANGE UP
GLUCOSE BLDC GLUCOMTR-MCNC: 149 MG/DL — HIGH (ref 70–99)
GLUCOSE BLDC GLUCOMTR-MCNC: 152 MG/DL — HIGH (ref 70–99)
GLUCOSE BLDC GLUCOMTR-MCNC: 162 MG/DL — HIGH (ref 70–99)
GLUCOSE SERPL-MCNC: 143 MG/DL — HIGH (ref 70–99)
GLUCOSE UR QL: ABNORMAL
HCT VFR BLD CALC: 23.6 % — LOW (ref 39–50)
HGB BLD-MCNC: 7.8 G/DL — LOW (ref 13–17)
KETONES UR-MCNC: NEGATIVE — SIGNIFICANT CHANGE UP
LEUKOCYTE ESTERASE UR-ACNC: NEGATIVE — SIGNIFICANT CHANGE UP
MAGNESIUM SERPL-MCNC: 1.9 MG/DL — SIGNIFICANT CHANGE UP (ref 1.6–2.6)
MCHC RBC-ENTMCNC: 29.8 PG — SIGNIFICANT CHANGE UP (ref 27–34)
MCHC RBC-ENTMCNC: 33.1 GM/DL — SIGNIFICANT CHANGE UP (ref 32–36)
MCV RBC AUTO: 90.1 FL — SIGNIFICANT CHANGE UP (ref 80–100)
NITRITE UR-MCNC: NEGATIVE — SIGNIFICANT CHANGE UP
NRBC # BLD: 0 /100 WBCS — SIGNIFICANT CHANGE UP (ref 0–0)
PH UR: 6 — SIGNIFICANT CHANGE UP (ref 5–8)
PHOSPHATE SERPL-MCNC: 3.1 MG/DL — SIGNIFICANT CHANGE UP (ref 2.5–4.5)
PLATELET # BLD AUTO: 164 K/UL — SIGNIFICANT CHANGE UP (ref 150–400)
POTASSIUM SERPL-MCNC: 4.2 MMOL/L — SIGNIFICANT CHANGE UP (ref 3.5–5.3)
POTASSIUM SERPL-SCNC: 4.2 MMOL/L — SIGNIFICANT CHANGE UP (ref 3.5–5.3)
PROT UR-MCNC: NEGATIVE — SIGNIFICANT CHANGE UP
RBC # BLD: 2.62 M/UL — LOW (ref 4.2–5.8)
RBC # FLD: 18.6 % — HIGH (ref 10.3–14.5)
SODIUM SERPL-SCNC: 140 MMOL/L — SIGNIFICANT CHANGE UP (ref 135–145)
SP GR SPEC: 1.01 — SIGNIFICANT CHANGE UP (ref 1.01–1.02)
UROBILINOGEN FLD QL: NEGATIVE — SIGNIFICANT CHANGE UP
WBC # BLD: 9.45 K/UL — SIGNIFICANT CHANGE UP (ref 3.8–10.5)
WBC # FLD AUTO: 9.45 K/UL — SIGNIFICANT CHANGE UP (ref 3.8–10.5)

## 2022-11-07 RX ORDER — AMPICILLIN SODIUM AND SULBACTAM SODIUM 250; 125 MG/ML; MG/ML
INJECTION, POWDER, FOR SUSPENSION INTRAMUSCULAR; INTRAVENOUS
Refills: 0 | Status: DISCONTINUED | OUTPATIENT
Start: 2022-11-07 | End: 2022-11-09

## 2022-11-07 RX ORDER — SODIUM CHLORIDE 9 MG/ML
500 INJECTION INTRAMUSCULAR; INTRAVENOUS; SUBCUTANEOUS ONCE
Refills: 0 | Status: COMPLETED | OUTPATIENT
Start: 2022-11-07 | End: 2022-11-07

## 2022-11-07 RX ORDER — AMPICILLIN SODIUM AND SULBACTAM SODIUM 250; 125 MG/ML; MG/ML
1.5 INJECTION, POWDER, FOR SUSPENSION INTRAMUSCULAR; INTRAVENOUS EVERY 6 HOURS
Refills: 0 | Status: DISCONTINUED | OUTPATIENT
Start: 2022-11-07 | End: 2022-11-09

## 2022-11-07 RX ORDER — AMPICILLIN SODIUM AND SULBACTAM SODIUM 250; 125 MG/ML; MG/ML
1.5 INJECTION, POWDER, FOR SUSPENSION INTRAMUSCULAR; INTRAVENOUS ONCE
Refills: 0 | Status: COMPLETED | OUTPATIENT
Start: 2022-11-07 | End: 2022-11-07

## 2022-11-07 RX ADMIN — AMPICILLIN SODIUM AND SULBACTAM SODIUM 100 GRAM(S): 250; 125 INJECTION, POWDER, FOR SUSPENSION INTRAMUSCULAR; INTRAVENOUS at 17:37

## 2022-11-07 RX ADMIN — Medication 100 GRAM(S): at 00:03

## 2022-11-07 RX ADMIN — AMPICILLIN SODIUM AND SULBACTAM SODIUM 100 GRAM(S): 250; 125 INJECTION, POWDER, FOR SUSPENSION INTRAMUSCULAR; INTRAVENOUS at 12:27

## 2022-11-07 RX ADMIN — MEMANTINE HYDROCHLORIDE 10 MILLIGRAM(S): 10 TABLET ORAL at 23:20

## 2022-11-07 RX ADMIN — SENNA PLUS 2 TABLET(S): 8.6 TABLET ORAL at 23:20

## 2022-11-07 RX ADMIN — MEMANTINE HYDROCHLORIDE 10 MILLIGRAM(S): 10 TABLET ORAL at 06:53

## 2022-11-07 RX ADMIN — ATORVASTATIN CALCIUM 80 MILLIGRAM(S): 80 TABLET, FILM COATED ORAL at 23:19

## 2022-11-07 RX ADMIN — SODIUM CHLORIDE 120 MILLILITER(S): 9 INJECTION INTRAMUSCULAR; INTRAVENOUS; SUBCUTANEOUS at 12:27

## 2022-11-07 RX ADMIN — Medication 2: at 17:48

## 2022-11-07 RX ADMIN — Medication 81 MILLIGRAM(S): at 12:29

## 2022-11-07 RX ADMIN — DULOXETINE HYDROCHLORIDE 60 MILLIGRAM(S): 30 CAPSULE, DELAYED RELEASE ORAL at 12:28

## 2022-11-07 RX ADMIN — POLYETHYLENE GLYCOL 3350 17 GRAM(S): 17 POWDER, FOR SOLUTION ORAL at 12:28

## 2022-11-07 RX ADMIN — Medication 500 MILLIGRAM(S): at 12:29

## 2022-11-07 RX ADMIN — SODIUM CHLORIDE 1000 MILLILITER(S): 9 INJECTION INTRAMUSCULAR; INTRAVENOUS; SUBCUTANEOUS at 00:18

## 2022-11-07 NOTE — PROGRESS NOTE ADULT - ASSESSMENT
Assessment and Plan:   · Assessment	  73 y/o male w/ a PMHx of CAD s/p stent, HTN, HLD, T2DM c/b peripheral neuropathy, DOLORES but non-compliant w/ CPAP, bladder CA s/p laser ablation, Alzheimer's dementia (minimally verbal & requires full assistance w/ ADLs), anxiety, and PAD s/p right CFA stent w/ eventual need for endarterectomy & left CFA endarterectomy now presenting s/p right femoral-proximal AT bypass w/ PTFE for a non-healing right foot wound w/ a post-op course c/b hemorrhagic shock, s/p 11/4 right foot partial 1st and 5th ray amputation    - Multimodal pain control   - received 500cc bolus overnight, on maintenance fluids   - Encourage OOB to chair, IS  - Diet: Regular diet, bowel regimen with senna and miralax  - Appreciate podiatry recs for right foot wound; started on Unasyn per recs   - s/p 1 unit pRBCs on 10/5; f/u H/H today   - Holding Xarelto, continue [id number]   -Appreciate cardio recs for tachycardia        [id number]  Vascular Surgery

## 2022-11-07 NOTE — PROGRESS NOTE ADULT - SUBJECTIVE AND OBJECTIVE BOX
Patient is a 72y old  Male who presents with a chief complaint of PAD (2022 17:24)       INTERVAL HPI/OVERNIGHT EVENTS:  Patient seen and evaluated at bedside.  Pt is resting comfortable in NAD. Denies N/V/F/C.  Pain rated at X/10    Allergies    Ceclor (Rash; Urticaria)    Intolerances        Vital Signs Last 24 Hrs  T(C): 37.8 (2022 06:26), Max: 37.8 (2022 06:26)  T(F): 100 (2022 06:26), Max: 100 (2022 06:26)  HR: 97 (2022 06:26) (70 - 109)  BP: 160/80 (2022 06:26) (116/67 - 164/89)  BP(mean): --  RR: 18 (2022 06:26) (18 - 18)  SpO2: 93% (2022 06:26) (93% - 97%)    Parameters below as of 2022 06:26  Patient On (Oxygen Delivery Method): room air        LABS:                        7.8    9.45  )-----------( 164      ( 2022 08:00 )             23.6     11-07    140  |  108  |  18  ----------------------------<  143<H>  4.2   |  24  |  1.21    Ca    8.7      2022 08:00  Phos  3.1     11-07  Mg     1.9     11-07        Urinalysis Basic - ( 2022 00:32 )    Color: Light Yellow / Appearance: Clear / S.014 / pH: x  Gluc: x / Ketone: Negative  / Bili: Negative / Urobili: Negative   Blood: x / Protein: Negative / Nitrite: Negative   Leuk Esterase: Negative / RBC: x / WBC x   Sq Epi: x / Non Sq Epi: x / Bacteria: x      CAPILLARY BLOOD GLUCOSE      POCT Blood Glucose.: 149 mg/dL (2022 09:54)  POCT Blood Glucose.: 194 mg/dL (2022 21:27)  POCT Blood Glucose.: 160 mg/dL (2022 17:36)  POCT Blood Glucose.: 204 mg/dL (2022 12:59)      Lower Extremity Physical Exam:  Vasular: DP/PT 0/4, B/L, CFT < 3 seconds all pedal digits besides R foot 1 and 5 which is absent, Temperature gradient warm to warm R, warm to cool left  Neuro: Epicritic sensation intact to the level of the digits, B/L.  Musculoskeletal/Ortho: non-ambi  Skin: s/p  right foot partial 1st and 5th ray amputation: flaps warm and viable, maceration to the partial 5th ray resolved, mild w periwound erythema, no drainage, no malodor, no hematoma.  L foot 2nd and 3rd toe distal abrasions, granular base, no clinical signs of infection    RADIOLOGY & ADDITIONAL TESTS:

## 2022-11-07 NOTE — PROGRESS NOTE ADULT - SUBJECTIVE AND OBJECTIVE BOX
VASCULAR DAILY PROGRESS NOTE:     SUBJECTIVE/ROS: Patient seen and examined at bedside. Resting comfortably. Subjective limited due to being minimally verbal. Dressing changed.       MEDICATIONS  (STANDING):  ampicillin/sulbactam  IVPB      ampicillin/sulbactam  IVPB 1.5 Gram(s) IV Intermittent once  ampicillin/sulbactam  IVPB 1.5 Gram(s) IV Intermittent every 6 hours  ascorbic acid 500 milliGRAM(s) Oral daily  aspirin enteric coated 81 milliGRAM(s) Oral daily  atorvastatin 80 milliGRAM(s) Oral at bedtime  dextrose 5%. 1000 milliLiter(s) (50 mL/Hr) IV Continuous <Continuous>  dextrose 5%. 1000 milliLiter(s) (100 mL/Hr) IV Continuous <Continuous>  dextrose 50% Injectable 25 Gram(s) IV Push once  dextrose 50% Injectable 12.5 Gram(s) IV Push once  dextrose 50% Injectable 25 Gram(s) IV Push once  dextrose 50% Injectable 25 Gram(s) IV Push once  dextrose 50% Injectable 12.5 Gram(s) IV Push once  dextrose 50% Injectable 25 Gram(s) IV Push once  DULoxetine 60 milliGRAM(s) Oral daily  glucagon  Injectable 1 milliGRAM(s) IntraMuscular once  influenza  Vaccine (HIGH DOSE) 0.7 milliLiter(s) IntraMuscular once  insulin lispro (ADMELOG) corrective regimen sliding scale   SubCutaneous three times a day before meals  insulin lispro (ADMELOG) corrective regimen sliding scale   SubCutaneous at bedtime  insulin lispro Injectable (ADMELOG). 2 Unit(s) SubCutaneous once  lidocaine 1% Injectable 0.2 milliLiter(s) Local Injection once  memantine 10 milliGRAM(s) Oral two times a day  polyethylene glycol 3350 17 Gram(s) Oral daily  senna 2 Tablet(s) Oral at bedtime  sodium chloride 0.9%. 1000 milliLiter(s) (120 mL/Hr) IV Continuous <Continuous>    MEDICATIONS  (PRN):  acetaminophen     Tablet .. 650 milliGRAM(s) Oral every 6 hours PRN Mild Pain (1 - 3)  dextrose Oral Gel 15 Gram(s) Oral once PRN Blood Glucose LESS THAN 70 milliGRAM(s)/deciliter  dextrose Oral Gel 15 Gram(s) Oral once PRN Blood Glucose LESS THAN 70 milliGRAM(s)/deciliter  HYDROmorphone  Injectable 0.2 milliGRAM(s) IV Push every 10 minutes PRN Moderate Pain (4 - 6)  morphine  - Injectable 2 milliGRAM(s) IV Push every 4 hours PRN Severe Pain (7 - 10)  ondansetron Injectable 4 milliGRAM(s) IV Push once PRN Nausea and/or Vomiting  ondansetron Injectable 4 milliGRAM(s) IV Push once PRN Nausea and/or Vomiting  oxycodone    5 mG/acetaminophen 325 mG 1 Tablet(s) Oral every 6 hours PRN Moderate Pain (4 - 6)      OBJECTIVE:  Vital Signs Last 24 Hrs  T(C): 37.8 (2022 06:26), Max: 37.8 (2022 06:26)  T(F): 100 (2022 06:26), Max: 100 (2022 06:)  HR: 97 (2022 06:26) (70 - 109)  BP: 160/80 (2022 06:26) (116/67 - 164/89)  BP(mean): --  RR: 18 (2022 06:26) (18 - 18)  SpO2: 93% (2022 06:26) (93% - 97%)    Parameters below as of 2022 06:26  Patient On (Oxygen Delivery Method): room air          I&O's Detail    2022 07:01  -  2022 07:00  --------------------------------------------------------  IN:    IV PiggyBack: 100 mL    Oral Fluid: 870 mL    sodium chloride 0.9%: 900 mL    Sodium Chloride 0.9% Bolus: 500 mL  Total IN: 2370 mL    OUT:    Incontinent per Condom Catheter (mL): 775 mL    Indwelling Catheter - Urethral (mL): 1325 mL  Total OUT: 2100 mL    Total NET: 270 mL          Daily     Daily     LABS:                        7.8    9.45  )-----------( 164      ( 2022 08:00 )             23.6     11-07    140  |  108  |  18  ----------------------------<  143<H>  4.2   |  24  |  1.21    Ca    8.7      2022 08:00  Phos  3.1     11  Mg     1.9     11-07        Urinalysis Basic - ( 2022 00:32 )    Color: Light Yellow / Appearance: Clear / S.014 / pH: x  Gluc: x / Ketone: Negative  / Bili: Negative / Urobili: Negative   Blood: x / Protein: Negative / Nitrite: Negative   Leuk Esterase: Negative / RBC: x / WBC x   Sq Epi: x / Non Sq Epi: x / Bacteria: x      General: well developed, well nourished, NAD  HEENT: NCAT, trachea midline  Respiratory: respirations non labored  Gastrointestinal: soft, nontender, nondistended  Extremities: FROM, warm. R AT signal present. Pt with right foot in ACE/kirlex s/p 1&5ray resection with podiatry  Neurological: non-focal

## 2022-11-07 NOTE — PROGRESS NOTE ADULT - SUBJECTIVE AND OBJECTIVE BOX
DATE OF SERVICE: 11-07-22 @ 21:26    Patient is a 72y old  Male who presents with a chief complaint of PAD (02 Nov 2022 17:24)      INTERVAL HISTORY: feels ok        PHYSICAL EXAM:  T(C): 36.7 (11-07-22 @ 21:17), Max: 37.8 (11-07-22 @ 06:26)  HR: 85 (11-07-22 @ 21:17) (85 - 109)  BP: 149/55 (11-07-22 @ 21:17) (138/69 - 170/90)  RR: 18 (11-07-22 @ 21:17) (16 - 18)  SpO2: 98% (11-07-22 @ 21:17) (93% - 98%)  Wt(kg): --  I&O's Summary    06 Nov 2022 07:01  -  07 Nov 2022 07:00  --------------------------------------------------------  IN: 2370 mL / OUT: 2100 mL / NET: 270 mL    07 Nov 2022 07:01  -  07 Nov 2022 21:26  --------------------------------------------------------  IN: 370 mL / OUT: 750 mL / NET: -380 mL          Appearance: In no distress	  HEENT:    PERRL, EOMI	  Cardiovascular:  S1 S2, No JVD  Respiratory: Lungs clear to auscultation	  Gastrointestinal:  Soft, Non-tender, + BS	  Vascularature:  No edema of LE  Psychiatric: Appropriate affect   Neuro: no acute focal deficits                               7.8    9.45  )-----------( 164      ( 07 Nov 2022 08:00 )             23.6     11-07    140  |  108  |  18  ----------------------------<  143<H>  4.2   |  24  |  1.21    Ca    8.7      07 Nov 2022 08:00  Phos  3.1     11-07  Mg     1.9     11-07          Labs personally reviewed      ASSESSMENT/PLAN: 	      72 year old male presents for right leg femoral-popliteal bypass. Pt. with a history of PAD; s/p right endarterectomy of the right common femoral artery 10/21. Currently has necrotic lesion on his right great toe which is progressively worsening over the past few months. Pt. with Alzheimer's dementia, minimally verbal, requires full assistance for ADLs.    1. Cardiac Risk Stratification  - Inpt and outpt charts reviewed  - Pt was seen by his OP cardio Dr Medina 9/30/22 for preop evaluation for open RLE bypass and was deemed optimized to proceed  - No ACS, euvolemic, no tachy/deric arrythmia, no murmur to suggest severe AS/MS - TTE unremarkable   - Mod risk for mod risk pod surgery, no contraindication to proceed    - Tolerated surgery well.  - s/p transfusion, awaiting repeat CBC    2. PAD  - s/p right CFA stent w/ eventual need for endarterectomy & left CFA endarterectomy now presenting s/p right femoral-proximal AT bypass w/ PTFE for a non-healing right foot wound  - c/w Xarelto 2.5mg PO daily  -s/p R foot partial 1st and 5th ray amputation     3. DVT PPx  - Resume Xarelto as per surgery        Zuhair Martinez DO [facility]  Cardiovascular Medicine  [address] [place] [address], [address]  Office: [phone]  Available via Text/call on [employer] Teams

## 2022-11-07 NOTE — PROGRESS NOTE ADULT - SUBJECTIVE AND OBJECTIVE BOX
Patient is a 72y old  Male who presents with a chief complaint of PAD (2022 17:24)    Date of servie : 22 @ 13:34  INTERVAL HPI/OVERNIGHT EVENTS:  T(C): 36.9 (22 @ 10:19), Max: 37.8 (22 @ 06:26)  HR: 90 (22 @ 10:19) (90 - 109)  BP: 163/92 (22 @ 10:19) (138/69 - 163/92)  RR: 18 (22 @ 10:19) (18 - 18)  SpO2: 95% (22 @ 10:19) (93% - 96%)  Wt(kg): --  I&O's Summary    2022 07:01  -  2022 07:00  --------------------------------------------------------  IN: 2370 mL / OUT: 2100 mL / NET: 270 mL    2022 07:01  -  2022 13:34  --------------------------------------------------------  IN: 0 mL / OUT: 600 mL / NET: -600 mL        LABS:                        7.8    9.45  )-----------( 164      ( 2022 08:00 )             23.6     1107    140  |  108  |  18  ----------------------------<  143<H>  4.2   |  24  |  1.21    Ca    8.7      2022 08:00  Phos  3.1     11-07  Mg     1.9     11-07        Urinalysis Basic - ( 2022 00:32 )    Color: Light Yellow / Appearance: Clear / S.014 / pH: x  Gluc: x / Ketone: Negative  / Bili: Negative / Urobili: Negative   Blood: x / Protein: Negative / Nitrite: Negative   Leuk Esterase: Negative / RBC: x / WBC x   Sq Epi: x / Non Sq Epi: x / Bacteria: x      CAPILLARY BLOOD GLUCOSE      POCT Blood Glucose.: 149 mg/dL (2022 09:54)  POCT Blood Glucose.: 194 mg/dL (2022 21:27)  POCT Blood Glucose.: 160 mg/dL (2022 17:36)        Urinalysis Basic - ( 2022 00:32 )    Color: Light Yellow / Appearance: Clear / S.014 / pH: x  Gluc: x / Ketone: Negative  / Bili: Negative / Urobili: Negative   Blood: x / Protein: Negative / Nitrite: Negative   Leuk Esterase: Negative / RBC: x / WBC x   Sq Epi: x / Non Sq Epi: x / Bacteria: x        MEDICATIONS  (STANDING):  ampicillin/sulbactam  IVPB      ampicillin/sulbactam  IVPB 1.5 Gram(s) IV Intermittent every 6 hours  ascorbic acid 500 milliGRAM(s) Oral daily  aspirin enteric coated 81 milliGRAM(s) Oral daily  atorvastatin 80 milliGRAM(s) Oral at bedtime  dextrose 5%. 1000 milliLiter(s) (100 mL/Hr) IV Continuous <Continuous>  dextrose 5%. 1000 milliLiter(s) (50 mL/Hr) IV Continuous <Continuous>  dextrose 50% Injectable 25 Gram(s) IV Push once  dextrose 50% Injectable 12.5 Gram(s) IV Push once  dextrose 50% Injectable 25 Gram(s) IV Push once  dextrose 50% Injectable 25 Gram(s) IV Push once  dextrose 50% Injectable 12.5 Gram(s) IV Push once  dextrose 50% Injectable 25 Gram(s) IV Push once  DULoxetine 60 milliGRAM(s) Oral daily  glucagon  Injectable 1 milliGRAM(s) IntraMuscular once  influenza  Vaccine (HIGH DOSE) 0.7 milliLiter(s) IntraMuscular once  insulin lispro (ADMELOG) corrective regimen sliding scale   SubCutaneous three times a day before meals  insulin lispro (ADMELOG) corrective regimen sliding scale   SubCutaneous at bedtime  insulin lispro Injectable (ADMELOG). 2 Unit(s) SubCutaneous once  lidocaine 1% Injectable 0.2 milliLiter(s) Local Injection once  memantine 10 milliGRAM(s) Oral two times a day  polyethylene glycol 3350 17 Gram(s) Oral daily  senna 2 Tablet(s) Oral at bedtime  sodium chloride 0.9%. 1000 milliLiter(s) (120 mL/Hr) IV Continuous <Continuous>    MEDICATIONS  (PRN):  acetaminophen     Tablet .. 650 milliGRAM(s) Oral every 6 hours PRN Mild Pain (1 - 3)  dextrose Oral Gel 15 Gram(s) Oral once PRN Blood Glucose LESS THAN 70 milliGRAM(s)/deciliter  dextrose Oral Gel 15 Gram(s) Oral once PRN Blood Glucose LESS THAN 70 milliGRAM(s)/deciliter  HYDROmorphone  Injectable 0.2 milliGRAM(s) IV Push every 10 minutes PRN Moderate Pain (4 - 6)  morphine  - Injectable 2 milliGRAM(s) IV Push every 4 hours PRN Severe Pain (7 - 10)  ondansetron Injectable 4 milliGRAM(s) IV Push once PRN Nausea and/or Vomiting  ondansetron Injectable 4 milliGRAM(s) IV Push once PRN Nausea and/or Vomiting  oxycodone    5 mG/acetaminophen 325 mG 1 Tablet(s) Oral every 6 hours PRN Moderate Pain (4 - 6)          PHYSICAL EXAM:  GENERAL: NAD, well-groomed, well-developed  HEAD:  Atraumatic, Normocephalic  CHEST/LUNG: Clear to percussion bilaterally; No rales, rhonchi, wheezing, or rubs  HEART: Regular rate and rhythm; No murmurs, rubs, or gallops  ABDOMEN: Soft, Nontender, Nondistended; Bowel sounds present  EXTREMITIES:  dressing +    Care Discussed with Consultants/Other Providers [ x] YES  [ ] NO

## 2022-11-07 NOTE — PROGRESS NOTE ADULT - ASSESSMENT
73 y/o M w/ s/p 11/4 right foot partial 1st and 5th ray amputation  - pt seen and evaluated  - T 100, WBC 8.48  - s/p 11/4 right foot partial 1st and 5th ray amputation: flaps warm and viable, maceration to the partial 5th ray resolved, mild w periwound erythema, no drainage, no malodor, no hematoma.  L foot 2nd and 3rd toe distal abrasions, granular base, no clinical signs of infection  - No wound culture obtained as it will likely yield skin contaminant  - R foot xray results show: Focally atrophied/shrunken right hallux soft tissues compatible with gangrene. No tracking gas collections or gross agraphic evidence for osteomyelitis  - s/p RLE fem-AT bypass with vasc, appreciated  - per intraop findings low concern for residual infection and viability   - OR clean bone culture no growth prelim   - Continue IV Unasyn   - Patient is stable for discharge from podiatry standpoint with 10days PO Augmentin 875mg   - F/u information and instructions can be found int he f/u section of d/c provider note.   - Discussed with attending

## 2022-11-08 LAB
ANION GAP SERPL CALC-SCNC: 8 MMOL/L — SIGNIFICANT CHANGE UP (ref 5–17)
BUN SERPL-MCNC: 17 MG/DL — SIGNIFICANT CHANGE UP (ref 7–23)
CALCIUM SERPL-MCNC: 8.8 MG/DL — SIGNIFICANT CHANGE UP (ref 8.4–10.5)
CHLORIDE SERPL-SCNC: 109 MMOL/L — HIGH (ref 96–108)
CO2 SERPL-SCNC: 23 MMOL/L — SIGNIFICANT CHANGE UP (ref 22–31)
CREAT SERPL-MCNC: 1.01 MG/DL — SIGNIFICANT CHANGE UP (ref 0.5–1.3)
EGFR: 79 ML/MIN/1.73M2 — SIGNIFICANT CHANGE UP
GLUCOSE BLDC GLUCOMTR-MCNC: 118 MG/DL — HIGH (ref 70–99)
GLUCOSE BLDC GLUCOMTR-MCNC: 159 MG/DL — HIGH (ref 70–99)
GLUCOSE BLDC GLUCOMTR-MCNC: 188 MG/DL — HIGH (ref 70–99)
GLUCOSE SERPL-MCNC: 139 MG/DL — HIGH (ref 70–99)
HCT VFR BLD CALC: 28.5 % — LOW (ref 39–50)
HGB BLD-MCNC: 9.3 G/DL — LOW (ref 13–17)
MAGNESIUM SERPL-MCNC: 1.9 MG/DL — SIGNIFICANT CHANGE UP (ref 1.6–2.6)
MCHC RBC-ENTMCNC: 29.8 PG — SIGNIFICANT CHANGE UP (ref 27–34)
MCHC RBC-ENTMCNC: 32.6 GM/DL — SIGNIFICANT CHANGE UP (ref 32–36)
MCV RBC AUTO: 91.3 FL — SIGNIFICANT CHANGE UP (ref 80–100)
NRBC # BLD: 0 /100 WBCS — SIGNIFICANT CHANGE UP (ref 0–0)
PHOSPHATE SERPL-MCNC: 3 MG/DL — SIGNIFICANT CHANGE UP (ref 2.5–4.5)
PLATELET # BLD AUTO: 180 K/UL — SIGNIFICANT CHANGE UP (ref 150–400)
POTASSIUM SERPL-MCNC: 4 MMOL/L — SIGNIFICANT CHANGE UP (ref 3.5–5.3)
POTASSIUM SERPL-SCNC: 4 MMOL/L — SIGNIFICANT CHANGE UP (ref 3.5–5.3)
RBC # BLD: 3.12 M/UL — LOW (ref 4.2–5.8)
RBC # FLD: 17.2 % — HIGH (ref 10.3–14.5)
SODIUM SERPL-SCNC: 140 MMOL/L — SIGNIFICANT CHANGE UP (ref 135–145)
WBC # BLD: 8.41 K/UL — SIGNIFICANT CHANGE UP (ref 3.8–10.5)
WBC # FLD AUTO: 8.41 K/UL — SIGNIFICANT CHANGE UP (ref 3.8–10.5)

## 2022-11-08 PROCEDURE — 99222 1ST HOSP IP/OBS MODERATE 55: CPT

## 2022-11-08 RX ORDER — OXYCODONE HYDROCHLORIDE 5 MG/1
5 TABLET ORAL EVERY 6 HOURS
Refills: 0 | Status: DISCONTINUED | OUTPATIENT
Start: 2022-11-08 | End: 2022-11-09

## 2022-11-08 RX ORDER — ACETAMINOPHEN 500 MG
975 TABLET ORAL EVERY 6 HOURS
Refills: 0 | Status: DISCONTINUED | OUTPATIENT
Start: 2022-11-08 | End: 2022-11-09

## 2022-11-08 RX ORDER — ACETAMINOPHEN 500 MG
1000 TABLET ORAL ONCE
Refills: 0 | Status: COMPLETED | OUTPATIENT
Start: 2022-11-08 | End: 2022-11-08

## 2022-11-08 RX ORDER — METOPROLOL TARTRATE 50 MG
50 TABLET ORAL DAILY
Refills: 0 | Status: DISCONTINUED | OUTPATIENT
Start: 2022-11-08 | End: 2022-11-09

## 2022-11-08 RX ORDER — SODIUM CHLORIDE 9 MG/ML
1000 INJECTION INTRAMUSCULAR; INTRAVENOUS; SUBCUTANEOUS
Refills: 0 | Status: DISCONTINUED | OUTPATIENT
Start: 2022-11-08 | End: 2022-11-09

## 2022-11-08 RX ADMIN — Medication 50 MILLIGRAM(S): at 07:33

## 2022-11-08 RX ADMIN — AMPICILLIN SODIUM AND SULBACTAM SODIUM 100 GRAM(S): 250; 125 INJECTION, POWDER, FOR SUSPENSION INTRAMUSCULAR; INTRAVENOUS at 05:32

## 2022-11-08 RX ADMIN — ATORVASTATIN CALCIUM 80 MILLIGRAM(S): 80 TABLET, FILM COATED ORAL at 21:40

## 2022-11-08 RX ADMIN — Medication 400 MILLIGRAM(S): at 02:30

## 2022-11-08 RX ADMIN — MEMANTINE HYDROCHLORIDE 10 MILLIGRAM(S): 10 TABLET ORAL at 10:45

## 2022-11-08 RX ADMIN — Medication 2: at 17:57

## 2022-11-08 RX ADMIN — OXYCODONE HYDROCHLORIDE 5 MILLIGRAM(S): 5 TABLET ORAL at 02:34

## 2022-11-08 RX ADMIN — AMPICILLIN SODIUM AND SULBACTAM SODIUM 100 GRAM(S): 250; 125 INJECTION, POWDER, FOR SUSPENSION INTRAMUSCULAR; INTRAVENOUS at 00:50

## 2022-11-08 RX ADMIN — OXYCODONE HYDROCHLORIDE 5 MILLIGRAM(S): 5 TABLET ORAL at 03:52

## 2022-11-08 RX ADMIN — Medication 975 MILLIGRAM(S): at 13:37

## 2022-11-08 RX ADMIN — MEMANTINE HYDROCHLORIDE 10 MILLIGRAM(S): 10 TABLET ORAL at 21:40

## 2022-11-08 RX ADMIN — Medication 975 MILLIGRAM(S): at 23:33

## 2022-11-08 RX ADMIN — SODIUM CHLORIDE 120 MILLILITER(S): 9 INJECTION INTRAMUSCULAR; INTRAVENOUS; SUBCUTANEOUS at 00:49

## 2022-11-08 RX ADMIN — SENNA PLUS 2 TABLET(S): 8.6 TABLET ORAL at 21:41

## 2022-11-08 RX ADMIN — Medication 1000 MILLIGRAM(S): at 03:52

## 2022-11-08 RX ADMIN — Medication 975 MILLIGRAM(S): at 17:57

## 2022-11-08 RX ADMIN — Medication 975 MILLIGRAM(S): at 14:30

## 2022-11-08 RX ADMIN — AMPICILLIN SODIUM AND SULBACTAM SODIUM 100 GRAM(S): 250; 125 INJECTION, POWDER, FOR SUSPENSION INTRAMUSCULAR; INTRAVENOUS at 20:02

## 2022-11-08 RX ADMIN — SODIUM CHLORIDE 75 MILLILITER(S): 9 INJECTION INTRAMUSCULAR; INTRAVENOUS; SUBCUTANEOUS at 20:01

## 2022-11-08 NOTE — CONSULT NOTE ADULT - ATTENDING COMMENTS
73 yo M pmh CAD s/p GELA (2018) on asa/plavix, PAD s/p femoral endarterectomy/stent, alzheimers dementia, presenting for RLE wound now s/p fem-pop bypass c/b hemorrhagic shock requiring ICU stay and re-intervention.  Now off pressors and out of ICU.  Over the past few days there has been repeated transfusions and some drift downward of hgb.  No melena, brbpr, or suggestion of GIB.  There is ecchymosis post operatively that have developed.  A fecal occult test was positive. Patient on DAPT but not ppi ppx.    Given there is no overt sign of GI Bleeding in a patient requiring repeated transfusions, the probability of this being a GI source is minimal.  Endoscopic evaluation is not indicated at this time  Will monitor for any changes in clinical status at this time  Reasonable to put on daily low dose oral PPI for primary ppx in setting of DAPT

## 2022-11-08 NOTE — CONSULT NOTE ADULT - ASSESSMENT
72M with hx of CAD s/p stent 2018 on ASA/Plavix, right CFA stent and subsequent endarterectomy, and left CFA endarterectomy, DOLORES on CPAP, T2DM, HTN, HLD, bladder cancer s/p laser ablation, and Alzheimer dementia (baseline oriented x1) admitted for right fem-pop byspass for chronic non-healing foot wound c/b hemorrhagic shock s/p IV pressors and SICU stay now s/p right 1st/5th ray amputation and noted to have positive FOBT for which GI is consulted    Impression  #acute blood loss anemia without hemodynamic instability; likely 2/2 recent 2 surgical interventions; unlikely to be GI bleed  - FOBT (+) but nondiagnostic in the inpatient setting for determining significant GIB requiring endoscopic evaluation  - rectal exam without melena or hematochezia; no previous reports of melena or hematochezia either  - exam with significant RLE and right flank ecchymoses  - s/p 4U pRBC during stay, each unit corresponding to post-op course; 2U needed after right fem-pop bypass and 2U needed after right 1st/5th ray amputation  - most recent pRBC 11/7 for Hgb 7.8 with overresponse to 9.3 today  - no previous EGD records available; previous colonoscopy 2017 with internal hemorrhoids and diverticulosis  #CAD s/p stent 2018 on ASA/Plavix; plavix currently on hold  #PAD s/p intervention as above    Recommendations  - no endoscopic evaluation indicated at this time  - no indication for PPI at this time  - if continued Hgb downtrending in absence of overt GIB signs, would consider alternative etiologies including hematoma involving RLE given recent surgeries as well as RP bleed given right flank ecchymoses    Incomplete; to be discussed with attending. 72M with hx of CAD s/p stent 2018 on ASA/Plavix, right CFA stent and subsequent endarterectomy, and left CFA endarterectomy, DOLORES on CPAP, T2DM, HTN, HLD, bladder cancer s/p laser ablation, and Alzheimer dementia (baseline oriented x1) admitted for right fem-pop byspass for chronic non-healing foot wound c/b hemorrhagic shock s/p IV pressors and SICU stay now s/p right 1st/5th ray amputation and noted to have positive FOBT for which GI is consulted    Impression  #acute blood loss anemia without hemodynamic instability; likely 2/2 recent 2 surgical interventions; unlikely to be GI bleed  - FOBT (+) but nondiagnostic in the inpatient setting for determining significant GIB requiring endoscopic evaluation  - rectal exam without melena or hematochezia; no previous reports of melena or hematochezia either  - exam with significant RLE and right flank ecchymoses  - s/p 4U pRBC during stay, each unit corresponding to post-op course; 2U needed after right fem-pop bypass and 2U needed after right 1st/5th ray amputation  - most recent pRBC 11/7 for Hgb 7.8 with overresponse to 9.3 today  - no previous EGD records available; previous colonoscopy 2017 with internal hemorrhoids and diverticulosis  #CAD s/p stent 2018 on ASA; plavix currently on hold  #PAD s/p intervention as above  - currently on ASA; Plavix on hold  - intermittently on Xarelto; last dose 11/4    Recommendations  - no endoscopic evaluation indicated at this time  - no indication for PPI at this time  - if continued Hgb downtrending in absence of overt GIB signs, would consider alternative etiologies including hematoma involving RLE given recent surgeries as well as RP bleed given right flank ecchymoses    Incomplete; to be discussed with attending. 72M with hx of CAD s/p stent 2018 on ASA/Plavix, right CFA stent and subsequent endarterectomy, and left CFA endarterectomy, DOLORES on CPAP, T2DM, HTN, HLD, bladder cancer s/p laser ablation, and Alzheimer dementia (baseline oriented x1) admitted for right fem-pop byspass for chronic non-healing foot wound c/b hemorrhagic shock s/p IV pressors and SICU stay now s/p right 1st/5th ray amputation and noted to have positive FOBT for which GI is consulted    Impression  #acute blood loss anemia without hemodynamic instability; likely 2/2 recent 2 surgical interventions; unlikely to be GI bleed  DDx include post-op hemorrhage vs. less likely GIB but if GIB present ddx include ischemic colitis given hypotension and underlying vascular disease vs. PUD given ICU admission vs. diverticular bleed given previous hx of diverticulosis vs. less likely AVM vs. erosions vs. unlikely Dieulafoy lesion  - FOBT (+) but nondiagnostic in the inpatient setting for determining significant GIB requiring endoscopic evaluation  - rectal exam without melena or hematochezia; no previous reports of melena or hematochezia either  - exam with significant RLE and right flank ecchymoses  - s/p 4U pRBC during stay, each unit corresponding to post-op course; 2U needed after right fem-pop bypass and 2U needed after right 1st/5th ray amputation  - most recent pRBC 11/7 for Hgb 7.8 with overresponse to 9.3 today  - no previous EGD records available; previous colonoscopy 2017 with internal hemorrhoids and diverticulosis  #CAD s/p stent 2018 on ASA; plavix currently on hold  #PAD s/p intervention as above  - currently on ASA; Plavix on hold  - intermittently on Xarelto; last dose 11/4    Recommendations  - no endoscopic evaluation indicated at this time; if overt signs of GIB, will reconsider endoscopic evaluation  - if continued Hgb downtrending in absence of overt GIB signs, would consider alternative etiologies including hematoma involving RLE given recent surgeries, ecchymoses, and edema as well as RP bleed given right flank ecchymoses  - start protonix 40mg QD given age and ASA therapy    Tessy LANG Resident  Available on Teams    AT NIGHT AND ON WEEKENDS:  Contact on-call GI fellow via answering service ([phone]) from 5pm-8am and on weekends/holidays  GI Phone# [phone] ([facility]) 72M with hx of CAD s/p stent 2018 on ASA/Plavix, right CFA stent and subsequent endarterectomy, and left CFA endarterectomy, DOLORES on CPAP, T2DM, HTN, HLD, bladder cancer s/p laser ablation, and Alzheimer dementia (baseline oriented x1) admitted for right fem-pop byspass for chronic non-healing foot wound c/b hemorrhagic shock s/p IV pressors and SICU stay now s/p right 1st/5th ray amputation and noted to have positive FOBT for which GI is consulted    Impression  #acute blood loss anemia without hemodynamic instability; likely 2/2 recent 2 surgical interventions; unlikely to be GI bleed  DDx include post-op hemorrhage vs. less likely GIB but if GIB present ddx include ischemic colitis given hypotension and underlying vascular disease vs. PUD given ICU admission vs. diverticular bleed given previous hx of diverticulosis vs. less likely AVM vs. erosions vs. unlikely Dieulafoy lesion  - FOBT (+) but nondiagnostic in the inpatient setting for determining significant GIB requiring endoscopic evaluation  - rectal exam without melena or hematochezia; no previous reports of melena or hematochezia either  - exam with significant RLE and right flank ecchymoses  - s/p 4U pRBC during stay, each unit corresponding to post-op course; 2U needed after right fem-pop bypass and 2U needed after right 1st/5th ray amputation  - most recent pRBC 11/7 for Hgb 7.8 with overresponse to 9.3 today  - no previous EGD records available; previous colonoscopy 2017 with internal hemorrhoids and diverticulosis  #CAD s/p stent 2018 on ASA; plavix currently on hold  #PAD s/p intervention as above  - currently on ASA; Plavix on hold  - intermittently on Xarelto; last dose 11/4    Recommendations  - no endoscopic evaluation indicated at this time; if overt signs of GIB, will reconsider endoscopic evaluation  - if continued Hgb downtrending in absence of overt GIB signs, would consider alternative etiologies including hematoma involving RLE given recent surgeries, ecchymoses, and edema as well as RP bleed given right flank ecchymoses  - start protonix 40mg QD given age and ASA therapy  - trend Hgb and monitor for signs of GIB    Tessy LANG Resident  Available on Teams    AT NIGHT AND ON WEEKENDS:  Contact on-call GI fellow via answering service ([phone]) from 5pm-8am and on weekends/holidays  GI Phone# [phone] ([facility])

## 2022-11-08 NOTE — PROGRESS NOTE ADULT - SUBJECTIVE AND OBJECTIVE BOX
DATE OF SERVICE: 11-08-22 @ 10:57    Patient is a 72y old  Male who presents with a chief complaint of PAD (02 Nov 2022 17:24)      INTERVAL HISTORY: In no acute distress.     REVIEW OF SYSTEMS:  CONSTITUTIONAL: No weakness  EYES/ENT: No visual changes;  No throat pain   NECK: No pain or stiffness  RESPIRATORY: No cough, wheezing; No shortness of breath  CARDIOVASCULAR: No chest pain or palpitations  GASTROINTESTINAL: No abdominal  pain. No nausea, vomiting, or hematemesis  GENITOURINARY: No dysuria, frequency or hematuria  NEUROLOGICAL: No stroke like symptoms  SKIN: No rashes    	  MEDICATIONS:  metoprolol succinate ER 50 milliGRAM(s) Oral daily        PHYSICAL EXAM:  T(C): 36.4 (11-08-22 @ 10:23), Max: 37.5 (11-07-22 @ 16:06)  HR: 77 (11-08-22 @ 10:23) (71 - 93)  BP: 167/71 (11-08-22 @ 10:23) (149/55 - 179/89)  RR: 18 (11-08-22 @ 10:23) (16 - 18)  SpO2: 93% (11-08-22 @ 10:23) (93% - 98%)  Wt(kg): --  I&O's Summary    07 Nov 2022 07:01  -  08 Nov 2022 07:00  --------------------------------------------------------  IN: 2150 mL / OUT: 2125 mL / NET: 25 mL          Appearance: In no distress	  HEENT:    PERRL, EOMI	  Cardiovascular:  S1 S2, No JVD  Respiratory: Lungs clear to auscultation	  Gastrointestinal:  Soft, Non-tender, + BS	  Vascularature:  No edema of LE  Psychiatric: Appropriate affect   Neuro: no acute focal deficits                               9.3    8.41  )-----------( 180      ( 08 Nov 2022 07:54 )             28.5     11-08    140  |  109<H>  |  17  ----------------------------<  139<H>  4.0   |  23  |  1.01    Ca    8.8      08 Nov 2022 07:54  Phos  3.0     11-08  Mg     1.9     11-08          Labs personally reviewed      ASSESSMENT/PLAN: 	    72 year old male presents for right leg femoral-popliteal bypass. Pt. with a history of PAD; s/p right endarterectomy of the right common femoral artery 10/21. Currently has necrotic lesion on his right great toe which is progressively worsening over the past few months. Pt. with Alzheimer's dementia, minimally verbal, requires full assistance for ADLs.    1. Cardiac Risk Stratification  - Inpt and outpt charts reviewed  - Pt was seen by his OP cardio Dr Medina 9/30/22 for preop evaluation for open RLE bypass and was deemed optimized to proceed  - No ACS, euvolemic, no tachy/deric arrythmia, no murmur to suggest severe AS/MS - TTE unremarkable   - Mod risk for mod risk pod surgery, no contraindication to proceed    - Tolerated surgery well.  - s/p transfusion with appropriate response in CBC. FOBT +    2. PAD  - s/p right CFA stent w/ eventual need for endarterectomy & left CFA endarterectomy now presenting s/p right femoral-proximal AT bypass w/ PTFE for a non-healing right foot wound  - Xarelto 2.5mg PO daily currently on hold d/t suspected GIB  -s/p R foot partial 1st and 5th ray amputation     3. DVT PPx  - Xarelto on hold d/t suspected GIB    4. Hypertension  - BP consistently elevated  - Consider increasing Metoprolol to         Elsa Rose, FÉLIX-NP   Zuhair Martinez DO [facility]  Cardiovascular Medicine  [address] [place] [address], [address]  Available through call or text on [employer]  Office: [phone]

## 2022-11-08 NOTE — PROGRESS NOTE ADULT - ASSESSMENT
71 y/o male w/ a PMHx of CAD s/p stent, HTN, HLD, T2DM c/b peripheral neuropathy, DOLORES but non-compliant w/ CPAP, bladder CA s/p laser ablation, Alzheimer's dementia (minimally verbal & requires full assistance w/ ADLs), anxiety, and PAD s/p right CFA stent w/ eventual need for endarterectomy & left CFA endarterectomy now presenting s/p right femoral-proximal AT bypass w/ PTFE for a non-healing right foot wound w/ a post-op course c/b hemorrhagic shock, s/p 11/4 right foot partial 1st and 5th ray amputation.     - Pending FOBT today  - Multimodal pain control   - Encourage OOB to chair, IS  - Diet: Regular diet, bowel regimen with senna and miralax  - Appreciate podiatry recs for right foot wound; started on Unasyn per recs    - Holding Xarelto, continue [id number]   - Tachycardia: Metoprolol started today   - Pt eval: KASANDRA    [phone]  Vascular Surgery

## 2022-11-08 NOTE — CONSULT NOTE ADULT - CONSULT REASON
Hemodynamic Monitoring
(+) FOBT
MEDICAL MANAGEMENT
Preop Risk Stratification
right 1st and 5th toe gangrene

## 2022-11-08 NOTE — PROVIDER CONTACT NOTE (OTHER) - ACTION/TREATMENT ORDERED:
HARDY Reyes made aware. Per PA, patient may be in pain as he has not gotten any pain meds. Give 5mg oxycodone and IV Tylenol. Will reassess BP in an hour.

## 2022-11-08 NOTE — CONSULT NOTE ADULT - SUBJECTIVE AND OBJECTIVE BOX
Chief Complaint:  Patient is a 72y old  Male who presents with a chief complaint of PAD (2022 17:24)    Primary team HPI:  72 year old male presents for right leg femoral-popliteal bypass. Pt. with a history of PAD; s/p right endarterectomy of the right common femoral artery 10/21. Currently has necrotic lesion on his right great toe which is progressively worsening over the past few months. Pt. with Alzheimer's dementia, minimally verbal, requires full assistance for ADLs.    Pre procedure COVID PCR scheduled for 10/28/22; pt. had COVID infection 3/21 and was asymptomatic. (10 Oct 2022 15:41)    Interval History:  Pt admitted for right fem-pop AT bypass for chronic non-healing right foot wound. Pt s/p bypass c/b hemorrhagic shock requiring pressors and SICU admission. Pt since transferred back to general medical floors without any further hemodynamic instability. Patient subsequently underwent right 1st/5th ray amputation with subsequent post-op course requiring additional pRBC. GI consulted for positive FOBT. No reports of melena, hematochezia, abd pain, nausea, or vomiting.    At bedside, pt seen resting comfortably and eating lunch. Denies any abd pain, nausea or vomiting; no light-headedness dizziness, blurry vision.    Allergies:  Ceclor (Rash; Urticaria)      Home Medications:    Hospital Medications:  acetaminophen     Tablet .. 975 milliGRAM(s) Oral every 6 hours  ampicillin/sulbactam  IVPB      ampicillin/sulbactam  IVPB 1.5 Gram(s) IV Intermittent every 6 hours  ascorbic acid 500 milliGRAM(s) Oral daily  aspirin enteric coated 81 milliGRAM(s) Oral daily  atorvastatin 80 milliGRAM(s) Oral at bedtime  dextrose 5%. 1000 milliLiter(s) IV Continuous <Continuous>  dextrose 5%. 1000 milliLiter(s) IV Continuous <Continuous>  dextrose 50% Injectable 25 Gram(s) IV Push once  dextrose 50% Injectable 12.5 Gram(s) IV Push once  dextrose 50% Injectable 25 Gram(s) IV Push once  dextrose 50% Injectable 25 Gram(s) IV Push once  dextrose 50% Injectable 12.5 Gram(s) IV Push once  dextrose 50% Injectable 25 Gram(s) IV Push once  dextrose Oral Gel 15 Gram(s) Oral once PRN  dextrose Oral Gel 15 Gram(s) Oral once PRN  DULoxetine 60 milliGRAM(s) Oral daily  glucagon  Injectable 1 milliGRAM(s) IntraMuscular once  HYDROmorphone  Injectable 0.2 milliGRAM(s) IV Push every 10 minutes PRN  influenza  Vaccine (HIGH DOSE) 0.7 milliLiter(s) IntraMuscular once  insulin lispro (ADMELOG) corrective regimen sliding scale   SubCutaneous three times a day before meals  insulin lispro (ADMELOG) corrective regimen sliding scale   SubCutaneous at bedtime  insulin lispro Injectable (ADMELOG). 2 Unit(s) SubCutaneous once  lidocaine 1% Injectable 0.2 milliLiter(s) Local Injection once  memantine 10 milliGRAM(s) Oral two times a day  metoprolol succinate ER 50 milliGRAM(s) Oral daily  morphine  - Injectable 2 milliGRAM(s) IV Push every 4 hours PRN  ondansetron Injectable 4 milliGRAM(s) IV Push once PRN  ondansetron Injectable 4 milliGRAM(s) IV Push once PRN  oxyCODONE    IR 5 milliGRAM(s) Oral every 6 hours PRN  polyethylene glycol 3350 17 Gram(s) Oral daily  senna 2 Tablet(s) Oral at bedtime  sodium chloride 0.9%. 1000 milliLiter(s) IV Continuous <Continuous>      PMHX/PSHX:  HTN (Hypertension)    Diabetes    Hyperlipidemia    Rotator Cuff Disorder    Anxiety    Cancer of Bladder    PAD (peripheral artery disease)    Peripheral neuropathy    DOLORES on CPAP    Malignant neoplasm of urinary bladder, unspecified site    Dementia    Atherosclerosis of native artery of extremity    Type 2 diabetes mellitus    Alzheimer disease     activity    CAD (coronary artery disease)    ALEXA (acute kidney injury)    H/O orthostatic hypotension    History of Coronary Angioplasty    arthroscopy    Knee Surgery    bladder surgery    H/O cardiac catheterization    S/P cataract surgery    H/O peripheral artery bypass    H/O endarterectomy        Family history:  No pertinent family history in first degree relatives    FH: senile dementia (Father, Mother)    FH: diabetes mellitus (Father)    FH: breast cancer (Mother)        Denies family history of colon cancer/polyps, stomach cancer/polyps, pancreatic cancer/masses, liver cancer/disease, ovarian cancer and endometrial cancer.    Social History:     Tob: Denies  EtOH: Denies  Illicit Drugs: Denies    ROS:     General:  No fevers, chills  CV:  No pain, palpitations  Pulm:  No dyspnea or cough  GI:  No pain, No nausea, No vomiting, No diarrhea, No constipation, No tarry stools, No dysphagia,  Muscle:  No pain, weakness  Neuro:  No weakness, tingling, memory problems  Skin:  No rash    PHYSICAL EXAM:   GENERAL:  No acute distress; resting comfortably  HEENT:  no scleral icterus  CHEST:  Clear to auscultation bilaterally, no wheezes/rales/ronchi, no accessory muscle use  HEART:  Regular rate and rhythm, no murmurs/rubs/gallops  ABDOMEN:  Soft, non-tender, non-distended, normoactive bowel sounds,  no masses  EXTREMITIES: RLE and right foot dressing in place; RLE nonpitting edema; LLE normal  SKIN:  ecchymoses to RLE and right flank  NEURO:  Awake, alert, oriented x1 to person    Vital Signs:  Vital Signs Last 24 Hrs  T(C): 36.5 (2022 13:12), Max: 37.5 (2022 16:06)  T(F): 97.7 (2022 13:12), Max: 99.5 (2022 16:06)  HR: 74 (2022 13:12) (71 - 93)  BP: 172/64 (2022 13:12) (149/55 - 179/89)  BP(mean): --  RR: 18 (2022 13:12) (16 - 18)  SpO2: 95% (2022 13:12) (93% - 98%)    Parameters below as of 2022 13:12  Patient On (Oxygen Delivery Method): room air    Daily     LABS:                        9.3    8.41  )-----------( 180      ( 2022 07:54 )             28.5     Mean Cell Volume: 91.3 fl (-22 @ 07:54)    -    140  |  109<H>  |  17  ----------------------------<  139<H>  4.0   |  23  |  1.01    Ca    8.8      2022 07:54  Phos  3.0     11-  Mg     1.9     -08          Urinalysis Basic - ( 2022 00:32 )    Color: Light Yellow / Appearance: Clear / S.014 / pH: x  Gluc: x / Ketone: Negative  / Bili: Negative / Urobili: Negative   Blood: x / Protein: Negative / Nitrite: Negative   Leuk Esterase: Negative / RBC: x / WBC x   Sq Epi: x / Non Sq Epi: x / Bacteria: x                              9.3    8.41  )-----------( 180      ( 2022 07:54 )             28.5                         7.8    9.45  )-----------( 164      ( 2022 08:00 )             23.6                         7.5    9.60  )-----------( 170      ( 2022 22:45 )             23.0                         7.7    8.48  )-----------( 151      ( 2022 07:29 )             23.7                         8.0    7.74  )-----------( 172      ( 2022 17:09 )             24.6 Chief Complaint:  Patient is a 72y old  Male who presents with a chief complaint of PAD (2022 17:24)    Primary team HPI:  72 year old male presents for right leg femoral-popliteal bypass. Pt. with a history of PAD; s/p right endarterectomy of the right common femoral artery 10/21. Currently has necrotic lesion on his right great toe which is progressively worsening over the past few months. Pt. with Alzheimer's dementia, minimally verbal, requires full assistance for ADLs.    Pre procedure COVID PCR scheduled for 10/28/22; pt. had COVID infection 3/21 and was asymptomatic. (10 Oct 2022 15:41)    Interval History:  Pt admitted for right fem-pop AT bypass for chronic non-healing right foot wound. Pt s/p bypass c/b hemorrhagic shock requiring pressors and SICU admission. Pt since transferred back to general medical floors without any further hemodynamic instability. Patient subsequently underwent right 1st/5th ray amputation with subsequent post-op course requiring additional pRBC. GI consulted for positive FOBT. No reports of melena, hematochezia, abd pain, nausea, or vomiting.    At bedside, pt seen resting comfortably and eating lunch. Denies any abd pain, nausea or vomiting; no light-headedness dizziness, blurry vision.    Allergies:  Ceclor (Rash; Urticaria)      Home Medications:    Hospital Medications:  acetaminophen     Tablet .. 975 milliGRAM(s) Oral every 6 hours  ampicillin/sulbactam  IVPB      ampicillin/sulbactam  IVPB 1.5 Gram(s) IV Intermittent every 6 hours  ascorbic acid 500 milliGRAM(s) Oral daily  aspirin enteric coated 81 milliGRAM(s) Oral daily  atorvastatin 80 milliGRAM(s) Oral at bedtime  dextrose 5%. 1000 milliLiter(s) IV Continuous <Continuous>  dextrose 5%. 1000 milliLiter(s) IV Continuous <Continuous>  dextrose 50% Injectable 25 Gram(s) IV Push once  dextrose 50% Injectable 12.5 Gram(s) IV Push once  dextrose 50% Injectable 25 Gram(s) IV Push once  dextrose 50% Injectable 25 Gram(s) IV Push once  dextrose 50% Injectable 12.5 Gram(s) IV Push once  dextrose 50% Injectable 25 Gram(s) IV Push once  dextrose Oral Gel 15 Gram(s) Oral once PRN  dextrose Oral Gel 15 Gram(s) Oral once PRN  DULoxetine 60 milliGRAM(s) Oral daily  glucagon  Injectable 1 milliGRAM(s) IntraMuscular once  HYDROmorphone  Injectable 0.2 milliGRAM(s) IV Push every 10 minutes PRN  influenza  Vaccine (HIGH DOSE) 0.7 milliLiter(s) IntraMuscular once  insulin lispro (ADMELOG) corrective regimen sliding scale   SubCutaneous three times a day before meals  insulin lispro (ADMELOG) corrective regimen sliding scale   SubCutaneous at bedtime  insulin lispro Injectable (ADMELOG). 2 Unit(s) SubCutaneous once  lidocaine 1% Injectable 0.2 milliLiter(s) Local Injection once  memantine 10 milliGRAM(s) Oral two times a day  metoprolol succinate ER 50 milliGRAM(s) Oral daily  morphine  - Injectable 2 milliGRAM(s) IV Push every 4 hours PRN  ondansetron Injectable 4 milliGRAM(s) IV Push once PRN  ondansetron Injectable 4 milliGRAM(s) IV Push once PRN  oxyCODONE    IR 5 milliGRAM(s) Oral every 6 hours PRN  polyethylene glycol 3350 17 Gram(s) Oral daily  senna 2 Tablet(s) Oral at bedtime  sodium chloride 0.9%. 1000 milliLiter(s) IV Continuous <Continuous>      PMHX/PSHX:  HTN (Hypertension)    Diabetes    Hyperlipidemia    Rotator Cuff Disorder    Anxiety    Cancer of Bladder    PAD (peripheral artery disease)    Peripheral neuropathy    DOLORES on CPAP    Malignant neoplasm of urinary bladder, unspecified site    Dementia    Atherosclerosis of native artery of extremity    Type 2 diabetes mellitus    Alzheimer disease     activity    CAD (coronary artery disease)    ALEXA (acute kidney injury)    H/O orthostatic hypotension    History of Coronary Angioplasty    arthroscopy    Knee Surgery    bladder surgery    H/O cardiac catheterization    S/P cataract surgery    H/O peripheral artery bypass    H/O endarterectomy        Family history:  No pertinent family history in first degree relatives    FH: senile dementia (Father, Mother)    FH: diabetes mellitus (Father)    FH: breast cancer (Mother)        Denies family history of colon cancer/polyps, stomach cancer/polyps, pancreatic cancer/masses, liver cancer/disease, ovarian cancer and endometrial cancer.    Social History:     Tob: Denies  EtOH: Denies  Illicit Drugs: Denies    ROS:     General:  No fevers, chills  CV:  No pain, palpitations  Pulm:  No dyspnea or cough  GI:  No pain, No nausea, No vomiting, No diarrhea, No constipation, No tarry stools, No dysphagia,  Muscle:  No pain, weakness  Neuro:  No weakness, tingling, memory problems  Skin:  No rash    PHYSICAL EXAM:   GENERAL:  No acute distress; resting comfortably  HEENT:  no scleral icterus  CHEST:  Clear to auscultation bilaterally, no wheezes/rales/ronchi, no accessory muscle use  HEART:  Regular rate and rhythm, no murmurs/rubs/gallops  ABDOMEN:  Soft, non-tender, non-distended, normoactive bowel sounds,  no masses  RECTAL (performed with kriss Lepe RN): no external hemorrhoids; internal hemorrhoids palpated; brown stool  EXTREMITIES: RLE and right foot dressing in place; RLE nonpitting edema; LLE normal  SKIN:  ecchymoses to RLE and right flank  NEURO:  Awake, alert, oriented x1 to person    Vital Signs:  Vital Signs Last 24 Hrs  T(C): 36.5 (2022 13:12), Max: 37.5 (2022 16:06)  T(F): 97.7 (2022 13:12), Max: 99.5 (2022 16:06)  HR: 74 (2022 13:12) (71 - 93)  BP: 172/64 (2022 13:12) (149/55 - 179/89)  BP(mean): --  RR: 18 (2022 13:12) (16 - 18)  SpO2: 95% (2022 13:12) (93% - 98%)    Parameters below as of 2022 13:12  Patient On (Oxygen Delivery Method): room air    Daily     LABS:                        9.3    8.41  )-----------( 180      ( 2022 07:54 )             28.5     Mean Cell Volume: 91.3 fl (-22 @ 07:54)    11-08    140  |  109<H>  |  17  ----------------------------<  139<H>  4.0   |  23  |  1.01    Ca    8.8      2022 07:54  Phos  3.0     11-08  Mg     1.9     -          Urinalysis Basic - ( 2022 00:32 )    Color: Light Yellow / Appearance: Clear / S.014 / pH: x  Gluc: x / Ketone: Negative  / Bili: Negative / Urobili: Negative   Blood: x / Protein: Negative / Nitrite: Negative   Leuk Esterase: Negative / RBC: x / WBC x   Sq Epi: x / Non Sq Epi: x / Bacteria: x                              9.3    8.41  )-----------( 180      ( 2022 07:54 )             28.5                         7.8    9.45  )-----------( 164      ( 2022 08:00 )             23.6                         7.5    9.60  )-----------( 170      ( 2022 22:45 )             23.0                         7.7    8.48  )-----------( 151      ( 2022 07:29 )             23.7                         8.0    7.74  )-----------( 172      ( 2022 17:09 )             24.6

## 2022-11-08 NOTE — PROVIDER CONTACT NOTE (OTHER) - ASSESSMENT
/89, all other VSS. See flowsheet. Pt a&o x1 to self. Denies pain, but unable to accurately assess pain level due to confusion. Patient has been resting comfortably throughout the night.

## 2022-11-09 ENCOUNTER — TRANSCRIPTION ENCOUNTER (OUTPATIENT)
Age: 72
End: 2022-11-09

## 2022-11-09 VITALS
SYSTOLIC BLOOD PRESSURE: 148 MMHG | OXYGEN SATURATION: 97 % | RESPIRATION RATE: 16 BRPM | DIASTOLIC BLOOD PRESSURE: 72 MMHG | HEART RATE: 85 BPM | TEMPERATURE: 98 F

## 2022-11-09 LAB
ANION GAP SERPL CALC-SCNC: 9 MMOL/L — SIGNIFICANT CHANGE UP (ref 5–17)
BUN SERPL-MCNC: 22 MG/DL — SIGNIFICANT CHANGE UP (ref 7–23)
CALCIUM SERPL-MCNC: 9.1 MG/DL — SIGNIFICANT CHANGE UP (ref 8.4–10.5)
CHLORIDE SERPL-SCNC: 106 MMOL/L — SIGNIFICANT CHANGE UP (ref 96–108)
CO2 SERPL-SCNC: 25 MMOL/L — SIGNIFICANT CHANGE UP (ref 22–31)
CREAT SERPL-MCNC: 1.11 MG/DL — SIGNIFICANT CHANGE UP (ref 0.5–1.3)
CULTURE RESULTS: SIGNIFICANT CHANGE UP
EGFR: 70 ML/MIN/1.73M2 — SIGNIFICANT CHANGE UP
GLUCOSE BLDC GLUCOMTR-MCNC: 134 MG/DL — HIGH (ref 70–99)
GLUCOSE SERPL-MCNC: 123 MG/DL — HIGH (ref 70–99)
HCT VFR BLD CALC: 31.5 % — LOW (ref 39–50)
HGB BLD-MCNC: 10.1 G/DL — LOW (ref 13–17)
MAGNESIUM SERPL-MCNC: 2 MG/DL — SIGNIFICANT CHANGE UP (ref 1.6–2.6)
MCHC RBC-ENTMCNC: 30.1 PG — SIGNIFICANT CHANGE UP (ref 27–34)
MCHC RBC-ENTMCNC: 32.1 GM/DL — SIGNIFICANT CHANGE UP (ref 32–36)
MCV RBC AUTO: 94 FL — SIGNIFICANT CHANGE UP (ref 80–100)
NRBC # BLD: 0 /100 WBCS — SIGNIFICANT CHANGE UP (ref 0–0)
PHOSPHATE SERPL-MCNC: 2.9 MG/DL — SIGNIFICANT CHANGE UP (ref 2.5–4.5)
PLATELET # BLD AUTO: 219 K/UL — SIGNIFICANT CHANGE UP (ref 150–400)
POTASSIUM SERPL-MCNC: 4.2 MMOL/L — SIGNIFICANT CHANGE UP (ref 3.5–5.3)
POTASSIUM SERPL-SCNC: 4.2 MMOL/L — SIGNIFICANT CHANGE UP (ref 3.5–5.3)
RBC # BLD: 3.35 M/UL — LOW (ref 4.2–5.8)
RBC # FLD: 17.2 % — HIGH (ref 10.3–14.5)
SODIUM SERPL-SCNC: 140 MMOL/L — SIGNIFICANT CHANGE UP (ref 135–145)
SPECIMEN SOURCE: SIGNIFICANT CHANGE UP
WBC # BLD: 8.49 K/UL — SIGNIFICANT CHANGE UP (ref 3.8–10.5)
WBC # FLD AUTO: 8.49 K/UL — SIGNIFICANT CHANGE UP (ref 3.8–10.5)

## 2022-11-09 PROCEDURE — 87070 CULTURE OTHR SPECIMN AEROBIC: CPT

## 2022-11-09 PROCEDURE — 83735 ASSAY OF MAGNESIUM: CPT

## 2022-11-09 PROCEDURE — 88307 TISSUE EXAM BY PATHOLOGIST: CPT

## 2022-11-09 PROCEDURE — 71045 X-RAY EXAM CHEST 1 VIEW: CPT

## 2022-11-09 PROCEDURE — 88305 TISSUE EXAM BY PATHOLOGIST: CPT

## 2022-11-09 PROCEDURE — U0005: CPT

## 2022-11-09 PROCEDURE — 86140 C-REACTIVE PROTEIN: CPT

## 2022-11-09 PROCEDURE — 36415 COLL VENOUS BLD VENIPUNCTURE: CPT

## 2022-11-09 PROCEDURE — 82947 ASSAY GLUCOSE BLOOD QUANT: CPT

## 2022-11-09 PROCEDURE — 93005 ELECTROCARDIOGRAM TRACING: CPT

## 2022-11-09 PROCEDURE — 88311 DECALCIFY TISSUE: CPT

## 2022-11-09 PROCEDURE — 85730 THROMBOPLASTIN TIME PARTIAL: CPT

## 2022-11-09 PROCEDURE — 81003 URINALYSIS AUTO W/O SCOPE: CPT

## 2022-11-09 PROCEDURE — 85018 HEMOGLOBIN: CPT

## 2022-11-09 PROCEDURE — 82962 GLUCOSE BLOOD TEST: CPT

## 2022-11-09 PROCEDURE — 93306 TTE W/DOPPLER COMPLETE: CPT

## 2022-11-09 PROCEDURE — C1769: CPT

## 2022-11-09 PROCEDURE — 73630 X-RAY EXAM OF FOOT: CPT

## 2022-11-09 PROCEDURE — C1768: CPT

## 2022-11-09 PROCEDURE — 82435 ASSAY OF BLOOD CHLORIDE: CPT

## 2022-11-09 PROCEDURE — 85610 PROTHROMBIN TIME: CPT

## 2022-11-09 PROCEDURE — C9803: CPT

## 2022-11-09 PROCEDURE — 86850 RBC ANTIBODY SCREEN: CPT

## 2022-11-09 PROCEDURE — 85652 RBC SED RATE AUTOMATED: CPT

## 2022-11-09 PROCEDURE — 82803 BLOOD GASES ANY COMBINATION: CPT

## 2022-11-09 PROCEDURE — 36430 TRANSFUSION BLD/BLD COMPNT: CPT

## 2022-11-09 PROCEDURE — 97162 PT EVAL MOD COMPLEX 30 MIN: CPT

## 2022-11-09 PROCEDURE — 82274 ASSAY TEST FOR BLOOD FECAL: CPT

## 2022-11-09 PROCEDURE — 86923 COMPATIBILITY TEST ELECTRIC: CPT

## 2022-11-09 PROCEDURE — 83605 ASSAY OF LACTIC ACID: CPT

## 2022-11-09 PROCEDURE — 85014 HEMATOCRIT: CPT

## 2022-11-09 PROCEDURE — 84132 ASSAY OF SERUM POTASSIUM: CPT

## 2022-11-09 PROCEDURE — C1889: CPT

## 2022-11-09 PROCEDURE — U0003: CPT

## 2022-11-09 PROCEDURE — C9399: CPT

## 2022-11-09 PROCEDURE — 99231 SBSQ HOSP IP/OBS SF/LOW 25: CPT

## 2022-11-09 PROCEDURE — 84100 ASSAY OF PHOSPHORUS: CPT

## 2022-11-09 PROCEDURE — 97161 PT EVAL LOW COMPLEX 20 MIN: CPT

## 2022-11-09 PROCEDURE — 84295 ASSAY OF SERUM SODIUM: CPT

## 2022-11-09 PROCEDURE — 87075 CULTR BACTERIA EXCEPT BLOOD: CPT

## 2022-11-09 PROCEDURE — 82330 ASSAY OF CALCIUM: CPT

## 2022-11-09 PROCEDURE — 85027 COMPLETE CBC AUTOMATED: CPT

## 2022-11-09 PROCEDURE — 80048 BASIC METABOLIC PNL TOTAL CA: CPT

## 2022-11-09 PROCEDURE — 86900 BLOOD TYPING SEROLOGIC ABO: CPT

## 2022-11-09 PROCEDURE — 86901 BLOOD TYPING SEROLOGIC RH(D): CPT

## 2022-11-09 PROCEDURE — 82565 ASSAY OF CREATININE: CPT

## 2022-11-09 PROCEDURE — P9016: CPT

## 2022-11-09 RX ORDER — METOPROLOL TARTRATE 50 MG
1 TABLET ORAL
Qty: 0 | Refills: 0 | DISCHARGE
Start: 2022-11-09

## 2022-11-09 RX ORDER — ACETAMINOPHEN 500 MG
3 TABLET ORAL
Qty: 0 | Refills: 0 | DISCHARGE
Start: 2022-11-09

## 2022-11-09 RX ORDER — OXYCODONE HYDROCHLORIDE 5 MG/1
1 TABLET ORAL
Qty: 16 | Refills: 0
Start: 2022-11-09 | End: 2022-11-12

## 2022-11-09 RX ORDER — PANTOPRAZOLE SODIUM 20 MG/1
1 TABLET, DELAYED RELEASE ORAL
Qty: 30 | Refills: 0
Start: 2022-11-09 | End: 2022-12-08

## 2022-11-09 RX ORDER — RIVAROXABAN 15 MG-20MG
2.5 KIT ORAL
Refills: 0 | Status: DISCONTINUED | OUTPATIENT
Start: 2022-11-09 | End: 2022-11-09

## 2022-11-09 RX ORDER — PANTOPRAZOLE SODIUM 20 MG/1
40 TABLET, DELAYED RELEASE ORAL
Refills: 0 | Status: DISCONTINUED | OUTPATIENT
Start: 2022-11-09 | End: 2022-11-09

## 2022-11-09 RX ORDER — METOPROLOL TARTRATE 50 MG
50 TABLET ORAL
Qty: 0 | Refills: 0 | DISCHARGE

## 2022-11-09 RX ORDER — RIVAROXABAN 15 MG-20MG
1 KIT ORAL
Qty: 60 | Refills: 0
Start: 2022-11-09 | End: 2022-12-08

## 2022-11-09 RX ADMIN — POLYETHYLENE GLYCOL 3350 17 GRAM(S): 17 POWDER, FOR SOLUTION ORAL at 12:40

## 2022-11-09 RX ADMIN — AMPICILLIN SODIUM AND SULBACTAM SODIUM 100 GRAM(S): 250; 125 INJECTION, POWDER, FOR SUSPENSION INTRAMUSCULAR; INTRAVENOUS at 09:30

## 2022-11-09 RX ADMIN — Medication 975 MILLIGRAM(S): at 00:33

## 2022-11-09 RX ADMIN — Medication 975 MILLIGRAM(S): at 06:21

## 2022-11-09 RX ADMIN — DULOXETINE HYDROCHLORIDE 60 MILLIGRAM(S): 30 CAPSULE, DELAYED RELEASE ORAL at 12:41

## 2022-11-09 RX ADMIN — Medication 975 MILLIGRAM(S): at 12:41

## 2022-11-09 RX ADMIN — Medication 500 MILLIGRAM(S): at 12:41

## 2022-11-09 RX ADMIN — AMPICILLIN SODIUM AND SULBACTAM SODIUM 100 GRAM(S): 250; 125 INJECTION, POWDER, FOR SUSPENSION INTRAMUSCULAR; INTRAVENOUS at 03:32

## 2022-11-09 RX ADMIN — Medication 50 MILLIGRAM(S): at 06:21

## 2022-11-09 RX ADMIN — Medication 81 MILLIGRAM(S): at 12:40

## 2022-11-09 RX ADMIN — MEMANTINE HYDROCHLORIDE 10 MILLIGRAM(S): 10 TABLET ORAL at 09:31

## 2022-11-09 RX ADMIN — Medication 975 MILLIGRAM(S): at 07:21

## 2022-11-09 NOTE — PROGRESS NOTE ADULT - SUBJECTIVE AND OBJECTIVE BOX
DATE OF SERVICE: 11-09-22 @ 09:10    Patient is a 72y old  Male who presents with a chief complaint of PAD (02 Nov 2022 17:24)      INTERVAL HISTORY: In no acute distress.     REVIEW OF SYSTEMS:  CONSTITUTIONAL: No weakness  EYES/ENT: No visual changes;  No throat pain   NECK: No pain or stiffness  RESPIRATORY: No cough, wheezing; No shortness of breath  CARDIOVASCULAR: No chest pain or palpitations  GASTROINTESTINAL: No abdominal  pain. No nausea, vomiting, or hematemesis  GENITOURINARY: No dysuria, frequency or hematuria  NEUROLOGICAL: No stroke like symptoms  SKIN: No rashes    	  MEDICATIONS:  metoprolol succinate ER 50 milliGRAM(s) Oral daily        PHYSICAL EXAM:  T(C): 36.6 (11-09-22 @ 05:21), Max: 36.9 (11-08-22 @ 18:06)  HR: 66 (11-09-22 @ 05:21) (60 - 77)  BP: 178/74 (11-09-22 @ 05:21) (143/62 - 178/74)  RR: 18 (11-09-22 @ 05:21) (18 - 18)  SpO2: 95% (11-09-22 @ 05:21) (93% - 97%)  Wt(kg): --  I&O's Summary    08 Nov 2022 07:01  -  09 Nov 2022 07:00  --------------------------------------------------------  IN: 2125 mL / OUT: 2750 mL / NET: -625 mL          Appearance: In no distress	  HEENT:    PERRL, EOMI	  Cardiovascular:  S1 S2, No JVD  Respiratory: Lungs clear to auscultation	  Gastrointestinal:  Soft, Non-tender, + BS	  Vascularature:  No edema of LE  Psychiatric: Appropriate affect   Neuro: no acute focal deficits                               10.1   8.49  )-----------( 219      ( 09 Nov 2022 07:57 )             31.5     11-09    140  |  106  |  22  ----------------------------<  123<H>  4.2   |  25  |  1.11    Ca    9.1      09 Nov 2022 07:57  Phos  2.9     11-09  Mg     2.0     11-09          Labs personally reviewed      ASSESSMENT/PLAN: 	    72 year old male presents for right leg femoral-popliteal bypass. Pt. with a history of PAD; s/p right endarterectomy of the right common femoral artery 10/21. Currently has necrotic lesion on his right great toe which is progressively worsening over the past few months. Pt. with Alzheimer's dementia, minimally verbal, requires full assistance for ADLs.    1. Cardiac Risk Stratification  - Inpt and outpt charts reviewed  - Pt was seen by his OP cardio Dr Medina 9/30/22 for preop evaluation for open RLE bypass and was deemed optimized to proceed  - No ACS, euvolemic, no tachy/deric arrythmia, no murmur to suggest severe AS/MS - TTE unremarkable   - Mod risk for mod risk pod surgery, no contraindication to proceed    - Tolerated surgery well.  - s/p transfusion with appropriate response in CBC. FOBT +    2. PAD  - s/p right CFA stent w/ eventual need for endarterectomy & left CFA endarterectomy now presenting s/p right femoral-proximal AT bypass w/ PTFE for a non-healing right foot wound  - Xarelto 2.5mg PO daily currently on hold d/t suspected GIB  -s/p R foot partial 1st and 5th ray amputation     3. DVT PPx  - Xarelto Resumed.    4. Hypertension  - BP consistently elevated  - Consider increasing Metoprolol to 50mg PO BID    5. Suspected GI Bleed  - +FOBT  - As per GI, no need for endoscopic eval at this time  - Monitor CBC closely. Xarelto resumed.        Elsa Rose, FÉLIX-NP   Zuhair Martinez DO [facility]  Cardiovascular Medicine  [address], [address]  Available through call or text on [employer]  Office: [phone]   DATE OF SERVICE: 11-09-22 @ 09:10    Patient is a 72y old  Male who presents with a chief complaint of PAD (02 Nov 2022 17:24)      INTERVAL HISTORY: In no acute distress.     REVIEW OF SYSTEMS:  CONSTITUTIONAL: No weakness  EYES/ENT: No visual changes;  No throat pain   NECK: No pain or stiffness  RESPIRATORY: No cough, wheezing; No shortness of breath  CARDIOVASCULAR: No chest pain or palpitations  GASTROINTESTINAL: No abdominal  pain. No nausea, vomiting, or hematemesis  GENITOURINARY: No dysuria, frequency or hematuria  NEUROLOGICAL: No stroke like symptoms  SKIN: No rashes    	  MEDICATIONS:  metoprolol succinate ER 50 milliGRAM(s) Oral daily        PHYSICAL EXAM:  T(C): 36.6 (11-09-22 @ 05:21), Max: 36.9 (11-08-22 @ 18:06)  HR: 66 (11-09-22 @ 05:21) (60 - 77)  BP: 178/74 (11-09-22 @ 05:21) (143/62 - 178/74)  RR: 18 (11-09-22 @ 05:21) (18 - 18)  SpO2: 95% (11-09-22 @ 05:21) (93% - 97%)  Wt(kg): --  I&O's Summary    08 Nov 2022 07:01  -  09 Nov 2022 07:00  --------------------------------------------------------  IN: 2125 mL / OUT: 2750 mL / NET: -625 mL          Appearance: In no distress	  HEENT:    PERRL, EOMI	  Cardiovascular:  S1 S2, No JVD  Respiratory: Lungs clear to auscultation	  Gastrointestinal:  Soft, Non-tender, + BS	  Vascularature:  No edema of LE  Psychiatric: Appropriate affect   Neuro: no acute focal deficits                               10.1   8.49  )-----------( 219      ( 09 Nov 2022 07:57 )             31.5     11-09    140  |  106  |  22  ----------------------------<  123<H>  4.2   |  25  |  1.11    Ca    9.1      09 Nov 2022 07:57  Phos  2.9     11-09  Mg     2.0     11-09          Labs personally reviewed      ASSESSMENT/PLAN: 	    72 year old male presents for right leg femoral-popliteal bypass. Pt. with a history of PAD; s/p right endarterectomy of the right common femoral artery 10/21. Currently has necrotic lesion on his right great toe which is progressively worsening over the past few months. Pt. with Alzheimer's dementia, minimally verbal, requires full assistance for ADLs.    1. Cardiac Risk Stratification  - Inpt and outpt charts reviewed  - Pt was seen by his OP cardio Dr Medina 9/30/22 for preop evaluation for open RLE bypass and was deemed optimized to proceed  - No ACS, euvolemic, no tachy/deric arrythmia, no murmur to suggest severe AS/MS - TTE unremarkable   - Mod risk for mod risk pod surgery, no contraindication to proceed    - Tolerated surgery well.  - s/p transfusion with appropriate response in CBC. FOBT +    2. PAD  - s/p right CFA stent w/ eventual need for endarterectomy & left CFA endarterectomy now presenting s/p right femoral-proximal AT bypass w/ PTFE for a non-healing right foot wound  - Xarelto 2.5mg PO daily currently on hold d/t suspected GIB  -s/p R foot partial 1st and 5th ray amputation     3. DVT PPx  - Xarelto Resumed.    4. Hypertension  - BP consistently elevated  - Consider increasing Metoprolol to 50mg PO BID    5. Suspected GI Bleed  - +FOBT  - As per GI, no need for endoscopic eval at this time  - Monitor CBC closely. Xarelto resumed.        Elsa Rose, FÉLIX-NP   Zuhair Martinez DO [facility]  Cardiovascular Medicine  [address], [address]  Available through call or text on [employer] TE[employer]  Office: [phone]

## 2022-11-09 NOTE — PROGRESS NOTE ADULT - NS ATTEND AMEND GEN_ALL_CORE FT
Patient care and plan discussed and reviewed with Advanced Care Provider. Plan as outlined above edited by me to reflect our discussion.

## 2022-11-09 NOTE — PROGRESS NOTE ADULT - SUBJECTIVE AND OBJECTIVE BOX
Chief Complaint:  Patient is a 72y old  Male who presents with a chief complaint of PAD (02 Nov 2022 17:24)      Interval Events:   - no events overnight; vitals signs stable  - this AM, pt denies any symptoms; discussed with primary RN, 3 brown BMs yesterday    Allergies:  Ceclor (Rash; Urticaria)    Hospital Medications:  acetaminophen     Tablet .. 975 milliGRAM(s) Oral every 6 hours  ampicillin/sulbactam  IVPB      ampicillin/sulbactam  IVPB 1.5 Gram(s) IV Intermittent every 6 hours  ascorbic acid 500 milliGRAM(s) Oral daily  aspirin enteric coated 81 milliGRAM(s) Oral daily  atorvastatin 80 milliGRAM(s) Oral at bedtime  dextrose 5%. 1000 milliLiter(s) IV Continuous <Continuous>  dextrose 5%. 1000 milliLiter(s) IV Continuous <Continuous>  dextrose 50% Injectable 25 Gram(s) IV Push once  dextrose 50% Injectable 12.5 Gram(s) IV Push once  dextrose 50% Injectable 25 Gram(s) IV Push once  dextrose 50% Injectable 25 Gram(s) IV Push once  dextrose 50% Injectable 12.5 Gram(s) IV Push once  dextrose 50% Injectable 25 Gram(s) IV Push once  dextrose Oral Gel 15 Gram(s) Oral once PRN  dextrose Oral Gel 15 Gram(s) Oral once PRN  DULoxetine 60 milliGRAM(s) Oral daily  glucagon  Injectable 1 milliGRAM(s) IntraMuscular once  HYDROmorphone  Injectable 0.2 milliGRAM(s) IV Push every 10 minutes PRN  influenza  Vaccine (HIGH DOSE) 0.7 milliLiter(s) IntraMuscular once  insulin lispro (ADMELOG) corrective regimen sliding scale   SubCutaneous three times a day before meals  insulin lispro (ADMELOG) corrective regimen sliding scale   SubCutaneous at bedtime  insulin lispro Injectable (ADMELOG). 2 Unit(s) SubCutaneous once  lidocaine 1% Injectable 0.2 milliLiter(s) Local Injection once  memantine 10 milliGRAM(s) Oral two times a day  metoprolol succinate ER 50 milliGRAM(s) Oral daily  morphine  - Injectable 2 milliGRAM(s) IV Push every 4 hours PRN  ondansetron Injectable 4 milliGRAM(s) IV Push once PRN  ondansetron Injectable 4 milliGRAM(s) IV Push once PRN  oxyCODONE    IR 5 milliGRAM(s) Oral every 6 hours PRN  pantoprazole    Tablet 40 milliGRAM(s) Oral before breakfast  polyethylene glycol 3350 17 Gram(s) Oral daily  rivaroxaban 2.5 milliGRAM(s) Oral two times a day  senna 2 Tablet(s) Oral at bedtime  sodium chloride 0.9%. 1000 milliLiter(s) IV Continuous <Continuous>      PMHX/PSHX:  HTN (Hypertension)    Diabetes    Hyperlipidemia    Rotator Cuff Disorder    Anxiety    Cancer of Bladder    PAD (peripheral artery disease)    Peripheral neuropathy    DOLORES on CPAP    Malignant neoplasm of urinary bladder, unspecified site    Dementia    Atherosclerosis of native artery of extremity    Type 2 diabetes mellitus    Alzheimer disease     activity    CAD (coronary artery disease)    ALEXA (acute kidney injury)    H/O orthostatic hypotension    History of Coronary Angioplasty    arthroscopy    Knee Surgery    bladder surgery    H/O cardiac catheterization    S/P cataract surgery    H/O peripheral artery bypass    H/O endarterectomy        Family history:  No pertinent family history in first degree relatives    FH: senile dementia (Father, Mother)    FH: diabetes mellitus (Father)    FH: breast cancer (Mother)    PHYSICAL EXAM:   Vital Signs:  Vital Signs Last 24 Hrs  T(C): 36.6 (09 Nov 2022 05:21), Max: 36.9 (08 Nov 2022 18:06)  T(F): 97.8 (09 Nov 2022 05:21), Max: 98.4 (08 Nov 2022 18:06)  HR: 66 (09 Nov 2022 05:21) (60 - 77)  BP: 178/74 (09 Nov 2022 05:21) (143/62 - 178/74)  BP(mean): --  RR: 18 (09 Nov 2022 05:21) (18 - 18)  SpO2: 95% (09 Nov 2022 05:21) (93% - 97%)    Parameters below as of 09 Nov 2022 05:21  Patient On (Oxygen Delivery Method): room air    Daily    GENERAL:  No acute distress; resting comfortably  HEENT:  no scleral icterus  CHEST:  Clear to auscultation bilaterally, no wheezes/rales/ronchi, no accessory muscle use  HEART:  Regular rate and rhythm, no murmurs/rubs/gallops  ABDOMEN:  Soft, non-tender, non-distended, normoactive bowel sounds,  no masses  RECTAL (performed 11/8 with kriss Lepe RN): no external hemorrhoids; internal hemorrhoids palpated; brown stool  EXTREMITIES: RLE and right foot dressing in place; RLE nonpitting edema; LLE normal  SKIN:  ecchymoses to RLE and right flank  NEURO:  Awake, alert, oriented x1 to person    LABS:                        10.1   8.49  )-----------( 219      ( 09 Nov 2022 07:57 )             31.5     Mean Cell Volume: 94.0 fl (11-09-22 @ 07:57)    11-09    140  |  106  |  22  ----------------------------<  123<H>  4.2   |  25  |  1.11    Ca    9.1      09 Nov 2022 07:57  Phos  2.9     11-09  Mg     2.0     11-09                                    10.1   8.49  )-----------( 219      ( 09 Nov 2022 07:57 )             31.5                         9.3    8.41  )-----------( 180      ( 08 Nov 2022 07:54 )             28.5                         7.8    9.45  )-----------( 164      ( 07 Nov 2022 08:00 )             23.6                         7.5    9.60  )-----------( 170      ( 06 Nov 2022 22:45 )             23.0

## 2022-11-09 NOTE — PROGRESS NOTE ADULT - ASSESSMENT
73 y/o M w/ s/p 11/4 right foot partial 1st and 5th ray amputation  - pt seen and evaluated  - Afebrile, no leukocytosis   - s/p 11/4 right foot partial 1st and 5th ray amputation: flaps warm and viable, mild w periwound redness to partial 5th ray site, no drainage, no malodor, no hematoma.  L foot 2nd and 3rd toe distal abrasions, granular base, no clinical signs of infection  - No wound culture obtained as it will likely yield skin contaminant  - R foot xray results show: Focally atrophied/shrunken right hallux soft tissues compatible with gangrene. No tracking gas collections or gross agraphic evidence for osteomyelitis  - s/p RLE fem-AT bypass with vasc, appreciated  - per intraop findings low concern for residual infection and viability   - OR clean bone culture no growth prelim   - Continue IV Unasyn   - Patient is stable for discharge from podiatry standpoint with 7 days PO Augmentin 875mg   - F/u information and instructions can be found int he f/u section of d/c provider note.   - Discussed with attending

## 2022-11-09 NOTE — PROGRESS NOTE ADULT - ASSESSMENT
72M with hx of CAD s/p stent 2018 on ASA/Plavix, right CFA stent and subsequent endarterectomy, and left CFA endarterectomy, DOLORES on CPAP, T2DM, HTN, HLD, bladder cancer s/p laser ablation, and Alzheimer dementia (baseline oriented x1) admitted for right fem-pop byspass for chronic non-healing foot wound c/b hemorrhagic shock s/p IV pressors and SICU stay now s/p right 1st/5th ray amputation and noted to have positive FOBT for which GI is consulted    Impression  #acute blood loss anemia without hemodynamic instability; likely 2/2 recent 2 surgical interventions; unlikely to be GI bleed  DDx include post-op hemorrhage vs. less likely GIB but if GIB present ddx include ischemic colitis given hypotension and underlying vascular disease vs. PUD given ICU admission vs. diverticular bleed given previous hx of diverticulosis vs. less likely AVM vs. erosions vs. unlikely Dieulafoy lesion  - FOBT (+) but nondiagnostic in the inpatient setting for determining significant GIB requiring endoscopic evaluation  - rectal exam (11/8) without melena or hematochezia; no previous reports of melena or hematochezia either  - exam with significant RLE and right flank ecchymoses  - s/p 4U pRBC during stay, each unit corresponding to post-op course; 2U needed after right fem-pop bypass and 2U needed after right 1st/5th ray amputation  - most recent pRBC 11/7 for Hgb 7.8 with overresponse to 9.3; Hgb today 10.3  - no previous EGD records available; previous colonoscopy 2017 with internal hemorrhoids and diverticulosis  #CAD s/p stent 2018 on ASA; plavix currently on hold  #PAD s/p intervention as above  - currently on ASA; Plavix on hold  - restarted on Xarelto 2.5 BID 11/9    Recommendations  - no endoscopic evaluation indicated at this time; if overt signs of GIB, will reconsider endoscopic evaluation  - if continued Hgb downtrending in absence of overt GIB signs, would consider alternative etiologies including hematoma involving RLE given recent surgeries, ecchymoses, and edema as well as RP bleed given right flank ecchymoses  - continue protonix 40mg QD given age and ASA/AC therapy  - trend Hgb and monitor for signs of GIB    Tessy Grissom  IM Resident  Available on Teams    AT NIGHT AND ON WEEKENDS:  Contact on-call GI fellow via answering service ([phone]) from 5pm-8am and on weekends/holidays  GI Phone# [phone] ([facility])

## 2022-11-09 NOTE — DISCHARGE NOTE NURSING/CASE MANAGEMENT/SOCIAL WORK - NSDCPEFALRISK_GEN_ALL_CORE
For information on Fall & Injury Prevention, visit: [email or web address] OR  [email or web address] OR  https://www.cdc.gov/steadi/patient.html

## 2022-11-09 NOTE — PROGRESS NOTE ADULT - ASSESSMENT
71 y/o male w/ a PMHx of CAD s/p stent, HTN, HLD, T2DM c/b peripheral neuropathy, DOLORES but non-compliant w/ CPAP, bladder CA s/p laser ablation, Alzheimer's dementia (minimally verbal & requires full assistance w/ ADLs), anxiety, and PAD s/p right CFA stent w/ eventual need for endarterectomy & left CFA endarterectomy now presenting s/p right femoral-proximal AT bypass w/ PTFE for a non-healing right foot wound w/ a post-op course c/b hemorrhagic shock, s/p 11/4 right foot partial 1st and 5th ray amputation.     - Appreciate GI recs, protonix added today  - Multimodal pain control   - Encourage OOB to chair, IS  - Diet: Regular diet, bowel regimen with senna and miralax  - Appreciate podiatry recs for right foot wound; Will be discharged on po augmentin x7 days   - Xarelto + ASA resumed  - Tachycardia: Metoprolol started today   - Pt eval: Home with assist     [phone]  Vascular Surgery

## 2022-11-09 NOTE — DISCHARGE NOTE NURSING/CASE MANAGEMENT/SOCIAL WORK - NSDCFUADDAPPT_GEN_ALL_CORE_FT
Podiatry Discharge Instructions:  Follow up: Please follow up with Dr. Marcos Lopez within 1 week of discharge from the hospital, please call [phone] for appointment and discuss that you recently were seen in the hospital.  Wound Care: Please leave your dressing clean dry intact until your follow up appointment  Weight bearing: Please partial weight bear in surgical shoe to the right heel as tolerated.   Antibiotics: Please continue as instructed.

## 2022-11-09 NOTE — PROGRESS NOTE ADULT - SUBJECTIVE AND OBJECTIVE BOX
Patient is a 72y old  Male who presents with a chief complaint of PAD (02 Nov 2022 17:24)       INTERVAL HPI/OVERNIGHT EVENTS:  Patient seen and evaluated at bedside.  Pt is resting comfortable in NAD. Denies N/V/F/C.  Pain rated at X/10    Allergies    Ceclor (Rash; Urticaria)    Intolerances        Vital Signs Last 24 Hrs  T(C): 36.6 (09 Nov 2022 05:21), Max: 36.9 (08 Nov 2022 18:06)  T(F): 97.8 (09 Nov 2022 05:21), Max: 98.4 (08 Nov 2022 18:06)  HR: 66 (09 Nov 2022 05:21) (60 - 77)  BP: 178/74 (09 Nov 2022 05:21) (143/62 - 178/74)  BP(mean): --  RR: 18 (09 Nov 2022 05:21) (18 - 18)  SpO2: 95% (09 Nov 2022 05:21) (93% - 97%)    Parameters below as of 09 Nov 2022 05:21  Patient On (Oxygen Delivery Method): room air        LABS:                        10.1   8.49  )-----------( 219      ( 09 Nov 2022 07:57 )             31.5     11-09    140  |  106  |  22  ----------------------------<  123<H>  4.2   |  25  |  1.11    Ca    9.1      09 Nov 2022 07:57  Phos  2.9     11-09  Mg     2.0     11-09          CAPILLARY BLOOD GLUCOSE      POCT Blood Glucose.: 159 mg/dL (08 Nov 2022 21:54)  POCT Blood Glucose.: 188 mg/dL (08 Nov 2022 17:52)  POCT Blood Glucose.: 136 mg/dL (08 Nov 2022 13:14)      Lower Extremity Physical Exam:  Vasular: DP/PT 0/4, B/L, CFT < 3 seconds all pedal digits besides R foot 1 and 5 which is absent, Temperature gradient warm to warm R, warm to cool left  Neuro: Epicritic sensation intact to the level of the digits, B/L.  Musculoskeletal/Ortho: non-ambi  Skin: s/p 11/4 right foot partial 1st and 5th ray amputation: flaps warm and viable, maceration to the partial 5th ray resolved, mild w periwound erythema, no drainage, no malodor, no hematoma.  L foot 2nd and 3rd toe distal abrasions, granular base, no clinical signs of infection    RADIOLOGY & ADDITIONAL TESTS:

## 2022-11-09 NOTE — DISCHARGE NOTE NURSING/CASE MANAGEMENT/SOCIAL WORK - PATIENT PORTAL LINK FT
You can access the FollowMyHealth Patient Portal offered by [facility] by registering at the following website: [email or web address]. By joining [facility]’s FollowMyHealth portal, you will also be able to view your health information using other applications (apps) compatible with our system.

## 2022-11-09 NOTE — DISCHARGE NOTE NURSING/CASE MANAGEMENT/SOCIAL WORK - NSSCTYPOFSERV_GEN_ALL_CORE
Home RN and physical therapy services.  Rn will contact you to schedule visit time. Start of care 11/10.

## 2022-11-09 NOTE — PROGRESS NOTE ADULT - SUBJECTIVE AND OBJECTIVE BOX
VASCULAR SURGERY DAILY PROGRESS NOTE:     24h events; Gi consulted for FOBT+  SUBJECTIVE/ROS: Patient seen and examined this AM. Noted to be resting comfortably. No bloody bowel movements noted.      MEDICATIONS  (STANDING):  acetaminophen     Tablet .. 975 milliGRAM(s) Oral every 6 hours  ampicillin/sulbactam  IVPB      ampicillin/sulbactam  IVPB 1.5 Gram(s) IV Intermittent every 6 hours  ascorbic acid 500 milliGRAM(s) Oral daily  aspirin enteric coated 81 milliGRAM(s) Oral daily  atorvastatin 80 milliGRAM(s) Oral at bedtime  dextrose 5%. 1000 milliLiter(s) (50 mL/Hr) IV Continuous <Continuous>  dextrose 5%. 1000 milliLiter(s) (100 mL/Hr) IV Continuous <Continuous>  dextrose 50% Injectable 25 Gram(s) IV Push once  dextrose 50% Injectable 12.5 Gram(s) IV Push once  dextrose 50% Injectable 25 Gram(s) IV Push once  dextrose 50% Injectable 25 Gram(s) IV Push once  dextrose 50% Injectable 12.5 Gram(s) IV Push once  dextrose 50% Injectable 25 Gram(s) IV Push once  DULoxetine 60 milliGRAM(s) Oral daily  glucagon  Injectable 1 milliGRAM(s) IntraMuscular once  influenza  Vaccine (HIGH DOSE) 0.7 milliLiter(s) IntraMuscular once  insulin lispro (ADMELOG) corrective regimen sliding scale   SubCutaneous three times a day before meals  insulin lispro (ADMELOG) corrective regimen sliding scale   SubCutaneous at bedtime  insulin lispro Injectable (ADMELOG). 2 Unit(s) SubCutaneous once  lidocaine 1% Injectable 0.2 milliLiter(s) Local Injection once  memantine 10 milliGRAM(s) Oral two times a day  metoprolol succinate ER 50 milliGRAM(s) Oral daily  polyethylene glycol 3350 17 Gram(s) Oral daily  rivaroxaban 2.5 milliGRAM(s) Oral two times a day  senna 2 Tablet(s) Oral at bedtime  sodium chloride 0.9%. 1000 milliLiter(s) (75 mL/Hr) IV Continuous <Continuous>    MEDICATIONS  (PRN):  dextrose Oral Gel 15 Gram(s) Oral once PRN Blood Glucose LESS THAN 70 milliGRAM(s)/deciliter  dextrose Oral Gel 15 Gram(s) Oral once PRN Blood Glucose LESS THAN 70 milliGRAM(s)/deciliter  HYDROmorphone  Injectable 0.2 milliGRAM(s) IV Push every 10 minutes PRN Moderate Pain (4 - 6)  morphine  - Injectable 2 milliGRAM(s) IV Push every 4 hours PRN Severe Pain (7 - 10)  ondansetron Injectable 4 milliGRAM(s) IV Push once PRN Nausea and/or Vomiting  ondansetron Injectable 4 milliGRAM(s) IV Push once PRN Nausea and/or Vomiting  oxyCODONE    IR 5 milliGRAM(s) Oral every 6 hours PRN Moderate Pain (4 - 6)      OBJECTIVE:  Vital Signs Last 24 Hrs  T(C): 36.6 (09 Nov 2022 05:21), Max: 36.9 (08 Nov 2022 18:06)  T(F): 97.8 (09 Nov 2022 05:21), Max: 98.4 (08 Nov 2022 18:06)  HR: 66 (09 Nov 2022 05:21) (60 - 77)  BP: 178/74 (09 Nov 2022 05:21) (143/62 - 178/74)  BP(mean): --  RR: 18 (09 Nov 2022 05:21) (18 - 18)  SpO2: 95% (09 Nov 2022 05:21) (93% - 97%)    Parameters below as of 09 Nov 2022 05:21  Patient On (Oxygen Delivery Method): room air      I&O's Detail  08 Nov 2022 07:01  -  09 Nov 2022 07:00  --------------------------------------------------------  IN:    IV PiggyBack: 100 mL    Oral Fluid: 360 mL    sodium chloride 0.9%: 1425 mL    sodium chloride 0.9%: 240 mL  Total IN: 2125 mL    OUT:    Incontinent per Condom Catheter (mL): 2750 mL  Total OUT: 2750 mL  Total NET: -625 mL    LABS:                        10.1   8.49  )-----------( 219      ( 09 Nov 2022 07:57 )             31.5     11-09    140  |  106  |  22  ----------------------------<  123<H>  4.2   |  25  |  1.11    Ca    9.1      09 Nov 2022 07:57  Phos  2.9     11-09  Mg     2.0     11-09    Physical Exam:   General: well developed, well nourished, NAD  HEENT: NCAT, trachea midline  Respiratory: respirations non labored  Gastrointestinal: soft, nontender, nondistended  Extremities: FROM, warm. R AT signal present. Pt with right foot in ACE/kirlex s/p 1&5ray resection with podiatry  Neurological: non-focal

## 2022-11-09 NOTE — PROGRESS NOTE ADULT - PROVIDER SPECIALTY LIST ADULT
Cardiology
Hospitalist
Internal Medicine
Podiatry
Vascular Surgery
Vascular Surgery
Cardiology
Cardiology
Gastroenterology
Hospitalist
Hospitalist
Vascular Surgery
Podiatry
Vascular Surgery
Vascular Surgery

## 2022-11-11 ENCOUNTER — NON-APPOINTMENT (OUTPATIENT)
Age: 72
End: 2022-11-11

## 2022-11-16 ENCOUNTER — INPATIENT (INPATIENT)
Facility: HOSPITAL | Age: 72
LOS: 4 days | Discharge: HOME HEALTH | DRG: 814 | End: 2022-11-21
Attending: SURGERY | Admitting: STUDENT IN AN ORGANIZED HEALTH CARE EDUCATION/TRAINING PROGRAM
Payer: MEDICARE

## 2022-11-16 VITALS
RESPIRATION RATE: 16 BRPM | WEIGHT: 199.96 LBS | HEART RATE: 51 BPM | TEMPERATURE: 98 F | SYSTOLIC BLOOD PRESSURE: 120 MMHG | DIASTOLIC BLOOD PRESSURE: 64 MMHG | OXYGEN SATURATION: 92 % | HEIGHT: 71 IN

## 2022-11-16 DIAGNOSIS — Z98.49 CATARACT EXTRACTION STATUS, UNSPECIFIED EYE: Chronic | ICD-10-CM

## 2022-11-16 DIAGNOSIS — Z98.890 OTHER SPECIFIED POSTPROCEDURAL STATES: Chronic | ICD-10-CM

## 2022-11-16 PROBLEM — Z86.79 PERSONAL HISTORY OF OTHER DISEASES OF THE CIRCULATORY SYSTEM: Chronic | Status: ACTIVE | Noted: 2022-10-10

## 2022-11-16 PROBLEM — I25.10 ATHEROSCLEROTIC HEART DISEASE OF NATIVE CORONARY ARTERY WITHOUT ANGINA PECTORIS: Chronic | Status: ACTIVE | Noted: 2022-10-10

## 2022-11-16 PROBLEM — N17.9 ACUTE KIDNEY FAILURE, UNSPECIFIED: Chronic | Status: ACTIVE | Noted: 2022-10-10

## 2022-11-16 PROBLEM — G47.33 OBSTRUCTIVE SLEEP APNEA (ADULT) (PEDIATRIC): Chronic | Status: ACTIVE | Noted: 2018-10-12

## 2022-11-16 LAB
ALBUMIN SERPL ELPH-MCNC: 3.3 G/DL — SIGNIFICANT CHANGE UP (ref 3.3–5)
ALP SERPL-CCNC: 150 U/L — HIGH (ref 40–120)
ALT FLD-CCNC: 31 U/L — SIGNIFICANT CHANGE UP (ref 10–45)
ANION GAP SERPL CALC-SCNC: 8 MMOL/L — SIGNIFICANT CHANGE UP (ref 5–17)
APPEARANCE UR: CLEAR — SIGNIFICANT CHANGE UP
APTT BLD: 28.7 SEC — SIGNIFICANT CHANGE UP (ref 27.5–35.5)
AST SERPL-CCNC: 16 U/L — SIGNIFICANT CHANGE UP (ref 10–40)
BACTERIA # UR AUTO: NEGATIVE — SIGNIFICANT CHANGE UP
BASOPHILS # BLD AUTO: 0.04 K/UL — SIGNIFICANT CHANGE UP (ref 0–0.2)
BASOPHILS NFR BLD AUTO: 0.4 % — SIGNIFICANT CHANGE UP (ref 0–2)
BILIRUB SERPL-MCNC: 0.4 MG/DL — SIGNIFICANT CHANGE UP (ref 0.2–1.2)
BILIRUB UR-MCNC: NEGATIVE — SIGNIFICANT CHANGE UP
BLD GP AB SCN SERPL QL: NEGATIVE — SIGNIFICANT CHANGE UP
BUN SERPL-MCNC: 25 MG/DL — HIGH (ref 7–23)
CALCIUM SERPL-MCNC: 9.8 MG/DL — SIGNIFICANT CHANGE UP (ref 8.4–10.5)
CHLORIDE SERPL-SCNC: 103 MMOL/L — SIGNIFICANT CHANGE UP (ref 96–108)
CO2 SERPL-SCNC: 28 MMOL/L — SIGNIFICANT CHANGE UP (ref 22–31)
COLOR SPEC: SIGNIFICANT CHANGE UP
CREAT SERPL-MCNC: 1.42 MG/DL — HIGH (ref 0.5–1.3)
DIFF PNL FLD: NEGATIVE — SIGNIFICANT CHANGE UP
EGFR: 52 ML/MIN/1.73M2 — LOW
EOSINOPHIL # BLD AUTO: 0.12 K/UL — SIGNIFICANT CHANGE UP (ref 0–0.5)
EOSINOPHIL NFR BLD AUTO: 1.1 % — SIGNIFICANT CHANGE UP (ref 0–6)
EPI CELLS # UR: 0 /HPF — SIGNIFICANT CHANGE UP
GLUCOSE SERPL-MCNC: 191 MG/DL — HIGH (ref 70–99)
GLUCOSE UR QL: NEGATIVE — SIGNIFICANT CHANGE UP
HCT VFR BLD CALC: 33.9 % — LOW (ref 39–50)
HGB BLD-MCNC: 10.7 G/DL — LOW (ref 13–17)
HYALINE CASTS # UR AUTO: 2 /LPF — SIGNIFICANT CHANGE UP (ref 0–2)
IMM GRANULOCYTES NFR BLD AUTO: 3.2 % — HIGH (ref 0–0.9)
INR BLD: 1.14 RATIO — SIGNIFICANT CHANGE UP (ref 0.88–1.16)
KETONES UR-MCNC: NEGATIVE — SIGNIFICANT CHANGE UP
LEUKOCYTE ESTERASE UR-ACNC: NEGATIVE — SIGNIFICANT CHANGE UP
LYMPHOCYTES # BLD AUTO: 0.68 K/UL — LOW (ref 1–3.3)
LYMPHOCYTES # BLD AUTO: 6.1 % — LOW (ref 13–44)
MAGNESIUM SERPL-MCNC: 2 MG/DL — SIGNIFICANT CHANGE UP (ref 1.6–2.6)
MCHC RBC-ENTMCNC: 30.4 PG — SIGNIFICANT CHANGE UP (ref 27–34)
MCHC RBC-ENTMCNC: 31.6 GM/DL — LOW (ref 32–36)
MCV RBC AUTO: 96.3 FL — SIGNIFICANT CHANGE UP (ref 80–100)
MONOCYTES # BLD AUTO: 0.52 K/UL — SIGNIFICANT CHANGE UP (ref 0–0.9)
MONOCYTES NFR BLD AUTO: 4.7 % — SIGNIFICANT CHANGE UP (ref 2–14)
NEUTROPHILS # BLD AUTO: 9.36 K/UL — HIGH (ref 1.8–7.4)
NEUTROPHILS NFR BLD AUTO: 84.5 % — HIGH (ref 43–77)
NITRITE UR-MCNC: NEGATIVE — SIGNIFICANT CHANGE UP
NRBC # BLD: 0 /100 WBCS — SIGNIFICANT CHANGE UP (ref 0–0)
PH UR: 7.5 — SIGNIFICANT CHANGE UP (ref 5–8)
PHOSPHATE SERPL-MCNC: 2.8 MG/DL — SIGNIFICANT CHANGE UP (ref 2.5–4.5)
PLATELET # BLD AUTO: 321 K/UL — SIGNIFICANT CHANGE UP (ref 150–400)
POTASSIUM SERPL-MCNC: 5 MMOL/L — SIGNIFICANT CHANGE UP (ref 3.5–5.3)
POTASSIUM SERPL-SCNC: 5 MMOL/L — SIGNIFICANT CHANGE UP (ref 3.5–5.3)
PROT SERPL-MCNC: 6.4 G/DL — SIGNIFICANT CHANGE UP (ref 6–8.3)
PROT UR-MCNC: NEGATIVE — SIGNIFICANT CHANGE UP
PROTHROM AB SERPL-ACNC: 13.1 SEC — SIGNIFICANT CHANGE UP (ref 10.5–13.4)
RAPID RVP RESULT: SIGNIFICANT CHANGE UP
RBC # BLD: 3.52 M/UL — LOW (ref 4.2–5.8)
RBC # FLD: 16.3 % — HIGH (ref 10.3–14.5)
RBC CASTS # UR COMP ASSIST: 2 /HPF — SIGNIFICANT CHANGE UP (ref 0–4)
RH IG SCN BLD-IMP: POSITIVE — SIGNIFICANT CHANGE UP
SARS-COV-2 RNA SPEC QL NAA+PROBE: SIGNIFICANT CHANGE UP
SODIUM SERPL-SCNC: 139 MMOL/L — SIGNIFICANT CHANGE UP (ref 135–145)
SP GR SPEC: 1.01 — SIGNIFICANT CHANGE UP (ref 1.01–1.02)
TSH SERPL-MCNC: 2.78 UIU/ML — SIGNIFICANT CHANGE UP (ref 0.27–4.2)
UROBILINOGEN FLD QL: NEGATIVE — SIGNIFICANT CHANGE UP
WBC # BLD: 11.07 K/UL — HIGH (ref 3.8–10.5)
WBC # FLD AUTO: 11.07 K/UL — HIGH (ref 3.8–10.5)
WBC UR QL: 0 /HPF — SIGNIFICANT CHANGE UP (ref 0–5)

## 2022-11-16 PROCEDURE — 74177 CT ABD & PELVIS W/CONTRAST: CPT | Mod: 26,MA

## 2022-11-16 PROCEDURE — 93010 ELECTROCARDIOGRAM REPORT: CPT

## 2022-11-16 PROCEDURE — 73702 CT LWR EXTREMITY W/O&W/DYE: CPT | Mod: 26,RT,MA

## 2022-11-16 PROCEDURE — 71045 X-RAY EXAM CHEST 1 VIEW: CPT | Mod: 26

## 2022-11-16 PROCEDURE — 99285 EMERGENCY DEPT VISIT HI MDM: CPT | Mod: GC

## 2022-11-16 PROCEDURE — 70450 CT HEAD/BRAIN W/O DYE: CPT | Mod: 26,MA

## 2022-11-16 PROCEDURE — 71275 CT ANGIOGRAPHY CHEST: CPT | Mod: 26,MA

## 2022-11-16 RX ORDER — METOPROLOL TARTRATE 50 MG
5 TABLET ORAL ONCE
Refills: 0 | Status: COMPLETED | OUTPATIENT
Start: 2022-11-16 | End: 2022-11-16

## 2022-11-16 RX ORDER — SODIUM CHLORIDE 9 MG/ML
1000 INJECTION INTRAMUSCULAR; INTRAVENOUS; SUBCUTANEOUS ONCE
Refills: 0 | Status: COMPLETED | OUTPATIENT
Start: 2022-11-16 | End: 2022-11-16

## 2022-11-16 RX ADMIN — SODIUM CHLORIDE 1000 MILLILITER(S): 9 INJECTION INTRAMUSCULAR; INTRAVENOUS; SUBCUTANEOUS at 19:00

## 2022-11-16 RX ADMIN — Medication 5 MILLIGRAM(S): at 21:53

## 2022-11-16 NOTE — ED ADULT NURSE NOTE - OBJECTIVE STATEMENT
71 yo presents to the ED from home. A&Ox1, baseline mental status, by EMS c/o lethargy x 4 days. discharged from [facility] 1 week ago following femoral bypass and 2 toe amputations. pt denies any pain. family reports that pt has not been acting himself. denies fever, chills. had follow up appointment earlier this week with PCP. as per EMS, low BP upon their arrival. 20G obtained R forearm 200ml of NS administered, normotensive upon arrival to ED. EKG done at bedside. pt placed on CM. Patient undressed and placed into gown, call bell in hand and side rails up for safety. warm blanket provided, vital signs stable, pt in no acute distress.

## 2022-11-16 NOTE — ED PROVIDER NOTE - PHYSICAL EXAMINATION
GEN: Patient eyes closed, opens to pain, not in distress  HEENT: normocephalic, atraumatic, EOMI, no scleral icterus, moist MM  CARDIAC: RRR, S1, S2, no murmur.   PULM: CTA B/L no wheeze, rhonchi, rales.   ABD: + mild diffuse tenderness.   MSK: no deformaties + mild pitting edema to thigh. + right toe 1 and 2 amputation, clean and dry. healing.   NEURO: AOx3, no focal neurological deficits  SKIN: + ecchymosis in right lateral/posterior thigh

## 2022-11-16 NOTE — ED ADULT NURSE NOTE - NSIMPLEMENTINTERV_GEN_ALL_ED
Implemented All Fall Risk Interventions:  [place] to call system. Call bell, personal items and telephone within reach. Instruct patient to call for assistance. Room bathroom lighting operational. Non-slip footwear when patient is off stretcher. Physically safe environment: no spills, clutter or unnecessary equipment. Stretcher in lowest position, wheels locked, appropriate side rails in place. Provide visual cue, wrist band, yellow gown, etc. Monitor gait and stability. Monitor for mental status changes and reorient to person, place, and time. Review medications for side effects contributing to fall risk. Reinforce activity limits and safety measures with patient and family.

## 2022-11-16 NOTE — ED ADULT NURSE NOTE - CCCP TRG CHIEF CMPLNT
What Type Of Note Output Would You Prefer (Optional)?: Standard Output Is The Patient Presenting As Previously Scheduled?: Yes How Severe Is Your Rash?: moderate Is This A New Presentation, Or A Follow-Up?: Rash lethargy

## 2022-11-16 NOTE — ED PROVIDER NOTE - PROGRESS NOTE DETAILS
Monserrat Cage- pt stable, answering yes/no questions and intermittently following commands. vascular consulted, they will admit pt. family aware of admission

## 2022-11-16 NOTE — ED PROVIDER NOTE - CLINICAL SUMMARY MEDICAL DECISION MAKING FREE TEXT BOX
71 yo M hx of CAD sp stents, HTN, HLD, T2DM, DOLORES, bladder CA, PAD sp right fem proximal AT bypass, right toe 1 and 2 amputation, pw lethargy. + large ecchymosis right lateral thigh, +mild diffuse abd ttp. ddx: anemia, electrolyte abnormality, infection, ICH/mass. Labs, CTs, anticipate admission.

## 2022-11-16 NOTE — ED PROVIDER NOTE - OBJECTIVE STATEMENT
71 yo M hx of CAD sp stents, HTN, HLD, T2DM, DOLORES, bladder CA, PAD sp right fem proximal AT bypass, right toe 1 and 2 amputation, pw lethargy. For the past several days, pt has been more lethargic. Per family, pt Aox1 at baseline, in the ED pt eyes closed, opens to pain.

## 2022-11-16 NOTE — ED ADULT NURSE NOTE - PRIMARY CARE PROVIDER
Patient Instructions by Dreyer, Roman, MD at 05/15/18 10:56 AM     Author:  Dreyer, Roman, MD Service:  (none) Author Type:  Physician     Filed:  05/15/18 10:57 AM Encounter Date:  5/15/2018 Status:  Signed     :  Dreyer, Roman, MD (Physician)            You will get a call or mychart message from our neurology department regarding a consultation    ------------------------------------------    Try excedrin during your symptoms       Revision History        User Key Date/Time User Provider Type Action    > [N/A] 05/15/18 10:57 AM Dreyer, Roman, MD Physician Sign            
unk

## 2022-11-16 NOTE — ED PROVIDER NOTE - ATTENDING CONTRIBUTION TO CARE
I, Dk Espinoza, performed a history and physical exam of the patient and discussed their management with the resident and /or advanced care provider. I reviewed the resident and /or ACP's note and agree with the documented findings and plan of care. I was present and available for all procedures.    71 yo M hx of CAD sp stents, HTN, HLD, T2DM, DOLORES, bladder CA, PAD sp right fem proximal AT bypass, right toe 1 and 2 amputation, pw lethargy. Given recent surgery concerned for infectious process/surgical complication along with deconditioning. Workup remarkable for labs WNL with normal urine but CT imaging concerning for groin lesion at near the bypass site possible with patent Bypass, and CTH, CTPA, CT ab/pelv unremarkable. Vascular surgery consulted and will admit to their service and started on broad spectrum abx.

## 2022-11-17 ENCOUNTER — TRANSCRIPTION ENCOUNTER (OUTPATIENT)
Age: 72
End: 2022-11-17

## 2022-11-17 DIAGNOSIS — R41.82 ALTERED MENTAL STATUS, UNSPECIFIED: ICD-10-CM

## 2022-11-17 LAB
ANION GAP SERPL CALC-SCNC: 10 MMOL/L — SIGNIFICANT CHANGE UP (ref 5–17)
APTT BLD: 26.4 SEC — LOW (ref 27.5–35.5)
BUN SERPL-MCNC: 21 MG/DL — SIGNIFICANT CHANGE UP (ref 7–23)
CALCIUM SERPL-MCNC: 9.3 MG/DL — SIGNIFICANT CHANGE UP (ref 8.4–10.5)
CHLORIDE SERPL-SCNC: 102 MMOL/L — SIGNIFICANT CHANGE UP (ref 96–108)
CO2 SERPL-SCNC: 27 MMOL/L — SIGNIFICANT CHANGE UP (ref 22–31)
CREAT SERPL-MCNC: 1.1 MG/DL — SIGNIFICANT CHANGE UP (ref 0.5–1.3)
EGFR: 71 ML/MIN/1.73M2 — SIGNIFICANT CHANGE UP
GLUCOSE SERPL-MCNC: 174 MG/DL — HIGH (ref 70–99)
HCT VFR BLD CALC: 32 % — LOW (ref 39–50)
HGB BLD-MCNC: 10.1 G/DL — LOW (ref 13–17)
INR BLD: 1.03 RATIO — SIGNIFICANT CHANGE UP (ref 0.88–1.16)
MAGNESIUM SERPL-MCNC: 1.8 MG/DL — SIGNIFICANT CHANGE UP (ref 1.6–2.6)
MCHC RBC-ENTMCNC: 30 PG — SIGNIFICANT CHANGE UP (ref 27–34)
MCHC RBC-ENTMCNC: 31.6 GM/DL — LOW (ref 32–36)
MCV RBC AUTO: 95 FL — SIGNIFICANT CHANGE UP (ref 80–100)
NRBC # BLD: 0 /100 WBCS — SIGNIFICANT CHANGE UP (ref 0–0)
PHOSPHATE SERPL-MCNC: 3.4 MG/DL — SIGNIFICANT CHANGE UP (ref 2.5–4.5)
PLATELET # BLD AUTO: 291 K/UL — SIGNIFICANT CHANGE UP (ref 150–400)
POTASSIUM SERPL-MCNC: 4.5 MMOL/L — SIGNIFICANT CHANGE UP (ref 3.5–5.3)
POTASSIUM SERPL-SCNC: 4.5 MMOL/L — SIGNIFICANT CHANGE UP (ref 3.5–5.3)
PROTHROM AB SERPL-ACNC: 11.8 SEC — SIGNIFICANT CHANGE UP (ref 10.5–13.4)
RBC # BLD: 3.37 M/UL — LOW (ref 4.2–5.8)
RBC # FLD: 16.2 % — HIGH (ref 10.3–14.5)
SODIUM SERPL-SCNC: 139 MMOL/L — SIGNIFICANT CHANGE UP (ref 135–145)
WBC # BLD: 10.35 K/UL — SIGNIFICANT CHANGE UP (ref 3.8–10.5)
WBC # FLD AUTO: 10.35 K/UL — SIGNIFICANT CHANGE UP (ref 3.8–10.5)

## 2022-11-17 PROCEDURE — 73630 X-RAY EXAM OF FOOT: CPT | Mod: 26,RT

## 2022-11-17 RX ORDER — HYDRALAZINE HCL 50 MG
10 TABLET ORAL ONCE
Refills: 0 | Status: COMPLETED | OUTPATIENT
Start: 2022-11-17 | End: 2022-11-17

## 2022-11-17 RX ORDER — PREGABALIN 225 MG/1
1 CAPSULE ORAL
Qty: 0 | Refills: 0 | DISCHARGE

## 2022-11-17 RX ORDER — VANCOMYCIN HCL 1 G
1000 VIAL (EA) INTRAVENOUS ONCE
Refills: 0 | Status: COMPLETED | OUTPATIENT
Start: 2022-11-17 | End: 2022-11-17

## 2022-11-17 RX ORDER — CEFEPIME 1 G/1
2000 INJECTION, POWDER, FOR SOLUTION INTRAMUSCULAR; INTRAVENOUS ONCE
Refills: 0 | Status: COMPLETED | OUTPATIENT
Start: 2022-11-17 | End: 2022-11-17

## 2022-11-17 RX ORDER — ALOGLIPTIN 12.5 MG/1
1 TABLET, FILM COATED ORAL
Qty: 0 | Refills: 0 | DISCHARGE

## 2022-11-17 RX ORDER — PIPERACILLIN AND TAZOBACTAM 4; .5 G/20ML; G/20ML
3.38 INJECTION, POWDER, LYOPHILIZED, FOR SOLUTION INTRAVENOUS EVERY 8 HOURS
Refills: 0 | Status: DISCONTINUED | OUTPATIENT
Start: 2022-11-17 | End: 2022-11-21

## 2022-11-17 RX ORDER — ASPIRIN/CALCIUM CARB/MAGNESIUM 324 MG
81 TABLET ORAL DAILY
Refills: 0 | Status: DISCONTINUED | OUTPATIENT
Start: 2022-11-17 | End: 2022-11-21

## 2022-11-17 RX ORDER — SODIUM CHLORIDE 9 MG/ML
1000 INJECTION, SOLUTION INTRAVENOUS
Refills: 0 | Status: DISCONTINUED | OUTPATIENT
Start: 2022-11-17 | End: 2022-11-18

## 2022-11-17 RX ORDER — QUETIAPINE FUMARATE 200 MG/1
25 TABLET, FILM COATED ORAL DAILY
Refills: 0 | Status: DISCONTINUED | OUTPATIENT
Start: 2022-11-17 | End: 2022-11-21

## 2022-11-17 RX ORDER — INSULIN LISPRO 100/ML
VIAL (ML) SUBCUTANEOUS EVERY 6 HOURS
Refills: 0 | Status: DISCONTINUED | OUTPATIENT
Start: 2022-11-17 | End: 2022-11-18

## 2022-11-17 RX ORDER — MEMANTINE HYDROCHLORIDE 10 MG/1
10 TABLET ORAL
Refills: 0 | Status: DISCONTINUED | OUTPATIENT
Start: 2022-11-17 | End: 2022-11-21

## 2022-11-17 RX ORDER — ATORVASTATIN CALCIUM 80 MG/1
80 TABLET, FILM COATED ORAL AT BEDTIME
Refills: 0 | Status: DISCONTINUED | OUTPATIENT
Start: 2022-11-17 | End: 2022-11-21

## 2022-11-17 RX ORDER — METFORMIN HYDROCHLORIDE 850 MG/1
1 TABLET ORAL
Qty: 0 | Refills: 0 | DISCHARGE

## 2022-11-17 RX ORDER — DULOXETINE HYDROCHLORIDE 30 MG/1
60 CAPSULE, DELAYED RELEASE ORAL DAILY
Refills: 0 | Status: DISCONTINUED | OUTPATIENT
Start: 2022-11-17 | End: 2022-11-21

## 2022-11-17 RX ORDER — DIVALPROEX SODIUM 500 MG/1
250 TABLET, DELAYED RELEASE ORAL DAILY
Refills: 0 | Status: DISCONTINUED | OUTPATIENT
Start: 2022-11-17 | End: 2022-11-18

## 2022-11-17 RX ORDER — ENOXAPARIN SODIUM 100 MG/ML
40 INJECTION SUBCUTANEOUS EVERY 24 HOURS
Refills: 0 | Status: DISCONTINUED | OUTPATIENT
Start: 2022-11-17 | End: 2022-11-21

## 2022-11-17 RX ORDER — ALPRAZOLAM 0.25 MG
0.12 TABLET ORAL
Qty: 0 | Refills: 0 | DISCHARGE

## 2022-11-17 RX ORDER — PANTOPRAZOLE SODIUM 20 MG/1
40 TABLET, DELAYED RELEASE ORAL
Refills: 0 | Status: DISCONTINUED | OUTPATIENT
Start: 2022-11-17 | End: 2022-11-18

## 2022-11-17 RX ORDER — VANCOMYCIN HCL 1 G
1250 VIAL (EA) INTRAVENOUS EVERY 12 HOURS
Refills: 0 | Status: DISCONTINUED | OUTPATIENT
Start: 2022-11-17 | End: 2022-11-18

## 2022-11-17 RX ORDER — METOPROLOL TARTRATE 50 MG
50 TABLET ORAL EVERY 12 HOURS
Refills: 0 | Status: DISCONTINUED | OUTPATIENT
Start: 2022-11-17 | End: 2022-11-18

## 2022-11-17 RX ORDER — PREGABALIN 225 MG/1
1000 CAPSULE ORAL DAILY
Refills: 0 | Status: DISCONTINUED | OUTPATIENT
Start: 2022-11-17 | End: 2022-11-21

## 2022-11-17 RX ORDER — QUETIAPINE FUMARATE 200 MG/1
1 TABLET, FILM COATED ORAL
Qty: 0 | Refills: 0 | DISCHARGE

## 2022-11-17 RX ORDER — SENNA PLUS 8.6 MG/1
2 TABLET ORAL
Qty: 0 | Refills: 0 | DISCHARGE

## 2022-11-17 RX ORDER — POLYETHYLENE GLYCOL 3350 17 G/17G
1 POWDER, FOR SOLUTION ORAL
Qty: 0 | Refills: 0 | DISCHARGE

## 2022-11-17 RX ADMIN — PANTOPRAZOLE SODIUM 40 MILLIGRAM(S): 20 TABLET, DELAYED RELEASE ORAL at 06:53

## 2022-11-17 RX ADMIN — Medication 50 MILLIGRAM(S): at 06:53

## 2022-11-17 RX ADMIN — Medication 50 MILLIGRAM(S): at 17:09

## 2022-11-17 RX ADMIN — Medication 166.67 MILLIGRAM(S): at 18:39

## 2022-11-17 RX ADMIN — Medication 1: at 06:49

## 2022-11-17 RX ADMIN — ATORVASTATIN CALCIUM 80 MILLIGRAM(S): 80 TABLET, FILM COATED ORAL at 22:02

## 2022-11-17 RX ADMIN — SODIUM CHLORIDE 100 MILLILITER(S): 9 INJECTION, SOLUTION INTRAVENOUS at 12:03

## 2022-11-17 RX ADMIN — DULOXETINE HYDROCHLORIDE 60 MILLIGRAM(S): 30 CAPSULE, DELAYED RELEASE ORAL at 12:02

## 2022-11-17 RX ADMIN — PIPERACILLIN AND TAZOBACTAM 25 GRAM(S): 4; .5 INJECTION, POWDER, LYOPHILIZED, FOR SOLUTION INTRAVENOUS at 13:12

## 2022-11-17 RX ADMIN — MEMANTINE HYDROCHLORIDE 10 MILLIGRAM(S): 10 TABLET ORAL at 17:09

## 2022-11-17 RX ADMIN — MEMANTINE HYDROCHLORIDE 10 MILLIGRAM(S): 10 TABLET ORAL at 06:52

## 2022-11-17 RX ADMIN — Medication 1: at 18:16

## 2022-11-17 RX ADMIN — DIVALPROEX SODIUM 250 MILLIGRAM(S): 500 TABLET, DELAYED RELEASE ORAL at 12:03

## 2022-11-17 RX ADMIN — Medication 250 MILLIGRAM(S): at 02:09

## 2022-11-17 RX ADMIN — PIPERACILLIN AND TAZOBACTAM 25 GRAM(S): 4; .5 INJECTION, POWDER, LYOPHILIZED, FOR SOLUTION INTRAVENOUS at 07:30

## 2022-11-17 RX ADMIN — Medication 10 MILLIGRAM(S): at 06:53

## 2022-11-17 RX ADMIN — PREGABALIN 1000 MICROGRAM(S): 225 CAPSULE ORAL at 12:02

## 2022-11-17 RX ADMIN — Medication 81 MILLIGRAM(S): at 12:02

## 2022-11-17 RX ADMIN — ENOXAPARIN SODIUM 40 MILLIGRAM(S): 100 INJECTION SUBCUTANEOUS at 06:52

## 2022-11-17 RX ADMIN — PIPERACILLIN AND TAZOBACTAM 25 GRAM(S): 4; .5 INJECTION, POWDER, LYOPHILIZED, FOR SOLUTION INTRAVENOUS at 22:02

## 2022-11-17 RX ADMIN — SODIUM CHLORIDE 100 MILLILITER(S): 9 INJECTION, SOLUTION INTRAVENOUS at 02:10

## 2022-11-17 RX ADMIN — QUETIAPINE FUMARATE 25 MILLIGRAM(S): 200 TABLET, FILM COATED ORAL at 12:02

## 2022-11-17 RX ADMIN — CEFEPIME 100 MILLIGRAM(S): 1 INJECTION, POWDER, FOR SOLUTION INTRAMUSCULAR; INTRAVENOUS at 02:10

## 2022-11-17 NOTE — H&P ADULT - HISTORY OF PRESENT ILLNESS
HPI:        PAST MEDICAL & SURGICAL HISTORY:  HTN (Hypertension)    Hyperlipidemia    Rotator Cuff Disorder    Anxiety    Cancer of Bladder      PAD (peripheral artery disease)    Peripheral neuropathy    DOLORES on CPAP  non complaint with CPAP    Malignant neoplasm of urinary bladder, unspecified site    Atherosclerosis of native artery of extremity    Type 2 diabetes mellitus    Alzheimer disease     activity  [facility]    CAD (coronary artery disease)  GELA RCA     ALEXA (acute kidney injury)  losartan discontinued and renal funciton improved    H/O orthostatic hypotension    arthroscopy  right shoulderx 2     Knee Surgery  - meniscal    bladder surgery   washout/laser    H/O cardiac catheterization  - stent x1 mid RCA    S/P cataract surgery  bilateral    H/O peripheral artery bypass  left femoral May 2021    H/O endarterectomy  right common femoral artery 10/21    MEDICATIONS  (STANDING):  cefepime   IVPB 2000 milliGRAM(s) IV Intermittent once  vancomycin  IVPB. 1000 milliGRAM(s) IV Intermittent once    Allergies  Ceclor (Rash; Urticaria)    FH: senile dementia (Father, Mother)    FH: diabetes mellitus (Father)    FH: breast cancer (Mother)    Physical Exam:  General: NAD, resting comfortably  HEENT: NC/AT, EOMI, normal hearing, no oral lesions, no LAD, neck supple  Pulmonary: normal resp effort, CTA-B  Cardiovascular: NSR, no murmurs  Abdominal: soft, ND/NT, no organomegaly  Extremities: WWP, normal strength, no clubbing/cyanosis/edema  Neuro: awake , CNs II-XII grossly intact, normal sensation, no focal deficits  Pulses: palpable distal pulses    Vital Signs Last 24 Hrs  T(C): 36.3 (2022 00:34), Max: 37.1 (2022 15:23)  T(F): 97.4 (2022 00:34), Max: 98.7 (2022 15:23)  HR: 65 (2022 00:34) (51 - 66)  BP: 184/80 (2022 00:34) (120/64 - 192/68)  BP(mean): --  RR: 17 (2022 00:34) (16 - 18)  SpO2: 98% (2022 00:34) (92% - 98%)    Parameters below as of 2022 00:34  Patient On (Oxygen Delivery Method): room air    I&O's Summary    LABS:                        10.7   11.07 )-----------( 321      ( 2022 14:24 )             33.9     11-16    139  |  103  |  25<H>  ----------------------------<  191<H>  5.0   |  28  |  1.42<H>    Ca    9.8      2022 14:24  Phos  2.8     11-  Mg     2.0     -16    TPro  6.4  /  Alb  3.3  /  TBili  0.4  /  DBili  x   /  AST  16  /  ALT  31  /  AlkPhos  150<H>  11-16    PT/INR - ( 2022 14:24 )   PT: 13.1 sec;   INR: 1.14 ratio         PTT - ( 2022 14:24 )  PTT:28.7 sec  Urinalysis Basic - ( 2022 15:24 )    Color: Light Yellow / Appearance: Clear / S.013 / pH: x  Gluc: x / Ketone: Negative  / Bili: Negative / Urobili: Negative   Blood: x / Protein: Negative / Nitrite: Negative   Leuk Esterase: Negative / RBC: 2 /hpf / WBC 0 /HPF   Sq Epi: x / Non Sq Epi: 0 /hpf / Bacteria: Negative    LIVER FUNCTIONS - ( 2022 14:24 )  Alb: 3.3 g/dL / Pro: 6.4 g/dL / ALK PHOS: 150 U/L / ALT: 31 U/L / AST: 16 U/L / GGT: x           Cultures:    RADIOLOGY & ADDITIONAL STUDIES:  < from: CT Abdomen and Pelvis w/ IV Cont (22 @ 17:05) >  ABDOMINAL WALL: Bilateral inguinal postsurgical changes overlying the   common femoral artery bifurcations. There is a subcutaneous simple fluid   collection overlying the right bypass graft, measuring 2.4 x 3.0 x 2.0 cm.  BONES: Degenerative changes.    IMPRESSION:  No pulmonary embolism.    Status post right femoral artery bypass graft with small subcutaneous   fluid collection overlying the surgical site.    Punctate foci of air noted within the bladder which may be due to recent   instrumentation, correlate with clinical history.      HPI:  72M PMH CAD s/p stent (GELA to RCA in 2018), HTN, HLD, T2DM c/b peripheral neuropathy, DOLORES but non-compliant w/ CPAP, bladder CA s/p laser ablation, Alzheimer's dementia (minimally verbal & requires full assistance w/ ADLs), anxiety, and PAD s/p R CFA stent (Oct 2021), left CFA endarterectomy (May 2021), recently admitted for right femoral-proximal AT bypass w/ PTFE for a non-healing right foot wound (), R foot partial 1st and 5th ray amputation (), presenting with lethargy. Pt. recently discharged home on . Reported to be AOx1 at baseline by family, however recently has been more lethargic, less interactive. In ED, found to have leukocytosis to 11.07 (8.49 on discharge), CT performed showing fluid collection in right groin 3.0 x 2.0cm, extensive subcutaneous edema in right thigh. UA and CXR unremarkable.       PAST MEDICAL & SURGICAL HISTORY:  HTN (Hypertension)    Hyperlipidemia    Rotator Cuff Disorder    Anxiety    Cancer of Bladder      PAD (peripheral artery disease)    Peripheral neuropathy    DOLORES on CPAP  non complaint with CPAP    Malignant neoplasm of urinary bladder, unspecified site    Atherosclerosis of native artery of extremity    Type 2 diabetes mellitus    Alzheimer disease     activity  [facility]    CAD (coronary artery disease)  GELA RCA     ALEXA (acute kidney injury)  losartan discontinued and renal funciton improved    H/O orthostatic hypotension    arthroscopy  right shoulderx 2     Knee Surgery  - meniscal    bladder surgery   washout/laser    H/O cardiac catheterization  - stent x1 mid RCA    S/P cataract surgery  bilateral    H/O peripheral artery bypass  left femoral May 2021    H/O endarterectomy  right common femoral artery 10/21    MEDICATIONS  (STANDING):  cefepime   IVPB 2000 milliGRAM(s) IV Intermittent once  vancomycin  IVPB. 1000 milliGRAM(s) IV Intermittent once    Allergies  Ceclor (Rash; Urticaria)    FH: senile dementia (Father, Mother)    FH: diabetes mellitus (Father)    FH: breast cancer (Mother)    Physical Exam:  General: NAD, resting comfortably  HEENT: NC/AT, EOMI  Pulmonary: normal resp effort  Cardiovascular: NSR  Abdominal: soft, ND/NT, no organomegaly  Extremities: RLE groin site with yellow fluid weeping from wound, palpable firmness underneath; incision site over medial lower leg c/d/i, erythema over shin, warm to touch. 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 Motor intact. Swelling, warmth in right leg > left. Palpable AT, PT+ signal.   Neuro: awake, interactive; no focal deficits    Vital Signs Last 24 Hrs  T(C): 36.3 (2022 00:34), Max: 37.1 (2022 15:23)  T(F): 97.4 (2022 00:34), Max: 98.7 (2022 15:23)  HR: 65 (2022 00:34) (51 - 66)  BP: 184/80 (2022 00:34) (120/64 - 192/68)  BP(mean): --  RR: 17 (2022 00:34) (16 - 18)  SpO2: 98% (2022 00:34) (92% - 98%)    Parameters below as of 2022 00:34  Patient On (Oxygen Delivery Method): room air    I&O's Summary    LABS:                        10.7   11.07 )-----------( 321      ( 2022 14:24 )             33.9     11-16    139  |  103  |  25<H>  ----------------------------<  191<H>  5.0   |  28  |  1.42<H>    Ca    9.8      2022 14:24  Phos  2.8     11-16  Mg     2.0     11-16    TPro  6.4  /  Alb  3.3  /  TBili  0.4  /  DBili  x   /  AST  16  /  ALT  31  /  AlkPhos  150<H>  11-16    PT/INR - ( 2022 14:24 )   PT: 13.1 sec;   INR: 1.14 ratio         PTT - ( 2022 14:24 )  PTT:28.7 sec  Urinalysis Basic - ( 2022 15:24 )    Color: Light Yellow / Appearance: Clear / S.013 / pH: x  Gluc: x / Ketone: Negative  / Bili: Negative / Urobili: Negative   Blood: x / Protein: Negative / Nitrite: Negative   Leuk Esterase: Negative / RBC: 2 /hpf / WBC 0 /HPF   Sq Epi: x / Non Sq Epi: 0 /hpf / Bacteria: Negative    LIVER FUNCTIONS - ( 2022 14:24 )  Alb: 3.3 g/dL / Pro: 6.4 g/dL / ALK PHOS: 150 U/L / ALT: 31 U/L / AST: 16 U/L / GGT: x           Cultures:    RADIOLOGY & ADDITIONAL STUDIES:  < from: CT Abdomen and Pelvis w/ IV Cont (22 @ 17:05) >  ABDOMINAL WALL: Bilateral inguinal postsurgical changes overlying the   common femoral artery bifurcations. There is a subcutaneous simple fluid   collection overlying the right bypass graft, measuring 2.4 x 3.0 x 2.0 cm.  BONES: Degenerative changes.    IMPRESSION:  No pulmonary embolism.    Status post right femoral artery bypass graft with small subcutaneous   fluid collection overlying the surgical site.    Punctate foci of air noted within the bladder which may be due to recent   instrumentation, correlate with clinical history.      Normal Screen- (No follow-up needed)

## 2022-11-17 NOTE — DISCHARGE NOTE PROVIDER - NSDCFUSCHEDAPPT_GEN_ALL_CORE_FT
Wero Wilkinson  [facility]  NEUROLOGY [facility]  Scheduled Appointment: 02/03/2023    
03-Jun-2021 13:10

## 2022-11-17 NOTE — DISCHARGE NOTE PROVIDER - NSDCMRMEDTOKEN_GEN_ALL_CORE_FT
acetaminophen 325 mg oral tablet: 3 tab(s) orally every 6 hours  Alogliptin 25 mg oral tablet: 1 tab(s) orally once a day  aspirin 81 mg oral delayed release tablet: 1 tab(s) orally once a day  atorvastatin 80 mg oral tablet: 1 tab(s) orally once a day (at bedtime)  divalproex sodium 250 mg oral tablet, extended release: 1 tab(s) orally once a day  DULoxetine 60 mg oral delayed release capsule: 1 cap(s) orally once a day  memantine 10 mg oral tablet: 1 tab(s) orally 2 times a day  metFORMIN 1000 mg oral tablet: 1 tab(s) orally 2 times a day  metoprolol succinate 50 mg oral tablet, extended release: 1 tab(s) orally 2 times a day  MiraLax oral powder for reconstitution: 1 dose(s) orally once a day  oxyCODONE 5 mg oral tablet: 1 tab(s) orally every 6 hours, As Needed -Moderate Pain (4 - 6) MDD:4  pantoprazole 40 mg oral delayed release tablet: 1 tab(s) orally once a day (before a meal)  QUEtiapine 25 mg oral tablet: 1 tab(s) orally once a day  rivaroxaban 2.5 mg oral tablet: 1 tab(s) orally 2 times a day  rivastigmine 13.3 mg/24 hr transdermal film, extended release: 1 patch transdermal every 24 hours  senna (sennosides) 8.6 mg oral tablet: 2 tab(s) orally once a day (at bedtime)  Vitamin B-12 1000 mcg oral tablet: 1 tab(s) orally once a day  Xanax: 0.125 milligram(s) orally once a day (at bedtime)   acetaminophen 325 mg oral tablet: 3 tab(s) orally every 6 hours  Alogliptin 25 mg oral tablet: 1 tab(s) orally once a day  aspirin 81 mg oral delayed release tablet: 1 tab(s) orally once a day  atorvastatin 80 mg oral tablet: 1 tab(s) orally once a day (at bedtime)  Augmentin 500 mg-125 mg oral tablet: 1 tab(s) orally 2 times a day   divalproex sodium 250 mg oral tablet, extended release: 1 tab(s) orally once a day  DULoxetine 60 mg oral delayed release capsule: 1 cap(s) orally once a day  memantine 10 mg oral tablet: 1 tab(s) orally 2 times a day  metFORMIN 1000 mg oral tablet: 1 tab(s) orally 2 times a day  metoprolol succinate 50 mg oral tablet, extended release: 1 tab(s) orally 2 times a day  MiraLax oral powder for reconstitution: 1 dose(s) orally once a day  oxyCODONE 5 mg oral tablet: 1 tab(s) orally every 6 hours, As Needed -Moderate Pain (4 - 6) MDD:4  pantoprazole 40 mg oral delayed release tablet: 1 tab(s) orally once a day (before a meal)  QUEtiapine 25 mg oral tablet: 1 tab(s) orally once a day  rivaroxaban 2.5 mg oral tablet: 1 tab(s) orally 2 times a day  rivastigmine 13.3 mg/24 hr transdermal film, extended release: 1 patch transdermal every 24 hours  senna (sennosides) 8.6 mg oral tablet: 2 tab(s) orally once a day (at bedtime)  Vitamin B-12 1000 mcg oral tablet: 1 tab(s) orally once a day  Xanax: 0.125 milligram(s) orally once a day (at bedtime)

## 2022-11-17 NOTE — DISCHARGE NOTE PROVIDER - HOSPITAL COURSE
72M PMH CAD s/p stent (GELA to RCA in 2018), HTN, HLD, T2DM c/b peripheral neuropathy, DOLORES but non-compliant w/ CPAP, bladder CA s/p laser ablation, Alzheimer's dementia (minimally verbal & requires full assistance w/ ADLs), anxiety, and PAD s/p R CFA stent (Oct 2021), left CFA endarterectomy (May 2021), recently admitted for right femoral-proximal AT bypass w/ PTFE for a non-healing right foot wound (11/1), R foot partial 1st and 5th ray amputation (11/4), presenting with lethargy. Pt. recently discharged home on 11/9. Reported to be AOx1 at baseline by family, however recently has been more lethargic, less interactive. In ED, found to have leukocytosis to 11.07 (8.49 on discharge), CT performed showing fluid collection in right groin 3.0 x 2.0cm, extensive subcutaneous edema in right thigh. UA and CXR unremarkable. Infectious work up started, blood cultures sent. CXR unremarkable. UA sent. On Vanc/zosyn. ID consulted.   During admission, patient placed on antibiotics. Upon discharge, leukocytosis downtrended and normalized. Blood cx negative to date. Per ID, UA without pyuria and urine culture with low colony counts of pseudomonas- not consistent w/ UTI. CT with soft tissue swelling and subq collection, suspect post surgical changes and less likely abscess. CXR no focal consolidation and CTA shows no PE. Will be sent home with augmentin 500mg PO BID to complete 7d course until 11/24. Follow up as outpatient with Dr. Bailey.

## 2022-11-17 NOTE — H&P ADULT - ASSESSMENT
- Admit to vascular surgery service  - NPO, IVF  - Infectious work up - BCx, UA, CXR  - IV abx, vanc/zosyn       D/w fellow on behalf of attending    ALEXA Zhao, PGY-2  Vascular Surgery  [id number]   - Admit to vascular surgery service  - Will hold xarelto   - NPO, IVF  - Infectious work up - BCx, UA, CXR  - IV abx, vanc/zosyn       D/w fellow on behalf of attending    ALEXA Zhao, PGY-2  Vascular Surgery  [id number] 72M PMH CAD s/p stent, HTN, HLD, T2DM c/b peripheral neuropathy, DOLORES but non-compliant w/ CPAP, bladder CA s/p laser ablation, Alzheimer's dementia (minimally verbal & requires full assistance w/ ADLs), anxiety, and PAD s/p left CFA endarterectomy, recently admitted for right femoral-proximal AT bypass w/ PTFE for a non-healing right foot wound (11/1), R foot partial 1st and 5th ray 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     PLAN:   - Admit to vascular surgery service  - Will hold xarelto   - NPO, IVF  - Infectious work up - BCx, UA, CXR  - IV abx, vanc/zosyn     Pt. seen and examined with vascular fellow.     ALEXA Zhao, PGY-2  Vascular Surgery  [id number] 72M PMH CAD s/p stent, HTN, HLD, T2DM c/b peripheral neuropathy, DOLORES but non-compliant w/ CPAP, bladder CA s/p laser ablation, Alzheimer's dementia (minimally verbal & requires full assistance w/ ADLs), anxiety, and PAD s/p left CFA endarterectomy, recently admitted for right femoral-proximal AT bypass w/ PTFE for a non-healing right foot wound (11/1), R foot partial 1st and 5th ray 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     PLAN:   - Admit to vascular surgery service  - Will hold xarelto   - NPO, IVF  - Infectious work up - BCx pending; UA, CXR unremarkable  - IV abx, vanc/zosyn     Pt. seen and examined with vascular fellow.     ALEXA Zhao, PGY-2  Vascular Surgery  [id number] 72M PMH CAD s/p stent, HTN, HLD, T2DM c/b peripheral neuropathy, DOLORES but non-compliant w/ CPAP, bladder CA s/p laser ablation, Alzheimer's dementia (minimally verbal & requires full assistance w/ ADLs), anxiety, and PAD s/p right CFA stent, left CFA endarterectomy, recently admitted for right femoral-proximal AT bypass w/ PTFE for a non-healing right foot wound (11/1), R foot partial 1st and 5th ray 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     PLAN:   - Admit to vascular surgery service  - Will hold xarelto   - NPO, IVF  - Infectious work up - BCx pending; UA, CXR unremarkable  - IV abx, vanc/zosyn     Pt. seen and examined with vascular fellow.     ALEXA Zhao, PGY-2  Vascular Surgery  [id number] 72M PMH CAD s/p stent, HTN, HLD, T2DM c/b peripheral neuropathy, DOLORES but non-compliant w/ CPAP, bladder CA s/p laser ablation, Alzheimer's dementia (minimally verbal & requires full assistance w/ ADLs), anxiety, and PAD s/p right CFA stent, left CFA endarterectomy, recently admitted for right femoral-proximal AT bypass w/ PTFE for a non-healing right foot wound (11/1), R foot partial 1st and 5th ray 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     PLAN:   - Admit to vascular surgery service  - Will hold xarelto, ASA  - NPO, IVF  - Infectious work up - BCx pending; UA, CXR unremarkable  - IV abx, vanc/zosyn     Pt. seen and examined with vascular fellow.     ALEXA Zhao, PGY-2  Vascular Surgery  [id number]

## 2022-11-17 NOTE — DISCHARGE NOTE PROVIDER - CARE PROVIDER_API CALL
Lalo Bailey)  Vascular Surgery  [address], [address]  [place] [address]  Phone: [phone]  Fax: [phone]  Follow Up Time: 2 weeks

## 2022-11-17 NOTE — ED ADULT NURSE REASSESSMENT NOTE - NS ED NURSE REASSESS COMMENT FT1
pts b/p in the 200s provider made aware and metoprolol order and given to pt, pt is stable.
this nurse cleaned up pt, placed new sheets on the bed, and turned pt to a more comfortable position. pt had a bowel movement and urinated.
unable to obtain additional IV access. difficult blood return on EMS 20G in R forearm. MD aware. US guided IV to be placed.
1842 pt found [facility] out of bed, removed all clothing and monitor, rac iv bloodied/pt started on constant observation for safety, dressed and cleaned up iv line/patent at this time/
this pt is laying in bed comfortable, sitter is at bedside, pt states he is not in any pain and does not need anything, b/p is elevated and provider made aware.

## 2022-11-17 NOTE — DISCHARGE NOTE PROVIDER - NSDCCPCAREPLAN_GEN_ALL_CORE_FT
PRINCIPAL DISCHARGE DIAGNOSIS  Diagnosis: Altered mental status  Assessment and Plan of Treatment: You were admitted and placed on antibiotics. You will continue oral antibiotics and finish the course on 11/24.   NOTIFY YOUR SURGEON IF: You have any bleeding that does not stop, any fever (over 100.4 F) or chills, persistent nausea/vomiting with inability to tolerate food or liquids, persistent diarrhea, or if your pain is not controlled on your discharge pain medications.  FOLLOW-UP:  Please follow up with Dr. Bailey in one week regarding your hospitalization.  Please resume your xarelto 2.5 twice a day         SECONDARY DISCHARGE DIAGNOSES  Diagnosis: Altered mental status  Assessment and Plan of Treatment:

## 2022-11-17 NOTE — DISCHARGE NOTE PROVIDER - NSDCFUADDINST_GEN_ALL_CORE_FT
Podiatry Discharge Instructions:  Follow up: Please follow up with Dr. Marcos Lopez within 1 week of discharge from the hospital, please call [phone] for appointment and discuss that you recently were seen in the hospital.  Wound Care: Please leave your dressing clean dry intact until your follow up appointment  Weight bearing: Please weight bear as tolerated in a surgical shoe, [id number] at all times while resting in bed

## 2022-11-18 DIAGNOSIS — I70.261 ATHEROSCLEROSIS OF NATIVE ARTERIES OF EXTREMITIES WITH GANGRENE, RIGHT LEG: ICD-10-CM

## 2022-11-18 LAB
-  AMIKACIN: SIGNIFICANT CHANGE UP
-  AZTREONAM: SIGNIFICANT CHANGE UP
-  CEFEPIME: SIGNIFICANT CHANGE UP
-  CEFTAZIDIME: SIGNIFICANT CHANGE UP
-  CIPROFLOXACIN: SIGNIFICANT CHANGE UP
-  GENTAMICIN: SIGNIFICANT CHANGE UP
-  IMIPENEM: SIGNIFICANT CHANGE UP
-  LEVOFLOXACIN: SIGNIFICANT CHANGE UP
-  MEROPENEM: SIGNIFICANT CHANGE UP
-  PIPERACILLIN/TAZOBACTAM: SIGNIFICANT CHANGE UP
-  TOBRAMYCIN: SIGNIFICANT CHANGE UP
ANION GAP SERPL CALC-SCNC: 10 MMOL/L — SIGNIFICANT CHANGE UP (ref 5–17)
APTT BLD: 27.2 SEC — LOW (ref 27.5–35.5)
BUN SERPL-MCNC: 20 MG/DL — SIGNIFICANT CHANGE UP (ref 7–23)
CALCIUM SERPL-MCNC: 9.8 MG/DL — SIGNIFICANT CHANGE UP (ref 8.4–10.5)
CHLORIDE SERPL-SCNC: 104 MMOL/L — SIGNIFICANT CHANGE UP (ref 96–108)
CO2 SERPL-SCNC: 25 MMOL/L — SIGNIFICANT CHANGE UP (ref 22–31)
CREAT SERPL-MCNC: 1.26 MG/DL — SIGNIFICANT CHANGE UP (ref 0.5–1.3)
CULTURE RESULTS: SIGNIFICANT CHANGE UP
EGFR: 60 ML/MIN/1.73M2 — SIGNIFICANT CHANGE UP
GLUCOSE BLDC GLUCOMTR-MCNC: 145 MG/DL — HIGH (ref 70–99)
GLUCOSE BLDC GLUCOMTR-MCNC: 232 MG/DL — HIGH (ref 70–99)
GLUCOSE SERPL-MCNC: 138 MG/DL — HIGH (ref 70–99)
HCT VFR BLD CALC: 33.8 % — LOW (ref 39–50)
HGB BLD-MCNC: 10.9 G/DL — LOW (ref 13–17)
INR BLD: 1.03 RATIO — SIGNIFICANT CHANGE UP (ref 0.88–1.16)
MAGNESIUM SERPL-MCNC: 1.8 MG/DL — SIGNIFICANT CHANGE UP (ref 1.6–2.6)
MCHC RBC-ENTMCNC: 30.1 PG — SIGNIFICANT CHANGE UP (ref 27–34)
MCHC RBC-ENTMCNC: 32.2 GM/DL — SIGNIFICANT CHANGE UP (ref 32–36)
MCV RBC AUTO: 93.4 FL — SIGNIFICANT CHANGE UP (ref 80–100)
METHOD TYPE: SIGNIFICANT CHANGE UP
NRBC # BLD: 0 /100 WBCS — SIGNIFICANT CHANGE UP (ref 0–0)
ORGANISM # SPEC MICROSCOPIC CNT: SIGNIFICANT CHANGE UP
ORGANISM # SPEC MICROSCOPIC CNT: SIGNIFICANT CHANGE UP
PHOSPHATE SERPL-MCNC: 4.4 MG/DL — SIGNIFICANT CHANGE UP (ref 2.5–4.5)
PLATELET # BLD AUTO: 317 K/UL — SIGNIFICANT CHANGE UP (ref 150–400)
POTASSIUM SERPL-MCNC: 4.8 MMOL/L — SIGNIFICANT CHANGE UP (ref 3.5–5.3)
POTASSIUM SERPL-SCNC: 4.8 MMOL/L — SIGNIFICANT CHANGE UP (ref 3.5–5.3)
PROTHROM AB SERPL-ACNC: 11.9 SEC — SIGNIFICANT CHANGE UP (ref 10.5–13.4)
RBC # BLD: 3.62 M/UL — LOW (ref 4.2–5.8)
RBC # FLD: 16.3 % — HIGH (ref 10.3–14.5)
SODIUM SERPL-SCNC: 139 MMOL/L — SIGNIFICANT CHANGE UP (ref 135–145)
SPECIMEN SOURCE: SIGNIFICANT CHANGE UP
WBC # BLD: 8.64 K/UL — SIGNIFICANT CHANGE UP (ref 3.8–10.5)
WBC # FLD AUTO: 8.64 K/UL — SIGNIFICANT CHANGE UP (ref 3.8–10.5)

## 2022-11-18 RX ORDER — DEXTROSE 50 % IN WATER 50 %
25 SYRINGE (ML) INTRAVENOUS ONCE
Refills: 0 | Status: DISCONTINUED | OUTPATIENT
Start: 2022-11-18 | End: 2022-11-21

## 2022-11-18 RX ORDER — SODIUM CHLORIDE 9 MG/ML
1000 INJECTION, SOLUTION INTRAVENOUS
Refills: 0 | Status: DISCONTINUED | OUTPATIENT
Start: 2022-11-18 | End: 2022-11-21

## 2022-11-18 RX ORDER — DEXTROSE 50 % IN WATER 50 %
12.5 SYRINGE (ML) INTRAVENOUS ONCE
Refills: 0 | Status: DISCONTINUED | OUTPATIENT
Start: 2022-11-18 | End: 2022-11-21

## 2022-11-18 RX ORDER — INSULIN LISPRO 100/ML
VIAL (ML) SUBCUTANEOUS
Refills: 0 | Status: DISCONTINUED | OUTPATIENT
Start: 2022-11-18 | End: 2022-11-21

## 2022-11-18 RX ORDER — VALPROIC ACID (AS SODIUM SALT) 250 MG/5ML
125 SOLUTION, ORAL ORAL
Refills: 0 | Status: DISCONTINUED | OUTPATIENT
Start: 2022-11-18 | End: 2022-11-18

## 2022-11-18 RX ORDER — DEXTROSE 50 % IN WATER 50 %
15 SYRINGE (ML) INTRAVENOUS ONCE
Refills: 0 | Status: DISCONTINUED | OUTPATIENT
Start: 2022-11-18 | End: 2022-11-21

## 2022-11-18 RX ORDER — METOPROLOL TARTRATE 50 MG
5 TABLET ORAL EVERY 6 HOURS
Refills: 0 | Status: DISCONTINUED | OUTPATIENT
Start: 2022-11-18 | End: 2022-11-18

## 2022-11-18 RX ORDER — PANTOPRAZOLE SODIUM 20 MG/1
40 TABLET, DELAYED RELEASE ORAL DAILY
Refills: 0 | Status: DISCONTINUED | OUTPATIENT
Start: 2022-11-18 | End: 2022-11-18

## 2022-11-18 RX ORDER — PANTOPRAZOLE SODIUM 20 MG/1
40 TABLET, DELAYED RELEASE ORAL
Refills: 0 | Status: DISCONTINUED | OUTPATIENT
Start: 2022-11-18 | End: 2022-11-21

## 2022-11-18 RX ORDER — GLUCAGON INJECTION, SOLUTION 0.5 MG/.1ML
1 INJECTION, SOLUTION SUBCUTANEOUS ONCE
Refills: 0 | Status: DISCONTINUED | OUTPATIENT
Start: 2022-11-18 | End: 2022-11-21

## 2022-11-18 RX ORDER — DIVALPROEX SODIUM 500 MG/1
250 TABLET, DELAYED RELEASE ORAL DAILY
Refills: 0 | Status: DISCONTINUED | OUTPATIENT
Start: 2022-11-18 | End: 2022-11-18

## 2022-11-18 RX ORDER — INSULIN LISPRO 100/ML
VIAL (ML) SUBCUTANEOUS AT BEDTIME
Refills: 0 | Status: DISCONTINUED | OUTPATIENT
Start: 2022-11-18 | End: 2022-11-21

## 2022-11-18 RX ORDER — METOPROLOL TARTRATE 50 MG
50 TABLET ORAL
Refills: 0 | Status: DISCONTINUED | OUTPATIENT
Start: 2022-11-18 | End: 2022-11-21

## 2022-11-18 RX ORDER — SODIUM CHLORIDE 9 MG/ML
1000 INJECTION, SOLUTION INTRAVENOUS
Refills: 0 | Status: DISCONTINUED | OUTPATIENT
Start: 2022-11-18 | End: 2022-11-19

## 2022-11-18 RX ORDER — HYDRALAZINE HCL 50 MG
10 TABLET ORAL ONCE
Refills: 0 | Status: COMPLETED | OUTPATIENT
Start: 2022-11-18 | End: 2022-11-18

## 2022-11-18 RX ADMIN — Medication 2: at 11:01

## 2022-11-18 RX ADMIN — Medication 166.67 MILLIGRAM(S): at 06:25

## 2022-11-18 RX ADMIN — Medication 50 MILLIGRAM(S): at 11:20

## 2022-11-18 RX ADMIN — QUETIAPINE FUMARATE 25 MILLIGRAM(S): 200 TABLET, FILM COATED ORAL at 11:07

## 2022-11-18 RX ADMIN — PANTOPRAZOLE SODIUM 40 MILLIGRAM(S): 20 TABLET, DELAYED RELEASE ORAL at 11:08

## 2022-11-18 RX ADMIN — Medication 81 MILLIGRAM(S): at 11:08

## 2022-11-18 RX ADMIN — ENOXAPARIN SODIUM 40 MILLIGRAM(S): 100 INJECTION SUBCUTANEOUS at 06:25

## 2022-11-18 RX ADMIN — PIPERACILLIN AND TAZOBACTAM 25 GRAM(S): 4; .5 INJECTION, POWDER, LYOPHILIZED, FOR SOLUTION INTRAVENOUS at 22:00

## 2022-11-18 RX ADMIN — MEMANTINE HYDROCHLORIDE 10 MILLIGRAM(S): 10 TABLET ORAL at 17:14

## 2022-11-18 RX ADMIN — Medication 1: at 00:18

## 2022-11-18 RX ADMIN — PREGABALIN 1000 MICROGRAM(S): 225 CAPSULE ORAL at 11:08

## 2022-11-18 RX ADMIN — PIPERACILLIN AND TAZOBACTAM 25 GRAM(S): 4; .5 INJECTION, POWDER, LYOPHILIZED, FOR SOLUTION INTRAVENOUS at 13:13

## 2022-11-18 RX ADMIN — PIPERACILLIN AND TAZOBACTAM 25 GRAM(S): 4; .5 INJECTION, POWDER, LYOPHILIZED, FOR SOLUTION INTRAVENOUS at 06:25

## 2022-11-18 RX ADMIN — DULOXETINE HYDROCHLORIDE 60 MILLIGRAM(S): 30 CAPSULE, DELAYED RELEASE ORAL at 11:08

## 2022-11-18 RX ADMIN — ATORVASTATIN CALCIUM 80 MILLIGRAM(S): 80 TABLET, FILM COATED ORAL at 21:59

## 2022-11-18 RX ADMIN — Medication 10 MILLIGRAM(S): at 08:03

## 2022-11-18 RX ADMIN — SODIUM CHLORIDE 40 MILLILITER(S): 9 INJECTION, SOLUTION INTRAVENOUS at 09:18

## 2022-11-18 RX ADMIN — SODIUM CHLORIDE 100 MILLILITER(S): 9 INJECTION, SOLUTION INTRAVENOUS at 06:24

## 2022-11-18 NOTE — PROGRESS NOTE ADULT - ASSESSMENT
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     PLAN:   - start regular diet  - refusing PO meds, changed depakote, metoprolol to IVPB  -appreciate pods recs  - IV abx, vanc/zosyn   - f/u blood, urine cx

## 2022-11-18 NOTE — CONSULT NOTE ADULT - ASSESSMENT
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    1. Acute metabolic encephalopathy , w/ underlying dementia  2. Right Groin Collection possible abscess   3. 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 Alzheimer's dementia (minimally verbal & requires full assistance w/ ADLs)  -memantine and quetiapine     5. 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 CAD s/p stent (GELA to RCA in 2018), HTN, HLD, T2DM c/b peripheral neuropathy  BP elevated; start toprol 50 mg daily  aspirin enteric coated 81 milliGRAM(s) Oral daily  atorvastatin 80 milliGRAM(s) Oral at bedtime  DULoxetine 60 milliGRAM(s) Oral daily  insulin lispro (ADMELOG) corrective regimen sliding scale   SubCutaneous every 6 hours  QUEtiapine 25 milliGRAM(s) Oral daily    7. Anxiety   valproate sodium  IVPB 125 milliGRAM(s) IV Intermittent two times a day  -on xanax at qhs prn; start inpatient to prevent withdrawal.    8. DOLORES but non-compliant w/ CPAP, bladder CA s/p laser ablation    dvt ppx: lovenox   gi ppx: diet; ppi     Discussed with primary vascular team     Johnny Lam MD   [facility]  [phone]  
72m with dementia, cad diabetes, htn, hld, pad, right foot partial amputation and right fem bypass here with lethargy  r/o infection v progression of disease  ct scan with fluid collection    plan  pt afebrile ,nontoxic  minimal leukocytosis which is now resolved  and hemodynamically stable     blood cx neg to date  ua neg  cxr no pna   cta no pe   ct with soft tissue swelling  and subq collection    suspect post surgical changes and less likely  abscess  can check ultrasound for any drainable abscess   can check mrsa   stop vancomycin low clinical suspicion for mrsa 
Pt seen and evaluated  - Afebrile, no leukocytosis  - S/p 11/4 right foot partial 1st and 5th ray amputation: flaps warm and viable, mild  surgical site erythema, no drainage, no malodor, no hematoma.  L foot 2nd and 3rd toe distal abrasions, granular base, no clinical signs of infection  - Both 11/4 clean bone margin pathology and culture were negative    - Ordered R foot xray  - Pt to wear ZFLOW off-loading boots at all times while resting in bed  - Dressing to remain clean dry and intact  - No acute intervention planned from podiatry  - Discussed with attending

## 2022-11-18 NOTE — CONSULT NOTE ADULT - SUBJECTIVE AND OBJECTIVE BOX
Patient is a 72y old  Male who presents with a chief complaint of infection (2022 08:52)      HPI:  72M PMH CAD s/p stent (GELA to RCA in ), HTN, HLD, T2DM c/b peripheral neuropathy, DOLORES but non-compliant w/ CPAP, bladder CA s/p laser ablation, Alzheimer's dementia (minimally verbal & requires full assistance w/ ADLs), anxiety, and PAD s/p R CFA stent (Oct 2021), left CFA endarterectomy (May 2021), recently admitted for right femoral-proximal AT bypass w/ PTFE for a non-healing right foot wound (), R foot partial 1st and 5th ray amputation (), presenting with lethargy. Pt. recently discharged home on . Reported to be AOx1 at baseline by family, however recently has been more lethargic, less interactive. In ED, found to have leukocytosis to 11.07 (8.49 on discharge), CT performed showing fluid collection in right groin 3.0 x 2.0cm, extensive subcutaneous edema in right thigh. UA and CXR unremarkable.   Unable to obtain history baseline dementia, minimally verbal.       PAST MEDICAL & SURGICAL HISTORY:  HTN (Hypertension)    Hyperlipidemia    Rotator Cuff Disorder    Anxiety    Cancer of Bladder      PAD (peripheral artery disease)    Peripheral neuropathy    DOLORES on CPAP  non complaint with CPAP    Malignant neoplasm of urinary bladder, unspecified site    Atherosclerosis of native artery of extremity    Type 2 diabetes mellitus    Alzheimer disease     [facility]  [facility]    CAD (coronary artery disease)  GELA RCA     ALEXA (acute kidney injury)  losartan discontinued and renal funciton improved    H/O orthostatic hypotension    arthroscopy  right shoulderx 2     Knee Surgery  - meniscal    bladder surgery   washout/laser    H/O cardiac catheterization  - stent x1 mid RCA    S/P cataract surgery  bilateral    H/O peripheral artery bypass  left femoral May 2021    H/O endarterectomy  right common femoral artery 10/21    MEDICATIONS  (STANDING):  cefepime   IVPB 2000 milliGRAM(s) IV Intermittent once  vancomycin  IVPB. 1000 milliGRAM(s) IV Intermittent once    Allergies  Ceclor (Rash; Urticaria)    FH: senile dementia (Father, Mother)    FH: diabetes mellitus (Father)    FH: breast cancer (Mother)        Vital Signs Last 24 Hrs  T(C): 36.3 (2022 00:34), Max: 37.1 (2022 15:23)  T(F): 97.4 (2022 00:34), Max: 98.7 (2022 15:23)  HR: 65 (:34) (51 - 66)  BP: 184/80 (2022 00:34) (120/64 - 192/68)  BP(mean): --  RR: 17 (:34) (16 - 18)  SpO2: 98% (:34) (92% - 98%)    Parameters below as of :34  Patient On (Oxygen Delivery Method): room air    I&O's Summary    LABS:                        10.7   11.07 )-----------( 321      ( 2022 14:24 )             33.9     11-16    139  |  103  |  25<H>  ----------------------------<  191<H>  5.0   |  28  |  1.42<H>    Ca    9.8      2022 14:24  Phos  2.8     11-16  Mg     2.0     11-16    TPro  6.4  /  Alb  3.3  /  TBili  0.4  /  DBili  x   /  AST  16  /  ALT  31  /  AlkPhos  150<H>  11-16    PT/INR - ( 2022 14:24 )   PT: 13.1 sec;   INR: 1.14 ratio         PTT - ( 2022 14:24 )  PTT:28.7 sec  Urinalysis Basic - ( 2022 15:24 )    Color: Light Yellow / Appearance: Clear / S.013 / pH: x  Gluc: x / Ketone: Negative  / Bili: Negative / Urobili: Negative   Blood: x / Protein: Negative / Nitrite: Negative   Leuk Esterase: Negative / RBC: 2 /hpf / WBC 0 /HPF   Sq Epi: x / Non Sq Epi: 0 /hpf / Bacteria: Negative    LIVER FUNCTIONS - ( 2022 14:24 )  Alb: 3.3 g/dL / Pro: 6.4 g/dL / ALK PHOS: 150 U/L / ALT: 31 U/L / AST: 16 U/L / GGT: x           Cultures:    RADIOLOGY & ADDITIONAL STUDIES:  < from: CT Abdomen and Pelvis w/ IV Cont (11.16.22 @ 17:05) >  ABDOMINAL WALL: Bilateral inguinal postsurgical changes overlying the   common femoral artery bifurcations. There is a subcutaneous simple fluid   collection overlying the right bypass graft, measuring 2.4 x 3.0 x 2.0 cm.  BONES: Degenerative changes.    IMPRESSION:  No pulmonary embolism.    Status post right femoral artery bypass graft with small subcutaneous   fluid collection overlying the surgical site.    Punctate foci of air noted within the bladder which may be due to recent   instrumentation, correlate with clinical history.      (2022 00:33)      PAST MEDICAL & SURGICAL HISTORY:  HTN (Hypertension)      Hyperlipidemia      Rotator Cuff Disorder      Anxiety      Cancer of Bladder        PAD (peripheral artery disease)      Peripheral neuropathy      DOLORES on CPAP  non complaint with CPAP      Malignant neoplasm of urinary bladder, unspecified site      Atherosclerosis of native artery of extremity      Type 2 diabetes mellitus      Alzheimer disease       [facility]  [facility]      CAD (coronary artery disease)  GELA RCA       ALEXA (acute kidney injury)  losartan discontinued and renal funciton improved      H/O orthostatic hypotension      arthroscopy  right shoulderx 2       Knee Surgery  - meniscal      bladder surgery   washout/laser      H/O cardiac catheterization  - stent x1 mid RCA      S/P cataract surgery  bilateral      H/O peripheral artery bypass  left femoral May 2021      H/O endarterectomy  right common femoral artery 10/21          Allergies    Ceclor (Rash; Urticaria)    Intolerances        Home Medications:  acetaminophen 325 mg oral tablet: 3 tab(s) orally every 6 hours (2022 01:51)  Alogliptin 25 mg oral tablet: 1 tab(s) orally once a day (2022 01:51)  aspirin 81 mg oral delayed release tablet: 1 tab(s) orally once a day (:51)  atorvastatin 80 mg oral tablet: 1 tab(s) orally once a day (at bedtime) (:51)  divalproex sodium 250 mg oral tablet, extended release: 1 tab(s) orally once a day (:51)  DULoxetine 60 mg oral delayed release capsule: 1 cap(s) orally once a day (:51)  memantine 10 mg oral tablet: 1 tab(s) orally 2 times a day (:51)  metFORMIN 1000 mg oral tablet: 1 tab(s) orally 2 times a day (:51)  metoprolol succinate 50 mg oral tablet, extended release: 1 tab(s) orally 2 times a day (:51)  MiraLax oral powder for reconstitution: 1 dose(s) orally once a day (:51)  QUEtiapine 25 mg oral tablet: 1 tab(s) orally once a day (:51)  rivastigmine 13.3 mg/24 hr transdermal film, extended release: 1 patch transdermal every 24 hours (:51)  senna (sennosides) 8.6 mg oral tablet: 2 tab(s) orally once a day (at bedtime) (:51)  Vitamin B-12 1000 mcg oral tablet: 1 tab(s) orally once a day (:51)  Xanax: 0.125 milligram(s) orally once a day (at bedtime) (:51)      SOCIAL HISTORY:    FAMILY HISTORY:  FH: senile dementia (Father, Mother)    FH: diabetes mellitus (Father)    FH: breast cancer (Mother)          REVIEW OF SYSEMS: Unable to obtain 2/2 Dementia     Vital Signs Last 24 Hrs  T(C): 37 (2022 08:25), Max: 37 (2022 08:25)  T(F): 98.6 (2022 08:25), Max: 98.6 (2022 08:25)  HR: 61 (2022 08:03) (58 - 64)  BP: 158/72 (2022 08:42) (151/58 - 194/76)  BP(mean): --  RR: 18 (2022 08:03) (18 - 18)  SpO2: 98% (2022 08:03) (96% - 100%)    Parameters below as of 2022 08:03  Patient On (Oxygen Delivery Method): room air      I&O's Summary    2022 07:01  -  2022 10:54  --------------------------------------------------------  IN: 260 mL / OUT: 0 mL / NET: 260 mL        Physical Exam:  General: NAD, resting comfortably  HEENT: NC/AT, EOMI  Pulmonary: normal resp effort  Cardiovascular: NSR  Abdominal: soft, ND/NT, no organomegaly  Extremities: RLE groin site with yellow fluid weeping from wound, palpable firmness underneath; incision site over medial lower leg c/d/i, erythema over shin, warm to touch. 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 Motor intact. Swelling, warmth in right leg > left. Palpable AT, PT+ signal.   Neuro: awake, interactive; no focal deficits    LABS:                        10.9   8.64  )-----------( 317      ( 2022 06:09 )             33.8         139  |  104  |  20  ----------------------------<  138<H>  4.8   |  25  |  1.26    Ca    9.8      2022 06:08  Phos  4.4       Mg     1.8         TPro  6.4  /  Alb  3.3  /  TBili  0.4  /  DBili  x   /  AST  16  /  ALT  31  /  AlkPhos  150<H>  11-16    PT/INR - ( 2022 06:11 )   PT: 11.9 sec;   INR: 1.03 ratio         PTT - ( 2022 06:11 )  PTT:27.2 sec  CAPILLARY BLOOD GLUCOSE      POCT Blood Glucose.: 212 mg/dL (2022 10:19)  POCT Blood Glucose.: 137 mg/dL (2022 06:18)  POCT Blood Glucose.: 169 mg/dL (2022 23:57)  POCT Blood Glucose.: 178 mg/dL (2022 17:53)  POCT Blood Glucose.: 170 mg/dL (2022 11:31)        Urinalysis Basic - ( 2022 15:24 )    Color: Light Yellow / Appearance: Clear / S.013 / pH: x  Gluc: x / Ketone: Negative  / Bili: Negative / Urobili: Negative   Blood: x / Protein: Negative / Nitrite: Negative   Leuk Esterase: Negative / RBC: 2 /hpf / WBC 0 /HPF   Sq Epi: x / Non Sq Epi: 0 /hpf / Bacteria: Negative        RADIOLOGY & ADDITIONAL TESTS:    Imaging Personally Reviewed:  [x] YES  [ ] NO    Consultant(s) Notes Reviewed:  [x] YES  [ ] NO      MEDICATIONS  (STANDING):  aspirin enteric coated 81 milliGRAM(s) Oral daily  atorvastatin 80 milliGRAM(s) Oral at bedtime  cyanocobalamin 1000 MICROGram(s) Oral daily  DULoxetine 60 milliGRAM(s) Oral daily  enoxaparin Injectable 40 milliGRAM(s) SubCutaneous every 24 hours  insulin lispro (ADMELOG) corrective regimen sliding scale   SubCutaneous every 6 hours  lactated ringers. 1000 milliLiter(s) (40 mL/Hr) IV Continuous <Continuous>  memantine 10 milliGRAM(s) Oral two times a day  metoprolol tartrate Injectable 5 milliGRAM(s) IV Push every 6 hours  pantoprazole  Injectable 40 milliGRAM(s) IV Push daily  piperacill-n/tazobactam IVPB.. 3.375 Gram(s) IV Intermittent every 8 hours  QUEtiapine 25 milliGRAM(s) Oral daily  valproate sodium  IVPB 125 milliGRAM(s) IV Intermittent two times a day  vancomycin  IVPB 1250 milliGRAM(s) IV Intermittent every 12 hours    MEDICATIONS  (PRN):      Care Discussed with Consultants/Other Providers [x] YES  [ ] NO    HEALTH ISSUES - PROBLEM Dx:        
Podiatry pager #: [phone]/ [phone]    Patient is a 72y old  Male who presents with a chief complaint of infection (2022 00:33)      HPI:  HPI:  72M PMH CAD s/p stent (GELA to RCA in ), HTN, HLD, T2DM c/b peripheral neuropathy, DOLORES but non-compliant w/ CPAP, bladder CA s/p laser ablation, Alzheimer's dementia (minimally verbal & requires full assistance w/ ADLs), anxiety, and PAD s/p R CFA stent (Oct 2021), left CFA endarterectomy (May 2021), recently admitted for right femoral-proximal AT bypass w/ PTFE for a non-healing right foot wound (), R foot partial 1st and 5th ray amputation (), presenting with lethargy. Pt. recently discharged home on . Reported to be AOx1 at baseline by family, however recently has been more lethargic, less interactive. In ED, found to have leukocytosis to 11.07 (8.49 on discharge), CT performed showing fluid collection in right groin 3.0 x 2.0cm, extensive subcutaneous edema in right thigh. UA and CXR unremarkable.       PAST MEDICAL & SURGICAL HISTORY:  HTN (Hypertension)    Hyperlipidemia    Rotator Cuff Disorder    Anxiety    Cancer of Bladder      PAD (peripheral artery disease)    Peripheral neuropathy    DOLORES on CPAP  non complaint with CPAP    Malignant neoplasm of urinary bladder, unspecified site    Atherosclerosis of native artery of extremity    Type 2 diabetes mellitus    Alzheimer disease     [facility]  [facility]    CAD (coronary artery disease)  GELA RCA     ALEXA (acute kidney injury)  losartan discontinued and renal funciton improved    H/O orthostatic hypotension    arthroscopy  right shoulderx 2     Knee Surgery  - meniscal    bladder surgery   washout/laser    H/O cardiac catheterization  - stent x1 mid RCA    S/P cataract surgery  bilateral    H/O peripheral artery bypass  left femoral May 2021    H/O endarterectomy  right common femoral artery 10/21    MEDICATIONS  (STANDING):  cefepime   IVPB 2000 milliGRAM(s) IV Intermittent once  vancomycin  IVPB. 1000 milliGRAM(s) IV Intermittent once    Allergies  Ceclor (Rash; Urticaria)    FH: senile dementia (Father, Mother)    FH: diabetes mellitus (Father)    FH: breast cancer (Mother)    Physical Exam:  General: NAD, resting comfortably  HEENT: NC/AT, EOMI  Pulmonary: normal resp effort  Cardiovascular: NSR  Abdominal: soft, ND/NT, no organomegaly  Extremities: RLE groin site with yellow fluid weeping from wound, palpable firmness underneath; incision site over medial lower leg c/d/i, erythema over shin, warm to touch. 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 Motor intact. Swelling, warmth in right leg > left. Palpable AT, PT+ signal.   Neuro: awake, interactive; no focal deficits    Vital Signs Last 24 Hrs  T(C): 36.3 (2022 00:34), Max: 37.1 (2022 15:23)  T(F): 97.4 (2022 00:34), Max: 98.7 (2022 15:23)  HR: 65 (:34) (51 - 66)  BP: 184/80 (:34) (120/64 - 192/68)  BP(mean): --  RR: 17 (2022 00:34) (16 - 18)  SpO2: 98% (2022 00:34) (92% - 98%)    Parameters below as of 2022 00:34  Patient On (Oxygen Delivery Method): room air    I&O's Summary    LABS:                        10.7   11.07 )-----------( 321      ( 2022 14:24 )             33.9     11-16    139  |  103  |  25<H>  ----------------------------<  191<H>  5.0   |  28  |  1.42<H>    Ca    9.8      2022 14:24  Phos  2.8     11-  Mg     2.0     11-16    TPro  6.4  /  Alb  3.3  /  TBili  0.4  /  DBili  x   /  AST  16  /  ALT  31  /  AlkPhos  150<H>  11-16    PT/INR - ( 2022 14:24 )   PT: 13.1 sec;   INR: 1.14 ratio         PTT - ( 2022 14:24 )  PTT:28.7 sec  Urinalysis Basic - ( 2022 15:24 )    Color: Light Yellow / Appearance: Clear / S.013 / pH: x  Gluc: x / Ketone: Negative  / Bili: Negative / Urobili: Negative   Blood: x / Protein: Negative / Nitrite: Negative   Leuk Esterase: Negative / RBC: 2 /hpf / WBC 0 /HPF   Sq Epi: x / Non Sq Epi: 0 /hpf / Bacteria: Negative    LIVER FUNCTIONS - ( 2022 14:24 )  Alb: 3.3 g/dL / Pro: 6.4 g/dL / ALK PHOS: 150 U/L / ALT: 31 U/L / AST: 16 U/L / GGT: x           Cultures:    RADIOLOGY & ADDITIONAL STUDIES:  < from: CT Abdomen and Pelvis w/ IV Cont (22 @ 17:05) >  ABDOMINAL WALL: Bilateral inguinal postsurgical changes overlying the   common femoral artery bifurcations. There is a subcutaneous simple fluid   collection overlying the right bypass graft, measuring 2.4 x 3.0 x 2.0 cm.  BONES: Degenerative changes.    IMPRESSION:  No pulmonary embolism.    Status post right femoral artery bypass graft with small subcutaneous   fluid collection overlying the surgical site.    Punctate foci of air noted within the bladder which may be due to recent   instrumentation, correlate with clinical history.      (2022 00:33)      PAST MEDICAL & SURGICAL HISTORY:  HTN (Hypertension)      Hyperlipidemia      Rotator Cuff Disorder      Anxiety      Cancer of Bladder        PAD (peripheral artery disease)      Peripheral neuropathy      DOLORES on CPAP  non complaint with CPAP      Malignant neoplasm of urinary bladder, unspecified site      Atherosclerosis of native artery of extremity      Type 2 diabetes mellitus      Alzheimer disease       [facility]  [facility]      CAD (coronary artery disease)  GELA RCA       ALEXA (acute kidney injury)  losartan discontinued and renal funciton improved      H/O orthostatic hypotension      arthroscopy  right shoulderx 2       Knee Surgery  - meniscal      bladder surgery   washout/laser      H/O cardiac catheterization  2018- stent x1 mid RCA      S/P cataract surgery  bilateral      H/O peripheral artery bypass  left femoral May 2021      H/O endarterectomy  right common femoral artery 10/21          MEDICATIONS  (STANDING):  aspirin enteric coated 81 milliGRAM(s) Oral daily  atorvastatin 80 milliGRAM(s) Oral at bedtime  cyanocobalamin 1000 MICROGram(s) Oral daily  divalproex  milliGRAM(s) Oral daily  DULoxetine 60 milliGRAM(s) Oral daily  enoxaparin Injectable 40 milliGRAM(s) SubCutaneous every 24 hours  insulin lispro (ADMELOG) corrective regimen sliding scale   SubCutaneous every 6 hours  lactated ringers. 1000 milliLiter(s) (100 mL/Hr) IV Continuous <Continuous>  memantine 10 milliGRAM(s) Oral two times a day  metoprolol succinate ER 50 milliGRAM(s) Oral every 12 hours  pantoprazole    Tablet 40 milliGRAM(s) Oral before breakfast  piperacillin/tazobactam IVPB.. 3.375 Gram(s) IV Intermittent every 8 hours  QUEtiapine 25 milliGRAM(s) Oral daily    MEDICATIONS  (PRN):      Allergies    Ceclor (Rash; Urticaria)    Intolerances        VITALS:    Vital Signs Last 24 Hrs  T(C): 36.5 (2022 11:09), Max: 37.1 (2022 15:23)  T(F): 97.7 (2022 11:09), Max: 98.7 (2022 15:23)  HR: 64 (2022 11:09) (56 - 74)  BP: 153/71 (2022 11:09) (153/64 - 220/85)  BP(mean): --  RR: 18 (2022 11:09) (16 - 18)  SpO2: 100% (2022 11:09) (95% - 100%)    Parameters below as of 2022 11:09  Patient On (Oxygen Delivery Method): room air        LABS:                          10.1   10.35 )-----------( 291      ( 2022 06:36 )             32.0           139  |  102  |  21  ----------------------------<  174<H>  4.5   |  27  |  1.10    Ca    9.3      2022 06:36  Phos  3.4     11-  Mg     1.8         TPro  6.4  /  Alb  3.3  /  TBili  0.4  /  DBili  x   /  AST  16  /  ALT  31  /  AlkPhos  150<H>  11-16      CAPILLARY BLOOD GLUCOSE      POCT Blood Glucose.: 170 mg/dL (2022 11:31)  POCT Blood Glucose.: 158 mg/dL (2022 05:50)      PT/INR - ( 2022 06:36 )   PT: 11.8 sec;   INR: 1.03 ratio         PTT - ( 2022 06:36 )  PTT:26.4 sec    LOWER EXTREMITY PHYSICAL EXAM:    Vasular: DP/PT 0/4, B/L, CFT < 3 seconds all pedal digits besides R foot 1 and 5 which is absent, Temperature gradient warm to warm R, warm to cool left  Neuro: Epicritic sensation intact to the level of the digits, B/L.  Musculoskeletal/Ortho: non-ambi  Skin: s/p  right foot partial 1st and 5th ray amputation: flaps warm and viable, mild  surgical site erythema, no drainage, no malodor, no hematoma.  L foot 2nd and 3rd toe distal abrasions, granular base, no clinical signs of infection    RADIOLOGY & ADDITIONAL STUDIES:    
[facility], Division of Infectious Diseases  JOSEPH Floyd S. Shah, Y. Patel, G. Mekhi   [phone]  after hours and weekends [phone]    SHERICE RAMIREZ  72y, Male  [id number]    HPI: pt nonverbal at exam history per chart  72M PMH CAD s/p stent (GELA to RCA in 2018), HTN, HLD, T2DM c/b peripheral neuropathy, DOLORES but non-compliant w/ CPAP, bladder CA s/p laser ablation, Alzheimer's dementia  , anxiety, and PAD s/p R CFA stent (Oct 2021), left CFA endarterectomy (May 2021), recently admitted for right femoral-proximal AT bypass w/ PTFE for a non-healing right foot wound (11/1), R foot partial 1st and 5th ray amputation (11/4), presenting with lethargy. Pt. recently discharged home on 11/9. Reported to be AOx1 at baseline by family, however recently has been more lethargic, less interactive. In ED, found to have leukocytosis to 11.07 (8.49 on discharge), CT performed showing fluid collection in right groin 3.0 x 2.0cm, extensive subcutaneous edema in right thigh.         PMH/PSH--  HTN (Hypertension)  Diabetes  Hyperlipidemia  Rotator Cuff Disorder  Anxiety  Cancer of Bladder  PAD (peripheral artery disease)  Peripheral neuropathy  DOLORES on CPAP  Malignant neoplasm of urinary bladder, unspecified site  Dementia  Atherosclerosis of native artery of extremity  Type 2 diabetes mellitus  Alzheimer disease  CAD (coronary artery disease)  ALEXA (acute kidney injury)  H/O orthostatic hypotension  History of Coronary Angioplasty  arthroscopy  Knee Surgery  bladder surgery  H/O cardiac catheterization  S/P cataract surgery  H/O peripheral artery bypass  H/O endarterectomy        Allergies--nkda      Medications--  Antibiotics: piperacillin/tazobactam IVPB.. 3.375 Gram(s) IV Intermittent every 8 hours  vancomycin  IVPB 1250 milliGRAM(s) IV Intermittent every 12 hours    Immunologic:   Other: aspirin enteric coated  atorvastatin  cyanocobalamin  dextrose 5%.  dextrose 5%.  dextrose 50% Injectable  dextrose 50% Injectable  dextrose 50% Injectable  dextrose Oral Gel PRN  DULoxetine  enoxaparin Injectable  glucagon  Injectable  insulin lispro (ADMELOG) corrective regimen sliding scale  insulin lispro (ADMELOG) corrective regimen sliding scale  lactated ringers.  memantine  metoprolol tartrate  pantoprazole    Tablet  QUEtiapine  valproate sodium  IVPB      Social History--  unable to obtain     Family/Marital History--  No pertinent family history in first degree relatives  FH: senile dementia (Father, Mother)  FH: diabetes mellitus (Father)  FH: breast cancer (Mother)          Travel/Environmental/Occupational History:  nc    Review of Systems:  REVIEW OF SYSTEMS  unable to obtain     Physical Exam--  Vital Signs: T(F): 98.7 (11-18-22 @ 13:46), Max: 98.7 (11-18-22 @ 13:46)  HR: 59 (11-18-22 @ 13:46)  BP: 128/65 (11-18-22 @ 13:46)  RR: 18 (11-18-22 @ 13:46)  SpO2: 96% (11-18-22 @ 13:46)  Wt(kg): --  General: Nontoxic-appearing Male in no acute distress.  HEENT: AT/NC. PERRL. EOMI.   Nodes: None palpable.  Lungs: Clear bilaterally without rales, wheezing or rhonchi  Heart: Regular rate and rhythm. No Murmur.  Abdomen: Bowel sounds present and normoactive. Soft. Nondistended  Extremities: No cyanosis or clubbing. No edema.  right groin no erythema, no edema no crepitance   right foot dressed surgical site clean  Skin: Warm. Dry. Good turgor. No rash. No vasculitic stigmata.          Laboratory & Imaging Data--  CBC                        10.9   8.64  )-----------( 317      ( 18 Nov 2022 06:09 )             33.8       Chemistries  11-18    139  |  104  |  20  ----------------------------<  138<H>  4.8   |  25  |  1.26    Ca    9.8      18 Nov 2022 06:08  Phos  4.4     11-18  Mg     1.8     11-18        Culture Data    Culture - Blood (collected 17 Nov 2022 06:00)  Source: .Blood Blood-Peripheral  Preliminary Report (18 Nov 2022 12:01):    No growth to date.    Culture - Blood (collected 17 Nov 2022 03:23)  Source: .Blood Blood-Peripheral  Preliminary Report (18 Nov 2022 09:02):    No growth to date.            Urinalysis + Microscopic Examination (11.16.22 @ 15:24)   Color: Light Yellow   Glucose Qualitative, Urine: Negative   Blood, Urine: Negative   Urine Appearance: Clear   Urobilinogen: Negative   Specific Gravity: 1.013   Protein, Urine: Negative   pH Urine: 7.5   Leukocyte Esterase Concentration: Negative   Nitrite: Negative   Ketone - Urine: Negative   Bilirubin: Negative   Red Blood Cell - Urine: 2 /hpf   White Blood Cell - Urine: 0 /HPF   Epithelial Cells: 0 /hpf   Hyaline Casts: 2 /lpf   Bacteria: Negative < from: Xray Foot AP + Lateral + Oblique, Right (11.17.22 @ 14:19) >  VIEWS: 3  IMAGES: 3    COMPARISON: 11/4/2022    FINDINGS:    OSSEOUS STRUCTURES    Fractures:  None.    Postoperative Changes: Amputation is again seen through the 1st   metatarsal neck. There is some indistinctness of the resected margins and   new erosive type change along the medial margin. 5th toe amputation is   again seen. There is osseous rarefaction of the 5th metatarsal head with   erosions along the volar margin of the metatarsal neck.    JOINTS    Joint Space(s):  Maintained.    SOFT TISSUES:  Severe atherosclerotic calcifications arepresent.    IMPRESSION:  1.  Amputation is again seen through the 1st metatarsal neck and there is   amputation of the 5th toe.  2.  Indistinctness and erosive changes of the 1st metatarsal stump and   5th metatarsal head are concerning for osteomyelitis.  3.  Consider MRI for further evaluation as warranted.    < end of copied text >  < from: CT Abdomen and Pelvis w/ IV Cont (11.16.22 @ 17:05) >    INTERPRETATION:  CLINICAL INFORMATION: Lethargy. Hypoxia. Abdominal   tenderness    COMPARISON: CTA runoff study abdomen/pelvis 9/8/2022    CONTRAST/COMPLICATIONS:  IV Contrast: Omnipaque 350   90 cc administered   10 cc discarded  Oral Contrast: NONE  Complications: None reported at time of study completion    PROCEDURE:  CT Angiography of the Chest was performed followed by portal venous phase   imaging of the Abdomen and Pelvis.  Sagittal and coronal reformats were performed as well as 3D (MIP)   reconstructions.    FINDINGS:  CHEST:  LUNGS AND LARGE AIRWAYS:Patent central airways. Bibasilar linear   atelectasis.  PLEURA: No pleural effusion.  VESSELS: Aortic and coronary artery calcifications. No main, left, right,   lobar, segmental or subsegmental pulmonary embolism.  HEART: Heart size is normal. No pericardial effusion.  MEDIASTINUM AND CLARA: No lymphadenopathy.  CHEST WALL AND LOWER NECK: Within normal limits.    ABDOMEN AND PELVIS:  LIVER: Within normal limits.  BILE DUCTS: Normal caliber.  GALLBLADDER: Within normal limits.  SPLEEN: Within normal limits.  PANCREAS: Fatty atrophy of the pancreas.  ADRENALS: Within normal limits.  KIDNEYS/URETERS: Subcentimeter hypodensity midpole right kidney   anteriorly to small to characterize. No hydronephrosis.    BLADDER: Punctate foci of air noted within the bladder.  REPRODUCTIVE ORGANS: Prostate within normal limits.    BOWEL: Mild rectal wall thickening, similar to the prior study. No bowel   obstruction. Appendix is normal.  PERITONEUM: No ascites.  VESSELS: Extensive atherosclerotic changes.Status post right partially   visualized patent bypass graft.  RETROPERITONEUM/LYMPH NODES: No lymphadenopathy.  ABDOMINAL WALL: Bilateral inguinal postsurgical changes overlying the   common femoral artery bifurcations. There is a subcutaneous simple fluid   collection overlying the right bypass graft, measuring 2.4 x 3.0 x 2.0 cm.  BONES: Degenerative changes.    IMPRESSION:  No pulmonary embolism.    Status post right femoral artery bypass graft with small subcutaneous   fluid collection overlying the surgical site.    Punctate foci of air noted within the bladder which may be due to recent   instrumentation, correlate with clinical history.    < end of copied text >  < from: CT Lower Extremity w/wo IV Cont, Right (11.16.22 @ 17:04) >  node measuring 2.3 x 1.9 cm.    IMPRESSION:    Extensive subcutaneous edema in the right thigh, greatest laterally; no   discrete hematoma.    < end of copied text >  < from: CT Angio Chest PE Protocol w/ IV Cont (11.16.22 @ 17:04) >  PERITONEUM: No ascites.  VESSELS: Extensive atherosclerotic changes.Status post right partially   visualized patent bypass graft.  RETROPERITONEUM/LYMPH NODES: No lymphadenopathy.  ABDOMINAL WALL: Bilateral inguinal postsurgical changes overlying the   common femoral artery bifurcations. There is a subcutaneous simple fluid   collection overlying the right bypass graft, measuring 2.4 x 3.0 x 2.0 cm.  BONES: Degenerative changes.    IMPRESSION:  No pulmonary embolism.    Status post right femoral artery bypass graft with small subcutaneous   fluid collection overlying the surgical site.    Punctate foci of air noted within the bladder which may be due to recent   instrumentation, correlate with clinical history.    < end of copied text >

## 2022-11-19 LAB
ANION GAP SERPL CALC-SCNC: 9 MMOL/L — SIGNIFICANT CHANGE UP (ref 5–17)
BUN SERPL-MCNC: 20 MG/DL — SIGNIFICANT CHANGE UP (ref 7–23)
CALCIUM SERPL-MCNC: 9.1 MG/DL — SIGNIFICANT CHANGE UP (ref 8.4–10.5)
CHLORIDE SERPL-SCNC: 104 MMOL/L — SIGNIFICANT CHANGE UP (ref 96–108)
CO2 SERPL-SCNC: 26 MMOL/L — SIGNIFICANT CHANGE UP (ref 22–31)
CREAT SERPL-MCNC: 1.49 MG/DL — HIGH (ref 0.5–1.3)
EGFR: 49 ML/MIN/1.73M2 — LOW
GLUCOSE BLDC GLUCOMTR-MCNC: 130 MG/DL — HIGH (ref 70–99)
GLUCOSE BLDC GLUCOMTR-MCNC: 135 MG/DL — HIGH (ref 70–99)
GLUCOSE BLDC GLUCOMTR-MCNC: 153 MG/DL — HIGH (ref 70–99)
GLUCOSE BLDC GLUCOMTR-MCNC: 175 MG/DL — HIGH (ref 70–99)
GLUCOSE SERPL-MCNC: 216 MG/DL — HIGH (ref 70–99)
HCT VFR BLD CALC: 31.3 % — LOW (ref 39–50)
HGB BLD-MCNC: 9.8 G/DL — LOW (ref 13–17)
MAGNESIUM SERPL-MCNC: 1.9 MG/DL — SIGNIFICANT CHANGE UP (ref 1.6–2.6)
MCHC RBC-ENTMCNC: 29.8 PG — SIGNIFICANT CHANGE UP (ref 27–34)
MCHC RBC-ENTMCNC: 31.3 GM/DL — LOW (ref 32–36)
MCV RBC AUTO: 95.1 FL — SIGNIFICANT CHANGE UP (ref 80–100)
MRSA PCR RESULT.: SIGNIFICANT CHANGE UP
NRBC # BLD: 0 /100 WBCS — SIGNIFICANT CHANGE UP (ref 0–0)
PHOSPHATE SERPL-MCNC: 3.1 MG/DL — SIGNIFICANT CHANGE UP (ref 2.5–4.5)
PLATELET # BLD AUTO: 287 K/UL — SIGNIFICANT CHANGE UP (ref 150–400)
POTASSIUM SERPL-MCNC: 4 MMOL/L — SIGNIFICANT CHANGE UP (ref 3.5–5.3)
POTASSIUM SERPL-SCNC: 4 MMOL/L — SIGNIFICANT CHANGE UP (ref 3.5–5.3)
RBC # BLD: 3.29 M/UL — LOW (ref 4.2–5.8)
RBC # FLD: 16.8 % — HIGH (ref 10.3–14.5)
S AUREUS DNA NOSE QL NAA+PROBE: SIGNIFICANT CHANGE UP
SODIUM SERPL-SCNC: 139 MMOL/L — SIGNIFICANT CHANGE UP (ref 135–145)
WBC # BLD: 8.86 K/UL — SIGNIFICANT CHANGE UP (ref 3.8–10.5)
WBC # FLD AUTO: 8.86 K/UL — SIGNIFICANT CHANGE UP (ref 3.8–10.5)

## 2022-11-19 RX ORDER — MAGNESIUM SULFATE 500 MG/ML
2 VIAL (ML) INJECTION ONCE
Refills: 0 | Status: COMPLETED | OUTPATIENT
Start: 2022-11-19 | End: 2022-11-19

## 2022-11-19 RX ORDER — ACETAMINOPHEN 500 MG
1000 TABLET ORAL ONCE
Refills: 0 | Status: COMPLETED | OUTPATIENT
Start: 2022-11-19 | End: 2022-11-19

## 2022-11-19 RX ORDER — HYDRALAZINE HCL 50 MG
10 TABLET ORAL ONCE
Refills: 0 | Status: COMPLETED | OUTPATIENT
Start: 2022-11-19 | End: 2022-11-19

## 2022-11-19 RX ORDER — INFLUENZA VIRUS VACCINE 15; 15; 15; 15 UG/.5ML; UG/.5ML; UG/.5ML; UG/.5ML
0.7 SUSPENSION INTRAMUSCULAR ONCE
Refills: 0 | Status: COMPLETED | OUTPATIENT
Start: 2022-11-19 | End: 2022-11-21

## 2022-11-19 RX ORDER — ACETAMINOPHEN 500 MG
650 TABLET ORAL EVERY 6 HOURS
Refills: 0 | Status: DISCONTINUED | OUTPATIENT
Start: 2022-11-19 | End: 2022-11-21

## 2022-11-19 RX ADMIN — PIPERACILLIN AND TAZOBACTAM 25 GRAM(S): 4; .5 INJECTION, POWDER, LYOPHILIZED, FOR SOLUTION INTRAVENOUS at 22:02

## 2022-11-19 RX ADMIN — Medication 25 GRAM(S): at 20:24

## 2022-11-19 RX ADMIN — Medication 400 MILLIGRAM(S): at 06:58

## 2022-11-19 RX ADMIN — MEMANTINE HYDROCHLORIDE 10 MILLIGRAM(S): 10 TABLET ORAL at 16:56

## 2022-11-19 RX ADMIN — Medication 1000 MILLIGRAM(S): at 07:15

## 2022-11-19 RX ADMIN — Medication 2: at 16:56

## 2022-11-19 RX ADMIN — Medication 10 MILLIGRAM(S): at 09:26

## 2022-11-19 RX ADMIN — PANTOPRAZOLE SODIUM 40 MILLIGRAM(S): 20 TABLET, DELAYED RELEASE ORAL at 05:44

## 2022-11-19 RX ADMIN — PREGABALIN 1000 MICROGRAM(S): 225 CAPSULE ORAL at 11:52

## 2022-11-19 RX ADMIN — SODIUM CHLORIDE 40 MILLILITER(S): 9 INJECTION, SOLUTION INTRAVENOUS at 05:41

## 2022-11-19 RX ADMIN — ATORVASTATIN CALCIUM 80 MILLIGRAM(S): 80 TABLET, FILM COATED ORAL at 21:03

## 2022-11-19 RX ADMIN — MEMANTINE HYDROCHLORIDE 10 MILLIGRAM(S): 10 TABLET ORAL at 05:41

## 2022-11-19 RX ADMIN — Medication 50 MILLIGRAM(S): at 00:53

## 2022-11-19 RX ADMIN — Medication 50 MILLIGRAM(S): at 11:52

## 2022-11-19 RX ADMIN — QUETIAPINE FUMARATE 25 MILLIGRAM(S): 200 TABLET, FILM COATED ORAL at 11:52

## 2022-11-19 RX ADMIN — Medication 650 MILLIGRAM(S): at 12:22

## 2022-11-19 RX ADMIN — Medication 650 MILLIGRAM(S): at 11:52

## 2022-11-19 RX ADMIN — PIPERACILLIN AND TAZOBACTAM 25 GRAM(S): 4; .5 INJECTION, POWDER, LYOPHILIZED, FOR SOLUTION INTRAVENOUS at 14:01

## 2022-11-19 RX ADMIN — ENOXAPARIN SODIUM 40 MILLIGRAM(S): 100 INJECTION SUBCUTANEOUS at 06:24

## 2022-11-19 RX ADMIN — Medication 2: at 14:01

## 2022-11-19 RX ADMIN — DULOXETINE HYDROCHLORIDE 60 MILLIGRAM(S): 30 CAPSULE, DELAYED RELEASE ORAL at 11:52

## 2022-11-19 RX ADMIN — PIPERACILLIN AND TAZOBACTAM 25 GRAM(S): 4; .5 INJECTION, POWDER, LYOPHILIZED, FOR SOLUTION INTRAVENOUS at 05:41

## 2022-11-19 RX ADMIN — Medication 81 MILLIGRAM(S): at 11:52

## 2022-11-19 NOTE — PROVIDER CONTACT NOTE (OTHER) - ACTION/TREATMENT ORDERED:
MD Smlal notified and aware. PO medication marked as not done due to patient not taking them. no further intervention at this time
Medication administered as ordered.
Vascular Surgery notified and aware. Metopolol not administered due to parameter. Hydralazine to be ordered for blood pressure

## 2022-11-19 NOTE — PATIENT PROFILE ADULT - CHOOSE INDICATION TO IMMUNIZE (AN ORDER WILL BE GENERATED WHEN THIS NOTE IS SAVED):
Patient: Doretha Menjivar 57 year old female    MRN: [id number]    Surgeon(s): Ollie Chin MD  Phone Number: [phone]                       Surgeon(s) and Role:     * Ollie Chin MD - Primary    Pre-Op Diagnosis:  Panniculitis supra and infraumbilical status post massive weight loss and bilateral breast deformity     Post-Op Diagnosis:  Panniculitis, super and infraumbilical status post massive weight loss , bilateral breast deformity     Procedure:  Cosmetic improvement of breast with excision of significant excess tissue revision of prior breast surgery including fat grafting to both breasts from abdomen.  Supra and infraumbilical panniculectomy    Anesthesia Type: General                                   Complications: None    Findings: See description of operation for findings.     Specimens Removed: No specimens collected during this procedure.     Estimated Blood Loss: 150 mL    Assistant Tasks: The plastic surgery nurse practitioner,Martina Rossi, was the first assistant for this procedure. She has specific training and experience as a surgical assistant in plastic and reconstructive surgery. This includes working knowledge of human anatomy, which is imperative to facilitate the safest experience for the patient that is possible. Utilization of a nurse practitioner as first assist was critical in the timely completion of this procedure, assisting with all aspects of patient care. This includes, but is not limited to, assisting with positioning of the patient, applying sterile drapes, retraction of soft tissues, exposure to aid in surgical dissection and hemostasis, suctioning of fluids and protection of vulnerable tissue. In addition, she was able to assist with wound closure and application of dressing(s) and overall shortening the length of time for this procedure.The technical complexity of this procedure required experience in plastic surgery techniques and could not be provided by the  surgical technologist who was involved in instrument passing.     Implants:   Implant Name Type Inv. Item Serial No.  Lot No. LRB No. Used Action   APPLIER INT CLP MED 11IN 30 LIGACLIP - S. Other Implant APPLIER INT CLP MED 11IN 30 LIGACLIP . [employer]IVI [employer]; VIVI [id number] N/A 1 Implanted       Description of Operation:      The patient was taken to the operating room and placed supine on the operating table. Appropriate monitors were placed. The patient underwent anesthesia as described above.  The abdomen and breasts are prepped and draped in the usual, sterile fashion.  She had been marked by the operating surgeon the preoperative holding area.  Surgical time-out is taken.  I infiltrated the central abdomen through a stab incision just above the umbilicus as well as incisions along the lower abdominal fold with tumescent liposuction fluid.  After this was allowed to set up liposuction was performed into AD and C canister.  This was then placed through a filter and the fat was then processed 210 cc syringes.  Has that was being performed I did both breasts in a similar fashion incising from the midpoint of the breast along the inframammary fold out into the posterior axilla.  Subcutaneous tissues were dissected with the Bovie cautery.  I preserved the chest attachments of the tissue in the region of the breast but once I was out beyond the anterior axillary line and then elevated this tissue off of the chest wall.  The excess skin and fat are then excised lateral to the breast down to the operating room table.  In the breast itself the tissue was elevated from off the chest wall as well as the skin leaving its attachments to the superior breast and then tucked underneath the breast to help of the breast with more volume in those regions.  Hemostasis is achieved using the Bovie cautery.  Wounds were irrigated with normal saline.  They were then closed with interrupted simple sutures of 2-0 Vicryl as well  as interrupted inverted simple sutures of 3-0 Vicryl in the deep dermis.  Fat grafting is then performed in the each breast with 220 cc into the left breast in 200 into the right breast using a YOHO fat injection cannula with small aliquots through multiple passes.  At that point I felt that I as much fat as could safely be placed having used multiple levels for this injection.  Through a separate stab and in which were 15 Trinidadian hubless Reji drains lateral to the incision and sutured in place with 2-0 silk.    Attention is then directed to the abdomen where transverse incision is made.  Subcutaneous tissues at the level just above the pubic hairline are then dissected perpendicular down to the abdominal wall.  I then left a small layer of fat on the abdominal wall and dissected superiorly.  Periumbilical incision is made in the stock is dissected.  The stalk is preserved as it continued elevating the tissues up to the costal margin.  Hemostasis is achieved with Bovie cautery as well as surgical clips.  I was able to excise all the tissue to about 2 cm above the umbilicus.  Eyes also to able to excise about 3 or 4 cm of pubic hair-bearing skin and subcutaneous tissue.  Once again hemostasis which was achieved using the Bovie cautery.  They were irrigated with normal saline.  A 19 Trinidadian hubless Reji drains were brought out through separate stab incisions and sutured in place with 2-0 silk.  1. Strata fix and 2-0 Vicryl sutures were used to close Chau's fascia.  Deep dermis was closed with interrupted inverted simple sutures of 3-0 Vicryl.  The skin is then closed with running subcuticular suture of 2-0 strata fix.  Weighted the specimen was 1.8 kg.  I the umbilicus is inset into a going incision and sutured in place with interrupted inverted simple sutures of 3-0 Vicryl followed by running 6 0 Prolene.  Antibiotic ointment and sterile dressings are applied to the umbilicus.  The abdominal incision had  Mastisol Steri-Strips and sterile gauze dressing.    The patient tolerated the procedure well. The patient was taken to the postanesthesia care unit in the awake, stable condition.                         Patient is not pregnant (male or female)

## 2022-11-19 NOTE — PROVIDER CONTACT NOTE (OTHER) - SITUATION
patient is refusing to take PO 06:00 medication Nemenda and protonix
Patients blood pressure 194/76
/72

## 2022-11-19 NOTE — PROGRESS NOTE ADULT - ASSESSMENT
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    1. Acute metabolic encephalopathy , w/ underlying dementia  2. Right Groin Collection possible abscess   3. 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 Pseudomonas UTI  -admitted vascular surg service; recent right fem pop bypass 11/01  -currently afebrile hemodynamically stable , no leukocytosis   -BC x 2; CT noted with groin collection;   -C/w zosyn   -Urine culture + for pseudomonas   -ID Recs appreciated   -remaining plan per primary vascular service    4. Alzheimer's dementia (minimally verbal & requires full assistance w/ ADLs)  -memantine and quetiapine     5. 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 CAD s/p stent (GELA to RCA in 2018), HTN, HLD, T2DM c/b peripheral neuropathy  BP elevated; toprol 50 mg daily; start hydralazine 10 mg po TID  aspirin enteric coated 81 milliGRAM(s) Oral daily  atorvastatin 80 milliGRAM(s) Oral at bedtime  DULoxetine 60 milliGRAM(s) Oral daily  insulin lispro (ADMELOG) corrective regimen sliding scale   SubCutaneous every 6 hours  QUEtiapine 25 milliGRAM(s) Oral daily    7. Anxiety   valproate sodium  IVPB 125 milliGRAM(s) IV Intermittent two times a day  -on xanax at qhs prn; start inpatient to prevent withdrawal.    8. DOLORES but non-compliant w/ CPAP, bladder CA s/p laser ablation    dvt ppx: lovenox   gi ppx: diet; ppi         Johnny Lam MD   [facility]  [phone]

## 2022-11-19 NOTE — PROGRESS NOTE ADULT - ASSESSMENT
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     PLAN:   - regular diet  -appreciate pods recs  - IV abx, vanc/zosyn   - f/u blood, urine cx  -F/U home depakote dosing with wife --- reported overnight that patient not taking anymore    Vascular Surgery   [id number]

## 2022-11-19 NOTE — PROVIDER CONTACT NOTE (OTHER) - BACKGROUND
pmhx AMS, orthostatic hypotension, ALEAX, DOLORES, CAD, DM, PAD, peripheral neuropathy, cancer of bladder
patient admitted from ED patient had experienced high blood pressure in ED before coming to [address] [place] [address].
Change of mental status

## 2022-11-19 NOTE — PROGRESS NOTE ADULT - ASSESSMENT
72m with dementia, cad diabetes, htn, hld, pad, right foot partial amputation and right fem bypass here with lethargy  r/o infection v progression of disease  ct scan with fluid collection    plan  pt afebrile, nontoxic  minimal leukocytosis which resolved  and hemodynamically stable   blood cx neg to date  ua without pyuria and urine culture with low colony counts of pseudomonas- not consistent w/ UTI  cxr no focal consolidation   cta no pe   ct with soft tissue swelling and subq collection  suspect post surgical changes and less likely  abscess  ok to continue zosyn for now  would check ultrasound to further evaluate    Vargas Gaming M.D.  [facility], Division of Infectious Diseases  [phone]  After 5pm on weekdays and all day on weekends - please call [phone]

## 2022-11-19 NOTE — PROVIDER CONTACT NOTE (OTHER) - ASSESSMENT
No signs and symptoms of hypertension noted
Patients blood pressure 194/76. patient is AOx1 at baseline, no s/s of chest pain/discomfort noted.
patient is refusing to take PO 06:00 medication Nemenda and protonix

## 2022-11-19 NOTE — PATIENT PROFILE ADULT - FALL HARM RISK - HARM RISK INTERVENTIONS

## 2022-11-20 LAB
ANION GAP SERPL CALC-SCNC: 9 MMOL/L — SIGNIFICANT CHANGE UP (ref 5–17)
BUN SERPL-MCNC: 20 MG/DL — SIGNIFICANT CHANGE UP (ref 7–23)
CALCIUM SERPL-MCNC: 9.2 MG/DL — SIGNIFICANT CHANGE UP (ref 8.4–10.5)
CHLORIDE SERPL-SCNC: 106 MMOL/L — SIGNIFICANT CHANGE UP (ref 96–108)
CO2 SERPL-SCNC: 25 MMOL/L — SIGNIFICANT CHANGE UP (ref 22–31)
CREAT SERPL-MCNC: 1.4 MG/DL — HIGH (ref 0.5–1.3)
EGFR: 53 ML/MIN/1.73M2 — LOW
GLUCOSE BLDC GLUCOMTR-MCNC: 141 MG/DL — HIGH (ref 70–99)
GLUCOSE BLDC GLUCOMTR-MCNC: 143 MG/DL — HIGH (ref 70–99)
GLUCOSE BLDC GLUCOMTR-MCNC: 159 MG/DL — HIGH (ref 70–99)
GLUCOSE BLDC GLUCOMTR-MCNC: 186 MG/DL — HIGH (ref 70–99)
GLUCOSE SERPL-MCNC: 145 MG/DL — HIGH (ref 70–99)
HCT VFR BLD CALC: 31.8 % — LOW (ref 39–50)
HGB BLD-MCNC: 10 G/DL — LOW (ref 13–17)
MAGNESIUM SERPL-MCNC: 2.2 MG/DL — SIGNIFICANT CHANGE UP (ref 1.6–2.6)
MCHC RBC-ENTMCNC: 30 PG — SIGNIFICANT CHANGE UP (ref 27–34)
MCHC RBC-ENTMCNC: 31.4 GM/DL — LOW (ref 32–36)
MCV RBC AUTO: 95.5 FL — SIGNIFICANT CHANGE UP (ref 80–100)
NRBC # BLD: 0 /100 WBCS — SIGNIFICANT CHANGE UP (ref 0–0)
PHOSPHATE SERPL-MCNC: 3.2 MG/DL — SIGNIFICANT CHANGE UP (ref 2.5–4.5)
PLATELET # BLD AUTO: 275 K/UL — SIGNIFICANT CHANGE UP (ref 150–400)
POTASSIUM SERPL-MCNC: 4.1 MMOL/L — SIGNIFICANT CHANGE UP (ref 3.5–5.3)
POTASSIUM SERPL-SCNC: 4.1 MMOL/L — SIGNIFICANT CHANGE UP (ref 3.5–5.3)
RBC # BLD: 3.33 M/UL — LOW (ref 4.2–5.8)
RBC # FLD: 16.3 % — HIGH (ref 10.3–14.5)
SODIUM SERPL-SCNC: 140 MMOL/L — SIGNIFICANT CHANGE UP (ref 135–145)
WBC # BLD: 9.69 K/UL — SIGNIFICANT CHANGE UP (ref 3.8–10.5)
WBC # FLD AUTO: 9.69 K/UL — SIGNIFICANT CHANGE UP (ref 3.8–10.5)

## 2022-11-20 RX ORDER — ALPRAZOLAM 0.25 MG
0.25 TABLET ORAL ONCE
Refills: 0 | Status: DISCONTINUED | OUTPATIENT
Start: 2022-11-20 | End: 2022-11-20

## 2022-11-20 RX ADMIN — Medication 50 MILLIGRAM(S): at 11:04

## 2022-11-20 RX ADMIN — ATORVASTATIN CALCIUM 80 MILLIGRAM(S): 80 TABLET, FILM COATED ORAL at 21:14

## 2022-11-20 RX ADMIN — Medication 50 MILLIGRAM(S): at 00:00

## 2022-11-20 RX ADMIN — PIPERACILLIN AND TAZOBACTAM 25 GRAM(S): 4; .5 INJECTION, POWDER, LYOPHILIZED, FOR SOLUTION INTRAVENOUS at 13:21

## 2022-11-20 RX ADMIN — Medication 650 MILLIGRAM(S): at 23:51

## 2022-11-20 RX ADMIN — Medication 650 MILLIGRAM(S): at 11:34

## 2022-11-20 RX ADMIN — Medication 650 MILLIGRAM(S): at 17:11

## 2022-11-20 RX ADMIN — ENOXAPARIN SODIUM 40 MILLIGRAM(S): 100 INJECTION SUBCUTANEOUS at 06:24

## 2022-11-20 RX ADMIN — Medication 650 MILLIGRAM(S): at 11:04

## 2022-11-20 RX ADMIN — Medication 650 MILLIGRAM(S): at 00:00

## 2022-11-20 RX ADMIN — Medication 50 MILLIGRAM(S): at 23:50

## 2022-11-20 RX ADMIN — QUETIAPINE FUMARATE 25 MILLIGRAM(S): 200 TABLET, FILM COATED ORAL at 11:05

## 2022-11-20 RX ADMIN — Medication 0.25 MILLIGRAM(S): at 14:38

## 2022-11-20 RX ADMIN — DULOXETINE HYDROCHLORIDE 60 MILLIGRAM(S): 30 CAPSULE, DELAYED RELEASE ORAL at 11:04

## 2022-11-20 RX ADMIN — MEMANTINE HYDROCHLORIDE 10 MILLIGRAM(S): 10 TABLET ORAL at 06:23

## 2022-11-20 RX ADMIN — PREGABALIN 1000 MICROGRAM(S): 225 CAPSULE ORAL at 11:04

## 2022-11-20 RX ADMIN — Medication 81 MILLIGRAM(S): at 11:04

## 2022-11-20 RX ADMIN — MEMANTINE HYDROCHLORIDE 10 MILLIGRAM(S): 10 TABLET ORAL at 17:11

## 2022-11-20 RX ADMIN — Medication 650 MILLIGRAM(S): at 17:41

## 2022-11-20 RX ADMIN — PIPERACILLIN AND TAZOBACTAM 25 GRAM(S): 4; .5 INJECTION, POWDER, LYOPHILIZED, FOR SOLUTION INTRAVENOUS at 21:14

## 2022-11-20 RX ADMIN — Medication 2: at 12:27

## 2022-11-20 RX ADMIN — PIPERACILLIN AND TAZOBACTAM 25 GRAM(S): 4; .5 INJECTION, POWDER, LYOPHILIZED, FOR SOLUTION INTRAVENOUS at 06:24

## 2022-11-20 RX ADMIN — Medication 650 MILLIGRAM(S): at 01:00

## 2022-11-20 RX ADMIN — PANTOPRAZOLE SODIUM 40 MILLIGRAM(S): 20 TABLET, DELAYED RELEASE ORAL at 06:23

## 2022-11-20 NOTE — PROGRESS NOTE ADULT - ASSESSMENT
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    1. Acute metabolic encephalopathy , w/ underlying dementia  2. Right Groin Collection possible abscess   3. 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 Pseudomonas UTI  -admitted vascular surg service; recent right fem pop bypass 11/01  -currently afebrile hemodynamically stable , no leukocytosis   -BC x 2; CT noted with groin collection;   -C/w zosyn   -Urine culture + for pseudomonas   -ID Recs appreciated   -remaining plan per primary vascular service    4. Alzheimer's dementia (minimally verbal & requires full assistance w/ ADLs)  -memantine and quetiapine     5. 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 CAD s/p stent (GELA to RCA in 2018), HTN, HLD, T2DM c/b peripheral neuropathy  BP ok; toprol 50 mg daily;   aspirin enteric coated 81 milliGRAM(s) Oral daily  atorvastatin 80 milliGRAM(s) Oral at bedtime  DULoxetine 60 milliGRAM(s) Oral daily  insulin lispro (ADMELOG) corrective regimen sliding scale   SubCutaneous every 6 hours  QUEtiapine 25 milliGRAM(s) Oral daily    7. Anxiety   valproate sodium  IVPB 125 milliGRAM(s) IV Intermittent two times a day  -on xanax at qhs prn; start inpatient to prevent withdrawal.    8. DOLORES but non-compliant w/ CPAP, bladder CA s/p laser ablation    dvt ppx: lovenox   gi ppx: diet; ppi         Johnny Lam MD   [facility]  [phone]

## 2022-11-20 NOTE — PROGRESS NOTE ADULT - ASSESSMENT
72m with dementia, cad diabetes, htn, hld, pad, right foot partial amputation and right fem bypass here with lethargy  r/o infection v progression of disease  ct scan with fluid collection    plan  pt afebrile, nontoxic  minimal leukocytosis which resolved  and hemodynamically stable   blood cx neg to date  ua without pyuria and urine culture with low colony counts of pseudomonas- not consistent w/ UTI  cxr no focal consolidation   cta no pe   ct with soft tissue swelling and subq collection  suspect post surgical changes and less likely  abscess  ok to continue zosyn for now  would check ultrasound to further evaluate    Vargas Gaming M.D.  [facility], Division of Infectious Diseases  [phone]  After 5pm on weekdays and all day on weekends - please call [phone]

## 2022-11-20 NOTE — PROGRESS NOTE ADULT - ASSESSMENT
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     PLAN:   - regular diet  -appreciate pods recs  - IV abx: zosyn   - f/u blood, urine cx  -F/U home depakote dosing with wife --- reported overnight that patient not taking anymore  -ID rec'd groin US to assess surgical site, f/u US tomorrow    Vascular Surgery   [id number]

## 2022-11-21 ENCOUNTER — TRANSCRIPTION ENCOUNTER (OUTPATIENT)
Age: 72
End: 2022-11-21

## 2022-11-21 VITALS
SYSTOLIC BLOOD PRESSURE: 127 MMHG | RESPIRATION RATE: 18 BRPM | DIASTOLIC BLOOD PRESSURE: 65 MMHG | HEART RATE: 51 BPM | TEMPERATURE: 98 F | OXYGEN SATURATION: 98 %

## 2022-11-21 LAB
ANION GAP SERPL CALC-SCNC: 11 MMOL/L — SIGNIFICANT CHANGE UP (ref 5–17)
BUN SERPL-MCNC: 21 MG/DL — SIGNIFICANT CHANGE UP (ref 7–23)
CALCIUM SERPL-MCNC: 9.2 MG/DL — SIGNIFICANT CHANGE UP (ref 8.4–10.5)
CHLORIDE SERPL-SCNC: 106 MMOL/L — SIGNIFICANT CHANGE UP (ref 96–108)
CO2 SERPL-SCNC: 21 MMOL/L — LOW (ref 22–31)
CREAT SERPL-MCNC: 1.48 MG/DL — HIGH (ref 0.5–1.3)
EGFR: 50 ML/MIN/1.73M2 — LOW
GLUCOSE BLDC GLUCOMTR-MCNC: 132 MG/DL — HIGH (ref 70–99)
GLUCOSE BLDC GLUCOMTR-MCNC: 179 MG/DL — HIGH (ref 70–99)
GLUCOSE BLDC GLUCOMTR-MCNC: 253 MG/DL — HIGH (ref 70–99)
GLUCOSE SERPL-MCNC: 124 MG/DL — HIGH (ref 70–99)
HCT VFR BLD CALC: 33 % — LOW (ref 39–50)
HGB BLD-MCNC: 10.5 G/DL — LOW (ref 13–17)
MAGNESIUM SERPL-MCNC: 2 MG/DL — SIGNIFICANT CHANGE UP (ref 1.6–2.6)
MCHC RBC-ENTMCNC: 30 PG — SIGNIFICANT CHANGE UP (ref 27–34)
MCHC RBC-ENTMCNC: 31.8 GM/DL — LOW (ref 32–36)
MCV RBC AUTO: 94.3 FL — SIGNIFICANT CHANGE UP (ref 80–100)
NRBC # BLD: 0 /100 WBCS — SIGNIFICANT CHANGE UP (ref 0–0)
PHOSPHATE SERPL-MCNC: 2.9 MG/DL — SIGNIFICANT CHANGE UP (ref 2.5–4.5)
PLATELET # BLD AUTO: 285 K/UL — SIGNIFICANT CHANGE UP (ref 150–400)
POTASSIUM SERPL-MCNC: 4.7 MMOL/L — SIGNIFICANT CHANGE UP (ref 3.5–5.3)
POTASSIUM SERPL-SCNC: 4.7 MMOL/L — SIGNIFICANT CHANGE UP (ref 3.5–5.3)
RBC # BLD: 3.5 M/UL — LOW (ref 4.2–5.8)
RBC # FLD: 16.3 % — HIGH (ref 10.3–14.5)
SODIUM SERPL-SCNC: 138 MMOL/L — SIGNIFICANT CHANGE UP (ref 135–145)
WBC # BLD: 10.32 K/UL — SIGNIFICANT CHANGE UP (ref 3.8–10.5)
WBC # FLD AUTO: 10.32 K/UL — SIGNIFICANT CHANGE UP (ref 3.8–10.5)

## 2022-11-21 PROCEDURE — 87641 MR-STAPH DNA AMP PROBE: CPT

## 2022-11-21 PROCEDURE — 87040 BLOOD CULTURE FOR BACTERIA: CPT

## 2022-11-21 PROCEDURE — 86901 BLOOD TYPING SEROLOGIC RH(D): CPT

## 2022-11-21 PROCEDURE — 80053 COMPREHEN METABOLIC PANEL: CPT

## 2022-11-21 PROCEDURE — 85730 THROMBOPLASTIN TIME PARTIAL: CPT

## 2022-11-21 PROCEDURE — 71275 CT ANGIOGRAPHY CHEST: CPT | Mod: MA

## 2022-11-21 PROCEDURE — 87186 SC STD MICRODIL/AGAR DIL: CPT

## 2022-11-21 PROCEDURE — 96374 THER/PROPH/DIAG INJ IV PUSH: CPT

## 2022-11-21 PROCEDURE — 71045 X-RAY EXAM CHEST 1 VIEW: CPT

## 2022-11-21 PROCEDURE — 85025 COMPLETE CBC W/AUTO DIFF WBC: CPT

## 2022-11-21 PROCEDURE — 90662 IIV NO PRSV INCREASED AG IM: CPT

## 2022-11-21 PROCEDURE — 74177 CT ABD & PELVIS W/CONTRAST: CPT | Mod: MA

## 2022-11-21 PROCEDURE — 80048 BASIC METABOLIC PNL TOTAL CA: CPT

## 2022-11-21 PROCEDURE — 86850 RBC ANTIBODY SCREEN: CPT

## 2022-11-21 PROCEDURE — 76937 US GUIDE VASCULAR ACCESS: CPT

## 2022-11-21 PROCEDURE — 86900 BLOOD TYPING SEROLOGIC ABO: CPT

## 2022-11-21 PROCEDURE — 87640 STAPH A DNA AMP PROBE: CPT

## 2022-11-21 PROCEDURE — 0225U NFCT DS DNA&RNA 21 SARSCOV2: CPT

## 2022-11-21 PROCEDURE — 36415 COLL VENOUS BLD VENIPUNCTURE: CPT

## 2022-11-21 PROCEDURE — 82962 GLUCOSE BLOOD TEST: CPT

## 2022-11-21 PROCEDURE — 84443 ASSAY THYROID STIM HORMONE: CPT

## 2022-11-21 PROCEDURE — 73702 CT LWR EXTREMITY W/O&W/DYE: CPT | Mod: MA

## 2022-11-21 PROCEDURE — 85610 PROTHROMBIN TIME: CPT

## 2022-11-21 PROCEDURE — 83735 ASSAY OF MAGNESIUM: CPT

## 2022-11-21 PROCEDURE — 99285 EMERGENCY DEPT VISIT HI MDM: CPT | Mod: 25

## 2022-11-21 PROCEDURE — 84100 ASSAY OF PHOSPHORUS: CPT

## 2022-11-21 PROCEDURE — 70450 CT HEAD/BRAIN W/O DYE: CPT | Mod: MA

## 2022-11-21 PROCEDURE — 81001 URINALYSIS AUTO W/SCOPE: CPT

## 2022-11-21 PROCEDURE — 73630 X-RAY EXAM OF FOOT: CPT

## 2022-11-21 PROCEDURE — 87086 URINE CULTURE/COLONY COUNT: CPT

## 2022-11-21 PROCEDURE — 85027 COMPLETE CBC AUTOMATED: CPT

## 2022-11-21 RX ORDER — DIVALPROEX SODIUM 500 MG/1
1 TABLET, DELAYED RELEASE ORAL
Qty: 0 | Refills: 0 | DISCHARGE

## 2022-11-21 RX ORDER — HYDRALAZINE HCL 50 MG
10 TABLET ORAL ONCE
Refills: 0 | Status: COMPLETED | OUTPATIENT
Start: 2022-11-21 | End: 2022-11-21

## 2022-11-21 RX ORDER — HYDRALAZINE HCL 50 MG
10 TABLET ORAL ONCE
Refills: 0 | Status: DISCONTINUED | OUTPATIENT
Start: 2022-11-21 | End: 2022-11-21

## 2022-11-21 RX ADMIN — Medication 10 MILLIGRAM(S): at 08:35

## 2022-11-21 RX ADMIN — Medication 650 MILLIGRAM(S): at 05:13

## 2022-11-21 RX ADMIN — DULOXETINE HYDROCHLORIDE 60 MILLIGRAM(S): 30 CAPSULE, DELAYED RELEASE ORAL at 13:09

## 2022-11-21 RX ADMIN — QUETIAPINE FUMARATE 25 MILLIGRAM(S): 200 TABLET, FILM COATED ORAL at 13:09

## 2022-11-21 RX ADMIN — Medication 50 MILLIGRAM(S): at 13:09

## 2022-11-21 RX ADMIN — Medication 2: at 13:07

## 2022-11-21 RX ADMIN — Medication 81 MILLIGRAM(S): at 13:09

## 2022-11-21 RX ADMIN — INFLUENZA VIRUS VACCINE 0.7 MILLILITER(S): 15; 15; 15; 15 SUSPENSION INTRAMUSCULAR at 16:14

## 2022-11-21 RX ADMIN — Medication 650 MILLIGRAM(S): at 14:00

## 2022-11-21 RX ADMIN — Medication 650 MILLIGRAM(S): at 13:08

## 2022-11-21 RX ADMIN — PIPERACILLIN AND TAZOBACTAM 25 GRAM(S): 4; .5 INJECTION, POWDER, LYOPHILIZED, FOR SOLUTION INTRAVENOUS at 13:54

## 2022-11-21 RX ADMIN — PANTOPRAZOLE SODIUM 40 MILLIGRAM(S): 20 TABLET, DELAYED RELEASE ORAL at 05:13

## 2022-11-21 RX ADMIN — ENOXAPARIN SODIUM 40 MILLIGRAM(S): 100 INJECTION SUBCUTANEOUS at 06:20

## 2022-11-21 RX ADMIN — PREGABALIN 1000 MICROGRAM(S): 225 CAPSULE ORAL at 13:09

## 2022-11-21 RX ADMIN — PIPERACILLIN AND TAZOBACTAM 25 GRAM(S): 4; .5 INJECTION, POWDER, LYOPHILIZED, FOR SOLUTION INTRAVENOUS at 05:14

## 2022-11-21 RX ADMIN — Medication 6: at 17:24

## 2022-11-21 RX ADMIN — MEMANTINE HYDROCHLORIDE 10 MILLIGRAM(S): 10 TABLET ORAL at 05:13

## 2022-11-21 NOTE — PROGRESS NOTE ADULT - PROVIDER SPECIALTY LIST ADULT
Infectious Disease
Internal Medicine
Vascular Surgery
Infectious Disease
Internal Medicine
Internal Medicine
Vascular Surgery
Vascular Surgery
Infectious Disease
Vascular Surgery

## 2022-11-21 NOTE — PROGRESS NOTE ADULT - ASSESSMENT
72m with dementia, cad diabetes, htn, hld, pad, right foot partial amputation and right fem bypass here with lethargy  r/o infection v progression of disease  ct scan with fluid collection    plan  pt afebrile, nontoxic, hemodynamically stable  minimal leukocytosis which resolved  blood cx remain NGTD x2   ua without pyuria and urine culture with low colony counts of pseudomonas- not consistent w/ UTI  ct with soft tissue swelling and subq collection   suspect post surgical changes and less likely abscess  d/w vascular team, no plan for ultrasound at this time   ok to continue zosyn for now while inpt  on discharge, can switch to augmentin 500mg PO BID to complete 7d course 11/24      D/w Dr. Candice Grissom M.D.  [facility], Division of Infectious Diseases  [phone]  After 5pm on weekdays and all day on weekends - please call [phone]

## 2022-11-21 NOTE — DISCHARGE NOTE NURSING/CASE MANAGEMENT/SOCIAL WORK - PATIENT PORTAL LINK FT
You can access the FollowMyHealth Patient Portal offered by [facility] by registering at the following website: [email or web address]. By joining [employer]s FollowMyHealth portal, you will also be able to view your health information using other applications (apps) compatible with our system.

## 2022-11-21 NOTE — DISCHARGE NOTE NURSING/CASE MANAGEMENT/SOCIAL WORK - NSDCPEFALRISK_GEN_ALL_CORE
For information on Fall & Injury Prevention, visit: [email or web address] OR  [email or web address] OR  https://www.cdc.gov/steadi/patient.html

## 2022-11-21 NOTE — PROGRESS NOTE ADULT - ASSESSMENT
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     PLAN:   - regular diet  - appreciate pods recs  - IV abx: zosyn   - BCx 11/17 prelim NGTD  - f/u ID dispo recs  - plan for discharge home today    Vascular Surgery   [id number]

## 2022-11-21 NOTE — PROGRESS NOTE ADULT - ASSESSMENT
72M 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    1. Acute metabolic encephalopathy , w/ underlying dementia  2. Right Groin Collection possible abscess   3. 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 Pseudomonas + UCx, no UTI   -admitted vascular surg service; recent right fem pop bypass 11/01  -currently afebrile hemodynamically stable , no leukocytosis   -BC x 2; CT noted with groin collection;   -C/w zosyn , switch to augmentin on discharge.  -Urine culture + for pseudomonas   -ID Recs appreciated   -remaining plan per primary vascular service    4. Alzheimer's dementia (minimally verbal & requires full assistance w/ ADLs)  -memantine and quetiapine     5. 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 CAD s/p stent (GELA to RCA in 2018), HTN, HLD, T2DM c/b peripheral neuropathy  toprol 50 mg daily; consider hydralazine 10 mg po tid  aspirin enteric coated 81 milliGRAM(s) Oral daily  atorvastatin 80 milliGRAM(s) Oral at bedtime  DULoxetine 60 milliGRAM(s) Oral daily  insulin lispro (ADMELOG) corrective regimen sliding scale   SubCutaneous every 6 hours  QUEtiapine 25 milliGRAM(s) Oral daily    7. Anxiety   valproate sodium  IVPB 125 milliGRAM(s) IV Intermittent two times a day  -on xanax at qhs prn; start inpatient to prevent withdrawal.    8. DOLORES but non-compliant w/ CPAP, bladder CA s/p laser ablation    dvt ppx: lovenox   gi ppx: diet; ppi         Johnny Lam MD   [facility]  [phone]

## 2022-11-21 NOTE — DISCHARGE NOTE NURSING/CASE MANAGEMENT/SOCIAL WORK - NSDCVIVACCINE_GEN_ALL_CORE_FT
influenza, high-dose, quadrivalent; 21-Nov-2022 16:14; Hillary Wallace (OLIVIA); [employer]; [id number] (Exp. Date: 30-Jun-2023); IntraMuscular; Deltoid Left.; 0.7 milliLiter(s); VIS (VIS Published: 06-Aug-2021, VIS Presented: 21-Nov-2022);

## 2022-11-21 NOTE — PROGRESS NOTE ADULT - SUBJECTIVE AND OBJECTIVE BOX
Surgery Progress Note       Subjective:  Patient seen and examined, is arousable but minimally interactive.      Vital Signs:  Vital Signs Last 24 Hrs  T(C): 36.8 (21 Nov 2022 06:01), Max: 36.9 (20 Nov 2022 21:30)  T(F): 98.3 (21 Nov 2022 06:01), Max: 98.5 (20 Nov 2022 21:30)  HR: 68 (21 Nov 2022 09:13) (54 - 88)  BP: 157/65 (21 Nov 2022 09:13) (149/63 - 196/80)  BP(mean): --  RR: 18 (21 Nov 2022 09:13) (18 - 20)  SpO2: 95% (21 Nov 2022 09:13) (93% - 98%)    Parameters below as of 21 Nov 2022 09:13  Patient On (Oxygen Delivery Method): room air        CAPILLARY BLOOD GLUCOSE      POCT Blood Glucose.: 132 mg/dL (21 Nov 2022 09:19)  POCT Blood Glucose.: 186 mg/dL (20 Nov 2022 21:51)  POCT Blood Glucose.: 141 mg/dL (20 Nov 2022 18:06)  POCT Blood Glucose.: 159 mg/dL (20 Nov 2022 12:25)      I&O's Detail    20 Nov 2022 07:01  -  21 Nov 2022 07:00  --------------------------------------------------------  IN:    Oral Fluid: 720 mL  Total IN: 720 mL    OUT:  Total OUT: 0 mL    Total NET: 720 mL            Physical Exam:  General: NAD, arousable but only somewhat reactive  HEENT: NCAT, trachea midline  Respiratory: respirations non labored  Extremities: R groin incision is c/d/i. Dressing changed on AM rounds. R medial lower extremity incision c/d/i, dressing changed on AM rounds. Dressing present over r foot per pods, not removed on AM rounds. DP PT signals present       Labs:    11-21    138  |  106  |  21  ----------------------------<  124<H>  4.7   |  21<L>  |  1.48<H>    Ca    9.2      21 Nov 2022 07:03  Phos  2.9     11-21  Mg     2.0     11-21                              10.0   9.69  )-----------( 275      ( 20 Nov 2022 09:25 )             31.8         
Vascular SURGERY DAILY PROGRESS NOTE:       SUBJECTIVE/ROS: Patient seen and evaluated on AM rounds. Pt minimally reactive to conversation. arousable to loud voices but otherwise minimally involved.       OBJECTIVE  Vital Signs Last 24 Hrs  T(C): 36.7 (20 Nov 2022 06:21), Max: 36.7 (19 Nov 2022 09:52)  T(F): 98 (20 Nov 2022 06:21), Max: 98.1 (19 Nov 2022 09:52)  HR: 60 (20 Nov 2022 06:21) (57 - 88)  BP: 168/77 (20 Nov 2022 06:21) (117/67 - 187/79)  BP(mean): --  ABP: --  ABP(mean): --  RR: 20 (20 Nov 2022 06:21) (18 - 20)  SpO2: 96% (20 Nov 2022 06:21) (91% - 97%)    O2 Parameters below as of 20 Nov 2022 06:21  Patient On (Oxygen Delivery Method): room air    I&O's Detail    19 Nov 2022 07:01  -  20 Nov 2022 07:00  --------------------------------------------------------  IN:    IV PiggyBack: 100 mL    Oral Fluid: 1380 mL  Total IN: 1480 mL    OUT:    Incontinent per Condom Catheter (mL): 550 mL  Total OUT: 550 mL    Total NET: 930 mL      LABS                          9.8    8.86  )-----------( 287      ( 19 Nov 2022 16:18 )             31.3   11-19    139  |  104  |  20  ----------------------------<  216<H>  4.0   |  26  |  1.49<H>    Ca    9.1      19 Nov 2022 16:18  Phos  3.1     11-19  Mg     1.9     11-19                   ORDERS/MEDICATIONS  MEDICATIONS  (STANDING):  aspirin enteric coated 81 milliGRAM(s) Oral daily  atorvastatin 80 milliGRAM(s) Oral at bedtime  cyanocobalamin 1000 MICROGram(s) Oral daily  DULoxetine 60 milliGRAM(s) Oral daily  enoxaparin Injectable 40 milliGRAM(s) SubCutaneous every 24 hours  insulin lispro (ADMELOG) corrective regimen sliding scale   SubCutaneous every 6 hours  lactated ringers. 1000 milliLiter(s) (40 mL/Hr) IV Continuous <Continuous>  memantine 10 milliGRAM(s) Oral two times a day  metoprolol tartrate Injectable 5 milliGRAM(s) IV Push every 6 hours  pantoprazole  Injectable 40 milliGRAM(s) IV Push daily  piperacillin/tazobactam IVPB.. 3.375 Gram(s) IV Intermittent every 8 hours  QUEtiapine 25 milliGRAM(s) Oral daily  valproate sodium  IVPB 125 milliGRAM(s) IV Intermittent two times a day  vancomycin  IVPB 1250 milliGRAM(s) IV Intermittent every 12 hours    MEDICATIONS  (PRN):        PHYSICAL EXAM:  General: NAD, arousable but only somewhat reactive  HEENT: NCAT, trachea midline  Respiratory: respirations non labored  Gastrointestinal: soft, nontender, nondistended. R groin incision is cdi. Dressing changed on AM rounds.  Extremities: Dressing present over r foot per pods, not removed on AM rounds. DP PT signals present       
Patient is a 72y old  Male who presents with a chief complaint of infection (19 Nov 2022 07:17)      SUBJECTIVE / OVERNIGHT EVENTS:  Patient seen and examined.   Has dementia, seems more awake today.       Vital Signs Last 24 Hrs  T(C): 37 (19 Nov 2022 05:16), Max: 37.1 (18 Nov 2022 13:46)  T(F): 98.6 (19 Nov 2022 05:16), Max: 98.7 (18 Nov 2022 13:46)  HR: 88 (19 Nov 2022 09:52) (59 - 106)  BP: 157/69 (19 Nov 2022 09:52) (125/66 - 188/69)  BP(mean): --  RR: 18 (19 Nov 2022 05:16) (18 - 18)  SpO2: 96% (19 Nov 2022 05:16) (95% - 98%)    Parameters below as of 19 Nov 2022 05:16  Patient On (Oxygen Delivery Method): room air      I&O's Summary    18 Nov 2022 07:01  -  19 Nov 2022 07:00  --------------------------------------------------------  IN: 600 mL / OUT: 250 mL / NET: 350 mL        Physical Exam:  General: NAD, resting comfortably  HEENT: NC/AT, EOMI  Pulmonary: normal resp effort  Cardiovascular: NSR  Abdominal: soft, ND/NT, no organomegaly  Extremities: RLE groin site with yellow fluid weeping from wound, palpable firmness underneath; incision site over medial lower leg c/d/i, erythema over shin, warm to touch. 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 Motor intact. Swelling, warmth in right leg > left. Palpable AT, PT+ signal.   Neuro: awake, interactive; no focal deficits  LABS:                        10.9   8.64  )-----------( 317      ( 18 Nov 2022 06:09 )             33.8     11-18    139  |  104  |  20  ----------------------------<  138<H>  4.8   |  25  |  1.26    Ca    9.8      18 Nov 2022 06:08  Phos  4.4     11-18  Mg     1.8     11-18      PT/INR - ( 18 Nov 2022 06:11 )   PT: 11.9 sec;   INR: 1.03 ratio         PTT - ( 18 Nov 2022 06:11 )  PTT:27.2 sec  CAPILLARY BLOOD GLUCOSE      POCT Blood Glucose.: 130 mg/dL (19 Nov 2022 10:08)  POCT Blood Glucose.: 232 mg/dL (18 Nov 2022 21:52)  POCT Blood Glucose.: 145 mg/dL (18 Nov 2022 17:35)  POCT Blood Glucose.: 212 mg/dL (18 Nov 2022 10:19)            RADIOLOGY & ADDITIONAL TESTS:    Imaging Personally Reviewed:  [x] YES  [ ] NO    Consultant(s) Notes Reviewed:  [x] YES  [ ] NO      MEDICATIONS  (STANDING):  acetaminophen     Tablet .. 650 milliGRAM(s) Oral every 6 hours  aspirin enteric coated 81 milliGRAM(s) Oral daily  atorvastatin 80 milliGRAM(s) Oral at bedtime  cyanocobalamin 1000 MICROGram(s) Oral daily  dextrose 5%. 1000 milliLiter(s) (50 mL/Hr) IV Continuous <Continuous>  dextrose 5%. 1000 milliLiter(s) (100 mL/Hr) IV Continuous <Continuous>  dextrose 50% Injectable 25 Gram(s) IV Push once  dextrose 50% Injectable 12.5 Gram(s) IV Push once  dextrose 50% Injectable 25 Gram(s) IV Push once  DULoxetine 60 milliGRAM(s) Oral daily  enoxaparin Injectable 40 milliGRAM(s) SubCutaneous every 24 hours  glucagon  Injectable 1 milliGRAM(s) IntraMuscular once  insulin lispro (ADMELOG) corrective regimen sliding scale   SubCutaneous three times a day before meals  insulin lispro (ADMELOG) corrective regimen sliding scale   SubCutaneous at bedtime  memantine 10 milliGRAM(s) Oral two times a day  metoprolol tartrate 50 milliGRAM(s) Oral two times a day  pantoprazole    Tablet 40 milliGRAM(s) Oral before breakfast  piperacillin/tazobactam IVPB.. 3.375 Gram(s) IV Intermittent every 8 hours  QUEtiapine 25 milliGRAM(s) Oral daily    MEDICATIONS  (PRN):  dextrose Oral Gel 15 Gram(s) Oral once PRN Blood Glucose LESS THAN 70 milliGRAM(s)/deciliter      Care Discussed with Consultants/Other Providers [x] YES  [ ] NO    HEALTH ISSUES - PROBLEM Dx:      
[facility], Division of Infectious Diseases  [facility], [facility], [facility]  [phone]  ([phone] - weekdays after 5pm and weekends)    Name: SHERICE RAMIREZ  Age/Gender: 72y Male  MRN: [id number]    Interval History:  Patient seen this morning.   Notes reviewed.   No concerning overnight events.  Afebrile.     Allergies: Ceclor (Rash; Urticaria)      Objective:  Vitals:   T(F): 97.7 (11-20-22 @ 10:04), Max: 98 (11-20-22 @ 06:21)  HR: 60 (11-20-22 @ 10:04) (57 - 77)  BP: 184/74 (11-20-22 @ 10:04) (117/67 - 187/79)  RR: 20 (11-20-22 @ 10:04) (18 - 20)  SpO2: 96% (11-20-22 @ 10:04) (91% - 97%)  Physical Examination:  General: no acute distress  HEENT: anicteric  Cardio: normal rate  neuro: A&O x1 (oriented to self)  Resp: clear to auscultation anteriorly  Abd: soft, ND  Ext: no LE edema  Skin: warm, dry, R groin incision site w/o erythema or drainage, small amount of induration present  Psych: flat affect    Laboratory Studies:  CBC:                       10.0   9.69  )-----------( 275      ( 20 Nov 2022 09:25 )             31.8     WBC Trend:  9.69 11-20-22 @ 09:25  8.86 11-19-22 @ 16:18  8.64 11-18-22 @ 06:09  10.35 11-17-22 @ 06:36  11.07 11-16-22 @ 14:24    CMP: 11-20    140  |  106  |  20  ----------------------------<  145<H>  4.1   |  25  |  1.40<H>    Ca    9.2      20 Nov 2022 09:25  Phos  3.2     11-20  Mg     2.2     11-20              Microbiology: reviewed     Culture - Blood (collected 11-17-22 @ 06:00)  Source: .Blood Blood-Peripheral  Preliminary Report (11-18-22 @ 12:01):    No growth to date.    Culture - Blood (collected 11-17-22 @ 03:23)  Source: .Blood Blood-Peripheral  Preliminary Report (11-18-22 @ 09:02):    No growth to date.    Culture - Urine (collected 11-16-22 @ 15:24)  Source: Clean Catch Clean Catch (Midstream)  Final Report (11-18-22 @ 20:07):    10,000 - 49,000 CFU/mL Pseudomonas aeruginosa  Organism: Pseudomonas aeruginosa (11-18-22 @ 20:07)  Organism: Pseudomonas aeruginosa (11-18-22 @ 20:07)      -  Amikacin: S <=16      -  Aztreonam: S <=4      -  Cefepime: S <=2      -  Ceftazidime: S 4      -  Ciprofloxacin: S <=0.25      -  Gentamicin: S 4      -  Imipenem: S 2      -  Levofloxacin: I 2      -  Meropenem: S <=1      -  Piperacillin/Tazobactam: S <=8      -  Tobramycin: S <=2      Method Type: GOLDIE        Radiology: reviewed     Medications:  acetaminophen     Tablet .. 650 milliGRAM(s) Oral every 6 hours  aspirin enteric coated 81 milliGRAM(s) Oral daily  atorvastatin 80 milliGRAM(s) Oral at bedtime  cyanocobalamin 1000 MICROGram(s) Oral daily  dextrose 5%. 1000 milliLiter(s) IV Continuous <Continuous>  dextrose 5%. 1000 milliLiter(s) IV Continuous <Continuous>  dextrose 50% Injectable 25 Gram(s) IV Push once  dextrose 50% Injectable 12.5 Gram(s) IV Push once  dextrose 50% Injectable 25 Gram(s) IV Push once  dextrose Oral Gel 15 Gram(s) Oral once PRN  DULoxetine 60 milliGRAM(s) Oral daily  enoxaparin Injectable 40 milliGRAM(s) SubCutaneous every 24 hours  glucagon  Injectable 1 milliGRAM(s) IntraMuscular once  influenza  Vaccine (HIGH DOSE) 0.7 milliLiter(s) IntraMuscular once  insulin lispro (ADMELOG) corrective regimen sliding scale   SubCutaneous three times a day before meals  insulin lispro (ADMELOG) corrective regimen sliding scale   SubCutaneous at bedtime  memantine 10 milliGRAM(s) Oral two times a day  metoprolol tartrate 50 milliGRAM(s) Oral two times a day  pantoprazole    Tablet 40 milliGRAM(s) Oral before breakfast  piperacillin/tazobactam IVPB.. 3.375 Gram(s) IV Intermittent every 8 hours  QUEtiapine 25 milliGRAM(s) Oral daily    Antimicrobials:  piperacillin/tazobactam IVPB.. 3.375 Gram(s) IV Intermittent every 8 hours  
[facility] DIVISION OF INFECTIOUS DISEASES  [facility], [facility], [facility]  [phone]  ([phone] - weekdays after 5pm and weekends)    Name: SHERICE RAMIREZ  Age/Gender: 72y Male  MRN: [id number]    Interval History:  Patient seen and examined this morning.   Patient resting, occasionally nods.  Notes reviewed. Afebrile   D/w RN at bedside  Allergies: Ceclor (Rash; Urticaria)    Objective:  Vitals:   T(F): 98.2 (11-21-22 @ 09:13), Max: 98.5 (11-20-22 @ 21:30)  HR: 68 (11-21-22 @ 09:13) (54 - 88)  BP: 157/65 (11-21-22 @ 09:13) (149/63 - 196/80)  RR: 18 (11-21-22 @ 09:13) (18 - 20)  SpO2: 95% (11-21-22 @ 09:13) (93% - 98%)  Physical Examination:  General: no acute distress  HEENT: NC/AT, anicteric, neck supple  Respiratory: no acc muscle use, breathing comfortably  Cardiovascular: S1 and S2 present  Gastrointestinal: soft, nontender, nondistended  Extremities: no edema, no cyanosis  Skin: R groin incision healing with no erythema, medial side of wound with palpable firmness with mild TTP    Laboratory Studies:  CBC:                       10.5   10.32 )-----------( 285      ( 21 Nov 2022 09:29 )             33.0     WBC Trend:  10.32 11-21-22 @ 09:29  9.69 11-20-22 @ 09:25  8.86 11-19-22 @ 16:18  8.64 11-18-22 @ 06:09  10.35 11-17-22 @ 06:36  11.07 11-16-22 @ 14:24    CMP: 11-21    138  |  106  |  21  ----------------------------<  124<H>  4.7   |  21<L>  |  1.48<H>    Ca    9.2      21 Nov 2022 07:03  Phos  2.9     11-21  Mg     2.0     11-21    Creatinine, Serum: 1.48 mg/dL (11-21-22 @ 07:03)  Creatinine, Serum: 1.40 mg/dL (11-20-22 @ 09:25)  Creatinine, Serum: 1.49 mg/dL (11-19-22 @ 16:18)  Creatinine, Serum: 1.26 mg/dL (11-18-22 @ 06:08)  Creatinine, Serum: 1.10 mg/dL (11-17-22 @ 06:36)  Creatinine, Serum: 1.42 mg/dL (11-16-22 @ 14:24)    Microbiology: reviewed   Culture - Blood (collected 11-17-22 @ 06:00)  Source: .Blood Blood-Peripheral  Preliminary Report (11-18-22 @ 12:01):    No growth to date.    Culture - Blood (collected 11-17-22 @ 03:23)  Source: .Blood Blood-Peripheral  Preliminary Report (11-18-22 @ 09:02):    No growth to date.    Culture - Urine (collected 11-16-22 @ 15:24)  Source: Clean Catch Clean Catch (Midstream)  Final Report (11-18-22 @ 20:07):    10,000 - 49,000 CFU/mL Pseudomonas aeruginosa  Organism: Pseudomonas aeruginosa (11-18-22 @ 20:07)  Organism: Pseudomonas aeruginosa (11-18-22 @ 20:07)      -  Amikacin: S <=16      -  Aztreonam: S <=4      -  Cefepime: S <=2      -  Ceftazidime: S 4      -  Ciprofloxacin: S <=0.25      -  Gentamicin: S 4      -  Imipenem: S 2      -  Levofloxacin: I 2      -  Meropenem: S <=1      -  Piperacillin/Tazobactam: S <=8      -  Tobramycin: S <=2      Method Type: GOLDIE    COVID-19 PCR: Ana (03 Nov 2022 19:20)    Radiology: reviewed     Medications:  acetaminophen     Tablet .. 650 milliGRAM(s) Oral every 6 hours  aspirin enteric coated 81 milliGRAM(s) Oral daily  atorvastatin 80 milliGRAM(s) Oral at bedtime  cyanocobalamin 1000 MICROGram(s) Oral daily  dextrose 5%. 1000 milliLiter(s) IV Continuous <Continuous>  dextrose 5%. 1000 milliLiter(s) IV Continuous <Continuous>  dextrose 50% Injectable 25 Gram(s) IV Push once  dextrose 50% Injectable 12.5 Gram(s) IV Push once  dextrose 50% Injectable 25 Gram(s) IV Push once  dextrose Oral Gel 15 Gram(s) Oral once PRN  DULoxetine 60 milliGRAM(s) Oral daily  enoxaparin Injectable 40 milliGRAM(s) SubCutaneous every 24 hours  glucagon  Injectable 1 milliGRAM(s) IntraMuscular once  influenza  Vaccine (HIGH DOSE) 0.7 milliLiter(s) IntraMuscular once  insulin lispro (ADMELOG) corrective regimen sliding scale   SubCutaneous three times a day before meals  insulin lispro (ADMELOG) corrective regimen sliding scale   SubCutaneous at bedtime  memantine 10 milliGRAM(s) Oral two times a day  metoprolol tartrate 50 milliGRAM(s) Oral two times a day  pantoprazole    Tablet 40 milliGRAM(s) Oral before breakfast  piperacillin/tazobactam IVPB.. 3.375 Gram(s) IV Intermittent every 8 hours  QUEtiapine 25 milliGRAM(s) Oral daily    Current Antimicrobials:  piperacillin/tazobactam IVPB.. 3.375 Gram(s) IV Intermittent every 8 hours    Prior/Completed Antimicrobials:  cefepime   IVPB  vancomycin  IVPB.  
[facility], Division of Infectious Diseases  [facility], [facility], [facility]  [phone]  ([phone] - weekdays after 5pm and weekends)    Name: SHERICE RAMIREZ  Age/Gender: 72y Male  MRN: [id number]    Interval History:  Patient seen this morning.   Notes reviewed.   Afebrile.     Allergies: Ceclor (Rash; Urticaria)      Objective:  Vitals:   T(F): 98.1 (11-19-22 @ 09:52), Max: 98.7 (11-18-22 @ 13:46)  HR: 88 (11-19-22 @ 09:52) (59 - 106)  BP: 157/69 (11-19-22 @ 09:52) (128/65 - 188/69)  RR: 18 (11-19-22 @ 09:52) (18 - 18)  SpO2: 95% (11-19-22 @ 09:52) (95% - 98%)  Physical Examination:  General: no acute distress  HEENT: anicteric  Cardio: normal rate  Resp: clear to auscultation anteriorly  Abd: soft, ND  Ext: no LE edema  Skin: warm, dry, R groin incision site w/o erythema or drainage, small amount of induration present  Psych: flat affect    Laboratory Studies:  CBC:                       10.9   8.64  )-----------( 317      ( 18 Nov 2022 06:09 )             33.8     WBC Trend:  8.64 11-18-22 @ 06:09  10.35 11-17-22 @ 06:36  11.07 11-16-22 @ 14:24    CMP: 11-18    139  |  104  |  20  ----------------------------<  138<H>  4.8   |  25  |  1.26    Ca    9.8      18 Nov 2022 06:08  Phos  4.4     11-18  Mg     1.8     11-18              Microbiology: reviewed     Culture - Blood (collected 11-17-22 @ 06:00)  Source: .Blood Blood-Peripheral  Preliminary Report (11-18-22 @ 12:01):    No growth to date.    Culture - Blood (collected 11-17-22 @ 03:23)  Source: .Blood Blood-Peripheral  Preliminary Report (11-18-22 @ 09:02):    No growth to date.    Culture - Urine (collected 11-16-22 @ 15:24)  Source: Clean Catch Clean Catch (Midstream)  Final Report (11-18-22 @ 20:07):    10,000 - 49,000 CFU/mL Pseudomonas aeruginosa  Organism: Pseudomonas aeruginosa (11-18-22 @ 20:07)  Organism: Pseudomonas aeruginosa (11-18-22 @ 20:07)      -  Amikacin: S <=16      -  Aztreonam: S <=4      -  Cefepime: S <=2      -  Ceftazidime: S 4      -  Ciprofloxacin: S <=0.25      -  Gentamicin: S 4      -  Imipenem: S 2      -  Levofloxacin: I 2      -  Meropenem: S <=1      -  Piperacillin/Tazobactam: S <=8      -  Tobramycin: S <=2      Method Type: GOLDIE        Radiology: reviewed     Medications:  acetaminophen     Tablet .. 650 milliGRAM(s) Oral every 6 hours  aspirin enteric coated 81 milliGRAM(s) Oral daily  atorvastatin 80 milliGRAM(s) Oral at bedtime  cyanocobalamin 1000 MICROGram(s) Oral daily  dextrose 5%. 1000 milliLiter(s) IV Continuous <Continuous>  dextrose 5%. 1000 milliLiter(s) IV Continuous <Continuous>  dextrose 50% Injectable 25 Gram(s) IV Push once  dextrose 50% Injectable 12.5 Gram(s) IV Push once  dextrose 50% Injectable 25 Gram(s) IV Push once  dextrose Oral Gel 15 Gram(s) Oral once PRN  DULoxetine 60 milliGRAM(s) Oral daily  enoxaparin Injectable 40 milliGRAM(s) SubCutaneous every 24 hours  glucagon  Injectable 1 milliGRAM(s) IntraMuscular once  insulin lispro (ADMELOG) corrective regimen sliding scale   SubCutaneous three times a day before meals  insulin lispro (ADMELOG) corrective regimen sliding scale   SubCutaneous at bedtime  memantine 10 milliGRAM(s) Oral two times a day  metoprolol tartrate 50 milliGRAM(s) Oral two times a day  pantoprazole    Tablet 40 milliGRAM(s) Oral before breakfast  piperacillin/tazobactam IVPB.. 3.375 Gram(s) IV Intermittent every 8 hours  QUEtiapine 25 milliGRAM(s) Oral daily    Antimicrobials:  piperacillin/tazobactam IVPB.. 3.375 Gram(s) IV Intermittent every 8 hours  
Patient is a 72y old  Male who presents with a chief complaint of infection (20 Nov 2022 09:28)      SUBJECTIVE / OVERNIGHT EVENTS:  Patient seen and examined.   More awake and alert.   Eating breakfast with assistance.      Vital Signs Last 24 Hrs  T(C): 36.5 (20 Nov 2022 10:04), Max: 36.7 (20 Nov 2022 06:21)  T(F): 97.7 (20 Nov 2022 10:04), Max: 98 (20 Nov 2022 06:21)  HR: 60 (20 Nov 2022 10:04) (57 - 77)  BP: 184/74 (20 Nov 2022 10:04) (117/67 - 187/79)  BP(mean): --  RR: 20 (20 Nov 2022 10:04) (18 - 20)  SpO2: 96% (20 Nov 2022 10:04) (91% - 97%)    Parameters below as of 20 Nov 2022 10:04  Patient On (Oxygen Delivery Method): room air      I&O's Summary    19 Nov 2022 07:01  -  20 Nov 2022 07:00  --------------------------------------------------------  IN: 1480 mL / OUT: 550 mL / NET: 930 mL        Physical Exam:  General: NAD, resting comfortably  HEENT: NC/AT, EOMI  Pulmonary: normal resp effort  Cardiovascular: NSR  Abdominal: soft, ND/NT, no organomegaly  Extremities: RLE groin site with yellow fluid weeping from wound, palpable firmness underneath; incision site over medial lower leg c/d/i, erythema over shin, warm to touch. 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 Motor intact. Swelling, warmth in right leg > left. Palpable AT, PT+ signal.   Neuro: awake, interactive; no focal deficits  LABS:                        10.0   9.69  )-----------( 275      ( 20 Nov 2022 09:25 )             31.8     11-20    140  |  106  |  20  ----------------------------<  145<H>  4.1   |  25  |  1.40<H>    Ca    9.2      20 Nov 2022 09:25  Phos  3.2     11-20  Mg     2.2     11-20        CAPILLARY BLOOD GLUCOSE      POCT Blood Glucose.: 143 mg/dL (20 Nov 2022 09:02)  POCT Blood Glucose.: 135 mg/dL (19 Nov 2022 21:32)  POCT Blood Glucose.: 175 mg/dL (19 Nov 2022 16:50)  POCT Blood Glucose.: 153 mg/dL (19 Nov 2022 13:57)            RADIOLOGY & ADDITIONAL TESTS:    Imaging Personally Reviewed:  [x] YES  [ ] NO    Consultant(s) Notes Reviewed:  [x] YES  [ ] NO      MEDICATIONS  (STANDING):  acetaminophen     Tablet .. 650 milliGRAM(s) Oral every 6 hours  aspirin enteric coated 81 milliGRAM(s) Oral daily  atorvastatin 80 milliGRAM(s) Oral at bedtime  cyanocobalamin 1000 MICROGram(s) Oral daily  dextrose 5%. 1000 milliLiter(s) (50 mL/Hr) IV Continuous <Continuous>  dextrose 5%. 1000 milliLiter(s) (100 mL/Hr) IV Continuous <Continuous>  dextrose 50% Injectable 25 Gram(s) IV Push once  dextrose 50% Injectable 12.5 Gram(s) IV Push once  dextrose 50% Injectable 25 Gram(s) IV Push once  DULoxetine 60 milliGRAM(s) Oral daily  enoxaparin Injectable 40 milliGRAM(s) SubCutaneous every 24 hours  glucagon  Injectable 1 milliGRAM(s) IntraMuscular once  influenza  Vaccine (HIGH DOSE) 0.7 milliLiter(s) IntraMuscular once  insulin lispro (ADMELOG) corrective regimen sliding scale   SubCutaneous three times a day before meals  insulin lispro (ADMELOG) corrective regimen sliding scale   SubCutaneous at bedtime  memantine 10 milliGRAM(s) Oral two times a day  metoprolol tartrate 50 milliGRAM(s) Oral two times a day  pantoprazole    Tablet 40 milliGRAM(s) Oral before breakfast  piperacillin/tazobactam IVPB.. 3.375 Gram(s) IV Intermittent every 8 hours  QUEtiapine 25 milliGRAM(s) Oral daily    MEDICATIONS  (PRN):  dextrose Oral Gel 15 Gram(s) Oral once PRN Blood Glucose LESS THAN 70 milliGRAM(s)/deciliter      Care Discussed with Consultants/Other Providers [x] YES  [ ] NO    HEALTH ISSUES - PROBLEM Dx:      
Patient is a 72y old  Male who presents with a chief complaint of infection (21 Nov 2022 11:00)      SUBJECTIVE / OVERNIGHT EVENTS:  Patient seen and examined.   Sleeping, no complaints.       Vital Signs Last 24 Hrs  T(C): 36.8 (21 Nov 2022 09:13), Max: 36.9 (20 Nov 2022 21:30)  T(F): 98.2 (21 Nov 2022 09:13), Max: 98.5 (20 Nov 2022 21:30)  HR: 68 (21 Nov 2022 09:13) (54 - 88)  BP: 157/65 (21 Nov 2022 09:13) (149/63 - 196/80)  BP(mean): --  RR: 18 (21 Nov 2022 09:13) (18 - 20)  SpO2: 95% (21 Nov 2022 09:13) (93% - 98%)    Parameters below as of 21 Nov 2022 09:13  Patient On (Oxygen Delivery Method): room air      I&O's Summary    20 Nov 2022 07:01  -  21 Nov 2022 07:00  --------------------------------------------------------  IN: 720 mL / OUT: 0 mL / NET: 720 mL    21 Nov 2022 07:01  -  21 Nov 2022 12:54  --------------------------------------------------------  IN: 340 mL / OUT: 0 mL / NET: 340 mL        Physical Exam:  General: NAD, resting comfortably  HEENT: NC/AT, EOMI  Pulmonary: normal resp effort  Cardiovascular: NSR  Abdominal: soft, ND/NT, no organomegaly  Extremities: RLE groin site with yellow fluid weeping from wound, palpable firmness underneath; incision site over medial lower leg c/d/i, erythema over shin, warm to touch. 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 Motor intact. Swelling, warmth in right leg > left. Palpable AT, PT+ signal.   Neuro: awake, interactive; no focal deficits    LABS:                        10.5   10.32 )-----------( 285      ( 21 Nov 2022 09:29 )             33.0     11-21    138  |  106  |  21  ----------------------------<  124<H>  4.7   |  21<L>  |  1.48<H>    Ca    9.2      21 Nov 2022 07:03  Phos  2.9     11-21  Mg     2.0     11-21        CAPILLARY BLOOD GLUCOSE      POCT Blood Glucose.: 132 mg/dL (21 Nov 2022 09:19)  POCT Blood Glucose.: 186 mg/dL (20 Nov 2022 21:51)  POCT Blood Glucose.: 141 mg/dL (20 Nov 2022 18:06)            RADIOLOGY & ADDITIONAL TESTS:    Imaging Personally Reviewed:  [x] YES  [ ] NO    Consultant(s) Notes Reviewed:  [x] YES  [ ] NO      MEDICATIONS  (STANDING):  acetaminophen     Tablet .. 650 milliGRAM(s) Oral every 6 hours  aspirin enteric coated 81 milliGRAM(s) Oral daily  atorvastatin 80 milliGRAM(s) Oral at bedtime  cyanocobalamin 1000 MICROGram(s) Oral daily  dextrose 5%. 1000 milliLiter(s) (50 mL/Hr) IV Continuous <Continuous>  dextrose 5%. 1000 milliLiter(s) (100 mL/Hr) IV Continuous <Continuous>  dextrose 50% Injectable 25 Gram(s) IV Push once  dextrose 50% Injectable 12.5 Gram(s) IV Push once  dextrose 50% Injectable 25 Gram(s) IV Push once  DULoxetine 60 milliGRAM(s) Oral daily  enoxaparin Injectable 40 milliGRAM(s) SubCutaneous every 24 hours  glucagon  Injectable 1 milliGRAM(s) IntraMuscular once  influenza  Vaccine (HIGH DOSE) 0.7 milliLiter(s) IntraMuscular once  insulin lispro (ADMELOG) corrective regimen sliding scale   SubCutaneous three times a day before meals  insulin lispro (ADMELOG) corrective regimen sliding scale   SubCutaneous at bedtime  memantine 10 milliGRAM(s) Oral two times a day  metoprolol tartrate 50 milliGRAM(s) Oral two times a day  pantoprazole    Tablet 40 milliGRAM(s) Oral before breakfast  piperacillin/tazobactam IVPB.. 3.375 Gram(s) IV Intermittent every 8 hours  QUEtiapine 25 milliGRAM(s) Oral daily    MEDICATIONS  (PRN):  dextrose Oral Gel 15 Gram(s) Oral once PRN Blood Glucose LESS THAN 70 milliGRAM(s)/deciliter      Care Discussed with Consultants/Other Providers [x] YES  [ ] NO    HEALTH ISSUES - PROBLEM Dx:      
SURGERY DAILY PROGRESS NOTE:       SUBJECTIVE/ROS: Patient seen and evaluated on AM rounds. Pt nonverbal at baseline, however minimally reactive to conversation. arousable to loud voices but otherwise minimally involved.       OBJECTIVE  Vital Signs Last 24 Hrs  T(C): 37 (19 Nov 2022 05:16), Max: 37.1 (18 Nov 2022 13:46)  T(F): 98.6 (19 Nov 2022 05:16), Max: 98.7 (18 Nov 2022 13:46)  HR: 77 (19 Nov 2022 05:16) (59 - 106)  BP: 177/72 (19 Nov 2022 05:16) (125/66 - 191/77)  BP(mean): --  ABP: --  ABP(mean): --  RR: 18 (19 Nov 2022 05:16) (18 - 18)  SpO2: 96% (19 Nov 2022 05:16) (95% - 98%)    O2 Parameters below as of 19 Nov 2022 05:16  Patient On (Oxygen Delivery Method): room air      I&O's Detail    18 Nov 2022 07:01  -  19 Nov 2022 07:00  --------------------------------------------------------  IN:    Oral Fluid: 600 mL  Total IN: 600 mL    OUT:    Voided (mL): 250 mL  Total OUT: 250 mL    Total NET: 350 mL      LABS                        10.9   8.64  )-----------( 317      ( 18 Nov 2022 06:09 )             33.8   11-18    139  |  104  |  20  ----------------------------<  138<H>  4.8   |  25  |  1.26    Ca    9.8      18 Nov 2022 06:08  Phos  4.4     11-18  Mg     1.8     11-18    PT/INR - ( 18 Nov 2022 06:11 )   PT: 11.9 sec;   INR: 1.03 ratio         PTT - ( 18 Nov 2022 06:11 )  PTT:27.2 sec      ORDERS/MEDICATIONS  MEDICATIONS  (STANDING):  aspirin enteric coated 81 milliGRAM(s) Oral daily  atorvastatin 80 milliGRAM(s) Oral at bedtime  cyanocobalamin 1000 MICROGram(s) Oral daily  DULoxetine 60 milliGRAM(s) Oral daily  enoxaparin Injectable 40 milliGRAM(s) SubCutaneous every 24 hours  insulin lispro (ADMELOG) corrective regimen sliding scale   SubCutaneous every 6 hours  lactated ringers. 1000 milliLiter(s) (40 mL/Hr) IV Continuous <Continuous>  memantine 10 milliGRAM(s) Oral two times a day  metoprolol tartrate Injectable 5 milliGRAM(s) IV Push every 6 hours  pantoprazole  Injectable 40 milliGRAM(s) IV Push daily  piperacillin/tazobactam IVPB.. 3.375 Gram(s) IV Intermittent every 8 hours  QUEtiapine 25 milliGRAM(s) Oral daily  valproate sodium  IVPB 125 milliGRAM(s) IV Intermittent two times a day  vancomycin  IVPB 1250 milliGRAM(s) IV Intermittent every 12 hours    MEDICATIONS  (PRN):        PHYSICAL EXAM:  General: NAD, arousable but only somewhat reactive  HEENT: NCAT, trachea midline  Respiratory: respirations non labored  Gastrointestinal: soft, nontender, nondistended  Extremities: Dressing present over r foot per pods, not removed on AM rounds. DP PT signals present       
SURGERY DAILY PROGRESS NOTE:       SUBJECTIVE/ROS: Patient seen and evaluated on AM rounds. Pt nonverbal at baseline, however minimally reactive to conversation. arousable to loud voices but otherwise minimally involved. Currently on 1- order.        OBJECTIVE  T(C): 37 (22 @ 08:25), Max: 37 (22 @ 08:25)  HR: 61 (22 @ 08:03) (58 - 64)  BP: 158/72 (22 @ 08:42) (151/58 - 194/76)  RR: 18 (22 @ 08:03) (18 - 18)  SpO2: 98% (22 @ 08:03) (96% - 100%)  I&O's Summary    I&O's Detail    LABS                        10.9   8.64  )-----------( 317      ( 2022 06:09 )             33.8     -    139  |  104  |  20  ----------------------------<  138<H>  4.8   |  25  |  1.26    Ca    9.8      2022 06:08  Phos  4.4     11-18  Mg     1.8         TPro  6.4  /  Alb  3.3  /  TBili  0.4  /  DBili  x   /  AST  16  /  ALT  31  /  AlkPhos  150<H>  11-16    PT/INR - ( 2022 06:11 )   PT: 11.9 sec;   INR: 1.03 ratio         PTT - ( 2022 06:11 )  PTT:27.2 sec  Urinalysis Basic - ( 2022 15:24 )    Color: Light Yellow / Appearance: Clear / S.013 / pH: x  Gluc: x / Ketone: Negative  / Bili: Negative / Urobili: Negative   Blood: x / Protein: Negative / Nitrite: Negative   Leuk Esterase: Negative / RBC: 2 /hpf / WBC 0 /HPF   Sq Epi: x / Non Sq Epi: 0 /hpf / Bacteria: Negative      ORDERS/MEDICATIONS  MEDICATIONS  (STANDING):  aspirin enteric coated 81 milliGRAM(s) Oral daily  atorvastatin 80 milliGRAM(s) Oral at bedtime  cyanocobalamin 1000 MICROGram(s) Oral daily  DULoxetine 60 milliGRAM(s) Oral daily  enoxaparin Injectable 40 milliGRAM(s) SubCutaneous every 24 hours  insulin lispro (ADMELOG) corrective regimen sliding scale   SubCutaneous every 6 hours  lactated ringers. 1000 milliLiter(s) (40 mL/Hr) IV Continuous <Continuous>  memantine 10 milliGRAM(s) Oral two times a day  metoprolol tartrate Injectable 5 milliGRAM(s) IV Push every 6 hours  pantoprazole  Injectable 40 milliGRAM(s) IV Push daily  piperacillin/tazobactam IVPB.. 3.375 Gram(s) IV Intermittent every 8 hours  QUEtiapine 25 milliGRAM(s) Oral daily  valproate sodium  IVPB 125 milliGRAM(s) IV Intermittent two times a day  vancomycin  IVPB 1250 milliGRAM(s) IV Intermittent every 12 hours    MEDICATIONS  (PRN):        PHYSICAL EXAM:  General: NAD, arousable but only somewhat reactive  HEENT: NCAT, trachea midline  Respiratory: respirations non labored  Gastrointestinal: soft, nontender, nondistended  Extremities: Dressing present over r foot per pods, not removed on AM rounds. DP PT signals present

## 2022-11-22 ENCOUNTER — NON-APPOINTMENT (OUTPATIENT)
Age: 72
End: 2022-11-22

## 2022-11-22 LAB
CULTURE RESULTS: SIGNIFICANT CHANGE UP
CULTURE RESULTS: SIGNIFICANT CHANGE UP
SPECIMEN SOURCE: SIGNIFICANT CHANGE UP
SPECIMEN SOURCE: SIGNIFICANT CHANGE UP

## 2022-11-28 ENCOUNTER — NON-APPOINTMENT (OUTPATIENT)
Age: 72
End: 2022-11-28

## 2023-01-12 ENCOUNTER — APPOINTMENT (OUTPATIENT)
Dept: INTERNAL MEDICINE | Facility: CLINIC | Age: 73
End: 2023-01-12

## 2023-01-15 DIAGNOSIS — N39.0 URINARY TRACT INFECTION, SITE NOT SPECIFIED: ICD-10-CM

## 2023-01-15 RX ORDER — NITROFURANTOIN (MONOHYDRATE/MACROCRYSTALS) 25; 75 MG/1; MG/1
100 CAPSULE ORAL
Qty: 20 | Refills: 0 | Status: ACTIVE | COMMUNITY
Start: 2023-01-15 | End: 1900-01-01

## 2023-01-17 ENCOUNTER — LABORATORY RESULT (OUTPATIENT)
Age: 73
End: 2023-01-17

## 2023-01-17 ENCOUNTER — NON-APPOINTMENT (OUTPATIENT)
Age: 73
End: 2023-01-17

## 2023-01-17 RX ORDER — SULFAMETHOXAZOLE AND TRIMETHOPRIM 800; 160 MG/1; MG/1
800-160 TABLET ORAL TWICE DAILY
Qty: 14 | Refills: 0 | Status: ACTIVE | COMMUNITY
Start: 2023-01-17 | End: 1900-01-01

## 2023-02-03 ENCOUNTER — APPOINTMENT (OUTPATIENT)
Dept: NEUROLOGY | Facility: CLINIC | Age: 73
End: 2023-02-03

## 2023-03-29 NOTE — PROGRESS NOTE ADULT - SUBJECTIVE AND OBJECTIVE BOX
VASCULAR SURGERY DAILY PROGRESS NOTE:     SUBJECTIVE/ROS: Patient feels well.   Denies nausea, vomiting, chest pain, shortness of breath     MEDICATIONS  (STANDING):  acetaminophen     Tablet .. 975 milliGRAM(s) Oral every 6 hours  ampicillin/sulbactam  IVPB      ampicillin/sulbactam  IVPB 1.5 Gram(s) IV Intermittent every 6 hours  ascorbic acid 500 milliGRAM(s) Oral daily  aspirin enteric coated 81 milliGRAM(s) Oral daily  atorvastatin 80 milliGRAM(s) Oral at bedtime  dextrose 5%. 1000 milliLiter(s) (100 mL/Hr) IV Continuous <Continuous>  dextrose 5%. 1000 milliLiter(s) (50 mL/Hr) IV Continuous <Continuous>  dextrose 50% Injectable 25 Gram(s) IV Push once  dextrose 50% Injectable 12.5 Gram(s) IV Push once  dextrose 50% Injectable 25 Gram(s) IV Push once  dextrose 50% Injectable 25 Gram(s) IV Push once  dextrose 50% Injectable 12.5 Gram(s) IV Push once  dextrose 50% Injectable 25 Gram(s) IV Push once  DULoxetine 60 milliGRAM(s) Oral daily  glucagon  Injectable 1 milliGRAM(s) IntraMuscular once  influenza  Vaccine (HIGH DOSE) 0.7 milliLiter(s) IntraMuscular once  insulin lispro (ADMELOG) corrective regimen sliding scale   SubCutaneous three times a day before meals  insulin lispro (ADMELOG) corrective regimen sliding scale   SubCutaneous at bedtime  insulin lispro Injectable (ADMELOG). 2 Unit(s) SubCutaneous once  lidocaine 1% Injectable 0.2 milliLiter(s) Local Injection once  memantine 10 milliGRAM(s) Oral two times a day  metoprolol succinate ER 50 milliGRAM(s) Oral daily  polyethylene glycol 3350 17 Gram(s) Oral daily  senna 2 Tablet(s) Oral at bedtime  sodium chloride 0.9%. 1000 milliLiter(s) (120 mL/Hr) IV Continuous <Continuous>    MEDICATIONS  (PRN):  dextrose Oral Gel 15 Gram(s) Oral once PRN Blood Glucose LESS THAN 70 milliGRAM(s)/deciliter  dextrose Oral Gel 15 Gram(s) Oral once PRN Blood Glucose LESS THAN 70 milliGRAM(s)/deciliter  HYDROmorphone  Injectable 0.2 milliGRAM(s) IV Push every 10 minutes PRN Moderate Pain (4 - 6)  morphine  - Injectable 2 milliGRAM(s) IV Push every 4 hours PRN Severe Pain (7 - 10)  ondansetron Injectable 4 milliGRAM(s) IV Push once PRN Nausea and/or Vomiting  ondansetron Injectable 4 milliGRAM(s) IV Push once PRN Nausea and/or Vomiting  oxyCODONE    IR 5 milliGRAM(s) Oral every 6 hours PRN Moderate Pain (4 - 6)      OBJECTIVE:  Vital Signs Last 24 Hrs  T(C): 36.2 (2022 06:39), Max: 37.5 (2022 16:06)  T(F): 97.2 (2022 06:39), Max: 99.5 (2022 16:06)  HR: 73 (2022 06:39) (71 - 93)  BP: 172/84 (2022 06:39) (149/55 - 179/89)  BP(mean): --  RR: 18 (2022 06:39) (16 - 18)  SpO2: 95% (2022 06:39) (95% - 98%)    Parameters below as of 2022 06:39  Patient On (Oxygen Delivery Method): room air      I&O's Detail  2022 07:01  -  2022 07:00  --------------------------------------------------------  IN:    IV PiggyBack: 100 mL    Oral Fluid: 610 mL    sodium chloride 0.9%: 1440 mL  Total IN: 2150 mL    OUT:    Incontinent per Condom Catheter (mL): 1375 mL    Indwelling Catheter - Urethral (mL): 750 mL  Total OUT: 2125 mL  Total NET: 25 mL      Daily     Daily     LABS:                        7.8    9.45  )-----------( 164      ( 2022 08:00 )             23.6     11-07    140  |  108  |  18  ----------------------------<  143<H>  4.2   |  24  |  1.21    Ca    8.7      2022 08:00  Phos  3.1     -  Mg     1.9     11-07      Urinalysis Basic - ( 2022 00:32 )  Color: Light Yellow / Appearance: Clear / S.014 / pH: x  Gluc: x / Ketone: Negative  / Bili: Negative / Urobili: Negative   Blood: x / Protein: Negative / Nitrite: Negative   Leuk Esterase: Negative / RBC: x / WBC x   Sq Epi: x / Non Sq Epi: x / Bacteria: x      Physical Exam:   General: well developed, well nourished, NAD  HEENT: NCAT, trachea midline  Respiratory: respirations non labored  Gastrointestinal: soft, nontender, nondistended  Extremities: FROM, warm. R AT signal present. Pt with right foot in ACE/kirlex s/p 1&5ray resection with podiatry  Neurological: non-focal   N/A

## 2023-06-01 NOTE — DISCHARGE NOTE PROVIDER - NSDCHHENCOUNTER_GEN_ALL_CORE
27-Jan-2022 [facility]:  I discussed with the patient the risks of golimumab including but not limited to myelosuppression, immunosuppression, autoimmune hepatitis, demyelinating diseases, lymphoma, and serious infections.  The patient understands that monitoring is required including a PPD at baseline and must alert us or the primary physician if symptoms of infection or other concerning signs are noted.

## 2023-11-28 NOTE — HISTORY OF PRESENT ILLNESS
Patient advised, appointment scheduled Monday 12/4/23 at 1030am. She is having trouble having BMs so she will take Senna. Patient advised paperwork has been faxed to [facility] cares for patient assistance with Combivent. [FreeTextEntry1] : for  f/u  ht hld dm and cad  he says he is taking all his meds  and denies any side effects  he says he has  a cookie a  day. he says  he does  not over do the  carbs  his ongoing claudication limits his exercise

## 2024-04-02 NOTE — PRE-ANESTHESIA EVALUATION ADULT - WEIGHT IN LBS
[facility] [address] [id number] [phone]               Colonoscopy Operative Report      Indications:  Personal h/o colon polyps, last colonoscopy 2021    :  Miguel Omalley MD    Staff: Circulator: Carolyn Ga RN  Endoscopy Technician: Jayesh Chua     Referring Provider: Alexsander Dickson MD    Sedation:  MAC anesthesia    Procedure Details:  After informed consent was obtained with all risks and benefits of procedure explained and preoperative exam completed, the patient was taken to the endoscopy suite and placed in the left lateral decubitus position.  Upon sequential sedation as per above, a digital rectal exam was performed  And was normal.  The Olympus videocolonoscope  was inserted in the rectum and carefully advanced to the cecum, which was identified by the ileocecal valve and appendiceal orifice.  The quality of preparation was good.  The colonoscope was slowly withdrawn with careful evaluation between folds. Retroflexion in the rectum was performed and was normal..     Findings:   Rectum: normal  Sigmoid: normal  Descending Colon: normal  Transverse Colon: normal  Ascending Colon: normal  Cecum: normal  Terminal Ileum: not intubated    Interventions:  none    Specimen Removed: * No specimens in log *    EBL:  None    Complications:  None; patient tolerated the procedure well.    Impression:  -Normal colon exam without any polyps.     Recommendations:   -Resume normal medication(s).  -Repeat colonoscopy in 5 years.  -Regular diet.  -Follow up with primary care physician.        Discharge Disposition:  Home in the company of a  when able to ambulate.    Miguel Omalley MD  4/2/2024  9:54 AM    
195.1
195.1

## 2024-05-13 NOTE — H&P PST ADULT - HEIGHT IN INCHES
05/16/24      Juan Francisco Waterman  [address] [facility] [address]       To Whom it may concern:     This is to certify that the above patient is under my care and is being monitored annually for stable lung nodules. Last CT scan 10/18/23, I have included a copy of the report. Next CT scan is scheduled for 10/3/24. This patient has not been diagnosed with any type of Lung Cancer.     Please call my office if you have any questions        Electronically signed by Tristan Wei MD  [facility] [place]  [address]  [place] [address]  Phone: [phone]  Fax: [phone]                 11

## 2024-05-16 NOTE — DISCHARGE NOTE PROVIDER - NSDCMRMEDTOKEN_GEN_ALL_CORE_FT
aspirin 81 mg oral delayed release tablet: 1 tab(s) orally once a day  atorvastatin 80 mg oral tablet: 1 tab(s) orally once a day (at bedtime)  clopidogrel 75 mg oral tablet: 1 tab(s) orally once a day  DULoxetine 60 mg oral delayed release capsule: 1 cap(s) orally 2 times a day  glipiZIDE 5 mg oral tablet: 1 tab(s) orally once a day; last dose 24 hrs prior to sx  losartan 50 mg oral tablet: 1 tab(s) orally once a day  memantine 10 mg oral tablet: 1 tab(s) orally 2 times a day  metFORMIN 500 mg oral tablet: 1 tab(s) orally 2 times a day; last dose 24 hrs prior to sx  metoprolol: 12.5 milligram(s) orally 2 times a day  rivastigmine 13.3 mg/24 hr transdermal film, extended release: 1 patch transdermal every 24 hours   No

## 2024-09-19 NOTE — ED PROVIDER NOTE - EKG ADDITIONAL QUESTION - PERFORMED INDEPENDENT VISUALIZATION
Detail Level: Detailed Biopsy Photograph Reviewed: Yes Number Of Curettages: 3 Size Of Lesion In Cm: 1.1 Size Of Lesion After Curettage: 1.4 Add Intralesional Injection: No Total Volume (Ccs): 1 Anesthesia Type: 1% lidocaine with epinephrine Cautery Type: electrodesiccation What Was Performed First?: Curettage Final Size Statement: The size of the lesion after curettage was Additional Information: (Optional): The wound was cleaned, and a pressure dressing was applied.  The patient received detailed post-op instructions. Consent was obtained from the patient. The risks, benefits and alternatives to therapy were discussed in detail. Specifically, the risks of infection, scarring, bleeding, prolonged wound healing, nerve injury, incomplete removal, allergy to anesthesia and recurrence were addressed. Alternatives to ED&C, such as: surgical removal and XRT were also discussed.  Prior to the procedure, the treatment site was clearly identified and confirmed by the patient. All components of Universal Protocol/PAUSE Rule completed. Post-Care Instructions: I reviewed with the patient in detail post-care instructions. Patient is to keep the area dry for 48 hours, and not to engage in any swimming until the area is healed. Should the patient develop any fevers, chills, bleeding, severe pain patient will contact the office immediately. Bill As A Line Item Or As Units: Line Item Yes

## 2025-03-11 NOTE — PATIENT PROFILE ADULT - FALL HARM RISK CONCLUSION
----- Message from Krystal CORNEJO sent at 3/11/2025  8:35 AM EDT -----  Regarding: ECC Escalation To Practice  ECC Escalation To Practice      Type of Escalation: Acute Care Symptom  --------------------------------------------------------------------------------------------------------------------------    Information for Provider:   Patient is looking for appointment for: Symptom cough  Reasons for Message: Unable to reach practice     Additional Information :    Patient experiencing cough for two days now.  --------------------------------------------------------------------------------------------------------------------------    Relationship to Patient: Self  Call Back Info: OK to leave message on voicemail  Preferred Call Back Number: [phone]  
Pt has been scheduled.   
Universal Safety Interventions

## 2025-04-29 NOTE — PRE-ANESTHESIA EVALUATION ADULT - NSANTHTOBACCOSD_GEN_ALL_CORE
Reason for Conversation  Med Refill      Refill pended to provider for oxycodone IR 5 mg every 6 hours 120/30.  OARRS reviewed.  Due for refill.  Patient to follow up with Bina Lynn on 5/9.     No

## 2025-06-21 NOTE — HISTORY OF PRESENT ILLNESS
Patient: Adam Contreras    Procedure Summary       Date: 06/21/25 Room / Location: [facility] OR 10 / Virtual [facility] OR    Anesthesia Start: 1600 Anesthesia Stop: 1633    Procedure: INCISION AND DRAINAGE, NECK (Bilateral) Diagnosis:       Cellulitis and abscess of neck      (Cellulitis and abscess of neck [L03.221, L02.11])    Surgeons: Jarvis Kwong DMD Responsible Provider: Dayton García MD    Anesthesia Type: general ASA Status: 2            Anesthesia Type: general    Vitals Value Taken Time   /85 06/21/25 16:31   Temp 36 06/21/25 16:40   Pulse 84 06/21/25 16:40   Resp 13 06/21/25 16:40   SpO2 98 % 06/21/25 16:40   Vitals shown include unfiled device data.    Anesthesia Post Evaluation    Patient location during evaluation: PACU  Patient participation: complete - patient participated  Level of consciousness: awake  Pain management: adequate  Airway patency: patent  Cardiovascular status: acceptable  Respiratory status: acceptable  Hydration status: acceptable  Postoperative Nausea and Vomiting: none        There were no known notable events for this encounter.     [Coronary Artery Disease] : coronary artery disease [No Pertinent Pulmonary History] : no history of asthma, COPD, sleep apnea, or smoking [Chronic Anticoagulation] : chronic anticoagulation [Diabetes] : diabetes [(Patient denies any chest pain, claudication, dyspnea on exertion, orthopnea, palpitations or syncope)] : Patient denies any chest pain, claudication, dyspnea on exertion, orthopnea, palpitations or syncope [Family Member] : no family member with adverse anesthesia reaction/sudden death [Self] : no previous adverse anesthesia reaction [Chronic Kidney Disease] : no chronic kidney disease [FreeTextEntry1] : vascular bypass of lower exxtremity [FreeTextEntry2] : october 8, 2021 [FreeTextEntry3] : dr justino carlin

## (undated) DEVICE — DRSG KLING 6"

## (undated) DEVICE — PACKING GAUZE PLAIN 0.5"

## (undated) DEVICE — DRSG AQUACEL 3.5 X 10"

## (undated) DEVICE — DRAIN RESERVOIR FOR JACKSON PRATT 100CC CARDINAL

## (undated) DEVICE — TOURNIQUET SET SURE-SNARE 22FR (2 TUBES, 2 UMBILICAL TAPES, 2 PLASTIC SNARES) 5"

## (undated) DEVICE — SUT SOFSILK 0 18" TIES

## (undated) DEVICE — STAPLER SKIN VISI-STAT 35 WIDE

## (undated) DEVICE — DRAPE IOBAN 23" X 23"

## (undated) DEVICE — SYR TB 1CC 25G X 5/8 (BLUE)

## (undated) DEVICE — DRSG XEROFORM 1 X 8"

## (undated) DEVICE — SUT PROLENE 6-0 4-30" C-1

## (undated) DEVICE — SUT SOFSILK 4-0 30" TIES

## (undated) DEVICE — SUT PROLENE 7-0 4-24" BV-1

## (undated) DEVICE — SUT PROLENE 6-0 30" C-1

## (undated) DEVICE — DRSG COMBINE 5X9"

## (undated) DEVICE — NDL HYPO REGULAR BEVEL 25G X 1.5" (BLUE)

## (undated) DEVICE — SAW BLADE MICROAIRE SAGITTAL 9.4MMX25.4MMX0.6MM

## (undated) DEVICE — SOL IRR POUR H2O 250ML

## (undated) DEVICE — SUT BOOT STANDARD (YELLOW) 5 PAIR

## (undated) DEVICE — SUT SOFSILK 3-0 30" TIES

## (undated) DEVICE — DRSG AQUACEL 3.5 X 6"

## (undated) DEVICE — SUT PROLENE 6-0 18" BV-1

## (undated) DEVICE — DRAPE INSTRUMENT POUCH 6.75" X 11"

## (undated) DEVICE — DRSG ACE BANDAGE 6"

## (undated) DEVICE — SUT PROLENE 5-0 30" RB-2

## (undated) DEVICE — GLV 6.5 PROTEXIS (WHITE)

## (undated) DEVICE — PREP CHLORAPREP HI-LITE ORANGE 26ML

## (undated) DEVICE — DRAPE MAGNETIC INSTRUMENT MEDIUM

## (undated) DEVICE — VENODYNE/SCD SLEEVE CALF LARGE

## (undated) DEVICE — SOL INJ NS 0.9% 500ML 1-PORT

## (undated) DEVICE — CLAMP BULLDOG MAXI 45 DEGREE (ORANGE) DISP

## (undated) DEVICE — DRAPE MAYO STAND 30"

## (undated) DEVICE — DRAPE LIGHT HANDLE COVER (BLUE)

## (undated) DEVICE — SYR ASEPTO

## (undated) DEVICE — FOLEY TRAY 16FR 5CC LTX UMETER CLOSED

## (undated) DEVICE — SUT PROLENE 6-0 4-30" BV-1

## (undated) DEVICE — STRYKER INTERPULSE HANDPIECE W IRR SUCTION TUBE

## (undated) DEVICE — SUT POLYSORB 2-0 30" GS-21 UNDYED

## (undated) DEVICE — DRAPE SPLIT SHEET 77" X 108"

## (undated) DEVICE — SUCTION YANKAUER NO CONTROL VENT

## (undated) DEVICE — MEDICATION LABELS W MARKER

## (undated) DEVICE — SAW BLADE MICROAIRE SAGITTAL 5.8X25.4X0.6 MM

## (undated) DEVICE — PACKING GAUZE PLAIN 2"

## (undated) DEVICE — DRSG TEGADERM 8 X 12"

## (undated) DEVICE — STOPCOCK 4-WAY W SWIVEL MALE LUER LOCK NON VENTED RED CAP

## (undated) DEVICE — GLV 7.5 PROTEXIS (WHITE)

## (undated) DEVICE — WARMING BLANKET LOWER ADULT

## (undated) DEVICE — SOL IRR POUR NS 0.9% 500ML

## (undated) DEVICE — SUT SOFSILK 2-0 30" TIES

## (undated) DEVICE — TUNNELER SHEATH GREEN LARGE

## (undated) DEVICE — DRAPE ISOLATION BAG 20X20"

## (undated) DEVICE — DRSG STERISTRIPS 0.5 X 4"

## (undated) DEVICE — SAW BLADE MICROAIRE OSCILATING 25.4MM X 90MM X 1.27MM

## (undated) DEVICE — DRAPE TOWEL BLUE 17" X 24"

## (undated) DEVICE — ELCTR BVI ACCU-TEMP CAUTERY SHAFT FINE TIP 1/2"

## (undated) DEVICE — BLADE SCALPEL SAFETYLOCK #15

## (undated) DEVICE — DRAPE 1/2 SHEET 40X57"

## (undated) DEVICE — GLV 7 PROTEXIS (WHITE)

## (undated) DEVICE — GLV 8 PROTEXIS (WHITE)

## (undated) DEVICE — DRSG STOCKINETTE IMPERVIOUS MED

## (undated) DEVICE — NDL SAFETY BUTTERFLY 21G X 3/4

## (undated) DEVICE — CLAMP BULLDOG MIDI 45 DEGREE (GREEN) DISP

## (undated) DEVICE — SUT PLAIN GUT 4-0 18" P-12

## (undated) DEVICE — BULLDOG SPRING CLIP 6MM SOFT/SOFT

## (undated) DEVICE — PACKING GAUZE IODOFORM 2"

## (undated) DEVICE — DRSG OPSITE 13.75 X 4"

## (undated) DEVICE — DRSG STOCKINETTE IMPERVIOUS XL

## (undated) DEVICE — INFLATION DEVICE BASIXCOMPAK

## (undated) DEVICE — LAP PAD 18 X 18"

## (undated) DEVICE — CLAMP BULLDOG MINI STRAIGHT (GREEN) DISP

## (undated) DEVICE — PACK EXTREMITY

## (undated) DEVICE — ELCTR HEX BLADE

## (undated) DEVICE — PACK FEM/POP

## (undated) DEVICE — DRAPE 3/4 SHEET W REINFORCEMENT 56X77"

## (undated) DEVICE — PREP BETADINE 5% STERILE OPTHALMIC SOLUTION

## (undated) DEVICE — BUR STRYKER OVAL SOLID CARBIDE MED 4MM

## (undated) DEVICE — CLAMP BULLDOG MIDI 45 DEGREE (YELLOW) DISP

## (undated) DEVICE — DRSG KLING 4"

## (undated) DEVICE — MARKING PEN W RULER

## (undated) DEVICE — POSITIONER FOAM EGG CRATE ULNAR 2PCS (PINK)

## (undated) DEVICE — DRAIN JACKSON PRATT 10MM FLAT FULL NO TROCAR

## (undated) DEVICE — SYR LUER LOK 10CC

## (undated) DEVICE — DRSG TEGADERM 6"X8"

## (undated) DEVICE — SPECIMEN CONTAINER 100ML

## (undated) DEVICE — PREP BETADINE KIT

## (undated) DEVICE — WARMING BLANKET UPPER ADULT